# Patient Record
Sex: MALE | Race: BLACK OR AFRICAN AMERICAN | Employment: UNEMPLOYED | URBAN - METROPOLITAN AREA
[De-identification: names, ages, dates, MRNs, and addresses within clinical notes are randomized per-mention and may not be internally consistent; named-entity substitution may affect disease eponyms.]

---

## 2020-11-10 ENCOUNTER — HOSPITAL ENCOUNTER (EMERGENCY)
Age: 60
Discharge: HOME | End: 2020-11-10
Attending: EMERGENCY MEDICINE

## 2020-11-10 VITALS
WEIGHT: 175 LBS | HEIGHT: 71 IN | RESPIRATION RATE: 14 BRPM | TEMPERATURE: 98.4 F | OXYGEN SATURATION: 100 % | SYSTOLIC BLOOD PRESSURE: 139 MMHG | HEART RATE: 87 BPM | DIASTOLIC BLOOD PRESSURE: 106 MMHG | BODY MASS INDEX: 24.5 KG/M2

## 2020-11-10 LAB
A/G RATIO: 1.1 (ref 1.1–2.2)
ALBUMIN SERPL-MCNC: 3.9 G/DL (ref 3.4–5)
ALP BLD-CCNC: 137 U/L (ref 40–129)
ALT SERPL-CCNC: 74 U/L (ref 10–40)
ANION GAP SERPL CALCULATED.3IONS-SCNC: 12 MMOL/L (ref 3–16)
AST SERPL-CCNC: 139 U/L (ref 15–37)
BASOPHILS ABSOLUTE: 0 K/UL (ref 0–0.2)
BASOPHILS RELATIVE PERCENT: 0.5 %
BILIRUB SERPL-MCNC: 0.8 MG/DL (ref 0–1)
BILIRUBIN URINE: NEGATIVE
BLOOD, URINE: NEGATIVE
BUN BLDV-MCNC: 13 MG/DL (ref 7–20)
CALCIUM SERPL-MCNC: 8.9 MG/DL (ref 8.3–10.6)
CHLORIDE BLD-SCNC: 100 MMOL/L (ref 99–110)
CLARITY: CLEAR
CO2: 22 MMOL/L (ref 21–32)
COLOR: YELLOW
CREAT SERPL-MCNC: 0.9 MG/DL (ref 0.8–1.3)
EOSINOPHILS ABSOLUTE: 0.1 K/UL (ref 0–0.6)
EOSINOPHILS RELATIVE PERCENT: 0.8 %
GFR AFRICAN AMERICAN: >60
GFR NON-AFRICAN AMERICAN: >60
GLOBULIN: 3.4 G/DL
GLUCOSE BLD-MCNC: 151 MG/DL (ref 70–99)
GLUCOSE URINE: NEGATIVE MG/DL
HCT VFR BLD CALC: 48.6 % (ref 40.5–52.5)
HEMOGLOBIN: 16.3 G/DL (ref 13.5–17.5)
KETONES, URINE: NEGATIVE MG/DL
LEUKOCYTE ESTERASE, URINE: ABNORMAL
LYMPHOCYTES ABSOLUTE: 3 K/UL (ref 1–5.1)
LYMPHOCYTES RELATIVE PERCENT: 46.6 %
MCH RBC QN AUTO: 34.8 PG (ref 26–34)
MCHC RBC AUTO-ENTMCNC: 33.5 G/DL (ref 31–36)
MCV RBC AUTO: 103.8 FL (ref 80–100)
MICROSCOPIC EXAMINATION: YES
MONOCYTES ABSOLUTE: 0.8 K/UL (ref 0–1.3)
MONOCYTES RELATIVE PERCENT: 11.6 %
MUCUS: ABNORMAL /LPF
NEUTROPHILS ABSOLUTE: 2.6 K/UL (ref 1.7–7.7)
NEUTROPHILS RELATIVE PERCENT: 40.5 %
NITRITE, URINE: NEGATIVE
PDW BLD-RTO: 13.1 % (ref 12.4–15.4)
PH UA: 6 (ref 5–8)
PLATELET # BLD: 72 K/UL (ref 135–450)
PMV BLD AUTO: 9 FL (ref 5–10.5)
POTASSIUM REFLEX MAGNESIUM: 4.7 MMOL/L (ref 3.5–5.1)
PROTEIN UA: NEGATIVE MG/DL
RBC # BLD: 4.68 M/UL (ref 4.2–5.9)
RBC UA: ABNORMAL /HPF (ref 0–4)
RENAL EPITHELIAL, UA: ABNORMAL /HPF (ref 0–1)
SODIUM BLD-SCNC: 134 MMOL/L (ref 136–145)
SPECIFIC GRAVITY UA: 1.02 (ref 1–1.03)
TOTAL PROTEIN: 7.3 G/DL (ref 6.4–8.2)
URINE TYPE: ABNORMAL
UROBILINOGEN, URINE: 1 E.U./DL
WBC # BLD: 6.5 K/UL (ref 4–11)
WBC UA: ABNORMAL /HPF (ref 0–5)

## 2020-11-10 PROCEDURE — 81001 URINALYSIS AUTO W/SCOPE: CPT

## 2020-11-10 PROCEDURE — 96360 HYDRATION IV INFUSION INIT: CPT

## 2020-11-10 PROCEDURE — 85025 COMPLETE CBC W/AUTO DIFF WBC: CPT

## 2020-11-10 PROCEDURE — 96361 HYDRATE IV INFUSION ADD-ON: CPT

## 2020-11-10 PROCEDURE — 2580000003 HC RX 258: Performed by: EMERGENCY MEDICINE

## 2020-11-10 PROCEDURE — 80053 COMPREHEN METABOLIC PANEL: CPT

## 2020-11-10 PROCEDURE — 99284 EMERGENCY DEPT VISIT MOD MDM: CPT

## 2020-11-10 PROCEDURE — 84443 ASSAY THYROID STIM HORMONE: CPT

## 2020-11-10 RX ORDER — SODIUM CHLORIDE, SODIUM LACTATE, POTASSIUM CHLORIDE, CALCIUM CHLORIDE 600; 310; 30; 20 MG/100ML; MG/100ML; MG/100ML; MG/100ML
1000 INJECTION, SOLUTION INTRAVENOUS ONCE
Status: COMPLETED | OUTPATIENT
Start: 2020-11-10 | End: 2020-11-10

## 2020-11-10 RX ADMIN — SODIUM CHLORIDE, POTASSIUM CHLORIDE, SODIUM LACTATE AND CALCIUM CHLORIDE 1000 ML: 600; 310; 30; 20 INJECTION, SOLUTION INTRAVENOUS at 01:37

## 2020-11-10 ASSESSMENT — ENCOUNTER SYMPTOMS
NAUSEA: 0
DIARRHEA: 1
BACK PAIN: 0
COUGH: 0
ABDOMINAL PAIN: 0
VOMITING: 0
SHORTNESS OF BREATH: 0

## 2020-11-10 NOTE — ED PROVIDER NOTES
[address] [facility]          ATTENDING PHYSICIAN NOTE       Date of evaluation: 11/10/2020    Chief Complaint     Fatigue      History of Present Illness     Rere Soriano is a 60 y.o. male who presents with a complaint of fatigue and generalized weakness. The patient states that he has been feeling weak for the last week or so with intermittent loose stools. He states he feels like \"the energy has been sucked out of him\". He did get tested for coronavirus this Friday but his results are not back yet. He denies respiratory symptoms, chest pain, or exertional dyspnea. He denies abdominal pain. He denies nausea or vomiting. [facility] because his energy had not returned. He just moved here from [place] and does not yet have primary care in the area but is unsure establishing primary care. Medical history significant only for hepatitis C for which he is currently not being treated. Review of Systems     Review of Systems   Constitutional: Positive for fatigue. Negative for chills and fever. Respiratory: Negative for cough and shortness of breath. Cardiovascular: Negative for chest pain. Gastrointestinal: Positive for diarrhea. Negative for abdominal pain, nausea and vomiting. Endocrine: Negative for cold intolerance and heat intolerance. Genitourinary: Negative for decreased urine volume, dysuria and urgency. Musculoskeletal: Negative for back pain and neck pain. Neurological: Positive for weakness. Negative for dizziness. All other systems reviewed and are negative. Past Medical, Surgical, Family, and Social History     He has a past medical history of Hepatitis C. He has no past surgical history on file. His family history is not on file. He reports that he has been smoking cigarettes. He has been smoking about 0.50 packs per day.  He has never used smokeless tobacco. He reports current alcohol use. He reports previous drug use. Medications     There are no discharge medications for this patient. Allergies     He has No Known Allergies. Physical Exam     INITIAL VITALS: BP: (!) 163/112, Temp: 98.4 °F (36.9 °C), Pulse: 90, Resp: 16, SpO2: 100 %   Physical Exam  Constitutional: Well nourished middle aged -American male who appears in no acute distress. HEENT: NC/AT. PERRL 4-2 bilaterally. EOMI. CV:Heart is regular rate and rhythm without murmurs, rubs or gallops. Resp: Respirations unlabored. Lungs clear to auscultation w/o wheezing. Abd: Soft, nontender, nondistended. MSK: Full ROM, no edema or tenderness to palpation. Skin: Extremities are warm and well perfused. 2+ radial pulses . Cap Refill <3 seconds. Neuro: A&Ox3. Cranial nerves grossly intact   Strength and sensation intact x4 extremities. Psych:  Thought content, behavior & judgement normal.    Diagnostic Results         RADIOLOGY:  No orders to display       LABS:   Results for orders placed or performed during the hospital encounter of 11/10/20   CBC Auto Differential   Result Value Ref Range    WBC 6.5 4.0 - 11.0 K/uL    RBC 4.68 4.20 - 5.90 M/uL    Hemoglobin 16.3 13.5 - 17.5 g/dL    Hematocrit 48.6 40.5 - 52.5 %    .8 (H) 80.0 - 100.0 fL    MCH 34.8 (H) 26.0 - 34.0 pg    MCHC 33.5 31.0 - 36.0 g/dL    RDW 13.1 12.4 - 15.4 %    Platelets 72 (L) 093 - 450 K/uL    MPV 9.0 5.0 - 10.5 fL    Neutrophils % 40.5 %    Lymphocytes % 46.6 %    Monocytes % 11.6 %    Eosinophils % 0.8 %    Basophils % 0.5 %    Neutrophils Absolute 2.6 1.7 - 7.7 K/uL    Lymphocytes Absolute 3.0 1.0 - 5.1 K/uL    Monocytes Absolute 0.8 0.0 - 1.3 K/uL    Eosinophils Absolute 0.1 0.0 - 0.6 K/uL    Basophils Absolute 0.0 0.0 - 0.2 K/uL   Comprehensive Metabolic Panel w/ Reflex to MG   Result Value Ref Range    Sodium 134 (L) 136 - 145 mmol/L    Potassium reflex Magnesium 4.7 3.5 - 5.1 mmol/L    Chloride repeated this time. He endorses some diarrhea, but has no recent antibiotic use or other findings concerning for C. difficile colitis. It is possible that his diarrhea and fatigue are due to novel coronavirus infection or another viral gastroenteritis. His urinalysis was unremarkable with trace leuks but he has no symptoms of a UTI. He has no sign of dehydration and urinalysis. CBC showed thrombocytopenia, which is uncertain if this is new or old for the patient. I did discuss with him the need for outpatient work-up of this. However he has no active signs of bleeding currently. The remainder of his cell lines appear to be within normal range. Medardo Meade His comprehensive metabolic panel showed a mild transaminitis consistent with chronic hepatitis from hepatitis C. He was given a liter of fluids for dehydration. He was instructed to follow-up with a primary care physician. I did offer him linkage to a primary care physician in our system but he prefers to \"shop for himself \". As such he will be discharged with return precautions for bleeding, and instructed to avoid NSAIDs due to the risk of bleeding with thrombocytopenia. Clinical Impression     1. Fatigue, unspecified type    2. Thrombocytopenia [facility])        Disposition     PATIENT REFERRED TO:  [facility] Emergency Department  [address] [place] [address]  [phone]    If symptoms worsen    A PCP  For further monitoring and testing of your platelet count, hepatitis, and other chronic medical issues          DISCHARGE MEDICATIONS:  There are no discharge medications for this patient.       DISPOSITION Decision To Discharge 11/10/2020 04:24:16 AM          Clarisa Vernon MD  11/10/20 [id number]

## 2020-11-10 NOTE — ED NOTES
Patient prepared for and ready to be discharged. Dressed in clothes and given belongings. IV removed, pt tolerated well, no complications. Patient discharged at this time in no acute distress after he verbalized understanding of discharge instructions. Reviewed medications, and when to return to the ED with patient.  Encouraged follow up with new PCP/[facility] clinic  Patient walked to miguel Haque RN  11/10/20 4204

## 2020-11-13 LAB — TSH REFLEX: 2.72 UIU/ML (ref 0.27–4.2)

## 2021-04-16 ENCOUNTER — HOSPITAL ENCOUNTER (INPATIENT)
Age: 61
LOS: 1 days | Discharge: HOME | DRG: 204 | End: 2021-04-18
Attending: EMERGENCY MEDICINE | Admitting: INTERNAL MEDICINE
Payer: MEDICAID

## 2021-04-16 ENCOUNTER — APPOINTMENT (OUTPATIENT)
Dept: GENERAL RADIOLOGY | Age: 61
DRG: 204 | End: 2021-04-16
Payer: MEDICAID

## 2021-04-16 ENCOUNTER — APPOINTMENT (OUTPATIENT)
Dept: CT IMAGING | Age: 61
DRG: 204 | End: 2021-04-16
Payer: MEDICAID

## 2021-04-16 DIAGNOSIS — R55 SYNCOPE AND COLLAPSE: Primary | ICD-10-CM

## 2021-04-16 LAB
ANION GAP SERPL CALCULATED.3IONS-SCNC: 18 MMOL/L (ref 3–16)
ANION GAP SERPL CALCULATED.3IONS-SCNC: NORMAL MMOL/L (ref 3–16)
BASOPHILS ABSOLUTE: 0 K/UL (ref 0–0.2)
BASOPHILS RELATIVE PERCENT: 0.1 %
BUN BLDV-MCNC: 9 MG/DL (ref 7–20)
BUN BLDV-MCNC: NORMAL MG/DL (ref 7–20)
CALCIUM SERPL-MCNC: 9.2 MG/DL (ref 8.3–10.6)
CALCIUM SERPL-MCNC: NORMAL MG/DL (ref 8.3–10.6)
CHLORIDE BLD-SCNC: 97 MMOL/L (ref 99–110)
CHLORIDE BLD-SCNC: NORMAL MMOL/L (ref 99–110)
CO2: 20 MMOL/L (ref 21–32)
CO2: NORMAL MMOL/L (ref 21–32)
CREAT SERPL-MCNC: 0.9 MG/DL (ref 0.8–1.3)
CREAT SERPL-MCNC: NORMAL MG/DL (ref 0.8–1.3)
EKG ATRIAL RATE: 73 BPM
EKG DIAGNOSIS: NORMAL
EKG P AXIS: 55 DEGREES
EKG P-R INTERVAL: 140 MS
EKG Q-T INTERVAL: 418 MS
EKG QRS DURATION: 78 MS
EKG QTC CALCULATION (BAZETT): 460 MS
EKG R AXIS: -23 DEGREES
EKG T AXIS: 37 DEGREES
EKG VENTRICULAR RATE: 73 BPM
EOSINOPHILS ABSOLUTE: 0 K/UL (ref 0–0.6)
EOSINOPHILS RELATIVE PERCENT: 0 %
GFR AFRICAN AMERICAN: >60
GFR AFRICAN AMERICAN: NORMAL
GFR NON-AFRICAN AMERICAN: >60
GFR NON-AFRICAN AMERICAN: NORMAL
GLUCOSE BLD-MCNC: 110 MG/DL (ref 70–99)
GLUCOSE BLD-MCNC: NORMAL MG/DL (ref 70–99)
HCT VFR BLD CALC: 43.4 % (ref 40.5–52.5)
HEMOGLOBIN: 14.9 G/DL (ref 13.5–17.5)
LYMPHOCYTES ABSOLUTE: 1.2 K/UL (ref 1–5.1)
LYMPHOCYTES RELATIVE PERCENT: 21.7 %
MACROCYTES: ABNORMAL
MCH RBC QN AUTO: 34.9 PG (ref 26–34)
MCHC RBC AUTO-ENTMCNC: 34.3 G/DL (ref 31–36)
MCV RBC AUTO: 101.7 FL (ref 80–100)
MONOCYTES ABSOLUTE: 0.6 K/UL (ref 0–1.3)
MONOCYTES RELATIVE PERCENT: 9.8 %
NEUTROPHILS ABSOLUTE: 3.9 K/UL (ref 1.7–7.7)
NEUTROPHILS RELATIVE PERCENT: 68.4 %
PDW BLD-RTO: 12.8 % (ref 12.4–15.4)
PLATELET # BLD: 62 K/UL (ref 135–450)
PMV BLD AUTO: 9.6 FL (ref 5–10.5)
POTASSIUM REFLEX MAGNESIUM: 5 MMOL/L (ref 3.5–5.1)
PRO-BNP: NORMAL PG/ML (ref 0–124)
RBC # BLD: 4.26 M/UL (ref 4.2–5.9)
SODIUM BLD-SCNC: 135 MMOL/L (ref 136–145)
SODIUM BLD-SCNC: NORMAL MMOL/L (ref 136–145)
TROPONIN: <0.01 NG/ML
TROPONIN: NORMAL NG/ML
WBC # BLD: 5.7 K/UL (ref 4–11)

## 2021-04-16 PROCEDURE — 96360 HYDRATION IV INFUSION INIT: CPT

## 2021-04-16 PROCEDURE — 72220 X-RAY EXAM SACRUM TAILBONE: CPT

## 2021-04-16 PROCEDURE — 70450 CT HEAD/BRAIN W/O DYE: CPT

## 2021-04-16 PROCEDURE — 99283 EMERGENCY DEPT VISIT LOW MDM: CPT

## 2021-04-16 PROCEDURE — 71045 X-RAY EXAM CHEST 1 VIEW: CPT

## 2021-04-16 PROCEDURE — 85025 COMPLETE CBC W/AUTO DIFF WBC: CPT

## 2021-04-16 PROCEDURE — 6370000000 HC RX 637 (ALT 250 FOR IP): Performed by: PHYSICIAN ASSISTANT

## 2021-04-16 PROCEDURE — 80048 BASIC METABOLIC PNL TOTAL CA: CPT

## 2021-04-16 PROCEDURE — 72100 X-RAY EXAM L-S SPINE 2/3 VWS: CPT

## 2021-04-16 PROCEDURE — 83880 ASSAY OF NATRIURETIC PEPTIDE: CPT

## 2021-04-16 PROCEDURE — G0378 HOSPITAL OBSERVATION PER HR: HCPCS

## 2021-04-16 PROCEDURE — 84484 ASSAY OF TROPONIN QUANT: CPT

## 2021-04-16 PROCEDURE — 2580000003 HC RX 258: Performed by: PHYSICIAN ASSISTANT

## 2021-04-16 PROCEDURE — 93005 ELECTROCARDIOGRAM TRACING: CPT | Performed by: EMERGENCY MEDICINE

## 2021-04-16 RX ORDER — SODIUM CHLORIDE 0.9 % (FLUSH) 0.9 %
5-40 SYRINGE (ML) INJECTION EVERY 12 HOURS SCHEDULED
Status: DISCONTINUED | OUTPATIENT
Start: 2021-04-17 | End: 2021-04-18 | Stop reason: HOSPADM

## 2021-04-16 RX ORDER — ACETAMINOPHEN 650 MG/1
650 SUPPOSITORY RECTAL EVERY 6 HOURS PRN
Status: DISCONTINUED | OUTPATIENT
Start: 2021-04-16 | End: 2021-04-18 | Stop reason: HOSPADM

## 2021-04-16 RX ORDER — ACETAMINOPHEN 325 MG/1
650 TABLET ORAL EVERY 6 HOURS PRN
Status: DISCONTINUED | OUTPATIENT
Start: 2021-04-16 | End: 2021-04-18 | Stop reason: HOSPADM

## 2021-04-16 RX ORDER — SODIUM CHLORIDE 0.9 % (FLUSH) 0.9 %
5-40 SYRINGE (ML) INJECTION PRN
Status: DISCONTINUED | OUTPATIENT
Start: 2021-04-16 | End: 2021-04-18 | Stop reason: HOSPADM

## 2021-04-16 RX ORDER — SODIUM CHLORIDE 9 MG/ML
INJECTION, SOLUTION INTRAVENOUS CONTINUOUS
Status: ACTIVE | OUTPATIENT
Start: 2021-04-17 | End: 2021-04-17

## 2021-04-16 RX ORDER — SODIUM CHLORIDE 9 MG/ML
25 INJECTION, SOLUTION INTRAVENOUS PRN
Status: DISCONTINUED | OUTPATIENT
Start: 2021-04-16 | End: 2021-04-18 | Stop reason: HOSPADM

## 2021-04-16 RX ORDER — MAGNESIUM SULFATE IN WATER 40 MG/ML
2000 INJECTION, SOLUTION INTRAVENOUS PRN
Status: DISCONTINUED | OUTPATIENT
Start: 2021-04-16 | End: 2021-04-18 | Stop reason: HOSPADM

## 2021-04-16 RX ORDER — ONDANSETRON 2 MG/ML
4 INJECTION INTRAMUSCULAR; INTRAVENOUS EVERY 6 HOURS PRN
Status: DISCONTINUED | OUTPATIENT
Start: 2021-04-16 | End: 2021-04-18 | Stop reason: HOSPADM

## 2021-04-16 RX ORDER — OXYCODONE HYDROCHLORIDE AND ACETAMINOPHEN 5; 325 MG/1; MG/1
1 TABLET ORAL ONCE
Status: COMPLETED | OUTPATIENT
Start: 2021-04-16 | End: 2021-04-16

## 2021-04-16 RX ORDER — POLYETHYLENE GLYCOL 3350 17 G/17G
17 POWDER, FOR SOLUTION ORAL DAILY PRN
Status: DISCONTINUED | OUTPATIENT
Start: 2021-04-16 | End: 2021-04-18 | Stop reason: HOSPADM

## 2021-04-16 RX ORDER — POTASSIUM CHLORIDE 20 MEQ/1
40 TABLET, EXTENDED RELEASE ORAL PRN
Status: DISCONTINUED | OUTPATIENT
Start: 2021-04-16 | End: 2021-04-18 | Stop reason: HOSPADM

## 2021-04-16 RX ORDER — PROMETHAZINE HYDROCHLORIDE 25 MG/1
12.5 TABLET ORAL EVERY 6 HOURS PRN
Status: DISCONTINUED | OUTPATIENT
Start: 2021-04-16 | End: 2021-04-18 | Stop reason: HOSPADM

## 2021-04-16 RX ORDER — 0.9 % SODIUM CHLORIDE 0.9 %
1000 INTRAVENOUS SOLUTION INTRAVENOUS ONCE
Status: COMPLETED | OUTPATIENT
Start: 2021-04-16 | End: 2021-04-17

## 2021-04-16 RX ORDER — POTASSIUM CHLORIDE 7.45 MG/ML
10 INJECTION INTRAVENOUS PRN
Status: DISCONTINUED | OUTPATIENT
Start: 2021-04-16 | End: 2021-04-18 | Stop reason: HOSPADM

## 2021-04-16 RX ORDER — IBUPROFEN 400 MG/1
800 TABLET ORAL ONCE
Status: COMPLETED | OUTPATIENT
Start: 2021-04-16 | End: 2021-04-16

## 2021-04-16 RX ADMIN — IBUPROFEN 800 MG: 400 TABLET, FILM COATED ORAL at 23:01

## 2021-04-16 RX ADMIN — OXYCODONE AND ACETAMINOPHEN 1 TABLET: 5; 325 TABLET ORAL at 23:01

## 2021-04-16 RX ADMIN — SODIUM CHLORIDE 1000 ML: 9 INJECTION, SOLUTION INTRAVENOUS at 23:09

## 2021-04-16 ASSESSMENT — PAIN DESCRIPTION - DESCRIPTORS: DESCRIPTORS: ACHING

## 2021-04-16 ASSESSMENT — PAIN DESCRIPTION - ONSET: ONSET: ON-GOING

## 2021-04-16 ASSESSMENT — PAIN DESCRIPTION - PAIN TYPE: TYPE: ACUTE PAIN

## 2021-04-16 ASSESSMENT — PAIN SCALES - GENERAL: PAINLEVEL_OUTOF10: 7

## 2021-04-16 ASSESSMENT — PAIN DESCRIPTION - LOCATION: LOCATION: SACRUM

## 2021-04-16 ASSESSMENT — PAIN - FUNCTIONAL ASSESSMENT: PAIN_FUNCTIONAL_ASSESSMENT: ACTIVITIES ARE NOT PREVENTED

## 2021-04-16 NOTE — ED PROVIDER NOTES
[facility]          PHYSICIAN ASSISTANT NOTE     Date of evaluation: 4/16/2021    ADDENDUM:      Care of this patient was assumed from Steffanie Terrazas MD.  The patient was seen for Loss of Consciousness (c/o light headed prior to syncope, + LOC while at work, ? length of time couple minutes ) and Head Injury (hit left side of head)  . The patient's initial evaluation and plan have been discussed with the prior provider who initially evaluated the patient. Nursing Notes, Past Medical Hx, Past Surgical Hx, Social Hx, Allergies, and Family Hx were all reviewed. Diagnostic Results     EKG   EKG NSR, left axis devation, poor R wave progression, no acute ischemic changes. RADIOLOGY:  XR SACRUM COCCYX (MIN 2 VIEWS)   Final Result   Impression:    No acute osseous injury. XR LUMBAR SPINE (2-3 VIEWS)   Final Result   Impression:    No acute osseous injury. CT Head WO Contrast   Final Result      1. No acute intracranial process. 2.  Mild chronic small vessel ischemic disease. XR CHEST PORTABLE   Final Result      No acute pulmonary disease.              LABS:   Results for orders placed or performed during the hospital encounter of 04/16/21   CBC Auto Differential   Result Value Ref Range    WBC 5.7 4.0 - 11.0 K/uL    RBC 4.26 4.20 - 5.90 M/uL    Hemoglobin 14.9 13.5 - 17.5 g/dL    Hematocrit 43.4 40.5 - 52.5 %    .7 (H) 80.0 - 100.0 fL    MCH 34.9 (H) 26.0 - 34.0 pg    MCHC 34.3 31.0 - 36.0 g/dL    RDW 12.8 12.4 - 15.4 %    Platelets 62 (L) 257 - 450 K/uL    MPV 9.6 5.0 - 10.5 fL    Neutrophils % 68.4 %    Lymphocytes % 21.7 %    Monocytes % 9.8 %    Eosinophils % 0.0 %    Basophils % 0.1 %    Neutrophils Absolute 3.9 1.7 - 7.7 K/uL    Lymphocytes Absolute 1.2 1.0 - 5.1 K/uL    Monocytes Absolute 0.6 0.0 - 1.3 K/uL    Eosinophils Absolute 0.0 0.0 - 0.6 K/uL    Basophils Absolute 0.0 0.0 - 0.2 K/uL    Macrocytes 1+ (A)    Basic Metabolic Panel w/ no acute fractures. Labs obtained revealed WBC 5.7, hemoglobin 14.9, creatinine 0.9, glucose 110, anion gap 18, sodium 135, negative troponin, . Given syncopal episode and hx of near syncope episodes with no good plan for follow up, will plan to admit patient to medicine at this time. This patient was also evaluated by the attending physician. All care plans were discussed and agreed upon. Clinical Impression     1.  Syncope and collapse        Disposition     DISPOSITION  Admit         Kenji Flores PA-C  04/16/21 7540

## 2021-04-16 NOTE — ED PROVIDER NOTES
ED Attending Attestation Note     Date of evaluation: 4/16/2021    This patient was seen by the resident. I have seen and examined the patient, agree with the workup, evaluation, management and diagnosis. The care plan has been discussed. I have reviewed the ECG and concur with the resident's interpretation. My assessment reveals patient here for a syncopal episode. Patient states over the past year he has had episodes of lightheadedness and near syncope but today had a true syncopal episode. He passed out while at work. No exposure to any chemicals or fumes. He fell on his buttocks and then fell to the side hitting his forehead. Also hit his left elbow. No reports of any seizure activity. He denies any recent fever chills sweats. He at 1 point lived in [place] but has moved back here to [address] [place] [address]. He denies any heart lung issues. Denies any history of diabetes. On exam patient in no acute distress and vitals as noted nurse's notes. Thin black male no acute distress. Patient does have a small abrasion in his left upper forehead with a small hematoma. Some slight bruising over his left elbow peripheral range of motion no tenderness. Good strength in his lower extremities. Neurologic revealed speech clear no focal deficits. Jose Alarcon MD  04/16/21 5134

## 2021-04-16 NOTE — LETTER
[address] [facility]  [address] [address]  Phone: [phone]             April 18, 2021    Patient: Marietta Lindo   YOB: 1960   Date of Visit: 4/16/2021       To Whom It May Concern:    Marietta Lindo was seen and treated in our facility  beginning 4/16/2021 until 4/18/2021.       Sincerely,       Cyndie Cooks, MD         Signature:__________________________________

## 2021-04-17 PROBLEM — I95.1 ORTHOSTATIC SYNCOPE: Status: ACTIVE | Noted: 2021-04-17

## 2021-04-17 LAB
ALBUMIN SERPL-MCNC: 2.6 G/DL (ref 3.4–5)
ALP BLD-CCNC: 93 U/L (ref 40–129)
ALT SERPL-CCNC: 69 U/L (ref 10–40)
ANION GAP SERPL CALCULATED.3IONS-SCNC: 9 MMOL/L (ref 3–16)
AST SERPL-CCNC: 107 U/L (ref 15–37)
BASOPHILS ABSOLUTE: 0 K/UL (ref 0–0.2)
BASOPHILS RELATIVE PERCENT: 0.2 %
BILIRUB SERPL-MCNC: 0.9 MG/DL (ref 0–1)
BILIRUBIN DIRECT: 0.3 MG/DL (ref 0–0.3)
BILIRUBIN, INDIRECT: 0.6 MG/DL (ref 0–1)
BUN BLDV-MCNC: 10 MG/DL (ref 7–20)
CALCIUM SERPL-MCNC: 7.7 MG/DL (ref 8.3–10.6)
CHLORIDE BLD-SCNC: 101 MMOL/L (ref 99–110)
CO2: 23 MMOL/L (ref 21–32)
CREAT SERPL-MCNC: 0.8 MG/DL (ref 0.8–1.3)
EOSINOPHILS ABSOLUTE: 0 K/UL (ref 0–0.6)
EOSINOPHILS RELATIVE PERCENT: 0.6 %
FOLATE: 10.61 NG/ML (ref 4.78–24.2)
GFR AFRICAN AMERICAN: >60
GFR NON-AFRICAN AMERICAN: >60
GLUCOSE BLD-MCNC: 171 MG/DL (ref 70–99)
HCT VFR BLD CALC: 34.1 % (ref 40.5–52.5)
HEMOGLOBIN: 11.8 G/DL (ref 13.5–17.5)
INR BLD: 1.26 (ref 0.86–1.14)
LYMPHOCYTES ABSOLUTE: 2.1 K/UL (ref 1–5.1)
LYMPHOCYTES RELATIVE PERCENT: 43.9 %
MCH RBC QN AUTO: 35 PG (ref 26–34)
MCHC RBC AUTO-ENTMCNC: 34.6 G/DL (ref 31–36)
MCV RBC AUTO: 101.1 FL (ref 80–100)
MONOCYTES ABSOLUTE: 0.7 K/UL (ref 0–1.3)
MONOCYTES RELATIVE PERCENT: 13.6 %
NEUTROPHILS ABSOLUTE: 2 K/UL (ref 1.7–7.7)
NEUTROPHILS RELATIVE PERCENT: 41.7 %
PDW BLD-RTO: 12.6 % (ref 12.4–15.4)
PLATELET # BLD: 41 K/UL (ref 135–450)
PMV BLD AUTO: 9.4 FL (ref 5–10.5)
POTASSIUM REFLEX MAGNESIUM: 4.1 MMOL/L (ref 3.5–5.1)
PROTHROMBIN TIME: 14.6 SEC (ref 10–13.2)
RBC # BLD: 3.38 M/UL (ref 4.2–5.9)
SODIUM BLD-SCNC: 133 MMOL/L (ref 136–145)
TOTAL PROTEIN: 5.3 G/DL (ref 6.4–8.2)
TSH REFLEX: 1.37 UIU/ML (ref 0.27–4.2)
VITAMIN B-12: 1214 PG/ML (ref 211–911)
WBC # BLD: 4.9 K/UL (ref 4–11)

## 2021-04-17 PROCEDURE — 85610 PROTHROMBIN TIME: CPT

## 2021-04-17 PROCEDURE — 85025 COMPLETE CBC W/AUTO DIFF WBC: CPT

## 2021-04-17 PROCEDURE — 1200000000 HC SEMI PRIVATE

## 2021-04-17 PROCEDURE — 6370000000 HC RX 637 (ALT 250 FOR IP): Performed by: INTERNAL MEDICINE

## 2021-04-17 PROCEDURE — 36415 COLL VENOUS BLD VENIPUNCTURE: CPT

## 2021-04-17 PROCEDURE — 80076 HEPATIC FUNCTION PANEL: CPT

## 2021-04-17 PROCEDURE — 84443 ASSAY THYROID STIM HORMONE: CPT

## 2021-04-17 PROCEDURE — 82746 ASSAY OF FOLIC ACID SERUM: CPT

## 2021-04-17 PROCEDURE — 80048 BASIC METABOLIC PNL TOTAL CA: CPT

## 2021-04-17 PROCEDURE — G0378 HOSPITAL OBSERVATION PER HR: HCPCS

## 2021-04-17 PROCEDURE — 82607 VITAMIN B-12: CPT

## 2021-04-17 PROCEDURE — 93005 ELECTROCARDIOGRAM TRACING: CPT | Performed by: INTERNAL MEDICINE

## 2021-04-17 PROCEDURE — 2580000003 HC RX 258: Performed by: INTERNAL MEDICINE

## 2021-04-17 PROCEDURE — 99255 IP/OBS CONSLTJ NEW/EST HI 80: CPT | Performed by: INTERNAL MEDICINE

## 2021-04-17 RX ORDER — LISINOPRIL 10 MG/1
10 TABLET ORAL DAILY
Status: DISCONTINUED | OUTPATIENT
Start: 2021-04-18 | End: 2021-04-18 | Stop reason: HOSPADM

## 2021-04-17 RX ORDER — LISINOPRIL 5 MG/1
5 TABLET ORAL ONCE
Status: COMPLETED | OUTPATIENT
Start: 2021-04-17 | End: 2021-04-17

## 2021-04-17 RX ORDER — LABETALOL HYDROCHLORIDE 5 MG/ML
5 INJECTION, SOLUTION INTRAVENOUS EVERY 4 HOURS PRN
Status: DISCONTINUED | OUTPATIENT
Start: 2021-04-17 | End: 2021-04-18 | Stop reason: HOSPADM

## 2021-04-17 RX ORDER — LISINOPRIL 5 MG/1
5 TABLET ORAL DAILY
Status: DISCONTINUED | OUTPATIENT
Start: 2021-04-17 | End: 2021-04-17

## 2021-04-17 RX ORDER — NICOTINE 21 MG/24HR
1 PATCH, TRANSDERMAL 24 HOURS TRANSDERMAL DAILY
Status: DISCONTINUED | OUTPATIENT
Start: 2021-04-17 | End: 2021-04-18 | Stop reason: HOSPADM

## 2021-04-17 RX ADMIN — SODIUM CHLORIDE 25 ML: 9 INJECTION, SOLUTION INTRAVENOUS at 19:58

## 2021-04-17 RX ADMIN — LISINOPRIL 5 MG: 5 TABLET ORAL at 16:38

## 2021-04-17 RX ADMIN — Medication 5 ML: at 21:47

## 2021-04-17 RX ADMIN — SODIUM CHLORIDE: 9 INJECTION, SOLUTION INTRAVENOUS at 00:04

## 2021-04-17 RX ADMIN — ACETAMINOPHEN 650 MG: 325 TABLET ORAL at 00:56

## 2021-04-17 RX ADMIN — LISINOPRIL 5 MG: 5 TABLET ORAL at 14:37

## 2021-04-17 RX ADMIN — ACETAMINOPHEN 650 MG: 325 TABLET ORAL at 16:41

## 2021-04-17 ASSESSMENT — PAIN DESCRIPTION - ORIENTATION: ORIENTATION: MID

## 2021-04-17 ASSESSMENT — PAIN SCALES - GENERAL: PAINLEVEL_OUTOF10: 6

## 2021-04-17 ASSESSMENT — PAIN - FUNCTIONAL ASSESSMENT: PAIN_FUNCTIONAL_ASSESSMENT: ACTIVITIES ARE NOT PREVENTED

## 2021-04-17 ASSESSMENT — PAIN DESCRIPTION - DESCRIPTORS: DESCRIPTORS: ACHING

## 2021-04-17 ASSESSMENT — PAIN DESCRIPTION - LOCATION: LOCATION: SACRUM

## 2021-04-17 ASSESSMENT — PAIN DESCRIPTION - PAIN TYPE: TYPE: ACUTE PAIN

## 2021-04-17 NOTE — ED NOTES
Bed: B20-20  Expected date:   Expected time:   Means of arrival:   Comments:  [facility]  04/16/21 9723
Report to [address], transport to floor      [place], [address] [place] [address]  04/16/21 0059
3

## 2021-04-17 NOTE — CONSULTS
[facility]  Cardiology Inpatient Consult Service                                                                                          Pt Name: Bri Whitehead  Age: 63 y.o. Sex: male  : 1960  Location: [facility] [place] [facility]/5316-01    Referring Physician: Jadyn Pillai MD      Reason for Consult:       Reason for Consultation/Chief Complaint:   Syncope      HPI:      Bri Whitehead is a 63 y.o. male with no significant PMH who syncopal episode. He states he has 3-4 episodes over the past year. 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 He was not standing for too long. States having a feeling of flushing/sweating/diaphoresis followed by lightheadedness and then passing out. No palpitations. Histories     Past Medical History:   has a past medical history of Hepatitis C. Surgical History:   has no past surgical history on file. Social History:   reports that he has been smoking cigars. He has been smoking about 0.00 packs per day. He has never used smokeless tobacco. He reports current alcohol use. He reports that he does not use drugs. Family History:  No evidence for sudden cardiac death or premature CAD      Medications:       Home Medications  Were reviewed and are listed in nursing record. and/or listed below  Prior to Admission medications    Not on File          Inpatient Medications:   sodium chloride flush  5-40 mL Intravenous 2 times per day       IV drips:   sodium chloride      sodium chloride 100 mL/hr at 21 0004       PRN:  sodium chloride flush, sodium chloride, promethazine **OR** ondansetron, polyethylene glycol, acetaminophen **OR** acetaminophen, potassium chloride **OR** potassium alternative oral replacement **OR** potassium chloride, magnesium sulfate    Allergy:     Patient has no known allergies. Review of Systems:     All 12 point review of symptoms completed.  Pertinent positives identified in the HPI, all other review of symptoms negative as below. CONSTITUTIONAL: syncope    SKIN: No rash or pruritis. EYES: No visual changes or diplopia. No scleral icterus. ENT: No Headaches, hearing loss or vertigo. No mouth sores or sore throat. CARDIOVASCULAR: No chest pain/chest pressure/chest discomfort. No palpitations. RESPIRATORY: No cough or wheezing, no sputum production. No hematemesis. GASTROINTESTINAL: No N/V/D. No abdominal pain, appetite loss, blood in stools. GENITOURINARY: No dysuria, trouble voiding, or hematuria. MUSCULOSKELETAL:  No gait disturbance, weakness or joint complaints. NEUROLOGICAL: syncope  PSHYCH: No anxiety, loss of interest, change in sexual behavior, feelings of self-harm, or confusion. ENDOCRINE: No malaise, fatigue or temperature intolerance. No excessive thirst, fluid intake, or urination. No tremor. HEMATOLOGIC: No abnormal bruising or bleeding. ALLERGY: No nasal congestion or hives.       Physical Examination:     Vitals:    04/16/21 2348 04/17/21 0347 04/17/21 0901 04/17/21 0903   BP: (!) 159/92 (!) 152/94 (!) 167/97 (!) 160/96   Pulse: 86 91 91    Resp: 18 18 16    Temp: 98.6 °F (37 °C) 98.3 °F (36.8 °C) 98.4 °F (36.9 °C)    TempSrc: Oral Oral Oral    SpO2: 95% 98% 94%    Weight:       Height:           Wt Readings from Last 3 Encounters:   04/16/21 175 lb (79.4 kg)   11/10/20 175 lb (79.4 kg)       Objective      General Appearance:  Alert, cooperative, no distress, appears stated age Appropriate weight   Head:  Normocephalic, without obvious abnormality, atraumatic   Eyes:  PERRL, conjunctiva/corneas clear EOM intact  Ears normal   Throat no lesions       Nose: Nares normal, no drainage or sinus tenderness   Throat: Lips, mucosa, and tongue normal   Neck: Supple, symmetrical, trachea midline, no adenopathy, thyroid: not enlarged, symmetric, no tenderness/mass/nodules, no carotid bruit or JVD       Lungs:   Clear to auscultation bilaterally, respirations unlabored Chest Wall:  No tenderness or deformity   Heart:  Regular rate and rhythm, S1, S2 normal, no murmur, rub or gallop PMI intact   Abdomen:   Soft, non-tender, bowel sounds active all four quadrants,  no masses, no organomegaly       Extremities: Extremities normal, atraumatic, no cyanosis or edema   Pulses: 2+ and symmetric   Skin: Skin color, texture, turgor normal, no rashes or lesions   Pysch: Normal mood and affect   Neurologic: Normal gross motor and sensory exam.  Cranial nerves intact        Labs:     Recent Labs     04/16/21 1743 04/16/21 1851 04/17/21  0628   NA see below 135* 133*   K  --  5.0 4.1   BUN see below 9 10   CREATININE see below 0.9 0.8   CL see below 97* 101   CO2 see below 20* 23   GLUCOSE see below 110* 171*   CALCIUM see below 9.2 7.7*     Recent Labs     04/16/21 1743 04/17/21  0628   WBC 5.7 4.9   HGB 14.9 11.8*   HCT 43.4 34.1*   PLT 62* 41*   .7* 101.1*     No results for input(s): CHOLTOT, TRIG, HDL in the last 72 hours. Invalid input(s): LIPIDCOMM, CHOLHDL, VLDCHOL, LDL  Recent Labs     04/17/21  1026   INR 1.26*     Recent Labs     04/16/21 1743 04/16/21 1851   TROPONINI see below <0.01     No results for input(s): BNP in the last 72 hours. No results for input(s): TSH in the last 72 hours. No results for input(s): CHOL, HDL, LDLCALC, TRIG in the last 72 hours.]    Lab Results   Component Value Date    TROPONINI <0.01 04/16/2021         Imaging:     I personally reviewed imaging studies including CXR, Stress test, TTE/SACHA. Last ECG (if available) - EKG:  I have reviewed EKG with the following interpretation  NSR    Telemetry:  NSR    Last Stress (if available):    Last TTE/SACHA(if available):    Last Cath (if available):    Last CMR  (if available):      Assessment / Plan:     Syncope  -Seems vasovagal in nature.  -Echo pending. -EKG unremarkable. -Orthostatics    Tobacco use was discussed with the patient and educated on the negative effects.  I have asked the

## 2021-04-17 NOTE — H&P
Hospital Medicine History & Physical      PCP: No primary care provider on file. Date of Admission: 4/16/2021    Date of Service: Pt seen/examined on 4/16/2021. Placed in Observation. Chief Complaint: Syncopal episode at work      History Of Present Illness:     63 y.o. male who presents after a syncopal episode at work. 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 No witnessed seizure activity. According to bystanders he has been unconscious for about 2 minutes. Patient states that he did strike his head on the left. Patient states that it was his first day at a new job as a  and he passed out even before he started his job while folding some towels. Patient has  Patient has recently moved to [place] from [place] and the day he was leaving he was having lunch with his friend and when he got up to leave he felt extreme brightness, lightheadedness and passed out. He has been having episodes of near syncope at least 4-5 times over the past 1 and half years. Patient denies chest pain, shortness of breath, nausea, vomiting, palpitations, urinary symptoms, abdominal pain. Past Medical History:          Diagnosis Date    Hepatitis C        Past Surgical History:      History reviewed. No pertinent surgical history. Medications Prior to Admission:      Prior to Admission medications    Not on File       Allergies:  Patient has no known allergies. Social History:      TOBACCO:   reports that he has been smoking cigars. He has been smoking about 0.00 packs per day. He has never used smokeless tobacco.  ETOH:   reports current alcohol use. E-Cigarettes/Vaping Use     Questions Responses    E-Cigarette/Vaping Use Never User    Start Date     Passive Exposure     Quit Date     Counseling Given     Comments             Family History:    Reviewed in detail and negative for DM, CAD, Cancer, CVA.  Positive as follows:    History reviewed. No pertinent family history. REVIEW OF SYSTEMS:   Pertinent positives as noted in the HPI. All other systems reviewed and negative. PHYSICAL EXAM PERFORMED:    /86   Pulse 87   Temp 97.6 °F (36.4 °C) (Oral)   Resp 18   Ht 5' 11\" (1.803 m)   Wt 175 lb (79.4 kg)   SpO2 100%   BMI 24.41 kg/m²     General appearance:  No apparent distress, appears stated age and cooperative. Thin built -American male  HEENT:  Normal cephalic, atraumatic without obvious deformity. Pupils equal, round, and reactive to light. Extra ocular muscles intact. Conjunctivae/corneas clear. Neck: Supple, with full range of motion. No jugular venous distention. Trachea midline. Respiratory:  Normal respiratory effort. Clear to auscultation, bilaterally without Rales/Wheezes/Rhonchi. Cardiovascular:  Regular rate and rhythm with normal S1/S2 without murmurs, rubs or gallops. Abdomen: Soft, non-tender, non-distended with normal bowel sounds. Musculoskeletal:  No clubbing, cyanosis or edema bilaterally. Full range of motion without deformity. Skin: Skin color, texture, turgor normal.  Hypopigmented spots/vitiligo all over the body  Neurologic:  Neurovascularly intact without any focal sensory/motor deficits. Cranial nerves: II-XII intact, grossly non-focal.  Psychiatric:  Alert and oriented, thought content appropriate, normal insight  Capillary Refill: Brisk,< 3 seconds   Peripheral Pulses: +2 palpable, equal bilaterally       Labs:     Recent Labs     04/16/21 1743   WBC 5.7   HGB 14.9   HCT 43.4   PLT 62*     Recent Labs     04/16/21 1743 04/16/21 1851   NA see below 135*   K  --  5.0   CL see below 97*   CO2 see below 20*   BUN see below 9   CREATININE see below 0.9   CALCIUM see below 9.2     No results for input(s): AST, ALT, BILIDIR, BILITOT, ALKPHOS in the last 72 hours. No results for input(s): INR in the last 72 hours.   Recent Labs     04/16/21 1743 04/16/21 1851   TROPONINI see below <0.01

## 2021-04-17 NOTE — PLAN OF CARE
Patient is free from falls, patient in bed in lowest position, bed wheels locked, bed alarm on, call light and bed table within reach, bathroom checked q2hrs and PRN. Chair alarm on when used, wheels locked. Due to history of syncope, patient is educated to call for assistance before getting oob.  Will continue to monitor

## 2021-04-18 VITALS
OXYGEN SATURATION: 100 % | TEMPERATURE: 98.1 F | SYSTOLIC BLOOD PRESSURE: 133 MMHG | RESPIRATION RATE: 16 BRPM | WEIGHT: 175 LBS | BODY MASS INDEX: 24.5 KG/M2 | HEIGHT: 71 IN | HEART RATE: 91 BPM | DIASTOLIC BLOOD PRESSURE: 89 MMHG

## 2021-04-18 PROBLEM — I10 HTN (HYPERTENSION): Status: ACTIVE | Noted: 2021-04-18

## 2021-04-18 PROBLEM — D69.6 THROMBOCYTOPENIA (HCC): Status: ACTIVE | Noted: 2021-04-18

## 2021-04-18 PROBLEM — R74.8 ABNORMAL LIVER ENZYMES: Status: ACTIVE | Noted: 2021-04-18

## 2021-04-18 LAB
ANION GAP SERPL CALCULATED.3IONS-SCNC: 7 MMOL/L (ref 3–16)
BASOPHILS ABSOLUTE: 0 K/UL (ref 0–0.2)
BASOPHILS RELATIVE PERCENT: 0.8 %
BUN BLDV-MCNC: 7 MG/DL (ref 7–20)
CALCIUM SERPL-MCNC: 8.2 MG/DL (ref 8.3–10.6)
CHLORIDE BLD-SCNC: 102 MMOL/L (ref 99–110)
CO2: 25 MMOL/L (ref 21–32)
CREAT SERPL-MCNC: 0.7 MG/DL (ref 0.8–1.3)
EOSINOPHILS ABSOLUTE: 0 K/UL (ref 0–0.6)
EOSINOPHILS RELATIVE PERCENT: 1 %
GFR AFRICAN AMERICAN: >60
GFR NON-AFRICAN AMERICAN: >60
GLUCOSE BLD-MCNC: 192 MG/DL (ref 70–99)
HCT VFR BLD CALC: 34.8 % (ref 40.5–52.5)
HEMOGLOBIN: 12 G/DL (ref 13.5–17.5)
LYMPHOCYTES ABSOLUTE: 1.4 K/UL (ref 1–5.1)
LYMPHOCYTES RELATIVE PERCENT: 37.8 %
MCH RBC QN AUTO: 34.8 PG (ref 26–34)
MCHC RBC AUTO-ENTMCNC: 34.6 G/DL (ref 31–36)
MCV RBC AUTO: 100.8 FL (ref 80–100)
MONOCYTES ABSOLUTE: 0.5 K/UL (ref 0–1.3)
MONOCYTES RELATIVE PERCENT: 13.4 %
NEUTROPHILS ABSOLUTE: 1.8 K/UL (ref 1.7–7.7)
NEUTROPHILS RELATIVE PERCENT: 47 %
PDW BLD-RTO: 12.7 % (ref 12.4–15.4)
PLATELET # BLD: 39 K/UL (ref 135–450)
PMV BLD AUTO: 9.3 FL (ref 5–10.5)
POTASSIUM REFLEX MAGNESIUM: 4 MMOL/L (ref 3.5–5.1)
RBC # BLD: 3.45 M/UL (ref 4.2–5.9)
SODIUM BLD-SCNC: 134 MMOL/L (ref 136–145)
WBC # BLD: 3.8 K/UL (ref 4–11)

## 2021-04-18 PROCEDURE — 80048 BASIC METABOLIC PNL TOTAL CA: CPT

## 2021-04-18 PROCEDURE — 85025 COMPLETE CBC W/AUTO DIFF WBC: CPT

## 2021-04-18 PROCEDURE — 6370000000 HC RX 637 (ALT 250 FOR IP): Performed by: INTERNAL MEDICINE

## 2021-04-18 PROCEDURE — 2580000003 HC RX 258: Performed by: INTERNAL MEDICINE

## 2021-04-18 PROCEDURE — 36415 COLL VENOUS BLD VENIPUNCTURE: CPT

## 2021-04-18 RX ORDER — LISINOPRIL 10 MG/1
10 TABLET ORAL DAILY
Qty: 30 TABLET | Refills: 3 | Status: SHIPPED | OUTPATIENT
Start: 2021-04-18 | End: 2021-04-18 | Stop reason: HOSPADM

## 2021-04-18 RX ORDER — LISINOPRIL 5 MG/1
5 TABLET ORAL DAILY
Qty: 30 TABLET | Refills: 0 | Status: SHIPPED | OUTPATIENT
Start: 2021-04-18 | End: 2021-06-17

## 2021-04-18 RX ADMIN — LISINOPRIL 10 MG: 10 TABLET ORAL at 09:32

## 2021-04-18 RX ADMIN — Medication 5 ML: at 09:32

## 2021-04-18 NOTE — DISCHARGE SUMMARY
Hospital Medicine Discharge Summary      Patient ID: Ricardo Brian      Patient's PCP: No primary care provider on file. Admit Date: 4/16/2021     Discharge Date: 4/18/2021      Admitting Physician: Devendra Hollingsworth MD    Discharge Physician: Leonie Reinoso MD     Discharge Diagnoses: Active Hospital Problems    Diagnosis Date Noted    Abnormal liver enzymes [R74.8] 04/18/2021    Thrombocytopenia (HCC) [D69.6] 04/18/2021    HTN (hypertension) [I10] 04/18/2021    Orthostatic syncope [I95.1] 04/17/2021    Syncope and collapse [R55] 04/16/2021         The patient was seen and examined on day of discharge and this discharge summary is in conjunction with any daily progress note from day of discharge. Hospital Course:     Patient is a 64 y/o male who recently relocated to [place] and presented with syncope at his work. Patient had several episodes in the past but did not have medical work up, has not seen a doctor in many years. He was told he has hepatitis C, he was not treated with antiviral medications. Patient works as a  in a relatively hot environment. Hospital course:  Cardiology was consulted, patient admitted to telemetry. Orthostatic vitals were positive. Given IVF with improvement. Overall SBP was elevated to 170's. Started on lisinopril. Cardiology cleared patient for discharge and outpatient work up. Labs were concerning for possible liver cirrhosis. Patient was asked to stop drinking all alcoholic drinks. He was referred to cardiology, PCP and GI on discharge. Etiology of his syncope likely vasovagal with orthostatic component (multifactorial).      Consults:     IP CONSULT TO HOSPITALIST  IP CONSULT TO CARDIOLOGY    Disposition: Home    Discharged Condition: Stable    Code Status: Prior    Activity: activity as tolerated    Diet: regular diet    Follow Up: Primary Care Physician in one week    Exam:     General appearance: No apparent distress, appears stated age and cooperative. Lungs: Clear to ascultation, bilaterally without Rales/Wheezes/Rhonchi with good respiratory effort. Heart: Regular rate and rhythm with Normal S1/S2 without  murmurs, rubs or gallops, point of maximum impulse non-displaced  Abdomen: Soft, non-tender or non-distended without rigidity or guarding and positive bowel sounds all four quadrants. Extremities: No clubbing, cyanosis, or edema bilaterally. Skin: Skin color, texture, turgor normal.    Neurologic: Alert and oriented X 3,  grossly non-focal.  Mental status: Alert, oriented, thought content appropriate      Labs: For convenience and continuity at follow-up the following most recent labs are provided:    CBC:   Lab Results   Component Value Date    WBC 3.8 04/18/2021    HGB 12.0 04/18/2021    HCT 34.8 04/18/2021    PLT 39 04/18/2021       RENAL:   Lab Results   Component Value Date     04/18/2021    K 4.0 04/18/2021     04/18/2021    CO2 25 04/18/2021    BUN 7 04/18/2021    CREATININE 0.7 04/18/2021           Discharge Medications:   Discharge Medication List as of 4/18/2021 12:11 PM           Details   lisinopril (PRINIVIL;ZESTRIL) 5 MG tablet Take 1 tablet by mouth daily, Disp-30 tablet, R-0Print                Time Spent on discharge is more than 30 minutes in the examination, evaluation, counseling and review of medications and discharge plan. Signed:  Candance Abu, MD   4/18/2021      Thank you No primary care provider on file. for the opportunity to be involved in this patient's care. If you have any questions or concerns please feel free to contact me at [phone].

## 2021-04-18 NOTE — PROGRESS NOTES
4 Eyes Admission Assessment     I agree as the admission nurse that 2 RN's have performed a thorough Head to Toe Skin Assessment on the patient. ALL assessment sites listed below have been assessed on admission.        Areas assessed by both nurses:   [x]   Head, Face, and Ears   [x]   Shoulders, Back, and Chest  [x]   Arms, Elbows, and Hands   [x]   Coccyx, Sacrum, and Ischium  [x]   Legs, Feet, and Heels        Does the Patient have Skin Breakdown?  no        César Prevention initiated:  no   Wound Care Orders initiated:  no      [facility] nurse consulted for Pressure Injury (Stage 3,4, Unstageable, DTI, NWPT, and Complex wounds) or César score 18 or lower:  no    Nurse 1 eSignature:  Electronically signed by Yanni Solomon RN on 4/17/2021 at 12:35 AM      Nurse 2 eSignature: Electronically signed by Santos Velarde RN on 4/17/21 at 6:12 AM EDT
Discharge instructions given reguarding medications, follow up appts and follow up testing. Patient verbalized understanding.  Patient discharged to home per [employer] ordered by social work
No significant events overnight. Pt remained AAOx4, denies any episodes of weakness, dizzyness, sweating, subjective palpitations or other symptoms either in bed or while ambulating to bathroom. Maintained NSR on telemetry, calm and co-opertive with care.
Pt ao4, vss. C/o pain on the sacrum and buttocks which was relieved w prn tylenol. This RN verified w dr Jennifer Honeycutt about giving tylenol considering pt has hx hep C, MD approved to give. Pt denied any sob, n/v, chestpain, but some light dizziness. Pt was educated to call for assistance before oob for his safety. Bed alarm remains active.  Will continue to monitor
four quadrants. Extremities: No clubbing, cyanosis, or edema bilaterally. Skin: Skin color, texture, turgor normal.   Neurologic: Alert and oriented X 3,   grossly non-focal.  Mental status: Alert, oriented, thought content appropriate. Data    Recent Labs     04/16/21 1743 04/17/21  0628   WBC 5.7 4.9   HGB 14.9 11.8*   HCT 43.4 34.1*   PLT 62* 41*      Recent Labs     04/16/21  1743 04/16/21  1851 04/17/21  0628   NA see below 135* 133*   K  --  5.0 4.1   CL see below 97* 101   CO2 see below 20* 23   BUN see below 9 10   CREATININE see below 0.9 0.8     No results for input(s): AST, ALT, ALB, BILIDIR, BILITOT, ALKPHOS in the last 72 hours. Recent Labs     04/17/21  1026   INR 1.26*     Recent Labs     04/16/21 1743 04/16/21  1851   TROPONINI see below <0.01       Consults:     IP CONSULT TO HOSPITALIST  [facility]    Diagnosis Date Noted    Orthostatic syncope [I95.1] 04/17/2021    Syncope and collapse [R55] 04/16/2021         ASSESSMENT AND PLAN      Recurrent syncope:  Has not had any work up prior. Keep on tele. Consult cardiology. Obtain ECHO and carotid duplex. Repeat EKG today. Orthostatic vitals are positive- given IVF. Will repeat tomorrow. HTN:  Start low dose lisinopril    H/o hep C:  Labs are concerning for cirrhosis. Will need referral to GI. Thrombocytopenia:  With elevated MCV. Obtain labs.          DVT Prophylaxis: SCD  Diet: DIET GENERAL;  Code Status: Full Code    PT/OT Eval Status:ordered    Dispo - inpt    Lucas Agustin MD

## 2021-04-19 LAB
EKG ATRIAL RATE: 84 BPM
EKG DIAGNOSIS: NORMAL
EKG P AXIS: 76 DEGREES
EKG P-R INTERVAL: 140 MS
EKG Q-T INTERVAL: 384 MS
EKG QRS DURATION: 76 MS
EKG QTC CALCULATION (BAZETT): 453 MS
EKG R AXIS: 7 DEGREES
EKG T AXIS: 62 DEGREES
EKG VENTRICULAR RATE: 84 BPM

## 2021-04-19 PROCEDURE — 93010 ELECTROCARDIOGRAM REPORT: CPT | Performed by: INTERNAL MEDICINE

## 2021-04-23 ASSESSMENT — ENCOUNTER SYMPTOMS
SORE THROAT: 0
BACK PAIN: 0
ABDOMINAL PAIN: 0
EYE REDNESS: 0
TROUBLE SWALLOWING: 0
DIARRHEA: 0
BLOOD IN STOOL: 0
EYE PAIN: 0
SHORTNESS OF BREATH: 0
VOMITING: 0
COUGH: 0
NAUSEA: 0
RHINORRHEA: 0

## 2021-04-23 NOTE — ED PROVIDER NOTES
[address] [facility]          EM RESIDENT NOTE       Date of evaluation: 4/16/2021    Chief Complaint     Loss of Consciousness (c/o light headed prior to syncope, + LOC while at work, ? length of time couple minutes ) and Head Injury (hit left side of head)      History of Present Illness     Ariela Mcwilliams is a 63 y.o. male who presents to the emergency department for syncope and collapse. 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 Denies preceding symptoms. Loss of consciousness length a few minutes. He reports that he hit his head. Has had 1 similar episode in the past where he felt like he was going to pass out but did not actually pass out. Patient has a history of hepatitis C but no history of cardiac abnormalities. Review of Systems     Review of Systems   Constitutional: Negative for chills, fatigue, fever and unexpected weight change. HENT: Negative for congestion, ear pain, rhinorrhea, sore throat and trouble swallowing. Bruise on head    Eyes: Negative for pain and redness. Respiratory: Negative for cough and shortness of breath. No hemoptysis   Cardiovascular: Negative for chest pain, palpitations and leg swelling. Gastrointestinal: Negative for abdominal pain, blood in stool, diarrhea, nausea and vomiting. Genitourinary: Negative for dysuria and hematuria. Musculoskeletal: Negative for back pain and myalgias. Skin: Negative for rash and wound. Neurological: Negative for dizziness, weakness and headaches. No sensation changes  Loss of consciousness    Psychiatric/Behavioral: Negative for dysphoric mood and suicidal ideas. All other systems reviewed and are negative. Past Medical, Surgical, Family, and Social History     He has a past medical history of Hepatitis C. He has no past surgical history on file. His family history is not on file.   He reports that he has been smoking cigars. He has been smoking about 0.00 packs per day. He has never used smokeless tobacco. He reports current alcohol use. He reports that he does not use drugs. Medications     Discharge Medication List as of 4/18/2021 12:11 PM          Allergies     He has No Known Allergies. Physical Exam     INITIAL VITALS: BP: 116/86, Temp: 97.6 °F (36.4 °C), Pulse: 76, Resp: 12, SpO2: 99 %   Physical Exam  Vitals signs and nursing note reviewed. Exam conducted with a chaperone present. Constitutional:       General: He is not in acute distress. Appearance: Normal appearance. He is not ill-appearing. HENT:      Head: Normocephalic. Comments: Small bruise on the head      Nose: Nose normal.      Mouth/Throat:      Mouth: Mucous membranes are moist.      Pharynx: Oropharynx is clear. Eyes:      Extraocular Movements: Extraocular movements intact. Conjunctiva/sclera: Conjunctivae normal.      Pupils: Pupils are equal, round, and reactive to light. Neck:      Musculoskeletal: Normal range of motion and neck supple. No muscular tenderness. Cardiovascular:      Rate and Rhythm: Normal rate and regular rhythm. Pulses: Normal pulses. Heart sounds: Normal heart sounds. No murmur. No friction rub. No gallop. Pulmonary:      Effort: Pulmonary effort is normal. No respiratory distress. Breath sounds: Normal breath sounds. No wheezing, rhonchi or rales. Abdominal:      General: Abdomen is flat. Bowel sounds are normal.      Palpations: Abdomen is soft. Tenderness: There is no abdominal tenderness. There is no guarding or rebound. Musculoskeletal: Normal range of motion. General: No swelling, tenderness, deformity or signs of injury. Right lower leg: No edema. Left lower leg: No edema. Skin:     General: Skin is warm and dry. Capillary Refill: Capillary refill takes less than 2 seconds. Findings: No rash.    Neurological:      General: No focal Eosinophils % 0.6 %    Basophils % 0.2 %    Neutrophils Absolute 2.0 1.7 - 7.7 K/uL    Lymphocytes Absolute 2.1 1.0 - 5.1 K/uL    Monocytes Absolute 0.7 0.0 - 1.3 K/uL    Eosinophils Absolute 0.0 0.0 - 0.6 K/uL    Basophils Absolute 0.0 0.0 - 0.2 K/uL   Hepatic function panel   Result Value Ref Range    Total Protein 5.3 (L) 6.4 - 8.2 g/dL    Albumin 2.6 (L) 3.4 - 5.0 g/dL    Alkaline Phosphatase 93 40 - 129 U/L    ALT 69 (H) 10 - 40 U/L     (H) 15 - 37 U/L    Total Bilirubin 0.9 0.0 - 1.0 mg/dL    Bilirubin, Direct 0.3 0.0 - 0.3 mg/dL    Bilirubin, Indirect 0.6 0.0 - 1.0 mg/dL   Protime-INR   Result Value Ref Range    Protime 14.6 (H) 10.0 - 13.2 sec    INR 1.26 (H) 0.86 - 1.14   Folate   Result Value Ref Range    Folate 10.61 4.78 - 24.20 ng/mL   TSH with Reflex   Result Value Ref Range    TSH 1.37 0.27 - 4.20 uIU/mL   Vitamin B12   Result Value Ref Range    Vitamin B-12 1214 (H) 211 - 911 pg/mL   Basic Metabolic Panel w/ Reflex to MG   Result Value Ref Range    Sodium 134 (L) 136 - 145 mmol/L    Potassium reflex Magnesium 4.0 3.5 - 5.1 mmol/L    Chloride 102 99 - 110 mmol/L    CO2 25 21 - 32 mmol/L    Anion Gap 7 3 - 16    Glucose 192 (H) 70 - 99 mg/dL    BUN 7 7 - 20 mg/dL    CREATININE 0.7 (L) 0.8 - 1.3 mg/dL    GFR Non-African American >60 >60    GFR African American >60 >60    Calcium 8.2 (L) 8.3 - 10.6 mg/dL   CBC auto differential   Result Value Ref Range    WBC 3.8 (L) 4.0 - 11.0 K/uL    RBC 3.45 (L) 4.20 - 5.90 M/uL    Hemoglobin 12.0 (L) 13.5 - 17.5 g/dL    Hematocrit 34.8 (L) 40.5 - 52.5 %    .8 (H) 80.0 - 100.0 fL    MCH 34.8 (H) 26.0 - 34.0 pg    MCHC 34.6 31.0 - 36.0 g/dL    RDW 12.7 12.4 - 15.4 %    Platelets 39 (L) 990 - 450 K/uL    MPV 9.3 5.0 - 10.5 fL    Neutrophils % 47.0 %    Lymphocytes % 37.8 %    Monocytes % 13.4 %    Eosinophils % 1.0 %    Basophils % 0.8 %    Neutrophils Absolute 1.8 1.7 - 7.7 K/uL    Lymphocytes Absolute 1.4 1.0 - 5.1 K/uL    Monocytes Absolute 0.5 0.0 - 1.3 K/uL    Eosinophils Absolute 0.0 0.0 - 0.6 K/uL    Basophils Absolute 0.0 0.0 - 0.2 K/uL   EKG 12 Lead   Result Value Ref Range    Ventricular Rate 73 BPM    Atrial Rate 73 BPM    P-R Interval 140 ms    QRS Duration 78 ms    Q-T Interval 418 ms    QTc Calculation (Bazett) 460 ms    P Axis 55 degrees    R Axis -23 degrees    T Axis 37 degrees    Diagnosis       EKG performed in ER and to be interpreted by ER physician. Confirmed by MD, ER ([id number]),  LENCHO GARCIA ([id number]) on 4/16/2021 4:33:31 PM   EKG 12 Lead   Result Value Ref Range    Ventricular Rate 84 BPM    Atrial Rate 84 BPM    P-R Interval 140 ms    QRS Duration 76 ms    Q-T Interval 384 ms    QTc Calculation (Bazett) 453 ms    P Axis 76 degrees    R Axis 7 degrees    T Axis 62 degrees    Diagnosis       Normal sinus rhythmCannot rule out Anterior infarct , age undeterminedAbnormal ECGConfirmed by Bunny Isidro ([id number]) on 4/19/2021 8:57:04 AM         RECENT VITALS:  BP: 133/89, Temp: 98.1 °F (36.7 °C), Pulse: 91,Resp: 16, SpO2: 100 %     Procedures     None     ED Course     Nursing Notes, Past Medical Hx, Past Surgical Hx, Social Hx, Allergies, and Family Hx were reviewed.     The patient was given the followingmedications:  Orders Placed This Encounter   Medications    DISCONTD: sodium chloride flush 0.9 % injection 5-40 mL    DISCONTD: sodium chloride flush 0.9 % injection 5-40 mL    DISCONTD: 0.9 % sodium chloride infusion    DISCONTD: promethazine (PHENERGAN) tablet 12.5 mg    DISCONTD: ondansetron (ZOFRAN) injection 4 mg    DISCONTD: polyethylene glycol (GLYCOLAX) packet 17 g    DISCONTD: acetaminophen (TYLENOL) tablet 650 mg    DISCONTD: acetaminophen (TYLENOL) suppository 650 mg    DISCONTD: potassium chloride (KLOR-CON M) extended release tablet 40 mEq    DISCONTD: potassium bicarb-citric acid (EFFER-K) effervescent tablet 40 mEq    DISCONTD: potassium chloride 10 mEq/100 mL IVPB (Peripheral Line)    DISCONTD: magnesium sulfate 2000 mg in 50 mL IVPB premix    0.9 % sodium chloride infusion    0.9 % sodium chloride bolus    oxyCODONE-acetaminophen (PERCOCET) 5-325 MG per tablet 1 tablet    ibuprofen (ADVIL;MOTRIN) tablet 800 mg    DISCONTD: nicotine (NICODERM CQ) 14 MG/24HR 1 patch    DISCONTD: lisinopril (PRINIVIL;ZESTRIL) tablet 5 mg    lisinopril (PRINIVIL;ZESTRIL) tablet 5 mg    DISCONTD: lisinopril (PRINIVIL;ZESTRIL) tablet 10 mg    DISCONTD: labetalol (NORMODYNE;TRANDATE) injection 5 mg    DISCONTD: lisinopril (PRINIVIL;ZESTRIL) 10 MG tablet     Sig: Take 1 tablet by mouth daily     Dispense:  30 tablet     Refill:  3    lisinopril (PRINIVIL;ZESTRIL) 5 MG tablet     Sig: Take 1 tablet by mouth daily     Dispense:  30 tablet     Refill:  0       CONSULTS:  IP CONSULT TO HOSPITALIST  IP CONSULT TO [address] [place] [address] / KENYA / Rosa Adrien is a 63 y.o. male with history listed above presenting to the emergency department for syncope and collapse. Patient has a reassuring physical exam but given the description of his symptoms, concerning for cardiac syncope. Vital signs within normal limits and no obvious ST segment changes on his EKG. Lab work within normal limits. CT head Noncon with no intracranial hemorrhage. Patient not on blood thinners at baseline. Other lab work overall reassuring with reassuring imaging. At this time I am going off service and assigned out the care of this patient to the oncoming provider. My colleagues responsibilities will include: Follow-up on imaging, reassessment, disposition. This patient was also evaluated by the attending physician. All care plans werediscussed and agreed upon. Clinical Impression     1.  Syncope and collapse        Disposition     PATIENT REFERRED TO:  [place] [id number] [phone]    Schedule an appointment as soon as possible for a visit in 1 week  to establish PCP    Romayne Lips, MD  [address] [place] [address]  [phone]    Schedule an appointment as soon as possible for a visit in 1 week      Annemarie Lauren [address] [address]  [phone]    Schedule an appointment as soon as possible for a visit in 1 week        DISCHARGE MEDICATIONS:  Discharge Medication List as of 4/18/2021 12:11 PM          DISPOSITION Admitted 04/16/2021 10:30:56 PM       Yancy Callaway MD  04/23/21 1944

## 2021-06-02 ENCOUNTER — OFFICE VISIT (OUTPATIENT)
Dept: CARDIOLOGY CLINIC | Age: 61
End: 2021-06-02
Payer: MEDICAID

## 2021-06-02 VITALS
DIASTOLIC BLOOD PRESSURE: 80 MMHG | HEIGHT: 71 IN | WEIGHT: 159.8 LBS | OXYGEN SATURATION: 96 % | HEART RATE: 101 BPM | BODY MASS INDEX: 22.37 KG/M2 | SYSTOLIC BLOOD PRESSURE: 120 MMHG

## 2021-06-02 DIAGNOSIS — R55 SYNCOPE AND COLLAPSE: Primary | ICD-10-CM

## 2021-06-02 DIAGNOSIS — I10 ESSENTIAL HYPERTENSION: ICD-10-CM

## 2021-06-02 DIAGNOSIS — I95.1 ORTHOSTATIC SYNCOPE: ICD-10-CM

## 2021-06-02 PROCEDURE — 99204 OFFICE O/P NEW MOD 45 MIN: CPT | Performed by: INTERNAL MEDICINE

## 2021-06-02 ASSESSMENT — ENCOUNTER SYMPTOMS
ABDOMINAL PAIN: 0
COLOR CHANGE: 0
WHEEZING: 0
BACK PAIN: 0
FACIAL SWELLING: 0
ABDOMINAL DISTENTION: 0
EYE DISCHARGE: 0
COUGH: 0
BLOOD IN STOOL: 0
SHORTNESS OF BREATH: 0
CHEST TIGHTNESS: 0
VOMITING: 0

## 2021-06-02 NOTE — PROGRESS NOTES
[facility]     Outpatient Cardiology         Chief Complaint   Patient presents with    Follow-Up from Hospital     due to syncopal episodes     Fatigue    Dizziness       HPI     Jose Myles a 63 y.o. male here for hospital follow up for syncope. No cardiac history. States 3 episodes of syncope, all of them while changing positions or standing, some premonitory symptoms including fogginess, little bit of sweatiness followed by passing out. The symptoms are moderate in nature. Today he is orthostatic in the office. He works as a  and is on his feet all day  Denies any symptoms concerning for CAD. PMH  Past Medical History:   Diagnosis Date    Hepatitis C     Hypertension        PSH  History reviewed. No pertinent surgical history. Social HIstory  Social History     Tobacco Use    Smoking status: Current Every Day Smoker     Packs/day: 0.00     Types: Cigars    Smokeless tobacco: Never Used    Tobacco comment: 2 cigars daily    Vaping Use    Vaping Use: Never used   Substance Use Topics    Alcohol use: Yes     Comment: couple beers every couple days     Drug use: Never       Family History  History reviewed. No pertinent family history. Allergies   No Known Allergies    Medications:     Home Medications:  Were reviewed and are listed in nursing record. and/or listed below    Prior to Admission medications    Medication Sig Start Date End Date Taking? Authorizing Provider   lisinopril (PRINIVIL;ZESTRIL) 5 MG tablet Take 1 tablet by mouth daily  Patient not taking: Reported on 6/2/2021 4/18/21   Nelly Benito MD        Review of Systems   Constitutional: Negative for activity change, appetite change, diaphoresis, fatigue, fever and unexpected weight change. HENT: Negative for congestion, facial swelling, mouth sores and nosebleeds. Eyes: Negative for discharge and visual disturbance.    Respiratory: Negative for cough, chest tightness, shortness of breath and wheezing. Cardiovascular: Negative for chest pain, palpitations and leg swelling. Gastrointestinal: Negative for abdominal distention, abdominal pain, blood in stool and vomiting. Endocrine: Negative for cold intolerance, heat intolerance and polyuria. Genitourinary: Negative for difficulty urinating, dysuria, frequency and hematuria. Musculoskeletal: Negative for back pain, joint swelling, myalgias and neck pain. Skin: Negative for color change, pallor and rash. Allergic/Immunologic: Negative for immunocompromised state. Neurological: Positive for syncope. Negative for dizziness, weakness, light-headedness, numbness and headaches. Hematological: Negative for adenopathy. Does not bruise/bleed easily. Psychiatric/Behavioral: Negative for behavioral problems, confusion, decreased concentration and suicidal ideas. The patient is not nervous/anxious. Vitals:    06/02/21 1028   BP: 120/80   Pulse: 101   SpO2:     Weight: 159 lb 12.8 oz (72.5 kg)       Vitals:    06/02/21 1017 06/02/21 1024 06/02/21 1028   BP: (!) 140/100 (!) 130/100 120/80   Site: Left Upper Arm Left Upper Arm Left Upper Arm   Position: Supine Sitting Standing   Cuff Size: Medium Adult Medium Adult Medium Adult   Pulse: 96 96 101   SpO2: 96%     Weight: 159 lb 12.8 oz (72.5 kg)     Height: 5' 11\" (1.803 m)         BP Readings from Last 3 Encounters:   06/02/21 120/80   04/18/21 133/89   11/10/20 (!) 139/106       Wt Readings from Last 3 Encounters:   06/02/21 159 lb 12.8 oz (72.5 kg)   04/16/21 175 lb (79.4 kg)   11/10/20 175 lb (79.4 kg)       Physical Exam  Constitutional:       General: He is not in acute distress. Appearance: He is well-developed. He is not diaphoretic. HENT:      Head: Normocephalic and atraumatic. Eyes:      Pupils: Pupils are equal, round, and reactive to light. Neck:      Thyroid: No thyromegaly. Vascular: No JVD.    Cardiovascular:      Rate and Rhythm: Normal rate and regular rhythm. Chest Wall: PMI is not displaced. Heart sounds: Normal heart sounds, S1 normal and S2 normal. No murmur heard. No friction rub. No gallop. Pulmonary:      Effort: Pulmonary effort is normal. No respiratory distress. Breath sounds: Normal breath sounds. No stridor. No wheezing or rales. Chest:      Chest wall: No tenderness. Abdominal:      General: Bowel sounds are normal. There is no distension. Palpations: Abdomen is soft. Tenderness: There is no abdominal tenderness. There is no guarding or rebound. Musculoskeletal:         General: No tenderness. Normal range of motion. Cervical back: Normal range of motion. Lymphadenopathy:      Cervical: No cervical adenopathy. Skin:     General: Skin is warm and dry. Findings: No erythema or rash. Neurological:      Mental Status: He is alert and oriented to person, place, and time. Coordination: Coordination normal.   Psychiatric:         Behavior: Behavior normal.         Thought Content:  Thought content normal.         Judgment: Judgment normal.         Labs:       Lab Results   Component Value Date    WBC 3.8 (L) 04/18/2021    HGB 12.0 (L) 04/18/2021    HCT 34.8 (L) 04/18/2021    .8 (H) 04/18/2021    PLT 39 (L) 04/18/2021     Lab Results   Component Value Date     (L) 04/18/2021    K 4.0 04/18/2021     04/18/2021    CO2 25 04/18/2021    BUN 7 04/18/2021    CREATININE 0.7 (L) 04/18/2021    GLUCOSE 192 (H) 04/18/2021    CALCIUM 8.2 (L) 04/18/2021    PROT 5.3 (L) 04/17/2021    LABALBU 2.6 (L) 04/17/2021    BILITOT 0.9 04/17/2021    ALKPHOS 93 04/17/2021     (H) 04/17/2021    ALT 69 (H) 04/17/2021    LABGLOM >60 04/18/2021    GFRAA >60 04/18/2021    AGRATIO 1.1 11/10/2020    GLOB 3.4 11/10/2020         No results found for: CHOL  No results found for: TRIG  No results found for: HDL  No results found for: LDLCHOLESTEROL, LDLCALC  No results found for: LABVLDL, VLDL  No results found

## 2021-06-02 NOTE — ASSESSMENT & PLAN NOTE
Clearly orthostatic today. We will get an echocardiogram.  Discussed hydration and compression stockings. No need for other medications currently.
No

## 2021-06-14 ENCOUNTER — HOSPITAL ENCOUNTER (OUTPATIENT)
Dept: NON INVASIVE DIAGNOSTICS | Age: 61
Discharge: HOME | End: 2021-06-14
Payer: MEDICAID

## 2021-06-14 DIAGNOSIS — R55 SYNCOPE AND COLLAPSE: ICD-10-CM

## 2021-06-14 LAB
LV EF: 58 %
LVEF MODALITY: NORMAL

## 2021-06-14 PROCEDURE — 93306 TTE W/DOPPLER COMPLETE: CPT

## 2021-06-17 ENCOUNTER — OFFICE VISIT (OUTPATIENT)
Dept: CARDIOLOGY CLINIC | Age: 61
End: 2021-06-17
Payer: MEDICAID

## 2021-06-17 VITALS
HEART RATE: 84 BPM | WEIGHT: 158.6 LBS | BODY MASS INDEX: 22.12 KG/M2 | DIASTOLIC BLOOD PRESSURE: 80 MMHG | SYSTOLIC BLOOD PRESSURE: 130 MMHG

## 2021-06-17 DIAGNOSIS — R55 SYNCOPE AND COLLAPSE: ICD-10-CM

## 2021-06-17 DIAGNOSIS — I10 ESSENTIAL HYPERTENSION: ICD-10-CM

## 2021-06-17 DIAGNOSIS — Z79.01 LONG TERM (CURRENT) USE OF ANTICOAGULANTS: ICD-10-CM

## 2021-06-17 DIAGNOSIS — I95.1 ORTHOSTATIC SYNCOPE: Primary | ICD-10-CM

## 2021-06-17 DIAGNOSIS — Z01.818 PRE-OP EVALUATION: Primary | ICD-10-CM

## 2021-06-17 DIAGNOSIS — R42 DIZZINESS: ICD-10-CM

## 2021-06-17 PROCEDURE — 4004F PT TOBACCO SCREEN RCVD TLK: CPT | Performed by: INTERNAL MEDICINE

## 2021-06-17 PROCEDURE — 93000 ELECTROCARDIOGRAM COMPLETE: CPT | Performed by: INTERNAL MEDICINE

## 2021-06-17 PROCEDURE — G8427 DOCREV CUR MEDS BY ELIG CLIN: HCPCS | Performed by: INTERNAL MEDICINE

## 2021-06-17 PROCEDURE — 3017F COLORECTAL CA SCREEN DOC REV: CPT | Performed by: INTERNAL MEDICINE

## 2021-06-17 PROCEDURE — 99204 OFFICE O/P NEW MOD 45 MIN: CPT | Performed by: INTERNAL MEDICINE

## 2021-06-17 PROCEDURE — G8420 CALC BMI NORM PARAMETERS: HCPCS | Performed by: INTERNAL MEDICINE

## 2021-06-17 NOTE — PROGRESS NOTES
[facility]   Cardiac Consultation    Referring Provider:  No primary care provider on file. Chief Complaint   Patient presents with    New Patient     syncope     HPI:  Jesus Calle is a 63 y.o. male referred by Dr. Ameena Walsh for syncope. PMH significant for HTN. He presented to the ED on 4/16/21 following a syncopal event. He reports having 3-4 episodes over the past year. Normal echo (6/14/21) with EF 55-60%. [facility] (while moving to [place]). In 2/2021, he states he became extremely weak, out of work for about 2 weeks which cost him his job. He took a couple months off work. In 4/2021, he got a new job as a . 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 This same scenario occurred 2 more times and he hasn't been able to work since. He is very frustrated because he wants to work. Past Medical History:   has a past medical history of Hepatitis C and Hypertension. Surgical History:   has no past surgical history on file. Social History:   reports that he has been smoking cigars. He has been smoking about 0.00 packs per day. He has never used smokeless tobacco. He reports current alcohol use. He reports that he does not use drugs. Family History:  family history is not on file. Home Medications:  Outpatient Encounter Medications as of 6/17/2021   Medication Sig Dispense Refill    [DISCONTINUED] lisinopril (PRINIVIL;ZESTRIL) 5 MG tablet Take 1 tablet by mouth daily (Patient not taking: Reported on 6/2/2021) 30 tablet 0     No facility-administered encounter medications on file as of 6/17/2021. Allergies:  Patient has no known allergies. Review of Systems   Constitutional: Negative. HENT: Negative. Eyes: Negative. Respiratory: Negative. Cardiovascular: Negative. Gastrointestinal: Negative. Genitourinary: Negative. Musculoskeletal: Negative. Skin: Negative. Neurological: Negative. Hematological: Negative. Psychiatric/Behavioral: Negative. /80   Pulse 84   Wt 158 lb 9.6 oz (71.9 kg)   BMI 22.12 kg/m²     ECG 6/17/21, personally reviewed    Echo 6/14/21  Summary   Normal left ventricular size. Preserved ejection fraction estimated at 55%-60%. Normal diastolic function. Thickened aortic valve leaflets. Trace-mild tricuspid regurgitation. The pulmonic valve is not well visualized. Estimated pulmonary artery systolic pressure is at 22 mmHg assuming a right   atrial pressure of 3 mmHg. Objective:  Physical Exam   Constitutional: He is oriented to person, place, and time. He appears well-developed and well-nourished. HENT:   Head: Normocephalic and atraumatic. Eyes: Pupils are equal, round, and reactive to light. Neck: Normal range of motion. Cardiovascular: Normal rate, regular rhythm and normal heart sounds. Pulmonary/Chest: Effort normal and breath sounds normal.   Abdominal: Soft. No tenderness. Musculoskeletal: Normal range of motion. He exhibits no edema. Neurological: He is alert and oriented to person, place, and time. Skin: Skin is warm and dry. Psychiatric: He has a normal mood and affect. Assessment:  1. Syncope - He has had 5 syncopal episodes since 1/2021. The first 2 were after postural changes. The last 3 episodes occurred after standing for 15 min. Plan:  1. Plan for tilt table study  2. Follow up     Eliud NAVARRETE RN, am scribing for and in the presence of Dr. Kim Chambers. 06/17/21 1:01 PM  Eliud Baeza RN    I, Dr. Kim Chambers, personally performed the services described in this documentation as scribed by Eliud Baeza RN in my presence, and it is both accurate and complete.       Kim Chambers M.D.

## 2021-06-17 NOTE — PATIENT INSTRUCTIONS
Tilt Table Test    Date of Procedure:    Time of Arrival:    Cardiologist performing procedure:     · Arrive at [facility] through the main entrance. Check in at the Outpatient Diagnostic desk on the 1st floor. · Do not eat or drink anything 4 hours prior to the test.      · No caffeine after midnight the night before the test.    · You may brush your teeth and rinse the morning of the procedure. · Take ALL of your routine medications the morning of the procedure. However, if you are taking diabetic medications, please HOLD on the day of the procedure (including insulin). If you take Lantus insulin, take HALF of your usual dose the night before. · Do NOT stop taking aspirin, Plavix, coumadin, Eliquis, Xarelto, or Pradaxa (any blood thinners)    · Do not apply any lotion, deodorant, or powder the morning of the procedure. · Please bring a list of your medications to the hospital with you. · You will not receive sedation, so you may drive yourself home. · If you are unable to make this appointment, please call [facility] Cardiology at [phone].

## 2021-06-18 ENCOUNTER — NURSE ONLY (OUTPATIENT)
Dept: PRIMARY CARE CLINIC | Age: 61
End: 2021-06-18
Payer: MEDICAID

## 2021-06-18 DIAGNOSIS — Z01.818 PRE-OP EXAMINATION: Primary | ICD-10-CM

## 2021-06-18 PROCEDURE — 99421 OL DIG E/M SVC 5-10 MIN: CPT | Performed by: NURSE PRACTITIONER

## 2021-06-19 LAB — SARS-COV-2: NOT DETECTED

## 2021-06-21 NOTE — PRE-PROCEDURE INSTRUCTIONS
Called patient about procedure. Told to be here at 1430 for procedure at 1500. To bring a current list of medications. No other questions or concerns.

## 2021-06-22 ENCOUNTER — HOSPITAL ENCOUNTER (OUTPATIENT)
Dept: CARDIAC CATH/INVASIVE PROCEDURES | Age: 61
Discharge: HOME | End: 2021-06-24
Payer: MEDICAID

## 2021-06-22 VITALS — TEMPERATURE: 97.8 F | BODY MASS INDEX: 22.4 KG/M2 | HEIGHT: 71 IN | WEIGHT: 160 LBS

## 2021-06-22 DIAGNOSIS — Z79.01 LONG TERM (CURRENT) USE OF ANTICOAGULANTS: ICD-10-CM

## 2021-06-22 DIAGNOSIS — R42 DIZZINESS: ICD-10-CM

## 2021-06-22 DIAGNOSIS — I10 ESSENTIAL HYPERTENSION: ICD-10-CM

## 2021-06-22 DIAGNOSIS — R55 SYNCOPE AND COLLAPSE: ICD-10-CM

## 2021-06-22 DIAGNOSIS — Z01.818 PRE-OP EVALUATION: ICD-10-CM

## 2021-06-22 PROCEDURE — 93005 ELECTROCARDIOGRAM TRACING: CPT

## 2021-06-22 PROCEDURE — 93660 TILT TABLE EVALUATION: CPT | Performed by: INTERNAL MEDICINE

## 2021-06-22 PROCEDURE — 93660 TILT TABLE EVALUATION: CPT

## 2021-06-22 NOTE — PROCEDURES
Tilt table procedure    This patient currently age 63 with several falls and syncope or near syncope over the past several months. Referred by Dr. Kiran Lamar and Dr. Sabine Johnson for tilt table procedure. This patient underwent the standard protocol for this procedure with a head up tilt table. Without issues issues. At about 20 minutes into the procedure he started having lightheadedness and felt weak and tired similar to what he has been experiencing. He did go out where he was on responsible for a bit but did not have appreciable change in his heart rate. There was a drop in his blood pressure systolic down to the 61X. At this time we had to terminate the procedure and that reclined him and he improved fairly quickly. At this time I declare that this is a positive head up tilt table with significant drop in blood pressure during the procedure but no appreciable change in the heart rate nor rhythm. We will send the reports to Dr. Kiran Lamar and anticipate that this gentleman will probably need to be on some treatment.   Katty Da Silva MD, [facility]    Please cc this note to Dr. Yannick Enriquez

## 2021-06-24 LAB
EKG ATRIAL RATE: 79 BPM
EKG DIAGNOSIS: NORMAL
EKG P AXIS: 77 DEGREES
EKG P-R INTERVAL: 144 MS
EKG Q-T INTERVAL: 398 MS
EKG QRS DURATION: 76 MS
EKG QTC CALCULATION (BAZETT): 456 MS
EKG R AXIS: -15 DEGREES
EKG T AXIS: 58 DEGREES
EKG VENTRICULAR RATE: 79 BPM

## 2021-07-01 ENCOUNTER — OFFICE VISIT (OUTPATIENT)
Dept: CARDIOLOGY CLINIC | Age: 61
End: 2021-07-01
Payer: MEDICAID

## 2021-07-01 VITALS
HEART RATE: 100 BPM | OXYGEN SATURATION: 96 % | WEIGHT: 161 LBS | SYSTOLIC BLOOD PRESSURE: 120 MMHG | BODY MASS INDEX: 22.45 KG/M2 | DIASTOLIC BLOOD PRESSURE: 80 MMHG

## 2021-07-01 DIAGNOSIS — R55 SYNCOPE AND COLLAPSE: ICD-10-CM

## 2021-07-01 DIAGNOSIS — I95.1 ORTHOSTATIC SYNCOPE: ICD-10-CM

## 2021-07-01 PROCEDURE — G8427 DOCREV CUR MEDS BY ELIG CLIN: HCPCS | Performed by: INTERNAL MEDICINE

## 2021-07-01 PROCEDURE — G8420 CALC BMI NORM PARAMETERS: HCPCS | Performed by: INTERNAL MEDICINE

## 2021-07-01 PROCEDURE — 4004F PT TOBACCO SCREEN RCVD TLK: CPT | Performed by: INTERNAL MEDICINE

## 2021-07-01 PROCEDURE — 99214 OFFICE O/P EST MOD 30 MIN: CPT | Performed by: INTERNAL MEDICINE

## 2021-07-01 PROCEDURE — 3017F COLORECTAL CA SCREEN DOC REV: CPT | Performed by: INTERNAL MEDICINE

## 2021-07-01 ASSESSMENT — ENCOUNTER SYMPTOMS
CHEST TIGHTNESS: 0
WHEEZING: 0
VOMITING: 0
FACIAL SWELLING: 0
COUGH: 0
BLOOD IN STOOL: 0
COLOR CHANGE: 0
SHORTNESS OF BREATH: 0
ABDOMINAL DISTENTION: 0
BACK PAIN: 0
EYE DISCHARGE: 0
ABDOMINAL PAIN: 0

## 2021-07-01 NOTE — PROGRESS NOTES
[facility]     Outpatient Cardiology         Chief Complaint   Patient presents with    Follow-up    Fatigue     patient is tired at all times       HPI     Cristina Hassan a 63 y.o. male here for follow up for syncope. Post tilt table test. Hx of HTN. Had a tilt table as part of work-up for his recurrent episodes of syncope. Tilt table was positive. No recurrence since then although still having a few episodes of lightheadedness. Not using compression stockings yet. PMH  Past Medical History:   Diagnosis Date    Hepatitis C     Hypertension        PSH  History reviewed. No pertinent surgical history. Social HIstory  Social History     Tobacco Use    Smoking status: Current Every Day Smoker     Packs/day: 0.00     Types: Cigars    Smokeless tobacco: Never Used    Tobacco comment: 2 cigars daily    Vaping Use    Vaping Use: Never used   Substance Use Topics    Alcohol use: Yes     Comment: couple beers every couple days     Drug use: Never       Family History  History reviewed. No pertinent family history. Allergies   No Known Allergies    Medications:     Home Medications:  Were reviewed and are listed in nursing record. and/or listed below    Prior to Admission medications    Not on File        Review of Systems   Constitutional: Negative for activity change, appetite change, diaphoresis, fatigue, fever and unexpected weight change. HENT: Negative for congestion, facial swelling, mouth sores and nosebleeds. Eyes: Negative for discharge and visual disturbance. Respiratory: Negative for cough, chest tightness, shortness of breath and wheezing. Cardiovascular: Negative for chest pain, palpitations and leg swelling. Gastrointestinal: Negative for abdominal distention, abdominal pain, blood in stool and vomiting. Endocrine: Negative for cold intolerance, heat intolerance and polyuria.    Genitourinary: Negative for difficulty urinating, dysuria, frequency and hematuria. Musculoskeletal: Negative for back pain, joint swelling, myalgias and neck pain. Skin: Negative for color change, pallor and rash. Allergic/Immunologic: Negative for immunocompromised state. Neurological: Positive for syncope. Negative for dizziness, weakness, light-headedness, numbness and headaches. Hematological: Negative for adenopathy. Does not bruise/bleed easily. Psychiatric/Behavioral: Negative for behavioral problems, confusion, decreased concentration and suicidal ideas. The patient is not nervous/anxious. Vitals:    07/01/21 1547   BP: 120/80   Pulse: 100   SpO2: 96%    Weight: 161 lb (73 kg)       Vitals:    07/01/21 1547   BP: 120/80   Site: Left Upper Arm   Position: Sitting   Cuff Size: Medium Adult   Pulse: 100   SpO2: 96%   Weight: 161 lb (73 kg)       BP Readings from Last 3 Encounters:   07/01/21 120/80   06/17/21 130/80   06/02/21 120/80       Wt Readings from Last 3 Encounters:   07/01/21 161 lb (73 kg)   06/22/21 160 lb (72.6 kg)   06/17/21 158 lb 9.6 oz (71.9 kg)       Physical Exam  Constitutional:       General: He is not in acute distress. Appearance: He is well-developed. He is not diaphoretic. HENT:      Head: Normocephalic and atraumatic. Eyes:      Pupils: Pupils are equal, round, and reactive to light. Neck:      Thyroid: No thyromegaly. Vascular: No JVD. Cardiovascular:      Rate and Rhythm: Normal rate and regular rhythm. Chest Wall: PMI is not displaced. Heart sounds: Normal heart sounds, S1 normal and S2 normal. No murmur heard. No friction rub. No gallop. Pulmonary:      Effort: Pulmonary effort is normal. No respiratory distress. Breath sounds: Normal breath sounds. No stridor. No wheezing or rales. Chest:      Chest wall: No tenderness. Abdominal:      General: Bowel sounds are normal. There is no distension. Palpations: Abdomen is soft. Tenderness: There is no abdominal tenderness.  There is no guarding or rebound. Musculoskeletal:         General: No tenderness. Normal range of motion. Cervical back: Normal range of motion. Lymphadenopathy:      Cervical: No cervical adenopathy. Skin:     General: Skin is warm and dry. Findings: No erythema or rash. Neurological:      Mental Status: He is alert and oriented to person, place, and time. Coordination: Coordination normal.   Psychiatric:         Behavior: Behavior normal.         Thought Content: Thought content normal.         Judgment: Judgment normal.         Labs:       Lab Results   Component Value Date    WBC 3.8 (L) 04/18/2021    HGB 12.0 (L) 04/18/2021    HCT 34.8 (L) 04/18/2021    .8 (H) 04/18/2021    PLT 39 (L) 04/18/2021     Lab Results   Component Value Date     (L) 04/18/2021    K 4.0 04/18/2021     04/18/2021    CO2 25 04/18/2021    BUN 7 04/18/2021    CREATININE 0.7 (L) 04/18/2021    GLUCOSE 192 (H) 04/18/2021    CALCIUM 8.2 (L) 04/18/2021    PROT 5.3 (L) 04/17/2021    LABALBU 2.6 (L) 04/17/2021    BILITOT 0.9 04/17/2021    ALKPHOS 93 04/17/2021     (H) 04/17/2021    ALT 69 (H) 04/17/2021    LABGLOM >60 04/18/2021    GFRAA >60 04/18/2021    AGRATIO 1.1 11/10/2020    GLOB 3.4 11/10/2020         No results found for: CHOL  No results found for: TRIG  No results found for: HDL  No results found for: LDLCHOLESTEROL, LDLCALC  No results found for: LABVLDL, VLDL  No results found for: [facility]    Lab Results   Component Value Date    INR 1.26 (H) 04/17/2021    PROTIME 14.6 (H) 04/17/2021       The ASCVD Risk score (José Luis Rojas, et al., 2013) failed to calculate for the following reasons:    Cannot find a previous HDL lab    Cannot find a previous total cholesterol lab      Imaging:       Last ECG (if available):  Normal sinus rhythm  Last Monitor/Holter    Last Stress (if available):    Last Cath (if available):    Last TTE/SACHA(if available): 6/16/21  Summary   Normal left ventricular size. Preserved ejection fraction estimated at 55%-60%. Normal diastolic function. Thickened aortic valve leaflets. Trace-mild tricuspid regurgitation. The pulmonic valve is not well visualized. Estimated pulmonary artery systolic pressure is at 22 mmHg assuming a right   atrial pressure of 3 mmHg. TILT Table  This patient underwent the standard protocol for this procedure with a head up tilt table. Without issues issues. At about 20 minutes into the procedure he started having lightheadedness and felt weak and tired similar to what he has been experiencing. He did go out where he was on responsible for a bit but did not have appreciable change in his heart rate. There was a drop in his blood pressure systolic down to the 96U. At this time we had to terminate the procedure and that reclined him and he improved fairly quickly. At this time I declare that this is a positive head up tilt table with significant drop in blood pressure during the procedure but no appreciable change in the heart rate nor rhythm. Last CMR  (if available):      Assessment / Plan:     Orthostatic syncope  Will follow with EP. Continue learning techniques to avoid syncope. Stay hydrated. Compression stockings. Syncope and collapse  No recurrence although had positive tilt table test.  Total of about 5 events. I had the opportunity to review the clinical symptoms and presentation of Jake Richardson. Patient's allergies and medications were reviewed and updated. Patient's past medical, surgical, social and family history were reviewed and updated. Patient's testing including laboratory, ECGs, monitor, imaging (TTE,SACHA,CMR,cath) were reviewed. Tobacco use was discussed with the patient and educated on the negative effects. I have asked the patient to not utilize these agents. All questions and concerns were addressed to the patient/family. Alternatives to my treatment were discussed.  The note was completed using EMR. Every effort wasmade to ensure accuracy; however, inadvertent computerized transcription errors may be present. Thank you for allowing [facility] to [facility] or [address] [place] [address].  Mortimer Diamond, MD, [facility]

## 2021-07-06 NOTE — ASSESSMENT & PLAN NOTE
Will follow with EP. Continue learning techniques to avoid syncope. Stay hydrated. Compression stockings.

## 2021-07-09 ENCOUNTER — OFFICE VISIT (OUTPATIENT)
Dept: PRIMARY CARE CLINIC | Age: 61
End: 2021-07-09
Payer: MEDICAID

## 2021-07-09 VITALS
SYSTOLIC BLOOD PRESSURE: 131 MMHG | HEART RATE: 92 BPM | DIASTOLIC BLOOD PRESSURE: 87 MMHG | WEIGHT: 159 LBS | OXYGEN SATURATION: 99 % | HEIGHT: 70 IN | TEMPERATURE: 97 F | BODY MASS INDEX: 22.76 KG/M2

## 2021-07-09 DIAGNOSIS — D61.818 PANCYTOPENIA (HCC): ICD-10-CM

## 2021-07-09 DIAGNOSIS — R53.82 CHRONIC FATIGUE: ICD-10-CM

## 2021-07-09 DIAGNOSIS — B18.2 CHRONIC HEPATITIS C WITHOUT HEPATIC COMA (HCC): ICD-10-CM

## 2021-07-09 DIAGNOSIS — E11.9 TYPE 2 DIABETES MELLITUS WITHOUT COMPLICATION, WITHOUT LONG-TERM CURRENT USE OF INSULIN (HCC): ICD-10-CM

## 2021-07-09 DIAGNOSIS — R94.09 ABNORMAL TILT TABLE TEST: ICD-10-CM

## 2021-07-09 DIAGNOSIS — R79.89 ELEVATED LIVER FUNCTION TESTS: ICD-10-CM

## 2021-07-09 DIAGNOSIS — E87.1 HYPONATREMIA: ICD-10-CM

## 2021-07-09 DIAGNOSIS — R73.9 ELEVATED SERUM GLUCOSE: ICD-10-CM

## 2021-07-09 DIAGNOSIS — Z13.31 POSITIVE DEPRESSION SCREENING: ICD-10-CM

## 2021-07-09 DIAGNOSIS — I95.1 ORTHOSTATIC SYNCOPE: Primary | ICD-10-CM

## 2021-07-09 DIAGNOSIS — L80 VITILIGO: ICD-10-CM

## 2021-07-09 DIAGNOSIS — F17.200 SMOKER: ICD-10-CM

## 2021-07-09 LAB — HBA1C MFR BLD: 7.3 %

## 2021-07-09 PROCEDURE — 4004F PT TOBACCO SCREEN RCVD TLK: CPT | Performed by: FAMILY MEDICINE

## 2021-07-09 PROCEDURE — 3017F COLORECTAL CA SCREEN DOC REV: CPT | Performed by: FAMILY MEDICINE

## 2021-07-09 PROCEDURE — G8420 CALC BMI NORM PARAMETERS: HCPCS | Performed by: FAMILY MEDICINE

## 2021-07-09 PROCEDURE — 3051F HG A1C>EQUAL 7.0%<8.0%: CPT | Performed by: FAMILY MEDICINE

## 2021-07-09 PROCEDURE — 83036 HEMOGLOBIN GLYCOSYLATED A1C: CPT | Performed by: FAMILY MEDICINE

## 2021-07-09 PROCEDURE — 2022F DILAT RTA XM EVC RTNOPTHY: CPT | Performed by: FAMILY MEDICINE

## 2021-07-09 PROCEDURE — 99204 OFFICE O/P NEW MOD 45 MIN: CPT | Performed by: FAMILY MEDICINE

## 2021-07-09 PROCEDURE — G8427 DOCREV CUR MEDS BY ELIG CLIN: HCPCS | Performed by: FAMILY MEDICINE

## 2021-07-09 ASSESSMENT — COLUMBIA-SUICIDE SEVERITY RATING SCALE - C-SSRS
5. HAVE YOU STARTED TO WORK OUT OR WORKED OUT THE DETAILS OF HOW TO KILL YOURSELF? DO YOU INTEND TO CARRY OUT THIS PLAN?: YES
2. HAVE YOU ACTUALLY HAD ANY THOUGHTS OF KILLING YOURSELF?: YES
1. WITHIN THE PAST MONTH, HAVE YOU WISHED YOU WERE DEAD OR WISHED YOU COULD GO TO SLEEP AND NOT WAKE UP?: YES
6. HAVE YOU EVER DONE ANYTHING, STARTED TO DO ANYTHING, OR PREPARED TO DO ANYTHING TO END YOUR LIFE?: NO
4. HAVE YOU HAD THESE THOUGHTS AND HAD SOME INTENTION OF ACTING ON THEM?: NO
3. HAVE YOU BEEN THINKING ABOUT HOW YOU MIGHT KILL YOURSELF?: NO

## 2021-07-09 ASSESSMENT — ENCOUNTER SYMPTOMS
SHORTNESS OF BREATH: 0
NAUSEA: 0
COUGH: 0
SORE THROAT: 0
ABDOMINAL PAIN: 0

## 2021-07-09 ASSESSMENT — PATIENT HEALTH QUESTIONNAIRE - PHQ9
6. FEELING BAD ABOUT YOURSELF - OR THAT YOU ARE A FAILURE OR HAVE LET YOURSELF OR YOUR FAMILY DOWN: 0
10. IF YOU CHECKED OFF ANY PROBLEMS, HOW DIFFICULT HAVE THESE PROBLEMS MADE IT FOR YOU TO DO YOUR WORK, TAKE CARE OF THINGS AT HOME, OR GET ALONG WITH OTHER PEOPLE: 3
7. TROUBLE CONCENTRATING ON THINGS, SUCH AS READING THE NEWSPAPER OR WATCHING TELEVISION: 0
4. FEELING TIRED OR HAVING LITTLE ENERGY: 3
SUM OF ALL RESPONSES TO PHQ QUESTIONS 1-9: 12
3. TROUBLE FALLING OR STAYING ASLEEP: 3
5. POOR APPETITE OR OVEREATING: 0
8. MOVING OR SPEAKING SO SLOWLY THAT OTHER PEOPLE COULD HAVE NOTICED. OR THE OPPOSITE, BEING SO FIGETY OR RESTLESS THAT YOU HAVE BEEN MOVING AROUND A LOT MORE THAN USUAL: 3
SUM OF ALL RESPONSES TO PHQ9 QUESTIONS 1 & 2: 3
1. LITTLE INTEREST OR PLEASURE IN DOING THINGS: 1
SUM OF ALL RESPONSES TO PHQ QUESTIONS 1-9: 12
SUM OF ALL RESPONSES TO PHQ QUESTIONS 1-9: 12
2. FEELING DOWN, DEPRESSED OR HOPELESS: 2
9. THOUGHTS THAT YOU WOULD BE BETTER OFF DEAD, OR OF HURTING YOURSELF: 0

## 2021-07-09 NOTE — PROGRESS NOTES
Never used   Substance Use Topics    Alcohol use: Yes     Comment: 48 oz beer/day    Drug use: Not Currently        History reviewed. No pertinent surgical history. No Known Allergies     Family History   Problem Relation Age of Onset    Other Mother         COPD    No Known Problems Sister     No Known Problems Brother     No Known Problems Brother     No Known Problems Brother         Patient's past medical history, surgical history, family history, medications, and allergies  were all reviewed and updated as appropriate today. Review of Systems   Constitutional: Positive for fatigue. Negative for fever and unexpected weight change. HENT: Negative for congestion, ear pain and sore throat. Eyes: Negative for pain, itching and visual disturbance. Respiratory: Negative for cough, shortness of breath and wheezing. Cardiovascular: Negative for chest pain, palpitations and leg swelling. Gastrointestinal: Negative for abdominal pain, constipation, diarrhea, nausea and vomiting. Endocrine: Negative for cold intolerance, heat intolerance, polydipsia and polyuria. Genitourinary: Negative for dysuria, frequency and hematuria. Musculoskeletal: Negative for arthralgias and joint swelling. Skin: Negative for rash. Neurological: Positive for dizziness and syncope. Negative for headaches. Hematological: Negative for adenopathy. Psychiatric/Behavioral: Positive for dysphoric mood. Negative for self-injury and suicidal ideas. /87   Pulse 92   Temp 97 °F (36.1 °C)   Ht 5' 10\" (1.778 m)   Wt 159 lb (72.1 kg)   SpO2 99%   BMI 22.81 kg/m²      Physical Exam  Vitals reviewed. Constitutional:       General: He is not in acute distress. Appearance: Normal appearance. He is well-developed and normal weight. HENT:      Head: Normocephalic and atraumatic. Right Ear: Tympanic membrane and ear canal normal. No drainage. No middle ear effusion.  Tympanic membrane is not erythematous. Left Ear: Tympanic membrane and ear canal normal. No drainage. No middle ear effusion. Tympanic membrane is not erythematous. Nose: Nose normal. No rhinorrhea. Mouth/Throat:      Mouth: Mucous membranes are moist.      Pharynx: No oropharyngeal exudate or posterior oropharyngeal erythema. Eyes:      Extraocular Movements: Extraocular movements intact. Pupils: Pupils are equal, round, and reactive to light. Neck:      Thyroid: No thyromegaly. Vascular: No carotid bruit, hepatojugular reflux or JVD. Cardiovascular:      Rate and Rhythm: Normal rate and regular rhythm. Heart sounds: No murmur heard. Pulmonary:      Effort: Pulmonary effort is normal.      Breath sounds: Normal breath sounds. No wheezing. Abdominal:      General: Bowel sounds are normal. There is no distension. Palpations: Abdomen is soft. There is no hepatomegaly, splenomegaly or mass. Tenderness: There is no abdominal tenderness. Musculoskeletal:         General: No swelling or deformity. Normal range of motion. Cervical back: Neck supple. Lymphadenopathy:      Cervical: No cervical adenopathy. Skin:     General: Skin is warm and dry. Findings: No rash. Neurological:      General: No focal deficit present. Mental Status: He is alert and oriented to person, place, and time. Cranial Nerves: No cranial nerve deficit. Psychiatric:         Mood and Affect: Mood normal.         Behavior: Behavior normal. Behavior is cooperative. Thought Content:  Thought content normal.         Judgment: Judgment normal.       Lab Results   Component Value Date    WBC 3.8 (L) 04/18/2021    HGB 12.0 (L) 04/18/2021    HCT 34.8 (L) 04/18/2021    .8 (H) 04/18/2021    PLT 39 (L) 04/18/2021     Lab Results   Component Value Date     (L) 04/18/2021    K 4.0 04/18/2021     04/18/2021    CO2 25 04/18/2021    BUN 7 04/18/2021    CREATININE 0.7 (L) 04/18/2021 GLUCOSE 192 (H) 04/18/2021    CALCIUM 8.2 (L) 04/18/2021    PROT 5.3 (L) 04/17/2021    LABALBU 2.6 (L) 04/17/2021    BILITOT 0.9 04/17/2021    ALKPHOS 93 04/17/2021     (H) 04/17/2021    ALT 69 (H) 04/17/2021    LABGLOM >60 04/18/2021    GFRAA >60 04/18/2021    AGRATIO 1.1 11/10/2020    GLOB 3.4 11/10/2020     Lab Results   Component Value Date    LABA1C 7.3 07/09/2021     Reviewed previous labs from ER/hospital and checked A1C in office today. Assessment:  Encounter Diagnoses   Name Primary?  Orthostatic syncope Yes    Abnormal tilt table test     Pancytopenia (HCC)     Elevated liver function tests     Hyponatremia     Vitiligo     Elevated serum glucose     Chronic fatigue     Smoker     Chronic hepatitis C without hepatic coma (HCC) - Diagnosed in 35s     Type 2 diabetes mellitus without complication, without long-term current use of insulin (HCC)     Positive depression screening        Plan:  1. Orthostatic syncope  NP to practice with intermittent episodes of dizziness and full syncope. Extensive chart review done today - appears to have true cardiac etiology with this. Will be seeing EP soon - encouraged to keep that visit. There are plenty of other factors that I believe could be playing a roll, so recommend continued eval. I have ordered a CT scan and additional labs. - CT ABDOMEN PELVIS W IV CONTRAST Additional Contrast? Oral; Future    2. Abnormal tilt table test    3. Pancytopenia ([address]  Noted on exams - possibly related to Hep C? Check labs now. - CT ABDOMEN PELVIS W IV CONTRAST Additional Contrast? Oral; Future  - Path Review, Smear; Future  - Amylase; Future  - Lipase; Future    4. Elevated liver function tests  Likely 2/2 Hep C that is untreated. Check labs now. Needs GI specialists eval and discussion for treatment.  - CT ABDOMEN PELVIS W IV CONTRAST Additional Contrast? Oral; Future  - Amylase; Future  - Lipase; Future  - Comprehensive Metabolic Panel; Future    5. Hyponatremia  Noted on labs - repeat as above. 6. Vitiligo    7. Elevated serum glucose  Check A1C - newly diagnosed DM.  - POCT glycosylated hemoglobin (Hb A1C)    8. Chronic fatigue    9. Smoker    10. Chronic hepatitis C without hepatic coma (HCC) - Diagnosed in 35s  - CT ABDOMEN PELVIS W IV CONTRAST Additional Contrast? Oral; Future  - Hepatitis C RNA QNT W Genotype RFLX; Future    11. Type 2 diabetes mellitus without complication, without long-term current use of insulin (HCC)  Newly diagnosed - discussed with pt what this means long term and what the goals are we need to set to achieve. No meds now - wants to work on dietary changes. Education provided. 12. Positive depression screening  Discussed briefly with pt - reports feeling like he has control over this. No red flags through discussion. F/U prn. Return if symptoms worsen or fail to improve. Hernán Ga, DO     Please note that this chart was generated using dragon dictation software. Although every effort was made to ensure the accuracy of this automated transcription, some errors in transcription may have occurred.

## 2021-07-13 ASSESSMENT — ENCOUNTER SYMPTOMS
EYE PAIN: 0
EYE ITCHING: 0
WHEEZING: 0
CONSTIPATION: 0
DIARRHEA: 0
VOMITING: 0

## 2021-07-14 DIAGNOSIS — R42 DIZZINESS: ICD-10-CM

## 2021-07-14 DIAGNOSIS — I10 ESSENTIAL HYPERTENSION: ICD-10-CM

## 2021-07-14 DIAGNOSIS — Z79.01 LONG TERM (CURRENT) USE OF ANTICOAGULANTS: ICD-10-CM

## 2021-07-14 DIAGNOSIS — R55 SYNCOPE AND COLLAPSE: ICD-10-CM

## 2021-07-14 DIAGNOSIS — D61.818 PANCYTOPENIA (HCC): ICD-10-CM

## 2021-07-14 DIAGNOSIS — R79.89 ELEVATED LIVER FUNCTION TESTS: ICD-10-CM

## 2021-07-14 DIAGNOSIS — B18.2 CHRONIC HEPATITIS C WITHOUT HEPATIC COMA (HCC): ICD-10-CM

## 2021-07-14 DIAGNOSIS — Z01.818 PRE-OP EVALUATION: ICD-10-CM

## 2021-07-14 LAB
A/G RATIO: 1 (ref 1.1–2.2)
ALBUMIN SERPL-MCNC: 3.6 G/DL (ref 3.4–5)
ALP BLD-CCNC: 171 U/L (ref 40–129)
ALT SERPL-CCNC: 103 U/L (ref 10–40)
AMYLASE: 135 U/L (ref 25–115)
ANION GAP SERPL CALCULATED.3IONS-SCNC: 17 MMOL/L (ref 3–16)
AST SERPL-CCNC: 200 U/L (ref 15–37)
BASOPHILS ABSOLUTE: 0 K/UL (ref 0–0.2)
BASOPHILS RELATIVE PERCENT: 0.4 %
BILIRUB SERPL-MCNC: 0.7 MG/DL (ref 0–1)
BUN BLDV-MCNC: 10 MG/DL (ref 7–20)
CALCIUM SERPL-MCNC: 9.4 MG/DL (ref 8.3–10.6)
CHLORIDE BLD-SCNC: 99 MMOL/L (ref 99–110)
CO2: 19 MMOL/L (ref 21–32)
CREAT SERPL-MCNC: 0.8 MG/DL (ref 0.8–1.3)
EOSINOPHILS ABSOLUTE: 0 K/UL (ref 0–0.6)
EOSINOPHILS RELATIVE PERCENT: 0.9 %
GFR AFRICAN AMERICAN: >60
GFR NON-AFRICAN AMERICAN: >60
GLOBULIN: 3.5 G/DL
GLUCOSE BLD-MCNC: 142 MG/DL (ref 70–99)
HCT VFR BLD CALC: 44.5 % (ref 40.5–52.5)
HEMOGLOBIN: 15.2 G/DL (ref 13.5–17.5)
INR BLD: 1.14 (ref 0.88–1.12)
LIPASE: 50 U/L (ref 13–60)
LYMPHOCYTES ABSOLUTE: 2.9 K/UL (ref 1–5.1)
LYMPHOCYTES RELATIVE PERCENT: 50.6 %
MCH RBC QN AUTO: 34.8 PG (ref 26–34)
MCHC RBC AUTO-ENTMCNC: 34.1 G/DL (ref 31–36)
MCV RBC AUTO: 102 FL (ref 80–100)
MONOCYTES ABSOLUTE: 0.7 K/UL (ref 0–1.3)
MONOCYTES RELATIVE PERCENT: 11.7 %
NEUTROPHILS ABSOLUTE: 2 K/UL (ref 1.7–7.7)
NEUTROPHILS RELATIVE PERCENT: 36.4 %
PDW BLD-RTO: 14 % (ref 12.4–15.4)
PLATELET # BLD: 60 K/UL (ref 135–450)
PMV BLD AUTO: 10.1 FL (ref 5–10.5)
POTASSIUM SERPL-SCNC: 4.3 MMOL/L (ref 3.5–5.1)
PROTHROMBIN TIME: 12.9 SEC (ref 9.9–12.7)
RBC # BLD: 4.36 M/UL (ref 4.2–5.9)
SODIUM BLD-SCNC: 135 MMOL/L (ref 136–145)
TOTAL PROTEIN: 7.1 G/DL (ref 6.4–8.2)
WBC # BLD: 5.6 K/UL (ref 4–11)

## 2021-07-16 LAB
HCV QNT BY NAAT IU/ML: ABNORMAL
HCV QNT BY NAAT LOG IU/ML: 6.9 LOG IU/ML
INTERPRETATION: DETECTED

## 2021-07-20 LAB — HEPATITIS C GENOTYPE: NORMAL

## 2021-07-21 ENCOUNTER — TELEPHONE (OUTPATIENT)
Dept: PRIMARY CARE CLINIC | Age: 61
End: 2021-07-21

## 2021-07-21 NOTE — TELEPHONE ENCOUNTER
Spoke to pt  Read him note from 7/15/21    He wants to know if there is anything else from the blood work that he should know? Also he says he will get his CT on Friday. He will await to here from [facility] about scan results before scheduling another appt. Ronda Guzmán

## 2021-07-21 NOTE — TELEPHONE ENCOUNTER
PT CALLING LOOKING FOR RESULTS OF LABS DONE 7-14    PT IS AWARE DR HAS FINISHED FOR TODAY AND TOMORROW IS FNE

## 2021-07-22 NOTE — TELEPHONE ENCOUNTER
Pt has not     \"He has the CT tomorrow, so we will see what happens with that\" and then will schedule with Hep C after that.

## 2021-07-23 ENCOUNTER — HOSPITAL ENCOUNTER (OUTPATIENT)
Dept: CT IMAGING | Age: 61
Discharge: HOME | End: 2021-07-23
Payer: MEDICAID

## 2021-07-23 DIAGNOSIS — D61.818 PANCYTOPENIA (HCC): ICD-10-CM

## 2021-07-23 DIAGNOSIS — I95.1 ORTHOSTATIC SYNCOPE: ICD-10-CM

## 2021-07-23 DIAGNOSIS — B18.2 CHRONIC HEPATITIS C WITHOUT HEPATIC COMA (HCC): ICD-10-CM

## 2021-07-23 DIAGNOSIS — R79.89 ELEVATED LIVER FUNCTION TESTS: ICD-10-CM

## 2021-07-23 PROCEDURE — 74177 CT ABD & PELVIS W/CONTRAST: CPT

## 2021-07-23 PROCEDURE — 6360000004 HC RX CONTRAST MEDICATION: Performed by: FAMILY MEDICINE

## 2021-07-23 RX ADMIN — IOHEXOL 50 ML: 240 INJECTION, SOLUTION INTRATHECAL; INTRAVASCULAR; INTRAVENOUS; ORAL at 13:04

## 2021-07-23 RX ADMIN — IOPAMIDOL 80 ML: 755 INJECTION, SOLUTION INTRAVENOUS at 12:55

## 2021-07-25 PROBLEM — K76.0 FATTY LIVER DISEASE, NONALCOHOLIC: Status: ACTIVE | Noted: 2021-07-25

## 2021-07-26 DIAGNOSIS — B18.2 CHRONIC HEPATITIS C WITHOUT HEPATIC COMA (HCC): Primary | ICD-10-CM

## 2021-07-26 NOTE — RESULT ENCOUNTER NOTE
Please let pt know I have reviewed ct scan. Things are normal except liver shows appearance of fatty liver. This, plus the hepatitis c, puts him at significant risk in future of severe liver issues. I suggest her allow referral to specialist to discuss treatment ptions of Hep C. Let me know what he would like to do.

## 2021-07-27 ENCOUNTER — TELEPHONE (OUTPATIENT)
Dept: PRIMARY CARE CLINIC | Age: 61
End: 2021-07-27

## 2021-07-27 DIAGNOSIS — B18.2 CHRONIC HEPATITIS C WITHOUT HEPATIC COMA (HCC): Primary | ICD-10-CM

## 2021-07-27 NOTE — TELEPHONE ENCOUNTER
----- Message from Matteo Menchaca sent at 7/27/2021  3:06 PM EDT -----  Subject: Appointment Request    Reason for Call:     QUESTIONS  Type of Appointment? Established Patient  Reason for appointment request? Requested Provider unavailable - Allyson Howard  Additional Information for Provider? PT was referred to Dr. Allyson Howard @   [facility] @[address] [place] [address] [id number], [phone]. Dr. Valorie Murphy is not at this office. Can we please refer PT to a different   provider? PT best contact number is [phone]. Thank you.  ---------------------------------------------------------------------------  --------------  CALL BACK INFO  What is the best way for the office to contact you? OK to leave message on   voicemail  Preferred Call Back Phone Number?  [phone]  ---------------------------------------------------------------------------  --------------  SCRIPT ANSWERS

## 2021-08-04 ENCOUNTER — TELEPHONE (OUTPATIENT)
Dept: PRIMARY CARE CLINIC | Age: 61
End: 2021-08-04

## 2021-08-04 DIAGNOSIS — B18.2 CHRONIC HEPATITIS C WITHOUT HEPATIC COMA (HCC): Primary | ICD-10-CM

## 2021-08-04 NOTE — TELEPHONE ENCOUNTER
MATHEUS FROM [facility] CALLING TO SAY THEY CANNOT SEE PT FOR HIS HEP C AND THAT HE WOULD NEED TO BE REFERRED TO A [facility]

## 2021-08-05 NOTE — TELEPHONE ENCOUNTER
Please place referral to [address] [place] [address] and give pt information. Hopelessness/Suicidality Suicidality Unremarkable Suicidality Suicidality Suicidality Hopelessness/Suicidality Hopelessness/Suicidality Unremarkable/Hopelessness/Suicidality Unremarkable/Hopelessness/Suicidality

## 2021-08-19 ENCOUNTER — TELEPHONE (OUTPATIENT)
Dept: PRIMARY CARE CLINIC | Age: 61
End: 2021-08-19

## 2021-08-19 NOTE — TELEPHONE ENCOUNTER
Had one scheduled with Dr Yessenia Arireta for 8/26 - it shows as cancelled by provider?  That is who he needs to see

## 2021-08-19 NOTE — TELEPHONE ENCOUNTER
No. He was supposed to see the electrophysiologist heart specialist - I dont see that this happened or that it is scheduled. He needs to f/u on that.

## 2021-08-19 NOTE — TELEPHONE ENCOUNTER
Pt calling to see if dr has an \"overall assessment\" of his condition to see why he keeps passing out and falling down. He declined appt to come in to discuss. He says he did not hear back about his ultrasound of his ab/pel.   Gave him the date that [address] in our office talked to him and that he had said \"yes\" to the G.I. referral.    Asked him if he made an appt with the G.I. and he said yes and it is coming up sept 7 but does not know the name of the dr he will see

## 2021-08-19 NOTE — TELEPHONE ENCOUNTER
Let patient know. He states he was unaware of the need to see an EP. I looked at the result note from Dr Sonia Brown, his nurse states she told the patient and made an appointment with an EP. Patient states that he was not told to see a specialist.  Would you now put a referral in for him?

## 2021-08-20 ENCOUNTER — NURSE TRIAGE (OUTPATIENT)
Dept: OTHER | Facility: CLINIC | Age: 61
End: 2021-08-20

## 2021-08-20 NOTE — TELEPHONE ENCOUNTER
Pt calling thru central scheduling & nurse triage    Asked pt if he had called the phone number to Dr Audrey Gross that he was given about a half an hour ago. He said no. Told him dr Jean Paul Hoyos wanted him to see this dr to see what is going on with him.   Pt says he will call that number now

## 2021-08-20 NOTE — TELEPHONE ENCOUNTER
Reason for Disposition   MILD weakness (i.e., does not interfere with ability to work, go to school, normal activities) and persists > 1 week    Answer Assessment - Initial Assessment Questions  1. DESCRIPTION: \"Describe how you are feeling. \"      Weak     2. SEVERITY: \"How bad is it? \"  \"Can you stand and walk? \"    - MILD - Feels weak or tired, but does not interfere with work, school or normal activities    - [facility] to stand and walk; weakness interferes with work, school, or normal activities    - SEVERE - Unable to stand or walk      No energy, passed out on job site 3 times about 1 month ago    3. ONSET:  \"When did the weakness begin? \"      4-5 months ago    4. CAUSE: \"What do you think is causing the weakness? \"      States he has had multiple tests and he has no answers    5. MEDICINES: [employer] Gather you recently started a new medicine or had a change in the amount of a medicine? \"      No medications    6. OTHER SYMPTOMS: \"Do you have any other symptoms? \" (e.g., chest pain, fever, cough, SOB, vomiting, diarrhea, bleeding, other areas of pain)      Dizzy at times, fainting episodes    7. PREGNANCY: \"Is there any chance you are pregnant? \" \"When was your last menstrual period? \"      n/a    Protocols used: WEAKNESS (GENERALIZED) AND FATIGUE-ADULT-OH    Received call from Suzi at [facility] with Red Flag Complaint. Brief description of triage: generalized weakness x 5 months. Triage indicates for patient to be seen in office within 3 days- wants to be seen Monday afternoon    Care advice provided, patient verbalizes understanding; denies any other questions or concerns; instructed to call back for any new or worsening symptoms. Writer provided warm transfer to Oksana Pope at [facility] for appointment scheduling. Attention Provider: Thank you for allowing me to participate in the care of your patient.   The patient was connected to triage in response to information provided to the ECC.  Please do not respond through this encounter as the response is not directed to a shared pool.

## 2021-11-15 LAB
ALBUMIN SERPL-MCNC: 3.8 G/DL (ref 3.4–5)
ALP BLD-CCNC: 241 U/L (ref 40–129)
ALT SERPL-CCNC: 125 U/L (ref 10–40)
AST SERPL-CCNC: 188 U/L (ref 15–37)
BILIRUB SERPL-MCNC: 0.5 MG/DL (ref 0–1)
BILIRUBIN DIRECT: 0.3 MG/DL (ref 0–0.3)
BILIRUBIN, INDIRECT: 0.2 MG/DL (ref 0–1)
HBV SURFACE AB TITR SER: <3.5 MIU/ML
HEPATITIS B SURFACE ANTIGEN INTERPRETATION: NORMAL
INR BLD: 1.08 (ref 0.88–1.12)
PROTHROMBIN TIME: 12.3 SEC (ref 9.9–12.7)
TOTAL PROTEIN: 7.1 G/DL (ref 6.4–8.2)

## 2021-11-16 LAB
HIV AG/AB: NORMAL
HIV ANTIGEN: NORMAL
HIV-1 ANTIBODY: NORMAL
HIV-2 AB: NORMAL

## 2021-11-17 LAB — HAV AB SERPL IA-ACNC: POSITIVE

## 2021-11-19 LAB
Lab: NORMAL
Lab: NORMAL
REPORT: NORMAL
REPORT: NORMAL
THIS TEST SENT TO: NORMAL
THIS TEST SENT TO: NORMAL

## 2021-12-07 ENCOUNTER — TELEPHONE (OUTPATIENT)
Dept: ORTHOPEDIC SURGERY | Age: 61
End: 2021-12-07

## 2021-12-16 ENCOUNTER — TELEPHONE (OUTPATIENT)
Dept: ORTHOPEDIC SURGERY | Age: 61
End: 2021-12-16

## 2021-12-16 NOTE — TELEPHONE ENCOUNTER
LVM for patient regarding the [address] [place] [address] Orthopedic joint pain program. Patient can call [phone] for more information or to schedule an appointment with a joint pain specialist.

## 2021-12-29 ENCOUNTER — OFFICE VISIT (OUTPATIENT)
Dept: PRIMARY CARE CLINIC | Age: 61
End: 2021-12-29
Payer: MEDICAID

## 2021-12-29 VITALS
HEIGHT: 70 IN | SYSTOLIC BLOOD PRESSURE: 145 MMHG | OXYGEN SATURATION: 98 % | DIASTOLIC BLOOD PRESSURE: 98 MMHG | TEMPERATURE: 98.1 F | HEART RATE: 110 BPM | BODY MASS INDEX: 23.34 KG/M2 | WEIGHT: 163 LBS

## 2021-12-29 DIAGNOSIS — B18.2 CHRONIC HEPATITIS C WITHOUT HEPATIC COMA (HCC): Primary | ICD-10-CM

## 2021-12-29 DIAGNOSIS — R94.09 ABNORMAL TILT TABLE TEST: ICD-10-CM

## 2021-12-29 DIAGNOSIS — L80 VITILIGO: ICD-10-CM

## 2021-12-29 DIAGNOSIS — I95.1 ORTHOSTATIC SYNCOPE: ICD-10-CM

## 2021-12-29 DIAGNOSIS — R79.89 ELEVATED LIVER FUNCTION TESTS: ICD-10-CM

## 2021-12-29 DIAGNOSIS — E11.9 TYPE 2 DIABETES MELLITUS WITHOUT COMPLICATION, WITHOUT LONG-TERM CURRENT USE OF INSULIN (HCC): ICD-10-CM

## 2021-12-29 PROCEDURE — 2022F DILAT RTA XM EVC RTNOPTHY: CPT | Performed by: FAMILY MEDICINE

## 2021-12-29 PROCEDURE — G8420 CALC BMI NORM PARAMETERS: HCPCS | Performed by: FAMILY MEDICINE

## 2021-12-29 PROCEDURE — 99214 OFFICE O/P EST MOD 30 MIN: CPT | Performed by: FAMILY MEDICINE

## 2021-12-29 PROCEDURE — 4004F PT TOBACCO SCREEN RCVD TLK: CPT | Performed by: FAMILY MEDICINE

## 2021-12-29 PROCEDURE — G8484 FLU IMMUNIZE NO ADMIN: HCPCS | Performed by: FAMILY MEDICINE

## 2021-12-29 PROCEDURE — G8427 DOCREV CUR MEDS BY ELIG CLIN: HCPCS | Performed by: FAMILY MEDICINE

## 2021-12-29 PROCEDURE — 3051F HG A1C>EQUAL 7.0%<8.0%: CPT | Performed by: FAMILY MEDICINE

## 2021-12-29 PROCEDURE — 3017F COLORECTAL CA SCREEN DOC REV: CPT | Performed by: FAMILY MEDICINE

## 2021-12-29 RX ORDER — VELPATASVIR AND SOFOSBUVIR 100; 400 MG/1; MG/1
1 TABLET, FILM COATED ORAL DAILY
COMMUNITY
Start: 2021-12-03 | End: 2022-06-09

## 2021-12-29 NOTE — PROGRESS NOTES
No Known Allergies     Family History   Problem Relation Age of Onset    Other Mother         COPD    No Known Problems Sister     No Known Problems Brother     No Known Problems Brother     No Known Problems Brother         Patient's past medical history, surgical history, family history, medications, and allergies  were all reviewed and updated as appropriate today. Review of Systems   Constitutional: Positive for fatigue. Negative for fever and unexpected weight change. HENT: Negative for congestion, ear pain and sore throat. Eyes: Negative for pain, itching and visual disturbance. Respiratory: Negative for cough, shortness of breath and wheezing. Cardiovascular: Negative for chest pain, palpitations and leg swelling. Gastrointestinal: Negative for abdominal pain, constipation, diarrhea, nausea and vomiting. Endocrine: Negative for cold intolerance, heat intolerance, polydipsia and polyuria. Genitourinary: Negative for dysuria, frequency and hematuria. Musculoskeletal: Negative for arthralgias and joint swelling. Skin: Negative for rash. Neurological: Positive for dizziness and syncope. Negative for headaches. Hematological: Negative for adenopathy. Psychiatric/Behavioral: Negative for dysphoric mood, self-injury and suicidal ideas. BP (!) 145/98   Pulse 110   Temp 98.1 °F (36.7 °C)   Ht 5' 10\" (1.778 m)   Wt 163 lb (73.9 kg)   SpO2 98%   BMI 23.39 kg/m²      Physical Exam  Vitals reviewed. Constitutional:       General: He is not in acute distress. Appearance: Normal appearance. He is well-developed and normal weight. HENT:      Head: Normocephalic and atraumatic. Right Ear: Tympanic membrane and ear canal normal. No drainage. No middle ear effusion. Tympanic membrane is not erythematous. Left Ear: Tympanic membrane and ear canal normal. No drainage. No middle ear effusion. Tympanic membrane is not erythematous.       Nose: Nose normal. No rhinorrhea. Mouth/Throat:      Mouth: Mucous membranes are moist.      Pharynx: No oropharyngeal exudate or posterior oropharyngeal erythema. Eyes:      Extraocular Movements: Extraocular movements intact. Pupils: Pupils are equal, round, and reactive to light. Neck:      Thyroid: No thyromegaly. Vascular: No carotid bruit, hepatojugular reflux or JVD. Cardiovascular:      Rate and Rhythm: Normal rate and regular rhythm. Heart sounds: No murmur heard. Pulmonary:      Effort: Pulmonary effort is normal.      Breath sounds: Normal breath sounds. No wheezing. Abdominal:      General: Bowel sounds are normal. There is no distension. Palpations: Abdomen is soft. There is no hepatomegaly, splenomegaly or mass. Tenderness: There is no abdominal tenderness. Musculoskeletal:         General: No swelling or deformity. Normal range of motion. Cervical back: Neck supple. Lymphadenopathy:      Cervical: No cervical adenopathy. Skin:     General: Skin is warm and dry. Findings: No rash. Neurological:      General: No focal deficit present. Mental Status: He is alert and oriented to person, place, and time. Cranial Nerves: No cranial nerve deficit. Psychiatric:         Mood and Affect: Mood normal.         Behavior: Behavior normal. Behavior is cooperative. Thought Content: Thought content normal.         Judgment: Judgment normal.         Assessment:  Encounter Diagnoses   Name Primary?  Chronic hepatitis C without hepatic coma ([address] - Diagnosed in 35s Yes    Type 2 diabetes mellitus without complication, without long-term current use of insulin (HCC)     Elevated liver function tests     Abnormal tilt table test     Orthostatic syncope     Vitiligo        Plan:  1. Chronic hepatitis C without hepatic coma ([address] - Diagnosed in 30s  Continue per gastroenterology.     2. Type 2 diabetes mellitus without complication, without long-term current use of insulin ([facility]  Due for follow-up labs. - Hemoglobin A1C; Future  - Microalbumin / Creatinine Urine Ratio; Future  - Lipid, Fasting; Future    3. Elevated liver function tests  - Lipid, Fasting; Future    4. Abnormal tilt table test  Patient continues to have concerns regarding these unexplained syncopal events. He never followed up with cardiology appropriately by their recommendations. At this time I have discussed this once again with patient and have explained to him that by their recommendations he was still needing to follow-up with electrophysiology. I am going to go ahead and give him time to reach out to them, but also send a message to cardiology. 5. Orthostatic syncope  - TSH with Reflex; Future    6. Vitiligo    Total time spent: Over 30 minutes. This includes time spent with the patient during the visit as well as time spent before and after the visit reviewing the chart, reviewing past/present studies and documenting the encounter. Return if symptoms worsen or fail to improve. Kavin Luz, DO     Please note that this chart was generated using dragon dictation software. Although every effort was made to ensure the accuracy of this automated transcription, some errors in transcription may have occurred.

## 2021-12-29 NOTE — Clinical Note
Sherry Khalil, this is a ermias you had seen a couple of times in the spring/summer for syncopal events. I just saw him for the second time, first being in July. He never followed through with y'alls cardiac recs I believe - can you look at his case again and see if there was f/u he did not complete. Maybe was needing to see EP? He is a terrible historian and basically came to see me and said nothing, but said he needs to figure out why he is still passing out. Let me know. Thanks.   Mundo Mckay

## 2022-01-03 ASSESSMENT — ENCOUNTER SYMPTOMS
DIARRHEA: 0
WHEEZING: 0
VOMITING: 0
SHORTNESS OF BREATH: 0
EYE ITCHING: 0
NAUSEA: 0
COUGH: 0
SORE THROAT: 0
EYE PAIN: 0
CONSTIPATION: 0
ABDOMINAL PAIN: 0

## 2022-02-07 ENCOUNTER — OFFICE VISIT (OUTPATIENT)
Dept: PRIMARY CARE CLINIC | Age: 62
End: 2022-02-07
Payer: MEDICAID

## 2022-02-07 VITALS
OXYGEN SATURATION: 98 % | WEIGHT: 167 LBS | TEMPERATURE: 98.6 F | BODY MASS INDEX: 23.91 KG/M2 | SYSTOLIC BLOOD PRESSURE: 133 MMHG | DIASTOLIC BLOOD PRESSURE: 81 MMHG | HEART RATE: 114 BPM | HEIGHT: 70 IN

## 2022-02-07 DIAGNOSIS — E11.9 TYPE 2 DIABETES MELLITUS WITHOUT COMPLICATION, WITHOUT LONG-TERM CURRENT USE OF INSULIN (HCC): ICD-10-CM

## 2022-02-07 DIAGNOSIS — R94.09 ABNORMAL TILT TABLE TEST: Primary | ICD-10-CM

## 2022-02-07 DIAGNOSIS — B18.2 CHRONIC HEPATITIS C WITHOUT HEPATIC COMA (HCC): ICD-10-CM

## 2022-02-07 DIAGNOSIS — E11.42 DIABETIC POLYNEUROPATHY ASSOCIATED WITH TYPE 2 DIABETES MELLITUS (HCC): ICD-10-CM

## 2022-02-07 PROBLEM — K76.0 FATTY LIVER DISEASE, NONALCOHOLIC: Status: RESOLVED | Noted: 2021-07-25 | Resolved: 2022-02-07

## 2022-02-07 PROCEDURE — 3017F COLORECTAL CA SCREEN DOC REV: CPT | Performed by: STUDENT IN AN ORGANIZED HEALTH CARE EDUCATION/TRAINING PROGRAM

## 2022-02-07 PROCEDURE — 2022F DILAT RTA XM EVC RTNOPTHY: CPT | Performed by: STUDENT IN AN ORGANIZED HEALTH CARE EDUCATION/TRAINING PROGRAM

## 2022-02-07 PROCEDURE — 99214 OFFICE O/P EST MOD 30 MIN: CPT | Performed by: STUDENT IN AN ORGANIZED HEALTH CARE EDUCATION/TRAINING PROGRAM

## 2022-02-07 PROCEDURE — G8484 FLU IMMUNIZE NO ADMIN: HCPCS | Performed by: STUDENT IN AN ORGANIZED HEALTH CARE EDUCATION/TRAINING PROGRAM

## 2022-02-07 PROCEDURE — G8420 CALC BMI NORM PARAMETERS: HCPCS | Performed by: STUDENT IN AN ORGANIZED HEALTH CARE EDUCATION/TRAINING PROGRAM

## 2022-02-07 PROCEDURE — G8427 DOCREV CUR MEDS BY ELIG CLIN: HCPCS | Performed by: STUDENT IN AN ORGANIZED HEALTH CARE EDUCATION/TRAINING PROGRAM

## 2022-02-07 PROCEDURE — 4004F PT TOBACCO SCREEN RCVD TLK: CPT | Performed by: STUDENT IN AN ORGANIZED HEALTH CARE EDUCATION/TRAINING PROGRAM

## 2022-02-07 PROCEDURE — 3046F HEMOGLOBIN A1C LEVEL >9.0%: CPT | Performed by: STUDENT IN AN ORGANIZED HEALTH CARE EDUCATION/TRAINING PROGRAM

## 2022-02-07 RX ORDER — GABAPENTIN 300 MG/1
300 CAPSULE ORAL 3 TIMES DAILY
Qty: 270 CAPSULE | Refills: 1 | Status: SHIPPED | OUTPATIENT
Start: 2022-02-07 | End: 2022-04-07

## 2022-02-07 RX ORDER — METFORMIN HYDROCHLORIDE 500 MG/1
500 TABLET, EXTENDED RELEASE ORAL
Qty: 90 TABLET | Refills: 1 | Status: SHIPPED | OUTPATIENT
Start: 2022-02-07 | End: 2022-03-22

## 2022-02-07 SDOH — ECONOMIC STABILITY: FOOD INSECURITY: WITHIN THE PAST 12 MONTHS, THE FOOD YOU BOUGHT JUST DIDN'T LAST AND YOU DIDN'T HAVE MONEY TO GET MORE.: NEVER TRUE

## 2022-02-07 SDOH — ECONOMIC STABILITY: FOOD INSECURITY: WITHIN THE PAST 12 MONTHS, YOU WORRIED THAT YOUR FOOD WOULD RUN OUT BEFORE YOU GOT MONEY TO BUY MORE.: NEVER TRUE

## 2022-02-07 ASSESSMENT — ENCOUNTER SYMPTOMS
NAUSEA: 0
ABDOMINAL PAIN: 0
CHEST TIGHTNESS: 0
ABDOMINAL DISTENTION: 0
SHORTNESS OF BREATH: 0
COUGH: 0
DIARRHEA: 0

## 2022-02-07 ASSESSMENT — SOCIAL DETERMINANTS OF HEALTH (SDOH): HOW HARD IS IT FOR YOU TO PAY FOR THE VERY BASICS LIKE FOOD, HOUSING, MEDICAL CARE, AND HEATING?: NOT HARD AT ALL

## 2022-02-07 NOTE — PROGRESS NOTES
2022     Herson Larios (:  1960) is a 57 y.o. male, here for evaluation of the following medical concerns:    HPI  Diabetes Mellitus Type 2: Current symptoms/problems include neuropathy. Medication compliance:  Diet controlled  Diabetic diet compliance:  compliant most of the time,  Weight trend: stable  Current exercise: no regular exercise  Barriers: none    Home blood sugar records: patient does not test  Any episodes of hypoglycemia? no  Eye exam current (within one year): no   reports that he has been smoking cigars. He has been smoking about 0.00 packs per day for the past 40.00 years. He has never used smokeless tobacco.   Daily Aspirin? No    Lab Results   Component Value Date    LABA1C 7.3 2021     Lab Results   Component Value Date    LABMICR YES 11/10/2020    CREATININE 0.8 2021     Lab Results   Component Value Date     (H) 11/15/2021     (H) 11/15/2021     No results found for: CHOL, TRIG, HDL, LDLCALC, LDLDIRECT       Review of Systems   Constitutional: Negative for activity change, appetite change, fatigue and unexpected weight change. Eyes: Negative for visual disturbance. Respiratory: Negative for cough, chest tightness and shortness of breath. Cardiovascular: Negative for chest pain, palpitations and leg swelling. Gastrointestinal: Negative for abdominal distention, abdominal pain, diarrhea and nausea. Endocrine: Negative for polydipsia, polyphagia and polyuria. Genitourinary: Negative for decreased urine volume, dysuria and frequency. Musculoskeletal: Negative for gait problem and myalgias. Skin: Negative for rash and wound. Neurological: Positive for syncope. Negative for dizziness, weakness, light-headedness and numbness. Hematological: Does not bruise/bleed easily. Prior to Visit Medications    Medication Sig Taking?  Authorizing Provider   metFORMIN (GLUCOPHAGE-XR) 500 MG extended release tablet Take 1 tablet by mouth daily (with breakfast) Yes Melodee Meckel, DO   gabapentin (NEURONTIN) 300 MG capsule Take 1 capsule by mouth 3 times daily for 30 days. Yes Melodee Meckel, DO   Sofosbuvir-Velpatasvir 400-100 MG TABS Take 1 tablet by mouth daily Yes Historical Provider, MD        Social History     Tobacco Use    Smoking status: Current Every Day Smoker     Packs/day: 0.00     Years: 40.00     Pack years: 0.00     Types: Cigars    Smokeless tobacco: Never Used    Tobacco comment: 2 cigars daily    Substance Use Topics    Alcohol use: Yes     Comment: 48 oz beer/day        Vitals:    02/07/22 1418   BP: 133/81   Pulse: 114   Temp: 98.6 °F (37 °C)   TempSrc: Temporal   SpO2: 98%   Weight: 167 lb (75.8 kg)   Height: 5' 10\" (1.778 m)     Estimated body mass index is 23.96 kg/m² as calculated from the following:    Height as of this encounter: 5' 10\" (1.778 m). Weight as of this encounter: 167 lb (75.8 kg). Physical Exam  Vitals reviewed. Constitutional:       Appearance: Normal appearance. He is normal weight. HENT:      Head: Normocephalic and atraumatic. Nose: Nose normal.      Mouth/Throat:      Mouth: Mucous membranes are moist.      Pharynx: Oropharynx is clear. Eyes:      Extraocular Movements: Extraocular movements intact. Conjunctiva/sclera: Conjunctivae normal.   Cardiovascular:      Rate and Rhythm: Normal rate and regular rhythm. Pulses: Normal pulses. Heart sounds: Normal heart sounds. Pulmonary:      Effort: Pulmonary effort is normal.      Breath sounds: Normal breath sounds. Abdominal:      General: Abdomen is flat. Bowel sounds are normal.      Palpations: Abdomen is soft. Musculoskeletal:         General: Normal range of motion. Cervical back: Normal range of motion and neck supple. Skin:     General: Skin is warm and dry. Capillary Refill: Capillary refill takes less than 2 seconds. Neurological:      General: No focal deficit present.       Mental Status: He is alert and oriented to person, place, and time. Mental status is at baseline. Psychiatric:         Mood and Affect: Mood normal.         ASSESSMENT/PLAN:  1. Abnormal tilt table test: Patient is new patient to me today. Has been following with Dr. Pippa Villagomez (cardiology) and Dr. Xiang Amezcua. Looks like work-up has been negative until recent normal tilt table test.  Information was never relayed to patient to schedule with EP per his report. This is why seen in the office today, because he has questions. Long conversation with patient about what a positive tilt table test means and why he needs to see electrophysiology. Referring him today. - Forrest Peres MD, Cardiac Electrophysiology, [facility]    2. Type 2 diabetes mellitus without complication, without long-term current use of insulin ([facility]): A1c 7.3. Patient is having neuropathy, which makes me question if his A1c may actually be worse than it was 6 months ago. Updating that lab today. Will start patient on Metformin.  - metFORMIN (GLUCOPHAGE-XR) 500 MG extended release tablet; Take 1 tablet by mouth daily (with breakfast)  Dispense: 90 tablet; Refill: 1    3. Diabetic polyneuropathy associated with type 2 diabetes mellitus ([address]): Foot exam performed and normal.  Does have some diminished sensation, which is symmetric in nature. Starting gabapentin as below. Reevaluate for improvement in 6 weeks. - gabapentin (NEURONTIN) 300 MG capsule; Take 1 capsule by mouth 3 times daily for 30 days. Dispense: 270 capsule; Refill: 1    4. Chronic hepatitis C without hepatic coma ([facility]): Following with gastroenterology for treatment. Return in about 6 weeks (around 3/21/2022) for Diabetes. An electronic signature was used to authenticate this note.     --Herminia Alamo, DO on 2/7/2022 at 3:40 PM

## 2022-02-07 NOTE — PATIENT INSTRUCTIONS
Learning About Diabetes and Exercise  Can you exercise if you have diabetes? When you have diabetes, it's important to get regular exercise. This helps control your blood sugar level. You can still play sports, run, ride a bike, go swimming, and do other activities when you have diabetes. How can exercise help you manage diabetes? Your body turns the food you eat into glucose, a type of sugar. You need this sugar for energy. When you have diabetes, the sugar builds up in your blood. But when you exercise, your body uses sugar. This helps keep it from building up in your blood and results in lower blood sugar and better control of diabetes. Exercise may help you in other ways too. It can help you reach and stay at a healthy weight. It also helps improve blood pressure and cholesterol, which can reduce the risk of heart disease. Exercise can make you feel stronger and happier. It can help you relax and sleep better, and give you confidence in other things you do. How can you exercise safely? Before you start a new exercise program, talk to your doctor about how and when to exercise. You may need to have a medical exam and tests before you begin. Some types of exercise can be harmful if your diabetes is causing other problems, such as problems with your feet. Your doctor can tell you what types of exercise are good choices for you. These tips can help you exercise safely when you have diabetes. If your diabetes is controlled by diet or medicine that doesn't lower your blood sugar, you don't need to eat a snack before you exercise. · Check your blood sugar before you exercise. And be careful about what you eat. ? If your blood sugar is less than 100, eat a carbohydrate snack before you exercise. ? Be careful when you exercise if your blood sugar is over 300. High blood sugar can make you dehydrated. And that makes your blood sugar levels go even higher.  If you have ketones in your blood or urine and your blood sugar is over 300, do not exercise. · Don't try to do too much at first. Build up your exercise program bit by bit. Try to get at least 30 minutes of exercise on most days of the week. Walking is a good choice. You also may want to do other activities, such as riding a bike or swimming. You might try running or gardening. Try to include muscle-strengthening exercises at least 2 times a week. These exercises include push-ups and weight training. You can also use rubber tubing or stretch bands. You stretch or pull the tubing or band to build muscle strength. If you want to exercise more, slowly increase how hard or long you exercise. · You may get symptoms of low blood sugar during exercise or up to 24 hours later. Some symptoms of low blood sugar, such as sweating, a fast heartbeat, or feeling tired, can be confused with what can happen anytime you exercise. Other symptoms may include feeling anxious, dizzy, weak, or shaky. So it's a good idea to check your blood sugar again. · You can treat low blood sugar by eating or drinking something that has 15 grams of carbohydrate. These should be quick-sugar foods. Quick-sugar foods such as glucose tablets, table sugar, honey, fruit juice, regular (not diet) soda pop, or hard candy can help raise blood sugar. Check your blood sugar level again 15 minutes after having a quick-sugar food to make sure your level is getting back to your target range. · Drink plenty of water before, during, and after you exercise. · Wear medical alert jewelry that says you have diabetes. You can buy this at most drugstores. · Pay attention to your body. If you are used to exercise and notice that you can't do as much as usual, talk to your doctor. Follow-up care is a key part of your treatment and safety. Be sure to make and go to all appointments, and call your doctor if you are having problems.  It's also a good idea to know your test results and keep a list of the medicines you take.  Where can you learn more? Go to [email or web address] and sign in to your [employer] account. Enter H829 in the [employer] box to learn more about \"Learning About Diabetes and Exercise. \"     If you do not have an account, please click on the \"Sign Up Now\" link. Current as of: December 20, 2019               Content Version: 12.5  © 7474-6826 [employer]wise, Incorporated. Care instructions adapted under license by [facility] ([facility]). If you have questions about a medical condition or this instruction, always ask your healthcare professional. [address] any warranty or liability for your use of this information.

## 2022-02-22 NOTE — PROGRESS NOTES
[address]   Cardiac Electrophysiology Consultation   Date: 2/24/2022  Reason for Consultation:   Consult Requesting Physician: No att. providers found     Chief Complaint:   Chief Complaint   Patient presents with    Follow-up     Syncope        HPI: Gonzalez Garcia is a 57 y.o. male, who has seen Dr Sandra Bustos in the past (7/2021), most recently referred to me by Dr Sana Laguerre for syncope. PMH significant for HTN, Hep C (fatty liver), DM Type 2. He presented to the ED on 4/16/21 following a syncopal event. He reports having 3-4 episodes over the past year. Normal echo (6/14/21) with EF 55-60%.       His first episode of syncope was in the [facility] [place] in 1/2021 (while moving back to [place]). In 2/2021, he states he became extremely weak, out of work for about 2 weeks; he then took a couple months off work & in 4/2021 he started work as a . 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 In June/2021, tilt table, Dr Arely Estrada, positive. Interval History: Today, he is here to review the tilt table results. Last episode of syncope/passing out ~ 3 mos ago. Does not 'know or feel' episodes coming on. During one episode in the past, in [facility] felt 'a head rush with knees buckling'. All episodes occur while standing, seem to be associated with lightheadedness. Assessment:   1. Neurocardiogenic syncope, multifactorial including autonomic dysfunction in the setting of diabetic neuropathy  2. Type 2 diabetes mellitus    Plan:   1. Keep yourself hydrated; adequate salt intake  2. Wear knee high compression stockings  3. Start Midodrine 5 mg twice daily  4. Follow up in 3 months with EP NP  5. Check your Blood Pressure daily at home   6. Discussed about lifestyle modifications including isometric exercises.     Past Medical History:   Diagnosis Date    Chronic hepatitis C without hepatic coma ([facility] [address] - Diagnosed in [facility]     Type 2 diabetes mellitus without complication, without long-term current use of insulin ([facility] [address] 7/9/2021        No past surgical history on file. Allergies:  No Known Allergies    Medication:   Prior to Admission medications    Medication Sig Start Date End Date Taking? Authorizing Provider   metFORMIN (GLUCOPHAGE-XR) 500 MG extended release tablet Take 1 tablet by mouth daily (with breakfast) 2/7/22  Yes Stephanie Varghese,    gabapentin (NEURONTIN) 300 MG capsule Take 1 capsule by mouth 3 times daily for 30 days. 2/7/22 3/9/22 Yes Stephanie Varghese, DO   Sofosbuvir-Velpatasvir 400-100 MG TABS Take 1 tablet by mouth daily 12/3/21  Yes Historical Provider, MD       Social History:   reports that he has been smoking cigars. He has been smoking about 0.00 packs per day for the past 40.00 years. He has never used smokeless tobacco. He reports current alcohol use. He reports previous drug use. Family History:  family history includes No Known Problems in his brother, brother, brother, and sister; Other in his mother. Reviewed. Denies family history of sudden cardiac death, arrhythmia, premature CAD    Review of System:    · General ROS: negative for - chills, fever   · Psychological ROS: negative for - anxiety or depression  · Ophthalmic ROS: negative for - eye pain or loss of vision  · ENT ROS: negative for - epistaxis, headaches, nasal discharge, sore throat   · Allergy and Immunology ROS: negative for - hives, nasal congestion   · Hematological and Lymphatic ROS: negative for - bleeding problems, blood clots, bruising or jaundice  · Endocrine ROS: negative for - skin changes, temperature intolerance or unexpected weight changes  · Respiratory ROS: negative for - cough, hemoptysis, pleuritic pain, SOB, sputum changes or wheezing  · Cardiovascular ROS: Per HPI.    · Gastrointestinal ROS: negative for - abdominal pain, blood in stools, diarrhea, hematemesis, melena, nausea/vomiting or swallowing difficulty/pain  · Genito-Urinary ROS: negative for - dysuria or incontinence  · Musculoskeletal ROS: negative for - joint swelling or muscle pain  · Neurological ROS: negative for - confusion, dizziness, gait disturbance, headaches, numbness/tingling, seizures, speech problems, tremors, visual changes or weakness  · Dermatological ROS: negative for - rash    Physical Examination:  Vitals:    02/24/22 1316   BP: 118/70       · Constitutional: Oriented. No distress. · Head: Normocephalic and atraumatic. · Mouth/Throat: Oropharynx is clear and moist.   · Eyes: Conjunctivae normal. EOM are normal.   · Neck: Normal range of motion. Neck supple. No rigidity. No JVD present. · Cardiovascular: Normal rate, regular rhythm, S1&S2 and intact distal pulses. · Pulmonary/Chest: Bilateral respiratory sounds. No wheezes. No rhonchi. · Abdominal: Soft. Bowel sounds present. No distension, No tenderness. · Musculoskeletal: No tenderness. No edema    · Lymphadenopathy: Has no cervical adenopathy. · Neurological: Alert and oriented. Cranial nerve appears intact, No Gross deficit   · Skin: Skin is warm and dry. No rash noted. · Psychiatric: Has a normal mood, affect and behavior     Labs:  Reviewed. ECG: reviewed, Sinus  Rhythm, HR 94 with QRS duration 86 msec, QTch 419 msec    Studies:   1. Event monitor:   2. Echo: 6/14/2021  Summary  Normal left ventricular size. Preserved ejection fraction estimated at 55%-60%. Normal diastolic function. Thickened aortic valve leaflets. Trace-mild tricuspid regurgitation. The pulmonic valve is not well visualized. Estimated pulmonary artery systolic pressure is at 22 mmHg assuming a right atrial pressure of 3 mmHg. LA Dimension: 3 cm    3. Tilt Table :  6/22/2021 (Dr Oracio Praker)   Positive head up tilt table with significant drop in blood pressure during the procedure but no appreciable change in the heart rate nor rhythm. 4. Cath:      The MCOT, echocardiogram, stress test, and coronary angiography/PCI were reviewed by myself and used for my plan of care. - The patient is counseled to follow a low salt diet to assure blood pressure remains controlled for cardiovascular risk factor modification.   - The patient is counseled to avoid excess caffeine, and energy drinks as this may exacerbated ectopy and arrhythmia. - The patient is counseled to get regular exercise 3-5 times per week to control cardiovascular risk factors. - The patient is counseled to lose weigt to control cardiovascular risk factors. -The patient is counseled about the health hazards of smoking including cardiovascular side effects and its impact on morbidity and mortality. Thank you for allowing me to participate in the care of Evelyn Thomas. All questions and concerns were addressed to the patient/family. Alternatives to my treatment were discussed. Solomon Garcia RN, am scribing for and in the presence of Dr. Tyler Borja. 02/24/22 1:19 PM  Vickey Coleman, RN    I, Wood Bueno MD, personally performed the services prescribed in this documentation as scribed in my presence and it is both accurate and complete.      Wood Bueno MD  Cardiac Electrophysiology  [facility]

## 2022-02-24 ENCOUNTER — OFFICE VISIT (OUTPATIENT)
Dept: CARDIOLOGY CLINIC | Age: 62
End: 2022-02-24
Payer: MEDICAID

## 2022-02-24 VITALS
WEIGHT: 169.6 LBS | DIASTOLIC BLOOD PRESSURE: 70 MMHG | HEIGHT: 70 IN | HEART RATE: 94 BPM | BODY MASS INDEX: 24.28 KG/M2 | SYSTOLIC BLOOD PRESSURE: 118 MMHG

## 2022-02-24 DIAGNOSIS — E13.43 DIABETIC AUTONOMIC NEUROPATHY ASSOCIATED WITH OTHER SPECIFIED DIABETES MELLITUS (HCC): ICD-10-CM

## 2022-02-24 DIAGNOSIS — R55 SYNCOPE, UNSPECIFIED SYNCOPE TYPE: Primary | ICD-10-CM

## 2022-02-24 PROCEDURE — 2022F DILAT RTA XM EVC RTNOPTHY: CPT | Performed by: INTERNAL MEDICINE

## 2022-02-24 PROCEDURE — 3046F HEMOGLOBIN A1C LEVEL >9.0%: CPT | Performed by: INTERNAL MEDICINE

## 2022-02-24 PROCEDURE — 4004F PT TOBACCO SCREEN RCVD TLK: CPT | Performed by: INTERNAL MEDICINE

## 2022-02-24 PROCEDURE — 93000 ELECTROCARDIOGRAM COMPLETE: CPT | Performed by: INTERNAL MEDICINE

## 2022-02-24 PROCEDURE — G8427 DOCREV CUR MEDS BY ELIG CLIN: HCPCS | Performed by: INTERNAL MEDICINE

## 2022-02-24 PROCEDURE — G8484 FLU IMMUNIZE NO ADMIN: HCPCS | Performed by: INTERNAL MEDICINE

## 2022-02-24 PROCEDURE — 99214 OFFICE O/P EST MOD 30 MIN: CPT | Performed by: INTERNAL MEDICINE

## 2022-02-24 PROCEDURE — G8420 CALC BMI NORM PARAMETERS: HCPCS | Performed by: INTERNAL MEDICINE

## 2022-02-24 PROCEDURE — 3017F COLORECTAL CA SCREEN DOC REV: CPT | Performed by: INTERNAL MEDICINE

## 2022-02-24 RX ORDER — MIDODRINE HYDROCHLORIDE 5 MG/1
5 TABLET ORAL 2 TIMES DAILY
Qty: 60 TABLET | Refills: 3 | Status: ON HOLD | OUTPATIENT
Start: 2022-02-24 | End: 2022-08-16 | Stop reason: HOSPADM

## 2022-03-21 PROBLEM — F17.210 CIGARETTE NICOTINE DEPENDENCE WITHOUT COMPLICATION: Status: ACTIVE | Noted: 2022-03-21

## 2022-03-21 PROBLEM — R79.89 ELEVATED LIVER FUNCTION TESTS: Status: RESOLVED | Noted: 2021-07-09 | Resolved: 2022-03-21

## 2022-03-21 NOTE — PATIENT INSTRUCTIONS
Learning About Diabetes and Exercise  Can you exercise if you have diabetes? When you have diabetes, it's important to get regular exercise. This helps control your blood sugar level. You can still play sports, run, ride a bike, go swimming, and do other activities when you have diabetes. How can exercise help you manage diabetes? Your body turns the food you eat into glucose, a type of sugar. You need this sugar for energy. When you have diabetes, the sugar builds up in your blood. But when you exercise, your body uses sugar. This helps keep it from building up in your blood and results in lower blood sugar and better control of diabetes. Exercise may help you in other ways too. It can help you reach and stay at a healthy weight. It also helps improve blood pressure and cholesterol, which can reduce the risk of heart disease. Exercise can make you feel stronger and happier. It can help you relax and sleep better, and give you confidence in other things you do. How can you exercise safely? Before you start a new exercise program, talk to your doctor about how and when to exercise. You may need to have a medical exam and tests before you begin. Some types of exercise can be harmful if your diabetes is causing other problems, such as problems with your feet. Your doctor can tell you what types of exercise are good choices for you. These tips can help you exercise safely when you have diabetes. If your diabetes is controlled by diet or medicine that doesn't lower your blood sugar, you don't need to eat a snack before you exercise. · Check your blood sugar before you exercise. And be careful about what you eat. ? If your blood sugar is less than 100, eat a carbohydrate snack before you exercise. ? Be careful when you exercise if your blood sugar is over 300. High blood sugar can make you dehydrated. And that makes your blood sugar levels go even higher.  If you have ketones in your blood or urine and your blood sugar is over 300, do not exercise. · Don't try to do too much at first. Build up your exercise program bit by bit. Try to get at least 30 minutes of exercise on most days of the week. Walking is a good choice. You also may want to do other activities, such as riding a bike or swimming. You might try running or gardening. Try to include muscle-strengthening exercises at least 2 times a week. These exercises include push-ups and weight training. You can also use rubber tubing or stretch bands. You stretch or pull the tubing or band to build muscle strength. If you want to exercise more, slowly increase how hard or long you exercise. · You may get symptoms of low blood sugar during exercise or up to 24 hours later. Some symptoms of low blood sugar, such as sweating, a fast heartbeat, or feeling tired, can be confused with what can happen anytime you exercise. Other symptoms may include feeling anxious, dizzy, weak, or shaky. So it's a good idea to check your blood sugar again. · You can treat low blood sugar by eating or drinking something that has 15 grams of carbohydrate. These should be quick-sugar foods. Quick-sugar foods such as glucose tablets, table sugar, honey, fruit juice, regular (not diet) soda pop, or hard candy can help raise blood sugar. Check your blood sugar level again 15 minutes after having a quick-sugar food to make sure your level is getting back to your target range. · Drink plenty of water before, during, and after you exercise. · Wear medical alert jewelry that says you have diabetes. You can buy this at most drugstores. · Pay attention to your body. If you are used to exercise and notice that you can't do as much as usual, talk to your doctor. Follow-up care is a key part of your treatment and safety. Be sure to make and go to all appointments, and call your doctor if you are having problems.  It's also a good idea to know your test results and keep a list of the medicines you take.  Where can you learn more? Go to [email or web address] and sign in to your [email or web address] account. Enter G306 in the [employer] box to learn more about \"Learning About Diabetes and Exercise. \"     If you do not have an account, please click on the \"Sign Up Now\" link. Current as of: December 20, 2019               Content Version: 12.5  © 7233-7665 Healthwise, Incorporated. Care instructions adapted under license by [facility] ([facility]). If you have questions about a medical condition or this instruction, always ask your healthcare professional. [address] any warranty or liability for your use of this information.

## 2022-03-21 NOTE — PROGRESS NOTES
3/22/2022     Naman Varghese (:  1960) is a 57 y.o. male, here for evaluation of the following medical concerns:    HPI  Diabetes Mellitus Type 2: Current symptoms/problems include none and neuropathy. Medication compliance:  never started his metformin; decided after leaving to focus on diet  Diabetic diet compliance:  compliant most of the time,  Weight trend: stable  Current exercise: no regular exercise  Barriers: none    Home blood sugar records: patient does not test  Any episodes of hypoglycemia? no  Eye exam current (within one year): no   reports that he has been smoking cigars. He has been smoking about 0.00 packs per day for the past 40.00 years. He has never used smokeless tobacco.   Daily Aspirin? No    Lab Results   Component Value Date    LABA1C 7.3 2021     Lab Results   Component Value Date    LABMICR YES 11/10/2020    CREATININE 0.8 2021     Lab Results   Component Value Date     (H) 11/15/2021     (H) 11/15/2021     No results found for: CHOL, TRIG, HDL, LDLCALC, LDLDIRECT       Review of Systems   Constitutional: Negative for fatigue. Eyes: Negative for visual disturbance. Respiratory: Negative for cough, chest tightness and shortness of breath. Cardiovascular: Negative for chest pain, palpitations and leg swelling. Endocrine: Negative for polydipsia, polyphagia and polyuria. Genitourinary: Negative for frequency. Skin: Negative for rash. Neurological: Negative for dizziness, syncope (No episodes since Cardio visit), weakness and light-headedness. Prior to Visit Medications    Medication Sig Taking? Authorizing Provider   midodrine (PROAMATINE) 5 MG tablet Take 1 tablet by mouth 2 times daily Yes Carlie Baugh MD   Sofosbuvir-Velpatasvir 400-100 MG TABS Take 1 tablet by mouth daily Yes Historical Provider, MD   gabapentin (NEURONTIN) 300 MG capsule Take 1 capsule by mouth 3 times daily for 30 days.   Aaron Mcfadden DO modifications after leaving last visit. Has done well and A1c is down from 7.3-6.3. Okay with holding Metformin. Updating diabetic labs as below. Refer for diabetic eye exam.  - POCT glycosylated hemoglobin (Hb A1C)  - Microalbumin / Creatinine Urine Ratio; Future  - AFL - Whitney Sánchez, OD, Optometry, [place]    2. Macrocytosis: Noticed on chart review. He is having some lower energy. Checking T22 folic acid and smear as below. - Vitamin B12 & Folate; Future  - Path Review, Smear; Future    3. Screening for hyperlipidemia  - Lipid, Fasting; Future    4. Screen for colon cancer  - Fecal DNA Colorectal cancer screening (Cologuard)      Return in about 3 months (around 6/22/2022) for Diabetes. An electronic signature was used to authenticate this note.     --Aaron Mcfadden, DO on 3/22/2022 at 2:49 PM

## 2022-03-22 ENCOUNTER — OFFICE VISIT (OUTPATIENT)
Dept: PRIMARY CARE CLINIC | Age: 62
End: 2022-03-22
Payer: MEDICAID

## 2022-03-22 VITALS
WEIGHT: 167 LBS | SYSTOLIC BLOOD PRESSURE: 114 MMHG | DIASTOLIC BLOOD PRESSURE: 83 MMHG | HEART RATE: 97 BPM | BODY MASS INDEX: 23.91 KG/M2 | HEIGHT: 70 IN | TEMPERATURE: 97.3 F

## 2022-03-22 DIAGNOSIS — Z23 NEED FOR PROPHYLACTIC VACCINATION AGAINST STREPTOCOCCUS PNEUMONIAE (PNEUMOCOCCUS): ICD-10-CM

## 2022-03-22 DIAGNOSIS — E11.42 DIABETIC POLYNEUROPATHY ASSOCIATED WITH TYPE 2 DIABETES MELLITUS (HCC): ICD-10-CM

## 2022-03-22 DIAGNOSIS — D75.89 MACROCYTOSIS: ICD-10-CM

## 2022-03-22 DIAGNOSIS — Z12.11 SCREEN FOR COLON CANCER: ICD-10-CM

## 2022-03-22 DIAGNOSIS — Z13.220 SCREENING FOR HYPERLIPIDEMIA: ICD-10-CM

## 2022-03-22 DIAGNOSIS — E11.9 TYPE 2 DIABETES MELLITUS WITHOUT COMPLICATION, WITHOUT LONG-TERM CURRENT USE OF INSULIN (HCC): Primary | ICD-10-CM

## 2022-03-22 PROCEDURE — 99214 OFFICE O/P EST MOD 30 MIN: CPT | Performed by: STUDENT IN AN ORGANIZED HEALTH CARE EDUCATION/TRAINING PROGRAM

## 2022-03-22 PROCEDURE — 3046F HEMOGLOBIN A1C LEVEL >9.0%: CPT | Performed by: STUDENT IN AN ORGANIZED HEALTH CARE EDUCATION/TRAINING PROGRAM

## 2022-03-22 PROCEDURE — 4004F PT TOBACCO SCREEN RCVD TLK: CPT | Performed by: STUDENT IN AN ORGANIZED HEALTH CARE EDUCATION/TRAINING PROGRAM

## 2022-03-22 PROCEDURE — 83036 HEMOGLOBIN GLYCOSYLATED A1C: CPT | Performed by: STUDENT IN AN ORGANIZED HEALTH CARE EDUCATION/TRAINING PROGRAM

## 2022-03-22 PROCEDURE — G8484 FLU IMMUNIZE NO ADMIN: HCPCS | Performed by: STUDENT IN AN ORGANIZED HEALTH CARE EDUCATION/TRAINING PROGRAM

## 2022-03-22 PROCEDURE — G8427 DOCREV CUR MEDS BY ELIG CLIN: HCPCS | Performed by: STUDENT IN AN ORGANIZED HEALTH CARE EDUCATION/TRAINING PROGRAM

## 2022-03-22 PROCEDURE — G8420 CALC BMI NORM PARAMETERS: HCPCS | Performed by: STUDENT IN AN ORGANIZED HEALTH CARE EDUCATION/TRAINING PROGRAM

## 2022-03-22 PROCEDURE — 3017F COLORECTAL CA SCREEN DOC REV: CPT | Performed by: STUDENT IN AN ORGANIZED HEALTH CARE EDUCATION/TRAINING PROGRAM

## 2022-03-22 PROCEDURE — 2022F DILAT RTA XM EVC RTNOPTHY: CPT | Performed by: STUDENT IN AN ORGANIZED HEALTH CARE EDUCATION/TRAINING PROGRAM

## 2022-03-22 ASSESSMENT — ENCOUNTER SYMPTOMS
COUGH: 0
SHORTNESS OF BREATH: 0
CHEST TIGHTNESS: 0

## 2022-04-07 ENCOUNTER — TELEPHONE (OUTPATIENT)
Dept: PRIMARY CARE CLINIC | Age: 62
End: 2022-04-07

## 2022-04-07 DIAGNOSIS — E11.42 DIABETIC POLYNEUROPATHY ASSOCIATED WITH TYPE 2 DIABETES MELLITUS (HCC): Primary | ICD-10-CM

## 2022-04-07 RX ORDER — GABAPENTIN 600 MG/1
600 TABLET ORAL DAILY
Qty: 30 TABLET | Refills: 0 | Status: SHIPPED | OUTPATIENT
Start: 2022-04-07 | End: 2022-04-28

## 2022-04-07 NOTE — TELEPHONE ENCOUNTER
Increasing gabapentin from 300 to 600 mg. Sent this to his pharmacy. He can take 2 of the 300 mg tablets until he runs out and then refill it as the 600 mg tablets.

## 2022-04-07 NOTE — TELEPHONE ENCOUNTER
Called pt back he asked if I could call him in 15 minutes.  Can one of you all give him a call with the below message

## 2022-04-07 NOTE — TELEPHONE ENCOUNTER
Spoke with pt he states he has seen you for this problem before regarding his neuropathy. Pt states you gave him Gabapentin for this problems and this medication has not touched the pain. He states he is in need of something stronger.

## 2022-04-25 ENCOUNTER — TELEPHONE (OUTPATIENT)
Dept: PRIMARY CARE CLINIC | Age: 62
End: 2022-04-25

## 2022-04-25 NOTE — TELEPHONE ENCOUNTER
Please call: Because his history is somewhat complicated he would have to make an appointment.   Let him know Dr. Jerome Lamar is out for the month

## 2022-04-25 NOTE — TELEPHONE ENCOUNTER
----- Message from Trudi Carrel sent at 4/22/2022  4:08 PM EDT -----  Subject: Message to Provider    QUESTIONS  Information for Provider? Patient would like a callback from PCP regarding   foot pain. Patient stated he is still having numbness, pain, and stinging   even with increasing Gabapentin. Patient stated neuropathy in his feet has   been going on for a year and he is having difficulty walking. The   medication is giving no relief and he is not wanting to continue   Gabapentin if it is not working. Patient did not want to schedule an appt,   would just like to speak with Dr. Dennis Blackwell over the phone if possible.   ---------------------------------------------------------------------------  --------------  [address]  What is the best way for the office to contact you? Do not leave any   message, patient will call back for answer  Preferred Call Back Phone Number? [phone]  ---------------------------------------------------------------------------  --------------  SCRIPT ANSWERS  Relationship to Patient?  Self

## 2022-04-28 ENCOUNTER — TELEPHONE (OUTPATIENT)
Dept: PRIMARY CARE CLINIC | Age: 62
End: 2022-04-28

## 2022-04-28 ENCOUNTER — OFFICE VISIT (OUTPATIENT)
Dept: PRIMARY CARE CLINIC | Age: 62
End: 2022-04-28
Payer: MEDICAID

## 2022-04-28 DIAGNOSIS — E11.42 DIABETIC POLYNEUROPATHY ASSOCIATED WITH TYPE 2 DIABETES MELLITUS (HCC): Primary | ICD-10-CM

## 2022-04-28 DIAGNOSIS — R53.83 FATIGUE, UNSPECIFIED TYPE: ICD-10-CM

## 2022-04-28 DIAGNOSIS — B18.2 CHRONIC HEPATITIS C WITHOUT HEPATIC COMA (HCC): ICD-10-CM

## 2022-04-28 DIAGNOSIS — E11.9 TYPE 2 DIABETES MELLITUS WITHOUT COMPLICATION, WITHOUT LONG-TERM CURRENT USE OF INSULIN (HCC): ICD-10-CM

## 2022-04-28 LAB — HBA1C MFR BLD: 6.2 %

## 2022-04-28 PROCEDURE — 2022F DILAT RTA XM EVC RTNOPTHY: CPT | Performed by: FAMILY MEDICINE

## 2022-04-28 PROCEDURE — 3046F HEMOGLOBIN A1C LEVEL >9.0%: CPT | Performed by: FAMILY MEDICINE

## 2022-04-28 PROCEDURE — 83036 HEMOGLOBIN GLYCOSYLATED A1C: CPT | Performed by: FAMILY MEDICINE

## 2022-04-28 PROCEDURE — 99214 OFFICE O/P EST MOD 30 MIN: CPT | Performed by: FAMILY MEDICINE

## 2022-04-28 PROCEDURE — 4004F PT TOBACCO SCREEN RCVD TLK: CPT | Performed by: FAMILY MEDICINE

## 2022-04-28 PROCEDURE — G8427 DOCREV CUR MEDS BY ELIG CLIN: HCPCS | Performed by: FAMILY MEDICINE

## 2022-04-28 PROCEDURE — 3017F COLORECTAL CA SCREEN DOC REV: CPT | Performed by: FAMILY MEDICINE

## 2022-04-28 PROCEDURE — G8420 CALC BMI NORM PARAMETERS: HCPCS | Performed by: FAMILY MEDICINE

## 2022-04-28 RX ORDER — L-METHYLFOLATE-ALGAE-VIT B12-B6 CAP 3-90.314-2-35 MG 3-90.314-2-35 MG
1 CAP ORAL DAILY
Qty: 90 CAPSULE | Refills: 3 | Status: SHIPPED | OUTPATIENT
Start: 2022-04-28

## 2022-04-28 RX ORDER — GABAPENTIN 800 MG/1
800 TABLET ORAL 3 TIMES DAILY
Qty: 90 TABLET | Refills: 3 | Status: SHIPPED | OUTPATIENT
Start: 2022-04-28 | End: 2022-07-26

## 2022-04-28 NOTE — PROGRESS NOTES
Exam  Constitutional:       Appearance: He is well-developed. Cardiovascular:      Rate and Rhythm: Normal rate and regular rhythm. Heart sounds: No murmur heard. No friction rub. No gallop. Pulmonary:      Effort: Pulmonary effort is normal.      Breath sounds: Normal breath sounds. No wheezing or rales. Abdominal:      General: Bowel sounds are normal. There is no distension. Palpations: Abdomen is soft. There is no mass. Tenderness: There is no abdominal tenderness. Skin:     General: Skin is warm and dry. Findings: No rash. Comments: Scattered patches of depigmentation over skin surface. Neurological:      Comments: Foot Exam: Skin warm, dry and intact w/o callus or erythema. Sensation intact to 10gm monofilament             Chemistry        Component Value Date/Time     (L) 07/14/2021 1022    K 4.3 07/14/2021 1022    K 4.0 04/18/2021 0614    CL 99 07/14/2021 1022    CO2 19 (L) 07/14/2021 1022    BUN 10 07/14/2021 1022    CREATININE 0.8 07/14/2021 1022        Component Value Date/Time    CALCIUM 9.4 07/14/2021 1022    ALKPHOS 241 (H) 11/15/2021 1420     (H) 11/15/2021 1420     (H) 11/15/2021 1420    BILITOT 0.5 11/15/2021 1420          Lab Results   Component Value Date    WBC 5.6 07/14/2021    HGB 15.2 07/14/2021    HCT 44.5 07/14/2021    .0 (H) 07/14/2021    PLT 60 (L) 07/14/2021     Lab Results   Component Value Date    LABA1C 7.3 07/09/2021     No results found for: EAG  Lab Results   Component Value Date    LABA1C 7.3 07/09/2021     No components found for: CHLPL  No results found for: TRIG  No results found for: HDL  No results found for: LDLCALC  No results found for: LABVLDL      Assessment   Plan   1. Diabetic polyneuropathy associated with type 2 diabetes mellitus ([facility] [address]  I am not completely sure that this is a diabetic neuropathy. We will titrate up his gabapentin to 800 mg 3 times a day and add Metanx.   Follow-up with Dr. Kervin Brannon in 6 weeks. In the meantime we will also check vitamin R18 and folic acid levels to see if they are contributing to his symptoms  - L-methylfolate-B6-B12 (METANX) 7-11.828-4-27 MG CAPS capsule; Take 1 capsule by mouth daily  Dispense: 90 capsule; Refill: 3  - gabapentin (NEURONTIN) 800 MG tablet; Take 1 tablet by mouth 3 times daily. Dispense: 90 tablet; Refill: 3  - Vitamin B12; Future  - Folate; Future    2. Type 2 diabetes mellitus without complication, without long-term current use of insulin (HCC)  Hemoglobin A1c was 6.2 today. This represents excellent control with diet and lifestyle  - Lipid Panel; Future  - Comprehensive Metabolic Panel; Future  - POCT glycosylated hemoglobin (Hb A1C)  - Microalbumin / Creatinine Urine Ratio; Future  - HM DIABETES FOOT EXAM    3. Chronic hepatitis C without hepatic coma (HCC)  We will check his hepatitis C viral load. I am contemplating if his antiviral treatment was contributing to his symptoms  - Hep C AB RLFX HCV PCR-A; Future    4. Fatigue, unspecified type  Of uncertain etiology. We will begin work-up with laboratory evaluation and follow-up with Dr. Malgorzata Munson in 6 weeks  - Vitamin B12; Future  - Folate; Future  - CBC with Auto Differential; Future      Discussed use, benefit, and side effects of prescribed medications. Barriers to medication compliance addressed. All patient questions answered. Pt voiced understanding. RTC Return in about 6 weeks (around 6/9/2022).

## 2022-05-17 ENCOUNTER — OFFICE VISIT (OUTPATIENT)
Dept: CARDIOLOGY CLINIC | Age: 62
End: 2022-05-17
Payer: MEDICAID

## 2022-05-17 VITALS
SYSTOLIC BLOOD PRESSURE: 142 MMHG | HEIGHT: 70 IN | WEIGHT: 168 LBS | HEART RATE: 74 BPM | DIASTOLIC BLOOD PRESSURE: 90 MMHG | BODY MASS INDEX: 24.05 KG/M2

## 2022-05-17 DIAGNOSIS — R42 DIZZINESS: ICD-10-CM

## 2022-05-17 DIAGNOSIS — R55 SYNCOPE, UNSPECIFIED SYNCOPE TYPE: Primary | ICD-10-CM

## 2022-05-17 DIAGNOSIS — R94.09 ABNORMAL TILT TABLE TEST: ICD-10-CM

## 2022-05-17 DIAGNOSIS — R55 SYNCOPE AND COLLAPSE: ICD-10-CM

## 2022-05-17 PROCEDURE — 4004F PT TOBACCO SCREEN RCVD TLK: CPT | Performed by: NURSE PRACTITIONER

## 2022-05-17 PROCEDURE — G8420 CALC BMI NORM PARAMETERS: HCPCS | Performed by: NURSE PRACTITIONER

## 2022-05-17 PROCEDURE — 99214 OFFICE O/P EST MOD 30 MIN: CPT | Performed by: NURSE PRACTITIONER

## 2022-05-17 PROCEDURE — G8427 DOCREV CUR MEDS BY ELIG CLIN: HCPCS | Performed by: NURSE PRACTITIONER

## 2022-05-17 PROCEDURE — 93000 ELECTROCARDIOGRAM COMPLETE: CPT | Performed by: NURSE PRACTITIONER

## 2022-05-17 PROCEDURE — 3017F COLORECTAL CA SCREEN DOC REV: CPT | Performed by: NURSE PRACTITIONER

## 2022-05-17 NOTE — PROGRESS NOTES
[address]   Electrophysiology  Office Visit  Date: 5/17/2022    Chief Complaint   Patient presents with    Loss of Consciousness       Cardiac HX: Paty Varela is a 57 y.o. man with PMH HTN, hepatitis C, DM, syncope, originally presented 4/2021 to the ER for syncopal event, echo showed normal EF 55-60%, underwent tilt table test (6/2021) that was positive    Interval History/HPI: Patient is here for for neurocardiogenic syncope. He started with syncopal episodes [place]. In April 2021 he had another syncopal episode while standing folding towels were about 15 minutes when he passed out. Echo unremarkable. He did undergo tilt table test which was positive. He was started on midodrine in February 2022 by Dr. Juanjo Stanley. Had no further episodes of syncope since February. He states he has not started the midodrine and has not been wearing compression socks. Continues to not work. Has occasional dizziness or lightheadedness. No CP, SOB, palpitations, BLE edema. Home medications:   Current Outpatient Medications on File Prior to Visit   Medication Sig Dispense Refill    L-methylfolate-B6-B12 (METANX) 4-23.395-6-05 MG CAPS capsule Take 1 capsule by mouth daily 90 capsule 3    gabapentin (NEURONTIN) 800 MG tablet Take 1 tablet by mouth 3 times daily. 90 tablet 3    midodrine (PROAMATINE) 5 MG tablet Take 1 tablet by mouth 2 times daily 60 tablet 3    Sofosbuvir-Velpatasvir 400-100 MG TABS Take 1 tablet by mouth daily       No current facility-administered medications on file prior to visit. Past Medical History:   Diagnosis Date    Chronic hepatitis C without hepatic coma ([facility] - Diagnosed in [facility]     Type 2 diabetes mellitus without complication, without long-term current use of insulin ([facility] 7/9/2021        History reviewed. No pertinent surgical history. Family History:  Reviewed.  family history includes No Known Problems in his brother, brother, brother, and sister; Other in his mother. Review of System:    · Constitutional: No fevers, chills. · Cardiovascular: No for chest pain, No for dyspnea on exertion, No for palpitations or No for loss of consciousness. No cough, hemoptysis, No for pleuritic pain, or phlebitis. · Respiratory: No for cough or wheezing. No hematemesis. · Gastrointestinal: No blood in stools. · Genitourinary: No hematuria. · Musculoskeletal: No gait disturbance,    · Integumentary: No rash or pruritis. · Psychiatric: No anxiety, or depression. · Hem/Lymph: No abnormal bruising or bleeding. Physical Examination:  Vitals:    05/17/22 1427   BP: (!) 142/90   Pulse: 74         Wt Readings from Last 3 Encounters:   05/17/22 168 lb (76.2 kg)   03/22/22 167 lb (75.8 kg)   02/24/22 169 lb 9.6 oz (76.9 kg)       · Constitutional: Oriented. No distress. · Head: Normocephalic and atraumatic. · Neck: Neck supple. No rigidity. No JVD present. · Cardiovascular: Normal rate, regular rhythm, S1&S2. · Pulmonary/Chest: Bilateral respiratory sounds. No wheezes, No rhonchi. · Musculoskeletal: No tenderness. No edema    · Neurological: Alert and oriented. Cranial nerve appears intact, No Gross deficit   · Skin: Skin is warm and dry. No rash noted. · Psychiatric: Has a normal mood, affect and behavior     Labs:  Reviewed. No results for input(s): NA, K, CL, CO2, PHOS, BUN, CREATININE, CA in the last 72 hours. Invalid input(s):  TSH  No results for input(s): WBC, HGB, HCT, MCV, PLT in the last 72 hours.   Lab Results   Component Value Date    TROPONINI <0.01 04/16/2021     No results found for: BNP  Lab Results   Component Value Date    PROTIME 12.3 11/15/2021    PROTIME 12.9 07/14/2021    PROTIME 14.6 04/17/2021    INR 1.08 11/15/2021    INR 1.14 07/14/2021    INR 1.26 04/17/2021     No results found for: CHOL, HDL, TRIG    ECG: Personally reviewed: NSR, HR 74, , QRS 90, QTc 432    ECHO:  6/2021   Summary   Normal left ventricular size. Preserved ejection fraction estimated at 55%-60%. Normal diastolic function. Thickened aortic valve leaflets. Trace-mild tricuspid regurgitation. The pulmonic valve is not well visualized. Estimated pulmonary artery systolic pressure is at 22 mmHg assuming a right   atrial pressure of 3 mmHg. Stress Test: n/a    Cardiac Angiography: n/a    Problem List:   Patient Active Problem List    Diagnosis Date Noted    Cigarette nicotine dependence without complication 15/13/9091    Diabetic polyneuropathy [facility] with type [facility] diabetes mellitus [facility] 02/07/2022    Abnormal tilt table test 07/09/2021    Pancytopenia ([facility] 07/09/2021    Smoker 07/09/2021    Vitiligo 07/09/2021    Type 2 diabetes mellitus without complication, without long-term current use of insulin ([facility] 07/09/2021    Chronic hepatitis C without hepatic coma ([facility]     Orthostatic syncope 04/17/2021        Assessment:   1. Syncope, unspecified syncope type    2. Abnormal tilt table test    3. Syncope and collapse    4. Dizziness      Neurocardiogenic syncope  - Tilt table test positive  - On midodrine 5 mg twice daily-advised patient to start taking  - Adequate hydration, adequate salt intake  - Knee-high compression stockings-advised patient to start utilizing  - Daily BP checks  - Lifestyle modifications-isometric exercises  - F/u 6 months or sooner if symptoms reoccur  - ECG ordered and results personally reviewed     EF of 75-66%  No known systolic HF  No known CAD  No known AF    Tobacco use was discussed with the patient and education provided. All questions and concerns were addressed to the patient/family. Alternatives to my treatment were discussed. The note was completed using EMR. Every effort was made to ensure accuracy; however, inadvertent computerized transcription errors may be present.      Patient received education regarding their diagnosis, treatment and medications while in the office today.      Devora Dorsey, [address] [facility]

## 2022-06-08 PROBLEM — F17.200 SMOKER: Status: RESOLVED | Noted: 2021-07-09 | Resolved: 2022-06-08

## 2022-06-08 NOTE — PATIENT INSTRUCTIONS
Learning About Diabetes and Exercise  Can you exercise if you have diabetes? When you have diabetes, it's important to get regular exercise. This helps control your blood sugar level. You can still play sports, run, ride a bike, go swimming, and do other activities when you have diabetes. How can exercise help you manage diabetes? Your body turns the food you eat into glucose, a type of sugar. You need this sugar for energy. When you have diabetes, the sugar builds up in your blood. But when you exercise, your body uses sugar. This helps keep it from building up in your blood and results in lower blood sugar and better control of diabetes. Exercise may help you in other ways too. It can help you reach and stay at a healthy weight. It also helps improve blood pressure and cholesterol, which can reduce the risk of heart disease. Exercise can make you feel stronger and happier. It can help you relax and sleep better, and give you confidence in other things you do. How can you exercise safely? Before you start a new exercise program, talk to your doctor about how and when to exercise. You may need to have a medical exam and tests before you begin. Some types of exercise can be harmful if your diabetes is causing other problems, such as problems with your feet. Your doctor can tell you what types of exercise are good choices for you. These tips can help you exercise safely when you have diabetes. If your diabetes is controlled by diet or medicine that doesn't lower your blood sugar, you don't need to eat a snack before you exercise. · Check your blood sugar before you exercise. And be careful about what you eat. ? If your blood sugar is less than 100, eat a carbohydrate snack before you exercise. ? Be careful when you exercise if your blood sugar is over 300. High blood sugar can make you dehydrated. And that makes your blood sugar levels go even higher.  If you have ketones in your blood or urine and your blood sugar is over 300, do not exercise. · Don't try to do too much at first. Build up your exercise program bit by bit. Try to get at least 30 minutes of exercise on most days of the week. Walking is a good choice. You also may want to do other activities, such as riding a bike or swimming. You might try running or gardening. Try to include muscle-strengthening exercises at least 2 times a week. These exercises include push-ups and weight training. You can also use rubber tubing or stretch bands. You stretch or pull the tubing or band to build muscle strength. If you want to exercise more, slowly increase how hard or long you exercise. · You may get symptoms of low blood sugar during exercise or up to 24 hours later. Some symptoms of low blood sugar, such as sweating, a fast heartbeat, or feeling tired, can be confused with what can happen anytime you exercise. Other symptoms may include feeling anxious, dizzy, weak, or shaky. So it's a good idea to check your blood sugar again. · You can treat low blood sugar by eating or drinking something that has 15 grams of carbohydrate. These should be quick-sugar foods. Quick-sugar foods such as glucose tablets, table sugar, honey, fruit juice, regular (not diet) soda pop, or hard candy can help raise blood sugar. Check your blood sugar level again 15 minutes after having a quick-sugar food to make sure your level is getting back to your target range. · Drink plenty of water before, during, and after you exercise. · Wear medical alert jewelry that says you have diabetes. You can buy this at most drugstores. · Pay attention to your body. If you are used to exercise and notice that you can't do as much as usual, talk to your doctor. Follow-up care is a key part of your treatment and safety. Be sure to make and go to all appointments, and call your doctor if you are having problems.  It's also a good idea to know your test results and keep a list of the medicines you take.  Where can you learn more? Go to [email or web address] and sign in to your [facility] account. Enter I057 in the [employer] box to learn more about \"Learning About Diabetes and Exercise. \"     If you do not have an account, please click on the \"Sign Up Now\" link. Current as of: December 20, 2019               Content Version: 12.5  © 5572-9260 Healthwise, Incorporated. Care instructions adapted under license by [facility] ([facility]). If you have questions about a medical condition or this instruction, always ask your healthcare professional. [address] any warranty or liability for your use of this information.

## 2022-06-08 NOTE — PROGRESS NOTES
2022     Gloria Burns (:  1960) is a 57 y.o. male, here for evaluation of the following medical concerns:    HPI  {Visit Chronic CHP (Optional):40996}    Review of Systems   Constitutional: Negative for activity change, appetite change, fatigue and unexpected weight change. Eyes: Negative for visual disturbance. Respiratory: Negative for cough, chest tightness and shortness of breath. Cardiovascular: Negative for chest pain, palpitations and leg swelling. Gastrointestinal: Negative for abdominal distention, abdominal pain, diarrhea and nausea. Endocrine: Negative for polydipsia, polyphagia and polyuria. Genitourinary: Negative for decreased urine volume, dysuria and frequency. Musculoskeletal: Negative for gait problem and myalgias. Skin: Negative for rash and wound. Neurological: Positive for numbness. Negative for dizziness, syncope (No episodes since Cardio visit), weakness and light-headedness. Hematological: Does not bruise/bleed easily. Prior to Visit Medications    Medication Sig Taking? Authorizing Provider   P-efilitetwnnq-Q4-B12 Sirisha Butts) 1-35.779-4-74 MG CAPS capsule Take 1 capsule by mouth daily  Bandar Amador MD   gabapentin (NEURONTIN) 800 MG tablet Take 1 tablet by mouth 3 times daily. Bandar Amador MD   midodrine (PROAMATINE) 5 MG tablet Take 1 tablet by mouth 2 times daily  Gallo Allen MD   Sofosbuvir-Velpatasvir 400-100 MG TABS Take 1 tablet by mouth daily  Historical Provider, MD        Social History     Tobacco Use    Smoking status: Current Every Day Smoker     Packs/day: 0.00     Years: 40.00     Pack years: 0.00     Types: Cigars    Smokeless tobacco: Never Used    Tobacco comment: 2 cigars daily    Substance Use Topics    Alcohol use: Yes     Comment: 48 oz beer/day        There were no vitals filed for this visit.   Estimated body mass index is 24.11 kg/m² as calculated from the following:    Height as of 22: 5' 10\" (1.778 m). Weight as of 5/17/22: 168 lb (76.2 kg). Physical Exam  Vitals reviewed. Constitutional:       Appearance: Normal appearance. He is well-developed and normal weight. HENT:      Head: Normocephalic and atraumatic. Nose: Nose normal.   Eyes:      Extraocular Movements: Extraocular movements intact. Conjunctiva/sclera: Conjunctivae normal.   Cardiovascular:      Rate and Rhythm: Normal rate and regular rhythm. Pulses: Normal pulses. Heart sounds: Normal heart sounds. No murmur heard. No friction rub. No gallop. Pulmonary:      Effort: Pulmonary effort is normal.      Breath sounds: Normal breath sounds. No wheezing or rales. Abdominal:      General: Bowel sounds are normal.      Palpations: Abdomen is soft. Musculoskeletal:         General: Normal range of motion. Cervical back: Normal range of motion and neck supple. Skin:     General: Skin is warm and dry. Capillary Refill: Capillary refill takes less than 2 seconds. Comments: Scattered areas of depigmentation   Neurological:      Mental Status: He is alert. Mental status is at baseline. Psychiatric:         Mood and Affect: Mood normal.         Behavior: Behavior normal.         Thought Content: Thought content normal.         Judgment: Judgment normal.         ASSESSMENT/PLAN:  There are no diagnoses linked to this encounter. No follow-ups on file. An electronic signature was used to authenticate this note.     --Renee Jc DO on 6/8/2022 at 6:34 PM

## 2022-06-09 ENCOUNTER — OFFICE VISIT (OUTPATIENT)
Dept: PRIMARY CARE CLINIC | Age: 62
End: 2022-06-09
Payer: MEDICAID

## 2022-06-09 VITALS
BODY MASS INDEX: 24.17 KG/M2 | HEART RATE: 69 BPM | TEMPERATURE: 97.1 F | DIASTOLIC BLOOD PRESSURE: 88 MMHG | HEIGHT: 70 IN | SYSTOLIC BLOOD PRESSURE: 144 MMHG | WEIGHT: 168.8 LBS

## 2022-06-09 DIAGNOSIS — F17.210 CIGARETTE NICOTINE DEPENDENCE WITHOUT COMPLICATION: ICD-10-CM

## 2022-06-09 DIAGNOSIS — E11.9 TYPE 2 DIABETES MELLITUS WITHOUT COMPLICATION, WITHOUT LONG-TERM CURRENT USE OF INSULIN (HCC): Primary | ICD-10-CM

## 2022-06-09 DIAGNOSIS — Z12.5 SCREENING FOR MALIGNANT NEOPLASM OF PROSTATE: ICD-10-CM

## 2022-06-09 DIAGNOSIS — Z23 NEED FOR SHINGLES VACCINE: ICD-10-CM

## 2022-06-09 DIAGNOSIS — Z13.220 SCREENING FOR HYPERLIPIDEMIA: ICD-10-CM

## 2022-06-09 DIAGNOSIS — B18.2 CHRONIC HEPATITIS C WITHOUT HEPATIC COMA (HCC): ICD-10-CM

## 2022-06-09 DIAGNOSIS — D75.89 MACROCYTOSIS: ICD-10-CM

## 2022-06-09 DIAGNOSIS — Z23 NEED FOR PROPHYLACTIC VACCINATION AGAINST STREPTOCOCCUS PNEUMONIAE (PNEUMOCOCCUS): ICD-10-CM

## 2022-06-09 DIAGNOSIS — Z13.89 SCREENING FOR NEPHROPATHY: ICD-10-CM

## 2022-06-09 DIAGNOSIS — E11.42 DIABETIC POLYNEUROPATHY ASSOCIATED WITH TYPE 2 DIABETES MELLITUS (HCC): ICD-10-CM

## 2022-06-09 PROCEDURE — 3017F COLORECTAL CA SCREEN DOC REV: CPT | Performed by: STUDENT IN AN ORGANIZED HEALTH CARE EDUCATION/TRAINING PROGRAM

## 2022-06-09 PROCEDURE — G8420 CALC BMI NORM PARAMETERS: HCPCS | Performed by: STUDENT IN AN ORGANIZED HEALTH CARE EDUCATION/TRAINING PROGRAM

## 2022-06-09 PROCEDURE — 99214 OFFICE O/P EST MOD 30 MIN: CPT | Performed by: STUDENT IN AN ORGANIZED HEALTH CARE EDUCATION/TRAINING PROGRAM

## 2022-06-09 PROCEDURE — 4004F PT TOBACCO SCREEN RCVD TLK: CPT | Performed by: STUDENT IN AN ORGANIZED HEALTH CARE EDUCATION/TRAINING PROGRAM

## 2022-06-09 PROCEDURE — 3044F HG A1C LEVEL LT 7.0%: CPT | Performed by: STUDENT IN AN ORGANIZED HEALTH CARE EDUCATION/TRAINING PROGRAM

## 2022-06-09 PROCEDURE — 2022F DILAT RTA XM EVC RTNOPTHY: CPT | Performed by: STUDENT IN AN ORGANIZED HEALTH CARE EDUCATION/TRAINING PROGRAM

## 2022-06-09 PROCEDURE — G8427 DOCREV CUR MEDS BY ELIG CLIN: HCPCS | Performed by: STUDENT IN AN ORGANIZED HEALTH CARE EDUCATION/TRAINING PROGRAM

## 2022-06-09 ASSESSMENT — PATIENT HEALTH QUESTIONNAIRE - PHQ9
10. IF YOU CHECKED OFF ANY PROBLEMS, HOW DIFFICULT HAVE THESE PROBLEMS MADE IT FOR YOU TO DO YOUR WORK, TAKE CARE OF THINGS AT HOME, OR GET ALONG WITH OTHER PEOPLE: 0
3. TROUBLE FALLING OR STAYING ASLEEP: 0
1. LITTLE INTEREST OR PLEASURE IN DOING THINGS: 0
6. FEELING BAD ABOUT YOURSELF - OR THAT YOU ARE A FAILURE OR HAVE LET YOURSELF OR YOUR FAMILY DOWN: 0
2. FEELING DOWN, DEPRESSED OR HOPELESS: 0
SUM OF ALL RESPONSES TO PHQ9 QUESTIONS 1 & 2: 0
SUM OF ALL RESPONSES TO PHQ QUESTIONS 1-9: 0
SUM OF ALL RESPONSES TO PHQ QUESTIONS 1-9: 0
7. TROUBLE CONCENTRATING ON THINGS, SUCH AS READING THE NEWSPAPER OR WATCHING TELEVISION: 0
SUM OF ALL RESPONSES TO PHQ QUESTIONS 1-9: 0
9. THOUGHTS THAT YOU WOULD BE BETTER OFF DEAD, OR OF HURTING YOURSELF: 0
8. MOVING OR SPEAKING SO SLOWLY THAT OTHER PEOPLE COULD HAVE NOTICED. OR THE OPPOSITE, BEING SO FIGETY OR RESTLESS THAT YOU HAVE BEEN MOVING AROUND A LOT MORE THAN USUAL: 0
5. POOR APPETITE OR OVEREATING: 0
SUM OF ALL RESPONSES TO PHQ QUESTIONS 1-9: 0
4. FEELING TIRED OR HAVING LITTLE ENERGY: 0

## 2022-06-09 ASSESSMENT — ENCOUNTER SYMPTOMS
ABDOMINAL DISTENTION: 0
NAUSEA: 0
COUGH: 0
CHEST TIGHTNESS: 0
SHORTNESS OF BREATH: 0
DIARRHEA: 0
ABDOMINAL PAIN: 0

## 2022-06-09 NOTE — PROGRESS NOTES
2022     Manjit Childs (:  1960) is a 57 y.o. male, here for evaluation of the following medical concerns:    Diabetes Mellitus Type 2: Current symptoms/problems include neuropathy. Medication compliance: Not applicable  Diabetic diet compliance:  compliant most of the time,  Weight trend: stable  Current exercise: no regular exercise  Barriers: none    Home blood sugar records: patient does not test  Any episodes of hypoglycemia? no  Eye exam current (within one year): no   reports that he has been smoking cigars. He has been smoking about 0.00 packs per day for the past 40.00 years. He has never used smokeless tobacco.   Daily Aspirin? No    Lab Results   Component Value Date    LABA1C 6.2 2022    LABA1C 7.3 2021     Lab Results   Component Value Date    LABMICR YES 11/10/2020    CREATININE 0.8 2021     Lab Results   Component Value Date     (H) 11/15/2021     (H) 11/15/2021     No results found for: CHOL, TRIG, HDL, LDLCALC, LDLDIRECT       Review of Systems   Constitutional: Positive for fatigue. Eyes: Negative for visual disturbance. Respiratory: Negative for cough, chest tightness and shortness of breath. Cardiovascular: Negative for chest pain, palpitations and leg swelling. Endocrine: Negative for polydipsia, polyphagia and polyuria. Genitourinary: Negative for frequency. Skin: Negative for rash. Neurological: Negative for dizziness, syncope (No episodes since Cardio visit), weakness and light-headedness. Prior to Visit Medications    Medication Sig Taking? Authorizing Provider   L-methylfolate-B6-B12 [facility]) 3-25.609-0-35 MG CAPS capsule Take 1 capsule by mouth daily Yes Pauline Crigler, MD   gabapentin (NEURONTIN) 800 MG tablet Take 1 tablet by mouth 3 times daily.  Yes Pauline Crigler, MD   midodrine (PROAMATINE) 5 MG tablet Take 1 tablet by mouth 2 times daily Yes Yandy Garcia MD        Social History     Tobacco Use  Smoking status: Current Every Day Smoker     Packs/day: 0.00     Years: 40.00     Pack years: 0.00     Types: Cigars    Smokeless tobacco: Never Used    Tobacco comment: 2 cigars daily    Substance Use Topics    Alcohol use: Yes     Comment: 48 oz beer/day        Vitals:    06/09/22 1410   BP: (!) 144/88   Pulse: 69   Temp: 97.1 °F (36.2 °C)   TempSrc: Axillary   Weight: 168 lb 12.8 oz (76.6 kg)   Height: 5' 10\" (1.778 m)     Estimated body mass index is 24.22 kg/m² as calculated from the following:    Height as of this encounter: 5' 10\" (1.778 m). Weight as of this encounter: 168 lb 12.8 oz (76.6 kg). Physical Exam  Vitals reviewed. Constitutional:       Appearance: Normal appearance. He is well-developed and normal weight. HENT:      Head: Normocephalic and atraumatic. Right Ear: Tympanic membrane, ear canal and external ear normal.      Left Ear: Tympanic membrane, ear canal and external ear normal.      Nose: Nose normal.   Eyes:      Extraocular Movements: Extraocular movements intact. Conjunctiva/sclera: Conjunctivae normal.   Cardiovascular:      Rate and Rhythm: Normal rate and regular rhythm. Pulses: Normal pulses. Heart sounds: Normal heart sounds. No murmur heard. No friction rub. No gallop. Pulmonary:      Effort: Pulmonary effort is normal.      Breath sounds: Normal breath sounds. No wheezing or rales. Abdominal:      General: Bowel sounds are normal. There is no distension. Palpations: Abdomen is soft. There is no mass. Tenderness: There is no abdominal tenderness. Musculoskeletal:         General: Normal range of motion. Cervical back: Normal range of motion and neck supple. Skin:     General: Skin is warm and dry. Capillary Refill: Capillary refill takes less than 2 seconds. Findings: No rash. Neurological:      Mental Status: He is alert. Mental status is at baseline.    Psychiatric:         Mood and Affect: Mood normal. Behavior: Behavior normal.         Thought Content: Thought content normal.         Judgment: Judgment normal.       Ace Metzger was seen today for hypertension. Diagnoses and all orders for this visit:    Type 2 diabetes mellitus without complication, without long-term current use of insulin ([facility]): A1c remains at goal.  Doing well with lifestyle modifications. No changes at this time. Diabetic polyneuropathy associated with type 2 diabetes mellitus ([facility]): Persistent neuropathy despite high-dose gabapentin. Referring to pain management. -     Amb External Referral To Pain Medicine    Chronic [facility] without [facility] Previously treated for hepatitis C, but not checked to ensure resolution. Labs as below. -     Hepatitis C Genotype; Future  -     Hepatitis C RNA, quantitative, PCR; Future    Fatigue, unspecified: Continues to complain of persistent low energy. The differential for this patient is wide especially given his vague diffuse demyelination, which is supposedly related to his diabetes. Need to be systematic in the approach. Macrocytic and anemic. Checking labs as below. Denies alcohol abuse. Updating cancer screenings. Denies depressive symptoms. Follow-up 4 weeks. Macrocytosis  -     Vitamin B12 & Folate; Future  -     Path Review, Smear; Future    Screening for hyperlipidemia  -     Lipid Panel; Future    Screening for nephropathy  -     Microalbumin / Creatinine Urine Ratio; Future    Screening for malignant neoplasm of prostate  -     PSA, Prostatic Specific Antigen; Future    Return in about 4 weeks (around 7/7/2022) for Blood Pressure. An electronic signature was used to authenticate this note.     --Ladarius Frank, DO on 6/9/2022 at 4:07 PM

## 2022-07-01 DIAGNOSIS — B18.2 CHRONIC HEPATITIS C WITHOUT HEPATIC COMA (HCC): ICD-10-CM

## 2022-07-01 DIAGNOSIS — Z13.220 SCREENING FOR HYPERLIPIDEMIA: ICD-10-CM

## 2022-07-01 DIAGNOSIS — D75.89 MACROCYTOSIS: ICD-10-CM

## 2022-07-01 DIAGNOSIS — Z12.5 SCREENING FOR MALIGNANT NEOPLASM OF PROSTATE: ICD-10-CM

## 2022-07-01 LAB
BLOOD SMEAR REVIEW: NORMAL
CHOLESTEROL, TOTAL: 149 MG/DL (ref 0–199)
HDLC SERPL-MCNC: 37 MG/DL (ref 40–60)
LDL CHOLESTEROL CALCULATED: 61 MG/DL
PROSTATE SPECIFIC ANTIGEN: 0.39 NG/ML (ref 0–4)
TRIGL SERPL-MCNC: 256 MG/DL (ref 0–150)
VLDLC SERPL CALC-MCNC: 51 MG/DL

## 2022-07-02 LAB
FOLATE: 18.43 NG/ML (ref 4.78–24.2)
VITAMIN B-12: 800 PG/ML (ref 211–911)

## 2022-07-04 LAB
HCV QNT BY NAAT IU/ML: NOT DETECTED IU/ML
HCV QNT BY NAAT LOG IU/ML: NOT DETECTED LOG IU/ML
INTERPRETATION: NOT DETECTED

## 2022-07-06 ENCOUNTER — TELEPHONE (OUTPATIENT)
Dept: PRIMARY CARE CLINIC | Age: 62
End: 2022-07-06

## 2022-07-06 LAB — HEMATOLOGY PATH CONSULT: NORMAL

## 2022-07-07 LAB — HEPATITIS C GENOTYPE: NORMAL

## 2022-07-13 ENCOUNTER — TELEPHONE (OUTPATIENT)
Dept: PRIMARY CARE CLINIC | Age: 62
End: 2022-07-13

## 2022-07-13 NOTE — TELEPHONE ENCOUNTER
Pt call in and still hasn't received a call about his labs from 7/1/22. Explain to him that you have 12-24 to return a call. Pt is concern because the tests were done on 7/1/22.

## 2022-07-26 ENCOUNTER — OFFICE VISIT (OUTPATIENT)
Dept: PRIMARY CARE CLINIC | Age: 62
End: 2022-07-26
Payer: MEDICAID

## 2022-07-26 VITALS
WEIGHT: 168 LBS | BODY MASS INDEX: 24.05 KG/M2 | SYSTOLIC BLOOD PRESSURE: 128 MMHG | HEART RATE: 73 BPM | TEMPERATURE: 98.4 F | DIASTOLIC BLOOD PRESSURE: 76 MMHG | HEIGHT: 70 IN

## 2022-07-26 DIAGNOSIS — E11.42 DIABETIC POLYNEUROPATHY ASSOCIATED WITH TYPE 2 DIABETES MELLITUS (HCC): ICD-10-CM

## 2022-07-26 DIAGNOSIS — E11.9 TYPE 2 DIABETES MELLITUS WITHOUT COMPLICATION, WITHOUT LONG-TERM CURRENT USE OF INSULIN (HCC): Primary | ICD-10-CM

## 2022-07-26 PROBLEM — R94.09 ABNORMAL TILT TABLE TEST: Status: RESOLVED | Noted: 2021-07-09 | Resolved: 2022-07-26

## 2022-07-26 LAB — HBA1C MFR BLD: 6.2 %

## 2022-07-26 PROCEDURE — 83036 HEMOGLOBIN GLYCOSYLATED A1C: CPT | Performed by: STUDENT IN AN ORGANIZED HEALTH CARE EDUCATION/TRAINING PROGRAM

## 2022-07-26 PROCEDURE — 3044F HG A1C LEVEL LT 7.0%: CPT | Performed by: STUDENT IN AN ORGANIZED HEALTH CARE EDUCATION/TRAINING PROGRAM

## 2022-07-26 PROCEDURE — 99214 OFFICE O/P EST MOD 30 MIN: CPT | Performed by: STUDENT IN AN ORGANIZED HEALTH CARE EDUCATION/TRAINING PROGRAM

## 2022-07-26 RX ORDER — PREGABALIN 50 MG/1
50 CAPSULE ORAL 3 TIMES DAILY
Qty: 90 CAPSULE | Refills: 1 | Status: SHIPPED | OUTPATIENT
Start: 2022-07-26 | End: 2022-09-01

## 2022-07-26 ASSESSMENT — ENCOUNTER SYMPTOMS
SHORTNESS OF BREATH: 0
CHEST TIGHTNESS: 0
NAUSEA: 0
ABDOMINAL PAIN: 0
DIARRHEA: 0
ABDOMINAL DISTENTION: 0

## 2022-07-26 NOTE — PROGRESS NOTES
2022     Master Armstrong (:  1960) is a 57 y.o. male, here for evaluation of the following medical concerns:    HPI  Neuropathy  He describes symptoms of burning, lancinating pain, and tingling. Onset of symptoms was  several years ago . Symptoms are currently of severe severity. Symptoms occur all day and last hours. The patient denies cramping, hypersensitivity, and allodynia. Symptoms are symmetric. He also describes autonomic symptoms of orthostasis, but was seen by cardiology and diagnosed with a sympathetic neuropathy. Has been doing better on midodrine. He denies  bloating, early satiety, diarrhea, alternating diarrhea and constipation, dry eyes and dry mouth, blurred vision, lack of sweating, and sexual dysfunction. Previous treatment has included gabapentin 800 mg 3 times daily, which has not improved symptoms. Review of Systems   Constitutional:  Positive for fatigue. Negative for activity change, appetite change and unexpected weight change. Eyes:  Negative for visual disturbance. Respiratory:  Negative for chest tightness and shortness of breath. Gastrointestinal:  Negative for abdominal distention, abdominal pain, diarrhea and nausea. Endocrine: Negative for polydipsia, polyphagia and polyuria. Genitourinary:  Negative for decreased urine volume, dysuria and frequency. Musculoskeletal:  Negative for gait problem and myalgias. Skin:  Negative for rash and wound. Neurological:  Positive for numbness. Negative for dizziness, weakness and light-headedness. Hematological:  Does not bruise/bleed easily. Prior to Visit Medications    Medication Sig Taking? Authorizing Provider   pregabalin (LYRICA) 50 MG capsule Take 1 capsule by mouth in the morning and 1 capsule at noon and 1 capsule before bedtime. Do all this for 30 days.  Yes Mae Lemus, DO   L-methylfolate-B6-B12 [facility]) 7-90.837-0-93 MG CAPS capsule Take 1 capsule by mouth daily  Patient not taking: Reported on 7/26/2022  Jerry Valencia MD   midodrine (PROAMATINE) 5 MG tablet Take 1 tablet by mouth 2 times daily  Patient not taking: Reported on 7/26/2022  Bonilla Vega MD        Social History     Tobacco Use    Smoking status: Every Day     Packs/day: 0.00     Years: 40.00     Pack years: 0.00     Types: Cigars, Cigarettes    Smokeless tobacco: Never    Tobacco comments:     2 cigars daily    Substance Use Topics    Alcohol use: Yes     Comment: 48 oz beer/day        Vitals:    07/26/22 1053 07/26/22 1105   BP: (!) 147/96 128/76   Pulse: 73    Temp: 98.4 °F (36.9 °C)    TempSrc: Temporal    Weight: 168 lb (76.2 kg)    Height: 5' 10\" (1.778 m)      Estimated body mass index is 24.11 kg/m² as calculated from the following:    Height as of this encounter: 5' 10\" (1.778 m). Weight as of this encounter: 168 lb (76.2 kg). Physical Exam  Vitals reviewed. Constitutional:       Appearance: Normal appearance. He is well-developed and normal weight. HENT:      Head: Normocephalic and atraumatic. Nose: Nose normal.   Eyes:      Extraocular Movements: Extraocular movements intact. Conjunctiva/sclera: Conjunctivae normal.   Cardiovascular:      Rate and Rhythm: Normal rate and regular rhythm. Pulses: Normal pulses. Heart sounds: Normal heart sounds. No murmur heard. No friction rub. No gallop. Pulmonary:      Effort: Pulmonary effort is normal.      Breath sounds: Normal breath sounds. No wheezing or rales. Abdominal:      General: Bowel sounds are normal. There is no distension. Palpations: Abdomen is soft. There is no mass. Tenderness: There is no abdominal tenderness. Musculoskeletal:         General: Normal range of motion. Cervical back: Normal range of motion and neck supple. Skin:     General: Skin is warm and dry. Capillary Refill: Capillary refill takes less than 2 seconds. Findings: No rash. Neurological:      Mental Status: He is alert. Mental status is at baseline. Sensory: No sensory deficit. Motor: No weakness. Psychiatric:         Mood and Affect: Mood normal.         Behavior: Behavior normal.         Thought Content: Thought content normal.         Judgment: Judgment normal.       ASSESSMENT/PLAN:  Diabetic polyneuropathy associated with type 2 diabetes mellitus ([facility] [address]): Diabetes has been well controlled for several years, but nerve damage has persisted. Has known sympathetic neuropathy treated on midodrine by cardiology. Had been on gabapentin 800 3 times daily with minimal improvement. Discussed switching to Lyrica, which patient is willing to try, but I explained to him I would be surprised if this worked given minimal improvement on gabapentin. Discussed possible referral to Dr. Jorge Estrada for possible nerve stimulator treatment. He is willing to discuss this with her. Referral placed. Follow-up 6 weeks to discuss their appointment as well as possibly increase Lyrica.  - External Referral To Spine Surgery  - pregabalin (LYRICA) 50 MG capsule; Take 1 capsule by mouth in the morning and 1 capsule at noon and 1 capsule before bedtime. Do all this for 30 days. Dispense: 90 capsule; Refill: 1    Return in about 6 weeks (around 9/6/2022) for neuropathy. An electronic signature was used to authenticate this note.     --Leslee Harmon, DO on 7/26/2022 at 11:33 AM

## 2022-08-09 ENCOUNTER — HOSPITAL ENCOUNTER (INPATIENT)
Age: 62
LOS: 7 days | Discharge: HOME | DRG: 282 | End: 2022-08-16
Attending: EMERGENCY MEDICINE | Admitting: INTERNAL MEDICINE
Payer: MEDICAID

## 2022-08-09 ENCOUNTER — APPOINTMENT (OUTPATIENT)
Dept: CT IMAGING | Age: 62
DRG: 282 | End: 2022-08-09
Payer: MEDICAID

## 2022-08-09 DIAGNOSIS — K85.90 ACUTE PANCREATITIS WITHOUT INFECTION OR NECROSIS, UNSPECIFIED PANCREATITIS TYPE: Primary | ICD-10-CM

## 2022-08-09 PROBLEM — K85.91 ACUTE PANCREATITIS WITH UNINFECTED NECROSIS: Status: ACTIVE | Noted: 2022-08-09

## 2022-08-09 LAB
A/G RATIO: 1.4 (ref 1.1–2.2)
ALBUMIN SERPL-MCNC: 4.3 G/DL (ref 3.4–5)
ALP BLD-CCNC: 113 U/L (ref 40–129)
ALT SERPL-CCNC: 59 U/L (ref 10–40)
ANION GAP SERPL CALCULATED.3IONS-SCNC: 13 MMOL/L (ref 3–16)
AST SERPL-CCNC: 77 U/L (ref 15–37)
BACTERIA: ABNORMAL /HPF
BASOPHILS ABSOLUTE: 0 K/UL (ref 0–0.2)
BASOPHILS RELATIVE PERCENT: 0.1 %
BILIRUB SERPL-MCNC: 1.6 MG/DL (ref 0–1)
BILIRUB SERPL-MCNC: 1.8 MG/DL (ref 0–1)
BILIRUBIN DIRECT: 0.5 MG/DL (ref 0–0.3)
BILIRUBIN URINE: NEGATIVE
BILIRUBIN, INDIRECT: 1.1 MG/DL (ref 0–1)
BLOOD, URINE: ABNORMAL
BUN BLDV-MCNC: 10 MG/DL (ref 7–20)
CALCIUM SERPL-MCNC: 9.6 MG/DL (ref 8.3–10.6)
CHLORIDE BLD-SCNC: 96 MMOL/L (ref 99–110)
CLARITY: CLEAR
CO2: 28 MMOL/L (ref 21–32)
COLOR: YELLOW
CREAT SERPL-MCNC: 0.8 MG/DL (ref 0.8–1.3)
EOSINOPHILS ABSOLUTE: 0 K/UL (ref 0–0.6)
EOSINOPHILS RELATIVE PERCENT: 0 %
EPITHELIAL CELLS, UA: ABNORMAL /HPF (ref 0–5)
GFR AFRICAN AMERICAN: >60
GFR NON-AFRICAN AMERICAN: >60
GLUCOSE BLD-MCNC: 232 MG/DL (ref 70–99)
GLUCOSE BLD-MCNC: 279 MG/DL (ref 70–99)
GLUCOSE URINE: >=1000 MG/DL
HCT VFR BLD CALC: 45.2 % (ref 40.5–52.5)
HEMOGLOBIN: 15.3 G/DL (ref 13.5–17.5)
HYALINE CASTS: ABNORMAL /LPF (ref 0–2)
INR BLD: 1.21 (ref 0.87–1.14)
KETONES, URINE: 15 MG/DL
LEUKOCYTE ESTERASE, URINE: NEGATIVE
LIPASE: 600 U/L (ref 13–60)
LYMPHOCYTES ABSOLUTE: 1 K/UL (ref 1–5.1)
LYMPHOCYTES RELATIVE PERCENT: 10.4 %
MCH RBC QN AUTO: 34.8 PG (ref 26–34)
MCHC RBC AUTO-ENTMCNC: 33.9 G/DL (ref 31–36)
MCV RBC AUTO: 102.7 FL (ref 80–100)
MICROSCOPIC EXAMINATION: YES
MONOCYTES ABSOLUTE: 1 K/UL (ref 0–1.3)
MONOCYTES RELATIVE PERCENT: 10.5 %
MUCUS: ABNORMAL /LPF
NEUTROPHILS ABSOLUTE: 7.5 K/UL (ref 1.7–7.7)
NEUTROPHILS RELATIVE PERCENT: 79 %
NITRITE, URINE: POSITIVE
PDW BLD-RTO: 13.1 % (ref 12.4–15.4)
PERFORMED ON: ABNORMAL
PH UA: 6 (ref 5–8)
PLATELET # BLD: 74 K/UL (ref 135–450)
PMV BLD AUTO: 9 FL (ref 5–10.5)
POTASSIUM SERPL-SCNC: 4.9 MMOL/L (ref 3.5–5.1)
PROTEIN UA: ABNORMAL MG/DL
PROTHROMBIN TIME: 15.2 SEC (ref 11.7–14.5)
RBC # BLD: 4.4 M/UL (ref 4.2–5.9)
RBC UA: ABNORMAL /HPF (ref 0–4)
SODIUM BLD-SCNC: 137 MMOL/L (ref 136–145)
SPECIFIC GRAVITY UA: 1.02 (ref 1–1.03)
TOTAL PROTEIN: 7.4 G/DL (ref 6.4–8.2)
URINE TYPE: ABNORMAL
UROBILINOGEN, URINE: 0.2 E.U./DL
WBC # BLD: 9.4 K/UL (ref 4–11)
WBC UA: ABNORMAL /HPF (ref 0–5)

## 2022-08-09 PROCEDURE — 2580000003 HC RX 258: Performed by: INTERNAL MEDICINE

## 2022-08-09 PROCEDURE — 99285 EMERGENCY DEPT VISIT HI MDM: CPT

## 2022-08-09 PROCEDURE — 85610 PROTHROMBIN TIME: CPT

## 2022-08-09 PROCEDURE — 2500000003 HC RX 250 WO HCPCS: Performed by: INTERNAL MEDICINE

## 2022-08-09 PROCEDURE — 83036 HEMOGLOBIN GLYCOSYLATED A1C: CPT

## 2022-08-09 PROCEDURE — 82247 BILIRUBIN TOTAL: CPT

## 2022-08-09 PROCEDURE — 6360000002 HC RX W HCPCS: Performed by: PHYSICIAN ASSISTANT

## 2022-08-09 PROCEDURE — 96374 THER/PROPH/DIAG INJ IV PUSH: CPT

## 2022-08-09 PROCEDURE — 85025 COMPLETE CBC W/AUTO DIFF WBC: CPT

## 2022-08-09 PROCEDURE — 81001 URINALYSIS AUTO W/SCOPE: CPT

## 2022-08-09 PROCEDURE — 96375 TX/PRO/DX INJ NEW DRUG ADDON: CPT

## 2022-08-09 PROCEDURE — 6360000002 HC RX W HCPCS: Performed by: INTERNAL MEDICINE

## 2022-08-09 PROCEDURE — 6360000004 HC RX CONTRAST MEDICATION: Performed by: PHYSICIAN ASSISTANT

## 2022-08-09 PROCEDURE — 80053 COMPREHEN METABOLIC PANEL: CPT

## 2022-08-09 PROCEDURE — 83690 ASSAY OF LIPASE: CPT

## 2022-08-09 PROCEDURE — 99223 1ST HOSP IP/OBS HIGH 75: CPT | Performed by: SURGERY

## 2022-08-09 PROCEDURE — 74177 CT ABD & PELVIS W/CONTRAST: CPT

## 2022-08-09 PROCEDURE — 6370000000 HC RX 637 (ALT 250 FOR IP): Performed by: INTERNAL MEDICINE

## 2022-08-09 PROCEDURE — 82248 BILIRUBIN DIRECT: CPT

## 2022-08-09 PROCEDURE — 1200000000 HC SEMI PRIVATE

## 2022-08-09 PROCEDURE — 36415 COLL VENOUS BLD VENIPUNCTURE: CPT

## 2022-08-09 RX ORDER — SODIUM CHLORIDE, SODIUM LACTATE, POTASSIUM CHLORIDE, CALCIUM CHLORIDE 600; 310; 30; 20 MG/100ML; MG/100ML; MG/100ML; MG/100ML
INJECTION, SOLUTION INTRAVENOUS CONTINUOUS
Status: DISCONTINUED | OUTPATIENT
Start: 2022-08-09 | End: 2022-08-09

## 2022-08-09 RX ORDER — SODIUM CHLORIDE, SODIUM LACTATE, POTASSIUM CHLORIDE, CALCIUM CHLORIDE 600; 310; 30; 20 MG/100ML; MG/100ML; MG/100ML; MG/100ML
INJECTION, SOLUTION INTRAVENOUS CONTINUOUS
Status: DISCONTINUED | OUTPATIENT
Start: 2022-08-09 | End: 2022-08-10

## 2022-08-09 RX ORDER — DEXTROSE MONOHYDRATE 100 MG/ML
INJECTION, SOLUTION INTRAVENOUS CONTINUOUS PRN
Status: DISCONTINUED | OUTPATIENT
Start: 2022-08-09 | End: 2022-08-16 | Stop reason: HOSPADM

## 2022-08-09 RX ORDER — INSULIN LISPRO 100 [IU]/ML
0-4 INJECTION, SOLUTION INTRAVENOUS; SUBCUTANEOUS EVERY 4 HOURS
Status: DISCONTINUED | OUTPATIENT
Start: 2022-08-09 | End: 2022-08-12

## 2022-08-09 RX ORDER — ONDANSETRON 2 MG/ML
4 INJECTION INTRAMUSCULAR; INTRAVENOUS ONCE
Status: COMPLETED | OUTPATIENT
Start: 2022-08-09 | End: 2022-08-09

## 2022-08-09 RX ORDER — HYDRALAZINE HYDROCHLORIDE 20 MG/ML
10 INJECTION INTRAMUSCULAR; INTRAVENOUS EVERY 6 HOURS PRN
Status: DISCONTINUED | OUTPATIENT
Start: 2022-08-09 | End: 2022-08-16 | Stop reason: HOSPADM

## 2022-08-09 RX ADMIN — SODIUM CHLORIDE, POTASSIUM CHLORIDE, SODIUM LACTATE AND CALCIUM CHLORIDE: 600; 310; 30; 20 INJECTION, SOLUTION INTRAVENOUS at 22:12

## 2022-08-09 RX ADMIN — HYDROMORPHONE HYDROCHLORIDE 1 MG: 1 INJECTION, SOLUTION INTRAMUSCULAR; INTRAVENOUS; SUBCUTANEOUS at 11:24

## 2022-08-09 RX ADMIN — ONDANSETRON 4 MG: 2 INJECTION INTRAMUSCULAR; INTRAVENOUS at 11:24

## 2022-08-09 RX ADMIN — HYDROMORPHONE HYDROCHLORIDE 0.25 MG: 1 INJECTION, SOLUTION INTRAMUSCULAR; INTRAVENOUS; SUBCUTANEOUS at 22:05

## 2022-08-09 RX ADMIN — HYDROMORPHONE HYDROCHLORIDE 0.5 MG: 1 INJECTION, SOLUTION INTRAMUSCULAR; INTRAVENOUS; SUBCUTANEOUS at 21:23

## 2022-08-09 RX ADMIN — HYDRALAZINE HYDROCHLORIDE 10 MG: 20 INJECTION INTRAMUSCULAR; INTRAVENOUS at 16:04

## 2022-08-09 RX ADMIN — IOPAMIDOL 75 ML: 755 INJECTION, SOLUTION INTRAVENOUS at 13:10

## 2022-08-09 RX ADMIN — SODIUM CHLORIDE, POTASSIUM CHLORIDE, SODIUM LACTATE AND CALCIUM CHLORIDE: 600; 310; 30; 20 INJECTION, SOLUTION INTRAVENOUS at 17:55

## 2022-08-09 RX ADMIN — INSULIN GLARGINE 10 UNITS: 100 INJECTION, SOLUTION SUBCUTANEOUS at 22:07

## 2022-08-09 RX ADMIN — HYDROMORPHONE HYDROCHLORIDE 0.5 MG: 1 INJECTION, SOLUTION INTRAMUSCULAR; INTRAVENOUS; SUBCUTANEOUS at 18:22

## 2022-08-09 RX ADMIN — ASCORBIC ACID, VITAMIN A PALMITATE, CHOLECALCIFEROL, THIAMINE HYDROCHLORIDE, RIBOFLAVIN-5 PHOSPHATE SODIUM, PYRIDOXINE HYDROCHLORIDE, NIACINAMIDE, DEXPANTHENOL, ALPHA-TOCOPHEROL ACETATE, VITAMIN K1, FOLIC ACID, BIOTIN, CYANOCOBALAMIN: 200; 3300; 200; 6; 3.6; 6; 40; 15; 10; 150; 600; 60; 5 INJECTION, SOLUTION INTRAVENOUS at 22:47

## 2022-08-09 ASSESSMENT — PAIN DESCRIPTION - LOCATION
LOCATION: ABDOMEN

## 2022-08-09 ASSESSMENT — PAIN DESCRIPTION - PAIN TYPE
TYPE: ACUTE PAIN

## 2022-08-09 ASSESSMENT — PAIN DESCRIPTION - FREQUENCY
FREQUENCY: CONTINUOUS
FREQUENCY: INTERMITTENT
FREQUENCY: CONTINUOUS

## 2022-08-09 ASSESSMENT — PAIN SCALES - GENERAL
PAINLEVEL_OUTOF10: 7
PAINLEVEL_OUTOF10: 4
PAINLEVEL_OUTOF10: 10
PAINLEVEL_OUTOF10: 8
PAINLEVEL_OUTOF10: 5
PAINLEVEL_OUTOF10: 4
PAINLEVEL_OUTOF10: 6

## 2022-08-09 ASSESSMENT — PAIN DESCRIPTION - ONSET
ONSET: GRADUAL
ONSET: ON-GOING

## 2022-08-09 ASSESSMENT — PAIN DESCRIPTION - ORIENTATION
ORIENTATION: MID
ORIENTATION: RIGHT

## 2022-08-09 ASSESSMENT — PAIN DESCRIPTION - DESCRIPTORS
DESCRIPTORS: DISCOMFORT;GNAWING
DESCRIPTORS: SHARP
DESCRIPTORS: GNAWING
DESCRIPTORS: GNAWING
DESCRIPTORS: THROBBING
DESCRIPTORS: GNAWING
DESCRIPTORS: GNAWING

## 2022-08-09 ASSESSMENT — ENCOUNTER SYMPTOMS
CONSTIPATION: 0
SHORTNESS OF BREATH: 0
DIARRHEA: 0
COUGH: 0
VOMITING: 1
ABDOMINAL PAIN: 1
NAUSEA: 1
RESPIRATORY NEGATIVE: 1

## 2022-08-09 ASSESSMENT — PAIN - FUNCTIONAL ASSESSMENT
PAIN_FUNCTIONAL_ASSESSMENT: 0-10
PAIN_FUNCTIONAL_ASSESSMENT: 0-10

## 2022-08-09 NOTE — H&P
Hospital Medicine      History & Physical        Reason for admission/ Chief Complaint-Abd pain    History Obtained From:  patient    HISTORY OF PRESENT ILLNESS:    The patient is a 57 y.o. male hx of DM2 complicated by neuropathy,recurrent neurocardiogenic syncope on midodrine,Chr hepatitis Cs/p treatment,Tobacco and marijuana use,  -presents with diffuse abdominal pain of 4 days duration,worse in the epigastrum and rated 9/10,aggravated by po intake with no relieving factors. No radiation. Associated nausea and nonbloody emesis  Has been unable to tolerate po and had a few sips of beer the day prior to presentation here. In the ED,lipase was elevated in the 600s and CT abd confirmed pancreatitis. No prior history of a similar illness. Drinks occasionally-maybe 2 drinks or less per week-per patient      ? ?  ED Triage Vitals [08/09/22 1034]   Enc Vitals Group      BP (!) 160/107      Heart Rate 91      Resp 18      Temp 98.1 °F (36.7 °C)      Temp Source Oral      SpO2 99 %      Weight 168 lb (76.2 kg)      Height 6' (1.829 m)      Head Circumference       Peak Flow       Pain Score       Pain Loc       Pain Edu? Excl. in [address]? Medications   hydrALAZINE (APRESOLINE) injection 10 mg (10 mg IntraVENous Given 8/9/22 1604)   HYDROmorphone (DILAUDID) injection 0.5 mg (0.5 mg IntraVENous Given 8/9/22 1822)   lactated ringers infusion ( IntraVENous New Bag 8/9/22 1755)   HYDROmorphone (DILAUDID) injection 1 mg (1 mg IntraVENous Given 8/9/22 1124)   ondansetron (ZOFRAN) injection 4 mg (4 mg IntraVENous Given 8/9/22 1124)   iopamidol (ISOVUE-370) 76 % injection 75 mL (75 mLs IntraVENous Given 8/9/22 1310)         Past Medical History:        Diagnosis Date    Chronic hepatitis C without hepatic coma ([address] - Diagnosed in 30s     Type 2 diabetes mellitus without complication, without long-term current use of insulin ([facility] [address] 7/9/2021       Past Surgical History:    History reviewed.  No pertinent surgical history. Medications Prior to Admission:    Not in a hospital admission. Allergies:  Patient has no known allergies. Social History:   TOBACCO:   reports that he has been smoking cigars and cigarettes. He has never used smokeless tobacco.  ETOH:   reports current alcohol use. Patient currently lives with family     Family History:       Problem Relation Age of Onset    Other Mother         COPD    No Known Problems Sister     No Known Problems Brother     No Known Problems Brother     No Known Problems Brother        REVIEW OF SYSTEMS:  10 point ROS obtained, as per HPI, otherwise NEG    PHYSICAL EXAM:  BP (!) 132/94   Pulse 85   Temp 98.1 °F (36.7 °C) (Oral)   Resp 18   Ht 6' (1.829 m)   Wt 168 lb (76.2 kg)   SpO2 100%   BMI 22.78 kg/m²   General appearance: alert, cooperative and appears in no distress  Head: Normocephalic, without obvious abnormality, atraumatic  Eyes: conjunctivae/corneas clear, EOM's intact. Neck: no JVD, supple,   Lungs: clear to auscultation bilaterally  Heart: regular rate and rhythm, S1, S2 normal, no murmur, regular rate and rhythm   Abdomen:soft,  diffuse tenderness. +BS. NO PERITONEAL SIGNS  Extremities:Pedal edema non-pitting, no sign of DVT  Skin: Skin color, texture, turgor normal. No rashes or lesions  Neurologic: Alert and oriented X 3, normal strength and tone.       DATA:    CBC with Differential:    Lab Results   Component Value Date/Time    WBC 9.4 08/09/2022 11:29 AM    RBC 4.40 08/09/2022 11:29 AM    HGB 15.3 08/09/2022 11:29 AM    HCT 45.2 08/09/2022 11:29 AM    PLT 74 08/09/2022 11:29 AM    .7 08/09/2022 11:29 AM    LYMPHOPCT 10.4 08/09/2022 11:29 AM     BMP:    Lab Results   Component Value Date/Time     08/09/2022 11:29 AM    K 4.9 08/09/2022 11:29 AM    K 4.0 04/18/2021 06:14 AM    CL 96 08/09/2022 11:29 AM    CO2 28 08/09/2022 11:29 AM    BUN 10 08/09/2022 11:29 AM    CREATININE 0.8 08/09/2022 11:29 AM    CALCIUM 9.6 08/09/2022 11:29 AM GFRAA >60 08/09/2022 11:29 AM    LABGLOM >60 08/09/2022 11:29 AM    GLUCOSE 279 08/09/2022 11:29 AM       CT abd   Extensive pancreatitis with extensive peripancreatic fat stranding. No pseudocyst formation or evidence of vascular compromise. No abscess identified. 2. Cholelithiasis without acute gallbladder inflammation identified. ASSESSMENT / PLAN:     #Acute Pancreatitis-severe/extensive  -assoc hemoconcentration  -no significant ETOH use but could be related. CT abd with nonobstructive cholelithiasis  NPO,Pain control  -Lactated ringers 2 l at 250cc/hr,then decrease to 150cc/hr  Imaging with gallstones,elevated bili but normal alk phos  Rt upper quadrant ultrasound ordered  Gen surg c/s I  for possible edson  GI consult  Pain control    # Macrocytosis/low platelets  -raising concerns for possible significant ETOH use vs hep C  -Check B12,Folate and TSH,INR    # HTN benign  Likely provoked by pain  Hydralazine IV PRN       #Chronic hepatitis C without hepatic coma  Recently completed treatment      # Type 2 diabetes mellitus ,controlled  Accuchecks qac and hs  SSI,lantus  NPO for now  hba1c    #Hypertension-hold midodrine for now  Had previously been on BP meds-stopped sec to recurrent syncope       #  Diabetic polyneuropathy associated with type 2 diabetes mellitus   -on lyrica      # Neurocardiogenic syncope, multifactorial including autonomic dysfunction in the setting of diabetic neuropathy  -on midodrine and follows with [facility]      # Cigarette nicotine dependence without complication,occ Marijuana use  Smoking cessation counseling      #Vitiligo-baseline    Disposition DC IN 3 DAYS    ____________________________  N. MD Travis  Hospitalist Service

## 2022-08-09 NOTE — ED PROVIDER NOTES
[facility]          PHYSICIAN ASSISTANT NOTE       Date of evaluation: 8/9/2022    Chief Complaint     Abdominal Pain (Abd pain x4 days with vomiting )      History of Present Illness     Jayme Julio is a 57 y.o. male who presents with abdominal pain. Patient presents with gradually worsening upper abdominal pain for the past few days. Patient has associated nausea and vomiting. Normal bowel movements. No fever. Patient reports he was previously treated for hepatitis C. No history of abdominal surgery. Patient has never had endoscopy or colonoscopy. Patient reports occasional alcohol use. Review of Systems     Review of Systems   Constitutional: Negative. Negative for fever. Respiratory: Negative. Negative for cough and shortness of breath. Cardiovascular: Negative. Negative for chest pain. Gastrointestinal:  Positive for abdominal pain, nausea and vomiting. Negative for constipation and diarrhea. Musculoskeletal: Negative. Skin: Negative. Neurological: Negative. All other systems reviewed and are negative. Past Medical, Surgical, Family, and Social History     He has a past medical history of Chronic hepatitis C without hepatic coma ([facility] [address] - Diagnosed in 35s and Type 2 diabetes mellitus without complication, without long-term current use of insulin ([facility] [address] He has no past surgical history on file. His family history includes No Known Problems in his brother, brother, brother, and sister; Other in his mother. He reports that he has been smoking cigars and cigarettes. He has never used smokeless tobacco. He reports current alcohol use. He reports that he does not currently use drugs.     Medications     Previous Medications    L-METHYLFOLATE-B6-B12 (METANX) 3-90.314-2-35 MG CAPS CAPSULE    Take 1 capsule by mouth daily    MIDODRINE (PROAMATINE) 5 MG TABLET    Take 1 tablet by mouth 2 times daily    PREGABALIN (LYRICA) 50 MG CAPSULE Take 1 capsule by mouth in the morning and 1 capsule at noon and 1 capsule before bedtime. Do all this for 30 days. Allergies     He has No Known Allergies. Physical Exam     INITIAL VITALS: BP: (!) 160/107, Temp: 98.1 °F (36.7 °C), Heart Rate: 91, Resp: 18, SpO2: 99 %  Physical Exam  Vitals and nursing note reviewed. Constitutional:       General: He is not in acute distress. Appearance: He is not toxic-appearing. Comments: Appears in pain   Cardiovascular:      Rate and Rhythm: Normal rate and regular rhythm. Pulmonary:      Effort: Pulmonary effort is normal.      Breath sounds: Normal breath sounds. Abdominal:      General: Abdomen is flat. Palpations: Abdomen is soft. Tenderness: abdominal tenderness in the epigastric area and left upper quadrant There is no guarding or rebound. Skin:     General: Skin is warm and dry. Neurological:      General: No focal deficit present. Mental Status: He is alert and oriented to person, place, and time. Psychiatric:         Mood and Affect: Mood normal.         Behavior: Behavior normal.       Diagnostic Results         RADIOLOGY:  CT ABDOMEN PELVIS W IV CONTRAST Additional Contrast? None   Final Result      1. Extensive pancreatitis with extensive peripancreatic fat stranding. No pseudocyst formation or evidence of vascular compromise. No abscess identified. 2. Cholelithiasis without acute gallbladder inflammation identified.                  LABS:   Results for orders placed or performed during the hospital encounter of 08/09/22   CBC with Auto Differential   Result Value Ref Range    WBC 9.4 4.0 - 11.0 K/uL    RBC 4.40 4.20 - 5.90 M/uL    Hemoglobin 15.3 13.5 - 17.5 g/dL    Hematocrit 45.2 40.5 - 52.5 %    .7 (H) 80.0 - 100.0 fL    MCH 34.8 (H) 26.0 - 34.0 pg    MCHC 33.9 31.0 - 36.0 g/dL    RDW 13.1 12.4 - 15.4 %    Platelets 74 (L) 773 - 450 K/uL    MPV 9.0 5.0 - 10.5 fL    Neutrophils % 79.0 %    Lymphocytes % 10.4 % Monocytes % 10.5 %    Eosinophils % 0.0 %    Basophils % 0.1 %    Neutrophils Absolute 7.5 1.7 - 7.7 K/uL    Lymphocytes Absolute 1.0 1.0 - 5.1 K/uL    Monocytes Absolute 1.0 0.0 - 1.3 K/uL    Eosinophils Absolute 0.0 0.0 - 0.6 K/uL    Basophils Absolute 0.0 0.0 - 0.2 K/uL   CMP   Result Value Ref Range    Sodium 137 136 - 145 mmol/L    Potassium 4.9 3.5 - 5.1 mmol/L    Chloride 96 (L) 99 - 110 mmol/L    CO2 28 21 - 32 mmol/L    Anion Gap 13 3 - 16    Glucose 279 (H) 70 - 99 mg/dL    BUN 10 7 - 20 mg/dL    Creatinine 0.8 0.8 - 1.3 mg/dL    GFR Non-African American >60 >60    GFR African American >60 >60    Calcium 9.6 8.3 - 10.6 mg/dL    Total Protein 7.4 6.4 - 8.2 g/dL    Albumin 4.3 3.4 - 5.0 g/dL    Albumin/Globulin Ratio 1.4 1.1 - 2.2    Total Bilirubin 1.8 (H) 0.0 - 1.0 mg/dL    Alkaline Phosphatase 113 40 - 129 U/L    ALT 59 (H) 10 - 40 U/L    AST 77 (H) 15 - 37 U/L   Lipase   Result Value Ref Range    Lipase 600.0 (H) 13.0 - 60.0 U/L       ED BEDSIDE ULTRASOUND:  No results found. RECENT VITALS:  BP: (!) 163/104, Temp: 98.1 °F (36.7 °C), Heart Rate: 76, Resp: 18, SpO2: 97 %     Procedures         ED Course     Nursing Notes, Past Medical Hx,Past Surgical Hx, Social Hx, Allergies, and Family Hx were reviewed. The patient was given the following medications:  Orders Placed This Encounter   Medications    HYDROmorphone (DILAUDID) injection 1 mg    ondansetron (ZOFRAN) injection 4 mg    iopamidol (ISOVUE-370) 76 % injection 75 mL       CONSULTS:  Valeriano 26 / ASSESSMENT / Darron Cronin is a 57 y.o. male with abdominal pain. Patient is well-appearing though appears in pain. Patient has normal vitals with exception of hypertension. Patient has tenderness to the epigastric and left upper quadrant. No peritoneal signs. Patient given pain medication. Labs are stable with the exception of an elevated lipase at 600. Normal LFTs.   CT of the abdomen obtained due to first episode of pancreatitis. CT reveals extensive pancreatitis but no abscess, pseudocyst, signs of necrosis and gallbladder looks normal.  Patient will be admitted for further management. This patient was also evaluated by the attending physician. All care plans were discussed and agreed upon. Clinical Impression     1. Acute pancreatitis without infection or necrosis, unspecified pancreatitis type        Disposition     PATIENT REFERRED TO:  No follow-up provider specified.     DISCHARGE MEDICATIONS:  New Prescriptions    No medications on file       DISPOSITION Decision To Admit 08/09/2022 01:53:31 PM       Reginaldo Reynolds PA-C  08/09/22 1417

## 2022-08-10 PROBLEM — K85.90 ACUTE PANCREATITIS WITHOUT INFECTION OR NECROSIS: Status: ACTIVE | Noted: 2022-08-09

## 2022-08-10 LAB
ALBUMIN SERPL-MCNC: 3.7 G/DL (ref 3.4–5)
ALP BLD-CCNC: 86 U/L (ref 40–129)
ALT SERPL-CCNC: 40 U/L (ref 10–40)
ANION GAP SERPL CALCULATED.3IONS-SCNC: 12 MMOL/L (ref 3–16)
AST SERPL-CCNC: 40 U/L (ref 15–37)
BASOPHILS ABSOLUTE: 0 K/UL (ref 0–0.2)
BASOPHILS RELATIVE PERCENT: 0.3 %
BILIRUB SERPL-MCNC: 1.1 MG/DL (ref 0–1)
BILIRUBIN DIRECT: 0.4 MG/DL (ref 0–0.3)
BILIRUBIN, INDIRECT: 0.7 MG/DL (ref 0–1)
BUN BLDV-MCNC: 8 MG/DL (ref 7–20)
CALCIUM SERPL-MCNC: 8.8 MG/DL (ref 8.3–10.6)
CHLORIDE BLD-SCNC: 99 MMOL/L (ref 99–110)
CO2: 27 MMOL/L (ref 21–32)
CREAT SERPL-MCNC: 0.5 MG/DL (ref 0.8–1.3)
EOSINOPHILS ABSOLUTE: 0 K/UL (ref 0–0.6)
EOSINOPHILS RELATIVE PERCENT: 0 %
ESTIMATED AVERAGE GLUCOSE: 142.7 MG/DL
GFR AFRICAN AMERICAN: >60
GFR NON-AFRICAN AMERICAN: >60
GLUCOSE BLD-MCNC: 166 MG/DL (ref 70–99)
GLUCOSE BLD-MCNC: 193 MG/DL (ref 70–99)
GLUCOSE BLD-MCNC: 199 MG/DL (ref 70–99)
GLUCOSE BLD-MCNC: 209 MG/DL (ref 70–99)
GLUCOSE BLD-MCNC: 217 MG/DL (ref 70–99)
GLUCOSE BLD-MCNC: 233 MG/DL (ref 70–99)
GLUCOSE BLD-MCNC: 249 MG/DL (ref 70–99)
GLUCOSE BLD-MCNC: 252 MG/DL (ref 70–99)
HBA1C MFR BLD: 6.6 %
HCT VFR BLD CALC: 41.2 % (ref 40.5–52.5)
HEMOGLOBIN: 14 G/DL (ref 13.5–17.5)
LIPASE: 600 U/L (ref 13–60)
LYMPHOCYTES ABSOLUTE: 0.8 K/UL (ref 1–5.1)
LYMPHOCYTES RELATIVE PERCENT: 8.1 %
MAGNESIUM: 1.3 MG/DL (ref 1.8–2.4)
MAGNESIUM: 2.3 MG/DL (ref 1.8–2.4)
MCH RBC QN AUTO: 34.9 PG (ref 26–34)
MCHC RBC AUTO-ENTMCNC: 34.1 G/DL (ref 31–36)
MCV RBC AUTO: 102.4 FL (ref 80–100)
MONOCYTES ABSOLUTE: 1 K/UL (ref 0–1.3)
MONOCYTES RELATIVE PERCENT: 9.5 %
NEUTROPHILS ABSOLUTE: 8.4 K/UL (ref 1.7–7.7)
NEUTROPHILS RELATIVE PERCENT: 82.1 %
PDW BLD-RTO: 13.2 % (ref 12.4–15.4)
PERFORMED ON: ABNORMAL
PHOSPHORUS: 3.4 MG/DL (ref 2.5–4.9)
PLATELET # BLD: 53 K/UL (ref 135–450)
PMV BLD AUTO: 9.1 FL (ref 5–10.5)
POTASSIUM SERPL-SCNC: 3.8 MMOL/L (ref 3.5–5.1)
RBC # BLD: 4.02 M/UL (ref 4.2–5.9)
SODIUM BLD-SCNC: 138 MMOL/L (ref 136–145)
TOTAL PROTEIN: 6.4 G/DL (ref 6.4–8.2)
WBC # BLD: 10.2 K/UL (ref 4–11)

## 2022-08-10 PROCEDURE — 6370000000 HC RX 637 (ALT 250 FOR IP): Performed by: INTERNAL MEDICINE

## 2022-08-10 PROCEDURE — 2580000003 HC RX 258: Performed by: INTERNAL MEDICINE

## 2022-08-10 PROCEDURE — 2580000003 HC RX 258: Performed by: NURSE PRACTITIONER

## 2022-08-10 PROCEDURE — 85025 COMPLETE CBC W/AUTO DIFF WBC: CPT

## 2022-08-10 PROCEDURE — 6360000002 HC RX W HCPCS: Performed by: INTERNAL MEDICINE

## 2022-08-10 PROCEDURE — 82746 ASSAY OF FOLIC ACID SERUM: CPT

## 2022-08-10 PROCEDURE — 2500000003 HC RX 250 WO HCPCS: Performed by: INTERNAL MEDICINE

## 2022-08-10 PROCEDURE — 1200000000 HC SEMI PRIVATE

## 2022-08-10 PROCEDURE — 36415 COLL VENOUS BLD VENIPUNCTURE: CPT

## 2022-08-10 PROCEDURE — 83735 ASSAY OF MAGNESIUM: CPT

## 2022-08-10 PROCEDURE — 80076 HEPATIC FUNCTION PANEL: CPT

## 2022-08-10 PROCEDURE — 82607 VITAMIN B-12: CPT

## 2022-08-10 PROCEDURE — 80069 RENAL FUNCTION PANEL: CPT

## 2022-08-10 PROCEDURE — 6360000002 HC RX W HCPCS: Performed by: NURSE PRACTITIONER

## 2022-08-10 PROCEDURE — 99232 SBSQ HOSP IP/OBS MODERATE 35: CPT | Performed by: SURGERY

## 2022-08-10 PROCEDURE — 83690 ASSAY OF LIPASE: CPT

## 2022-08-10 RX ORDER — SODIUM CHLORIDE, SODIUM LACTATE, POTASSIUM CHLORIDE, CALCIUM CHLORIDE 600; 310; 30; 20 MG/100ML; MG/100ML; MG/100ML; MG/100ML
INJECTION, SOLUTION INTRAVENOUS CONTINUOUS
Status: DISCONTINUED | OUTPATIENT
Start: 2022-08-10 | End: 2022-08-12

## 2022-08-10 RX ORDER — ACETAMINOPHEN 650 MG/1
650 SUPPOSITORY RECTAL EVERY 6 HOURS PRN
Status: DISCONTINUED | OUTPATIENT
Start: 2022-08-10 | End: 2022-08-16 | Stop reason: HOSPADM

## 2022-08-10 RX ORDER — ONDANSETRON 2 MG/ML
4 INJECTION INTRAMUSCULAR; INTRAVENOUS EVERY 6 HOURS PRN
Status: DISCONTINUED | OUTPATIENT
Start: 2022-08-10 | End: 2022-08-16 | Stop reason: HOSPADM

## 2022-08-10 RX ORDER — SODIUM CHLORIDE 9 MG/ML
INJECTION, SOLUTION INTRAVENOUS PRN
Status: DISCONTINUED | OUTPATIENT
Start: 2022-08-10 | End: 2022-08-16 | Stop reason: HOSPADM

## 2022-08-10 RX ORDER — SODIUM CHLORIDE 0.9 % (FLUSH) 0.9 %
5-40 SYRINGE (ML) INJECTION PRN
Status: DISCONTINUED | OUTPATIENT
Start: 2022-08-10 | End: 2022-08-16 | Stop reason: HOSPADM

## 2022-08-10 RX ORDER — POLYETHYLENE GLYCOL 3350 17 G/17G
17 POWDER, FOR SOLUTION ORAL DAILY PRN
Status: DISCONTINUED | OUTPATIENT
Start: 2022-08-10 | End: 2022-08-16 | Stop reason: HOSPADM

## 2022-08-10 RX ORDER — POTASSIUM CHLORIDE 7.45 MG/ML
10 INJECTION INTRAVENOUS PRN
Status: DISCONTINUED | OUTPATIENT
Start: 2022-08-10 | End: 2022-08-16 | Stop reason: HOSPADM

## 2022-08-10 RX ORDER — MAGNESIUM SULFATE IN WATER 40 MG/ML
2000 INJECTION, SOLUTION INTRAVENOUS PRN
Status: DISCONTINUED | OUTPATIENT
Start: 2022-08-10 | End: 2022-08-16 | Stop reason: HOSPADM

## 2022-08-10 RX ORDER — SODIUM CHLORIDE 0.9 % (FLUSH) 0.9 %
5-40 SYRINGE (ML) INJECTION EVERY 12 HOURS SCHEDULED
Status: DISCONTINUED | OUTPATIENT
Start: 2022-08-10 | End: 2022-08-16 | Stop reason: HOSPADM

## 2022-08-10 RX ORDER — ENOXAPARIN SODIUM 100 MG/ML
40 INJECTION SUBCUTANEOUS DAILY
Status: DISCONTINUED | OUTPATIENT
Start: 2022-08-10 | End: 2022-08-16 | Stop reason: HOSPADM

## 2022-08-10 RX ORDER — MIDODRINE HYDROCHLORIDE 5 MG/1
5 TABLET ORAL 2 TIMES DAILY
Status: DISCONTINUED | OUTPATIENT
Start: 2022-08-10 | End: 2022-08-10

## 2022-08-10 RX ORDER — ACETAMINOPHEN 325 MG/1
650 TABLET ORAL EVERY 6 HOURS PRN
Status: DISCONTINUED | OUTPATIENT
Start: 2022-08-10 | End: 2022-08-16 | Stop reason: HOSPADM

## 2022-08-10 RX ORDER — ONDANSETRON 4 MG/1
4 TABLET, ORALLY DISINTEGRATING ORAL EVERY 8 HOURS PRN
Status: DISCONTINUED | OUTPATIENT
Start: 2022-08-10 | End: 2022-08-16 | Stop reason: HOSPADM

## 2022-08-10 RX ADMIN — HYDROMORPHONE HYDROCHLORIDE 0.5 MG: 1 INJECTION, SOLUTION INTRAMUSCULAR; INTRAVENOUS; SUBCUTANEOUS at 14:44

## 2022-08-10 RX ADMIN — HYDROMORPHONE HYDROCHLORIDE 0.5 MG: 1 INJECTION, SOLUTION INTRAMUSCULAR; INTRAVENOUS; SUBCUTANEOUS at 19:17

## 2022-08-10 RX ADMIN — SODIUM CHLORIDE, POTASSIUM CHLORIDE, SODIUM LACTATE AND CALCIUM CHLORIDE: 600; 310; 30; 20 INJECTION, SOLUTION INTRAVENOUS at 10:00

## 2022-08-10 RX ADMIN — INSULIN LISPRO 1 UNITS: 100 INJECTION, SOLUTION INTRAVENOUS; SUBCUTANEOUS at 08:16

## 2022-08-10 RX ADMIN — HYDROMORPHONE HYDROCHLORIDE 0.5 MG: 1 INJECTION, SOLUTION INTRAMUSCULAR; INTRAVENOUS; SUBCUTANEOUS at 00:47

## 2022-08-10 RX ADMIN — HYDRALAZINE HYDROCHLORIDE 10 MG: 20 INJECTION INTRAMUSCULAR; INTRAVENOUS at 05:04

## 2022-08-10 RX ADMIN — ACETAMINOPHEN 650 MG: 325 TABLET, FILM COATED ORAL at 22:31

## 2022-08-10 RX ADMIN — SODIUM CHLORIDE, POTASSIUM CHLORIDE, SODIUM LACTATE AND CALCIUM CHLORIDE 125 ML/HR: 600; 310; 30; 20 INJECTION, SOLUTION INTRAVENOUS at 20:33

## 2022-08-10 RX ADMIN — MAGNESIUM SULFATE 6000 MG: 500 INJECTION, SOLUTION INTRAMUSCULAR; INTRAVENOUS at 10:25

## 2022-08-10 RX ADMIN — HYDROMORPHONE HYDROCHLORIDE 1 MG: 1 INJECTION, SOLUTION INTRAMUSCULAR; INTRAVENOUS; SUBCUTANEOUS at 06:58

## 2022-08-10 RX ADMIN — HYDROMORPHONE HYDROCHLORIDE 0.5 MG: 1 INJECTION, SOLUTION INTRAMUSCULAR; INTRAVENOUS; SUBCUTANEOUS at 03:57

## 2022-08-10 RX ADMIN — HYDROMORPHONE HYDROCHLORIDE 0.5 MG: 1 INJECTION, SOLUTION INTRAMUSCULAR; INTRAVENOUS; SUBCUTANEOUS at 22:20

## 2022-08-10 RX ADMIN — ASCORBIC ACID, VITAMIN A PALMITATE, CHOLECALCIFEROL, THIAMINE HYDROCHLORIDE, RIBOFLAVIN-5 PHOSPHATE SODIUM, PYRIDOXINE HYDROCHLORIDE, NIACINAMIDE, DEXPANTHENOL, ALPHA-TOCOPHEROL ACETATE, VITAMIN K1, FOLIC ACID, BIOTIN, CYANOCOBALAMIN: 200; 3300; 200; 6; 3.6; 6; 40; 15; 10; 150; 600; 60; 5 INJECTION, SOLUTION INTRAVENOUS at 19:21

## 2022-08-10 RX ADMIN — HYDROMORPHONE HYDROCHLORIDE 0.5 MG: 1 INJECTION, SOLUTION INTRAMUSCULAR; INTRAVENOUS; SUBCUTANEOUS at 10:12

## 2022-08-10 RX ADMIN — ENOXAPARIN SODIUM 40 MG: 100 INJECTION SUBCUTANEOUS at 22:22

## 2022-08-10 ASSESSMENT — PAIN DESCRIPTION - DESCRIPTORS
DESCRIPTORS: ACHING
DESCRIPTORS: THROBBING

## 2022-08-10 ASSESSMENT — PAIN SCALES - GENERAL
PAINLEVEL_OUTOF10: 7
PAINLEVEL_OUTOF10: 3
PAINLEVEL_OUTOF10: 7
PAINLEVEL_OUTOF10: 3
PAINLEVEL_OUTOF10: 8

## 2022-08-10 ASSESSMENT — PAIN DESCRIPTION - ORIENTATION
ORIENTATION: RIGHT
ORIENTATION: MID

## 2022-08-10 ASSESSMENT — PAIN DESCRIPTION - LOCATION
LOCATION: ABDOMEN
LOCATION: ABDOMEN
LOCATION: HEAD
LOCATION: ABDOMEN

## 2022-08-10 ASSESSMENT — PAIN DESCRIPTION - FREQUENCY: FREQUENCY: CONTINUOUS

## 2022-08-10 ASSESSMENT — PAIN DESCRIPTION - ONSET: ONSET: ON-GOING

## 2022-08-10 NOTE — PROGRESS NOTES
Colorectal Surgery   Daily Progress Note  Patient: Cristiano Russ      CC: pancreatitis    SUBJECTIVE:   Patient rested well overnight. Afebrile with persistent hypertension. Complains of abdominal pain that is stable since admission. Pain is improved with pain regimen. Denies N/V. Urinating without issues. ROS:   A 14 point review of systems was conducted, significant findings as noted above. All other systems negative. OBJECTIVE:    PHYSICAL EXAM:    Vitals:    08/10/22 0047 08/10/22 0117 08/10/22 0356 08/10/22 0357   BP:   (!) 184/113 (!) 171/109   Pulse:   73    Resp: 18 18 18    Temp:   98.5 °F (36.9 °C)    TempSrc:   Oral    SpO2:   100%    Weight:       Height:         General appearance: Alert, no acute distress, grooming appropriate  Neck: Trachea midline  Chest/Lungs: Normal effort, no accessory muscle use, on RA  Cardiovascular: RRR  Abdomen: Soft, non-tender, nondistended no guarding/rigidity  Skin: Warm and dry, no rashes  Extremities: No edema, no cyanosis  Neuro: A&Ox3, no focal deficits, sensation intact    LABS:   Recent Labs     08/09/22  1129   WBC 9.4   HGB 15.3   HCT 45.2   .7*   PLT 74*        Recent Labs     08/09/22  1129      K 4.9   CL 96*   CO2 28   BUN 10   CREATININE 0.8        Recent Labs     08/09/22  1129   AST 77*   ALT 59*   BILIDIR 0.5*   BILITOT 1.6*  1.8*   ALKPHOS 113        Recent Labs     08/09/22  1129   LIPASE 600.0*        Recent Labs     08/09/22  1129 08/09/22  2000   PROT 7.4  --    INR  --  1.21*      No results for input(s): CKTOTAL, CKMB, CKMBINDEX, TROPONINI in the last 72 hours. ASSESSMENT & PLAN:   This is a 57 y.o. male with significant history of diabetes and neuropathy, presented to the ED with 4 days of epigastric abdominal pain. He is afebrile hemodynamically stable labs show no leukocytosis. His LFTs are elevated with a hyperbilirubinemia of 1.8. Triglycerides are 279 and lipase is 600.  CT scan shows extensive pancreatitis with peripancreatic fat stranding consistent with acute pancreatitis. Given his extensive alcohol history most likely secondary to alcoholism however does have some gallstones reported on CT scan very small on review. General surgery consulted for evaluation for possible gallstone pancreatitis.     - F/u RUQ U/S  - F/u LFTs   - F/u GI consult  - Continue NPO for now, IVF and strict I/Os  - Abx not indicated at this time  - [facility] protocol   - Will continue to follow    Angelika July, DO, [address] [facility]  PGY1, General Surgery  08/10/22  6:12 AM  [place]  Pager: [phone]

## 2022-08-10 NOTE — PROGRESS NOTES
Progress Note  Admit Date: 8/9/2022       Overnight Events: none noted     CC: F/U for acute pancreatitis   Interval History: feeling ok, still with pain but feels he needs to eat soon. folic acid, thiamine, multi-vitamin with vitamin K infusion   IntraVENous Daily    insulin lispro  0-4 Units SubCUTAneous Q4H    insulin glargine  10 Units SubCUTAneous Nightly      PRN Medications: hydrALAZINE, dextrose, glucose, dextrose bolus **OR** dextrose bolus, glucagon (rDNA), dextrose, HYDROmorphone **OR** HYDROmorphone  Diet: Diet NPO Exceptions are: Ice Chips  Continuous Infusions:   lactated ringers 250 mL/hr at 08/10/22 1000    dextrose      dextrose       PHYSICAL EXAM:  BP (!) 161/93   Pulse 82   Temp 98.1 °F (36.7 °C) (Oral)   Resp 18   Ht 6' (1.829 m)   Wt 168 lb (76.2 kg)   SpO2 99%   BMI 22.78 kg/m²   Recent Labs     08/09/22  2002 08/10/22  0050 08/10/22  0500 08/10/22  0730 08/10/22  1135   POCGLU 232* 252* 249* 233* 209*       Intake/Output Summary (Last 24 hours) at 8/10/2022 1601  Last data filed at 8/10/2022 0700  Gross per 24 hour   Intake --   Output 350 ml   Net -350 ml       General appearance: alert, cooperative and appears in no distress  Head: Normocephalic, without obvious abnormality, atraumatic  Eyes: conjunctivae/corneas clear, EOM's intact. Neck: no JVD, supple,  Lungs: clear to auscultation bilaterally  Heart: regular rate and rhythm, S1, S2 normal, no murmur, regular rate and rhythm  Abdomen:soft,  diffuse tenderness. +BS. NO PERITONEAL SIGNS  Extremities:Pedal edema non-pitting, no sign of DVT  Skin: Skin color, texture, turgor normal. No rashes or lesions  Neurologic: Alert and oriented X 3, normal strength and tone.     LABS:  Recent Labs     08/09/22  1129 08/10/22  0802   WBC 9.4 10.2   HGB 15.3 14.0   HCT 45.2 41.2   PLT 74* 53*                                                                    Recent Labs     08/09/22  1129 08/10/22  0802    138   K 4.9 3.8   CL 96* 99   CO2 28 27   BUN 10 8   CREATININE 0.8 0.5*   GLUCOSE 279* 217*     Recent Labs     08/09/22  1129 08/10/22  0802   AST 77* 40*   ALT 59* 40   BILITOT 1.6*  1.8* 1.1*   ALKPHOS 113 86     No results for input(s): TROPONINI in the last 72 hours. No results for input(s): BNP in the last 72 hours. No results for input(s): CHOL, HDL in the last 72 hours. Invalid input(s): LDLCALCU  Recent Labs     08/09/22 2000   INR 1.21*     Assessment & Plan:    Patient Active Problem List:    #Acute Pancreatitis-severe/extensive  -assoc hemoconcentration  -no significant ETOH use but could be related. CT abd with nonobstructive cholelithiasis  NPO,Pain control  -Lactated ringers 2 l at 250cc/hr,then decrease to 150cc/hr  Imaging with gallstones,elevated bili but normal alk phos  Rt upper quadrant ultrasound ordered  Gen surg c/s I  for possible edson  GI consult appreciated, await US.    Pain controlled     # Macrocytosis/low platelets  -raising concerns for possible significant ETOH use vs hep C  -Check B12,Folate and TSH,INR     # HTN benign  Likely provoked by pain  Hydralazine IV PRN         #Chronic hepatitis C without hepatic coma  Recently completed treatment       # Type 2 diabetes mellitus ,controlled  Accuchecks qac and hs  SSI,lantus  NPO for now  hba1c     #Hypertension-hold midodrine for now  Had previously been on BP meds-stopped sec to recurrent syncope         #  Diabetic polyneuropathy associated with type 2 diabetes mellitus   -on lyrica        # Neurocardiogenic syncope, multifactorial including autonomic dysfunction in the setting of diabetic neuropathy  -on midodrine and follows with [facility]       # Cigarette nicotine dependence without complication,occ Marijuana use  Smoking cessation counseling       #Vitiligo-baseline         Orthostatic syncope     Pancytopenia ([facility] [address]     Chronic hepatitis C without hepatic coma ([facility] [address]     Vitiligo     Type 2 diabetes mellitus without complication, without long-term current use of insulin ([facility] [address]     Diabetic polyneuropathy associated with type 2 diabetes mellitus ([facility] [address]     Cigarette nicotine dependence without complication     Acute pancreatitis with uninfected necrosis        The patient and / or the family were informed of the results of any tests, a time was given to answer questions, a plan was proposed and they agreed with plan.   Disposition: inpt    Prior

## 2022-08-10 NOTE — PROGRESS NOTES
4 Eyes Admission Assessment     I agree as the admission nurse that 2 RN's have performed a thorough Head to Toe Skin Assessment on the patient. ALL assessment sites listed below have been assessed on admission. Areas assessed by both nurses: Annemarie Remedies  [x]   Head, Face, and Ears   [x]   Shoulders, Back, and Chest  [x]   Arms, Elbows, and Hands   [x]   Coccyx, Sacrum, and Ischium  [x]   Legs, Feet, and Heels        Does the Patient have Skin Breakdown?   No         César Prevention initiated:  No   Wound Care Orders initiated:  No      [facility] nurse consulted for Pressure Injury (Stage 3,4, Unstageable, DTI, NWPT, and Complex wounds) or César score 18 or lower:  No      Nurse 1 eSignature: Electronically signed by Ishaan Blandon RN on 8/9/22 at 11:40 PM EDT    **SHARE this note so that the co-signing nurse is able to place an eSignature**    Nurse 2 eSignature: Electronically signed by Ina Hammer RN on 8/11/22 at 2:59 AM EDT

## 2022-08-10 NOTE — CARE COORDINATION
CM following for  d/c planning:      CM attempted to meet with pt  at  bedside :      Door closed w/ sign  reading  Patient asleep :  Do Not enter. Per Chart  patient admitted with  Acute Pancreatitis:    Pain control :  GI following :     Plan:  GB ultrasound  Monitor lft's and lipase  Continue iv hydration: NPO IVF :    Will cont to follow w/ D/C planning and  needs  . Electronically signed by Channie Mortimer, RN on 8/10/2022 at 3:31 PM       Channie Mortimer RN Case Manager  [facility]  [address] [place] [address].   [place] [id number] [phone]  Fax [phone]

## 2022-08-10 NOTE — CONSULTS
General Surgery   Resident Consult Note    Reason for Consult: Pancreatitis    History of Present Illness:   Katheryn Pagan is a 57 y.o. male with past medical history of diabetes neuropathy alcohol use who presents to [facility]. with 4 days of epigastric abdominal pain nausea and nonbloody nonbilious vomiting. Denies fevers chills constipation diarrhea melena hematochezia. He hopes that the pain would resolve but presents the ED due to unrelenting pain. Denies any previous episodes of similar abdominal pain. Has never had any previous episodes of pancreatitis in the past.  Denies any history of gallstones. Denies any abdominal pain with fatty food intake. Reports a significant alcohol history with currently a sixpack every second day. Intermittent with 3-4 drinks of bourbon or vodka in the evening to help him fall asleep due to his neuropathic pain. No blood thinners. Does take Lyrica for his neuropathic pain. No abdominal surgeries. Past Medical History:        Diagnosis Date    Chronic hepatitis C without hepatic coma ([facility] [address] - Diagnosed in 30s     Type 2 diabetes mellitus without complication, without long-term current use of insulin ([facility] [address] 7/9/2021       Past Surgical History:     History reviewed. No pertinent surgical history. Allergies:  Patient has no known allergies. Medications:   Home Meds  No current facility-administered medications on file prior to encounter. Current Outpatient Medications on File Prior to Encounter   Medication Sig Dispense Refill    pregabalin (LYRICA) 50 MG capsule Take 1 capsule by mouth in the morning and 1 capsule at noon and 1 capsule before bedtime. Do all this for 30 days.  90 capsule 1    L-methylfolate-B6-B12 (METANX) 1-97.274-5-47 MG CAPS capsule Take 1 capsule by mouth daily (Patient not taking: No sig reported) 90 capsule 3    midodrine (PROAMATINE) 5 MG tablet Take 1 tablet by mouth 2 times daily (Patient not taking: No sig reported) 60 tablet 3 Current Meds  hydrALAZINE (APRESOLINE) injection 10 mg, Q6H PRN  lactated ringers infusion, Continuous  sodium chloride 0.9 % 6,502 mL with folic acid 1 mg, adult multi-vitamin with vitamin k 10 mL, thiamine 100 mg, Daily  dextrose 10 % infusion, Continuous PRN  insulin lispro (1 Unit Dial) 0-4 Units, Q4H  glucose chewable tablet 16 g, PRN  dextrose bolus 10% 125 mL, PRN   Or  dextrose bolus 10% 250 mL, PRN  glucagon (rDNA) injection 1 mg, PRN  dextrose 10 % infusion, Continuous PRN  insulin glargine (LANTUS;BASAGLAR) injection pen 10 Units, Nightly  HYDROmorphone (DILAUDID) injection 0.25 mg, Q3H PRN   Or  HYDROmorphone (DILAUDID) injection 0.5 mg, Q3H PRN        Family History:   Family History   Problem Relation Age of Onset    Other Mother         COPD    No Known Problems Sister     No Known Problems Brother     No Known Problems Brother     No Known Problems Brother        Social History:   TOBACCO:   reports that he has been smoking cigars and cigarettes. He has never used smokeless tobacco.  ETOH:   reports current alcohol use. DRUGS:   reports that he does not currently use drugs. ROS: A 14 point review of systems was conducted, significant findings as noted in HPI. All other systems negative.     Physical exam:    Vitals:    08/09/22 1900 08/09/22 1940 08/09/22 2123 08/09/22 2205   BP: (!) 169/99 (!) 156/85     Pulse:  89     Resp:  18 18 18   Temp:  97.8 °F (36.6 °C)     TempSrc:  Oral     SpO2:  99%     Weight:       Height:           General appearance: alert, no acute distress, grooming appropriate  Eyes: no gross deficits  Neck: trachea midline  Chest/Lungs: normal effort, no accessory muscle use, on RA  Cardiovascular: RRR  Abdomen: Soft nontender nondistended no guarding/rigidity  Skin: warm and dry, no rashes  Extremities: no edema, no cyanosis  Neuro: A&Ox3, no focal deficits, sensation intact    Labs:    CBC:   Recent Labs     08/09/22  1129   WBC 9.4   HGB 15.3   HCT 45.2   .7* PLT 74*     BMP:   Recent Labs     08/09/22  1129      K 4.9   CL 96*   CO2 28   BUN 10   CREATININE 0.8     PT/INR:   Recent Labs     08/09/22 2000   PROTIME 15.2*   INR 1.21*     APTT: No results for input(s): APTT in the last 72 hours. Liver Profile:  Lab Results   Component Value Date/Time    AST 77 08/09/2022 11:29 AM    ALT 59 08/09/2022 11:29 AM    BILIDIR 0.3 11/15/2021 02:20 PM    BILITOT 1.8 08/09/2022 11:29 AM    ALKPHOS 113 08/09/2022 11:29 AM     Lab Results   Component Value Date/Time    CHOL 149 07/01/2022 01:36 PM    HDL 37 07/01/2022 01:36 PM    TRIG 256 07/01/2022 01:36 PM     UA:   Lab Results   Component Value Date/Time    COLORU Yellow 08/09/2022 02:43 PM    PHUR 6.0 08/09/2022 02:43 PM    WBCUA 0-2 08/09/2022 02:43 PM    RBCUA 0-2 08/09/2022 02:43 PM    MUCUS 2+ 08/09/2022 02:43 PM    BACTERIA 2+ 08/09/2022 02:43 PM    CLARITYU Clear 08/09/2022 02:43 PM    SPECGRAV 1.020 08/09/2022 02:43 PM    LEUKOCYTESUR Negative 08/09/2022 02:43 PM    UROBILINOGEN 0.2 08/09/2022 02:43 PM    BILIRUBINUR Negative 08/09/2022 02:43 PM    BLOODU TRACE-INTACT 08/09/2022 02:43 PM    GLUCOSEU >=1000 08/09/2022 02:43 PM       Imaging:   CT ABDOMEN PELVIS W IV CONTRAST Additional Contrast? None   Final Result      1. Extensive pancreatitis with extensive peripancreatic fat stranding. No pseudocyst formation or evidence of vascular compromise. No abscess identified. 2. Cholelithiasis without acute gallbladder inflammation identified. Assessment/Plan: This is a 57 y.o. male with significant history of diabetes and neuropathy, presented to the ED with 4 days of epigastric abdominal pain. He is afebrile hemodynamically stable labs show no leukocytosis. His LFTs are elevated with a hyperbilirubinemia of 1.8. Triglycerides are 279 and lipase is 600. CT scan shows extensive pancreatitis with peripancreatic fat stranding consistent with acute pancreatitis.   Given his extensive alcohol history most likely secondary to alcoholism however does have some gallstones reported on CT scan very small on review.  General surgery consulted for evaluation for possible gallstone pancreatitis.    -Continue n.p.o.  -Continue IV fluids  -No antibiotics necessary  -pain medication PRN  -Follow-up right upper quadrant ultrasound  -Follow-up indirect and direct bilirubins added on this evening  -Follow-up a.m. labs if bilirubin continues to climb may need MRCP  - GI consult, will follow up JYOTI De Luna MD  Surgical Resident PGY 5  08/09/22  10:15 PM  [phone]

## 2022-08-10 NOTE — PROGRESS NOTES
Pt admitted to [id number]. Pt is axo x4. VSS. C/o pain to abdomen not relieved with dilaudid given in ED, MD notified. IV fluids infusing. Skin assessment complete. Pt educated on plan of care and instructed to call for assistance.

## 2022-08-10 NOTE — CONSULTS
GI:    Seen at bed side and evaluated  Records and images reviewed  Admitted with acute pancreatitis  Excessive alcohol consumption  Cholelithiasis noted on ct of abdomen  Hepatitis c . Completed therapy    Lipase 600  No leukocytosis     PE:  Abdomen soft minimally tender . No organomegaly    ASS:  Acute pancreatitis likely alcohol induced.   Lft's consistent with alcoholic liver, today improved    Plan:  GB ultrasound  Monitor lft's and lipase  Continue iv hydration  I agree with Dr Valentino Mormon plan    Thank you    Ava Robles.  8-10-22  45 minutes

## 2022-08-11 ENCOUNTER — APPOINTMENT (OUTPATIENT)
Dept: ULTRASOUND IMAGING | Age: 62
DRG: 282 | End: 2022-08-11
Payer: MEDICAID

## 2022-08-11 LAB
ALBUMIN SERPL-MCNC: 3.4 G/DL (ref 3.4–5)
ALP BLD-CCNC: 81 U/L (ref 40–129)
ALT SERPL-CCNC: 29 U/L (ref 10–40)
ANION GAP SERPL CALCULATED.3IONS-SCNC: 8 MMOL/L (ref 3–16)
AST SERPL-CCNC: 36 U/L (ref 15–37)
BASOPHILS ABSOLUTE: 0 K/UL (ref 0–0.2)
BASOPHILS RELATIVE PERCENT: 0.1 %
BILIRUB SERPL-MCNC: 1.1 MG/DL (ref 0–1)
BILIRUBIN DIRECT: 0.5 MG/DL (ref 0–0.3)
BILIRUBIN, INDIRECT: 0.6 MG/DL (ref 0–1)
BUN BLDV-MCNC: 6 MG/DL (ref 7–20)
CALCIUM SERPL-MCNC: 8.4 MG/DL (ref 8.3–10.6)
CHLORIDE BLD-SCNC: 99 MMOL/L (ref 99–110)
CO2: 28 MMOL/L (ref 21–32)
CREAT SERPL-MCNC: 0.6 MG/DL (ref 0.8–1.3)
EOSINOPHILS ABSOLUTE: 0 K/UL (ref 0–0.6)
EOSINOPHILS RELATIVE PERCENT: 0.1 %
GFR AFRICAN AMERICAN: >60
GFR NON-AFRICAN AMERICAN: >60
GLUCOSE BLD-MCNC: 109 MG/DL (ref 70–99)
GLUCOSE BLD-MCNC: 118 MG/DL (ref 70–99)
GLUCOSE BLD-MCNC: 142 MG/DL (ref 70–99)
GLUCOSE BLD-MCNC: 159 MG/DL (ref 70–99)
GLUCOSE BLD-MCNC: 208 MG/DL (ref 70–99)
HCT VFR BLD CALC: 36.9 % (ref 40.5–52.5)
HEMOGLOBIN: 12.6 G/DL (ref 13.5–17.5)
LIPASE: 161 U/L (ref 13–60)
LYMPHOCYTES ABSOLUTE: 1.2 K/UL (ref 1–5.1)
LYMPHOCYTES RELATIVE PERCENT: 13.1 %
MAGNESIUM: 1.8 MG/DL (ref 1.8–2.4)
MCH RBC QN AUTO: 34.9 PG (ref 26–34)
MCHC RBC AUTO-ENTMCNC: 34.2 G/DL (ref 31–36)
MCV RBC AUTO: 102.2 FL (ref 80–100)
MONOCYTES ABSOLUTE: 0.9 K/UL (ref 0–1.3)
MONOCYTES RELATIVE PERCENT: 10.4 %
NEUTROPHILS ABSOLUTE: 6.7 K/UL (ref 1.7–7.7)
NEUTROPHILS RELATIVE PERCENT: 76.3 %
PDW BLD-RTO: 13 % (ref 12.4–15.4)
PERFORMED ON: ABNORMAL
PHOSPHORUS: 2.4 MG/DL (ref 2.5–4.9)
PLATELET # BLD: 49 K/UL (ref 135–450)
PLATELET SLIDE REVIEW: ABNORMAL
PMV BLD AUTO: 9.3 FL (ref 5–10.5)
POTASSIUM SERPL-SCNC: 3.7 MMOL/L (ref 3.5–5.1)
RBC # BLD: 3.61 M/UL (ref 4.2–5.9)
SODIUM BLD-SCNC: 135 MMOL/L (ref 136–145)
STOMATOCYTES: ABNORMAL
TARGET CELLS: ABNORMAL
TOTAL PROTEIN: 6 G/DL (ref 6.4–8.2)
WBC # BLD: 8.8 K/UL (ref 4–11)

## 2022-08-11 PROCEDURE — 36415 COLL VENOUS BLD VENIPUNCTURE: CPT

## 2022-08-11 PROCEDURE — 6370000000 HC RX 637 (ALT 250 FOR IP): Performed by: INTERNAL MEDICINE

## 2022-08-11 PROCEDURE — 1200000000 HC SEMI PRIVATE

## 2022-08-11 PROCEDURE — 84443 ASSAY THYROID STIM HORMONE: CPT

## 2022-08-11 PROCEDURE — 80069 RENAL FUNCTION PANEL: CPT

## 2022-08-11 PROCEDURE — 99231 SBSQ HOSP IP/OBS SF/LOW 25: CPT | Performed by: SURGERY

## 2022-08-11 PROCEDURE — 80076 HEPATIC FUNCTION PANEL: CPT

## 2022-08-11 PROCEDURE — 83735 ASSAY OF MAGNESIUM: CPT

## 2022-08-11 PROCEDURE — 2580000003 HC RX 258: Performed by: INTERNAL MEDICINE

## 2022-08-11 PROCEDURE — 2500000003 HC RX 250 WO HCPCS

## 2022-08-11 PROCEDURE — 6360000002 HC RX W HCPCS: Performed by: INTERNAL MEDICINE

## 2022-08-11 PROCEDURE — 2580000003 HC RX 258

## 2022-08-11 PROCEDURE — 83690 ASSAY OF LIPASE: CPT

## 2022-08-11 PROCEDURE — 85025 COMPLETE CBC W/AUTO DIFF WBC: CPT

## 2022-08-11 PROCEDURE — 76705 ECHO EXAM OF ABDOMEN: CPT

## 2022-08-11 RX ORDER — GAUZE BANDAGE 2" X 2"
100 BANDAGE TOPICAL DAILY
Status: DISCONTINUED | OUTPATIENT
Start: 2022-08-11 | End: 2022-08-16 | Stop reason: HOSPADM

## 2022-08-11 RX ORDER — ACETAMINOPHEN 500 MG
500 TABLET ORAL ONCE
Status: DISCONTINUED | OUTPATIENT
Start: 2022-08-11 | End: 2022-08-16 | Stop reason: HOSPADM

## 2022-08-11 RX ORDER — OXYCODONE HYDROCHLORIDE AND ACETAMINOPHEN 5; 325 MG/1; MG/1
1 TABLET ORAL EVERY 4 HOURS PRN
Status: DISCONTINUED | OUTPATIENT
Start: 2022-08-11 | End: 2022-08-13

## 2022-08-11 RX ORDER — IBUPROFEN 400 MG/1
400 TABLET ORAL ONCE
Status: COMPLETED | OUTPATIENT
Start: 2022-08-11 | End: 2022-08-11

## 2022-08-11 RX ADMIN — HYDRALAZINE HYDROCHLORIDE 10 MG: 20 INJECTION INTRAMUSCULAR; INTRAVENOUS at 21:04

## 2022-08-11 RX ADMIN — HYDROMORPHONE HYDROCHLORIDE 0.5 MG: 1 INJECTION, SOLUTION INTRAMUSCULAR; INTRAVENOUS; SUBCUTANEOUS at 14:01

## 2022-08-11 RX ADMIN — SODIUM CHLORIDE, POTASSIUM CHLORIDE, SODIUM LACTATE AND CALCIUM CHLORIDE: 600; 310; 30; 20 INJECTION, SOLUTION INTRAVENOUS at 17:19

## 2022-08-11 RX ADMIN — SODIUM PHOSPHATE, MONOBASIC, MONOHYDRATE 15 MMOL: 276; 142 INJECTION, SOLUTION INTRAVENOUS at 11:42

## 2022-08-11 RX ADMIN — OXYCODONE HYDROCHLORIDE AND ACETAMINOPHEN 1 TABLET: 5; 325 TABLET ORAL at 20:58

## 2022-08-11 RX ADMIN — INSULIN LISPRO 1 UNITS: 100 INJECTION, SOLUTION INTRAVENOUS; SUBCUTANEOUS at 13:30

## 2022-08-11 RX ADMIN — HYDROMORPHONE HYDROCHLORIDE 0.5 MG: 1 INJECTION, SOLUTION INTRAMUSCULAR; INTRAVENOUS; SUBCUTANEOUS at 17:14

## 2022-08-11 RX ADMIN — HYDROMORPHONE HYDROCHLORIDE 0.5 MG: 1 INJECTION, SOLUTION INTRAMUSCULAR; INTRAVENOUS; SUBCUTANEOUS at 07:30

## 2022-08-11 RX ADMIN — ENOXAPARIN SODIUM 40 MG: 100 INJECTION SUBCUTANEOUS at 10:52

## 2022-08-11 RX ADMIN — HYDROMORPHONE HYDROCHLORIDE 0.5 MG: 1 INJECTION, SOLUTION INTRAMUSCULAR; INTRAVENOUS; SUBCUTANEOUS at 04:39

## 2022-08-11 RX ADMIN — SODIUM CHLORIDE, POTASSIUM CHLORIDE, SODIUM LACTATE AND CALCIUM CHLORIDE 125 ML/HR: 600; 310; 30; 20 INJECTION, SOLUTION INTRAVENOUS at 04:51

## 2022-08-11 RX ADMIN — HYDROMORPHONE HYDROCHLORIDE 0.5 MG: 1 INJECTION, SOLUTION INTRAMUSCULAR; INTRAVENOUS; SUBCUTANEOUS at 10:52

## 2022-08-11 RX ADMIN — HYDROMORPHONE HYDROCHLORIDE 0.5 MG: 1 INJECTION, SOLUTION INTRAMUSCULAR; INTRAVENOUS; SUBCUTANEOUS at 01:36

## 2022-08-11 RX ADMIN — IBUPROFEN 400 MG: 400 TABLET, FILM COATED ORAL at 04:32

## 2022-08-11 RX ADMIN — THIAMINE HCL TAB 100 MG 100 MG: 100 TAB at 17:14

## 2022-08-11 RX ADMIN — SODIUM CHLORIDE, PRESERVATIVE FREE 10 ML: 5 INJECTION INTRAVENOUS at 21:06

## 2022-08-11 ASSESSMENT — PAIN SCALES - GENERAL
PAINLEVEL_OUTOF10: 7
PAINLEVEL_OUTOF10: 7
PAINLEVEL_OUTOF10: 8
PAINLEVEL_OUTOF10: 6
PAINLEVEL_OUTOF10: 6
PAINLEVEL_OUTOF10: 8
PAINLEVEL_OUTOF10: 7
PAINLEVEL_OUTOF10: 7
PAINLEVEL_OUTOF10: 8
PAINLEVEL_OUTOF10: 7
PAINLEVEL_OUTOF10: 6

## 2022-08-11 ASSESSMENT — PAIN DESCRIPTION - LOCATION
LOCATION: ABDOMEN;HEAD
LOCATION: ABDOMEN
LOCATION: HEAD;ABDOMEN
LOCATION: ABDOMEN

## 2022-08-11 ASSESSMENT — PAIN DESCRIPTION - DESCRIPTORS
DESCRIPTORS: ACHING

## 2022-08-11 ASSESSMENT — PAIN DESCRIPTION - ORIENTATION
ORIENTATION: RIGHT

## 2022-08-11 NOTE — PROGRESS NOTES
Pt c/o abdominal pain requesting dilaudid q3h as ordered prn. Pt tolerating clear liquids without nausea or vomiting. Pt up ad vickie in room.

## 2022-08-11 NOTE — CARE COORDINATION
CM following for  d/c planning:    Patient admitted with  Acute Pancreatitis:    Pain control :  GI following :     Plan:    Continue iv hydration IVF :    CLD today advance  as  tolerated  . Will cont to follow w/ D/C planning and  needs  . No CM  needs  anticipated at this time      Electronically signed by Miguelito Khoury RN on 8/11/2022 at 1:11 PM       Miguelito Khoury RN Case Manager  [facility]  [address] [place] [address].   [place] [address]  [phone]  Fax [phone]

## 2022-08-11 NOTE — PROGRESS NOTES
Colorectal Surgery   Daily Progress Note  Patient: Trent Reagan      CC: pancreatitis    SUBJECTIVE:   Patient rested well overnight. Continues to be afebrile with persistent hypertension. Denies N/V. Urinating without issues. Abdominal pain is controlled with pain meds. Denies flatus or BM. Tolerating a cup of apple sauce and juice yesterday. ROS:   A 14 point review of systems was conducted, significant findings as noted above. All other systems negative. OBJECTIVE:    PHYSICAL EXAM:    Vitals:    08/11/22 0045 08/11/22 0136 08/11/22 0255 08/11/22 0439   BP: (!) 177/99 (!) 159/97 (!) 160/96    Pulse: 85  98    Resp: 18 16 17 16   Temp: 98.6 °F (37 °C)  98.4 °F (36.9 °C)    TempSrc: Oral  Tympanic    SpO2: 98%  95%    Weight:       Height:         General appearance: Alert, no acute distress, grooming appropriate  Neck: Trachea midline  Chest/Lungs: Normal effort, no accessory muscle use, on RA  Cardiovascular: RRR  Abdomen: Soft, non-tender, nondistended no guarding/rigidity  Skin: Warm and dry, no rashes  Extremities: No edema, no cyanosis  Neuro: A&Ox3, no focal deficits, sensation intact    LABS:   Recent Labs     08/09/22  1129 08/10/22  0802   WBC 9.4 10.2   HGB 15.3 14.0   HCT 45.2 41.2   .7* 102.4*   PLT 74* 53*          Recent Labs     08/09/22  1129 08/10/22  0802    138   K 4.9 3.8   CL 96* 99   CO2 28 27   PHOS  --  3.4   BUN 10 8   CREATININE 0.8 0.5*          Recent Labs     08/09/22  1129 08/10/22  0802   AST 77* 40*   ALT 59* 40   BILIDIR 0.5* 0.4*   BILITOT 1.6*  1.8* 1.1*   ALKPHOS 113 86          Recent Labs     08/09/22  1129 08/10/22  0802   LIPASE 600.0* 600.0*          Recent Labs     08/09/22 1129 08/09/22  2000 08/10/22  0802   PROT 7.4  --  6.4   INR  --  1.21*  --         No results for input(s): CKTOTAL, CKMB, CKMBINDEX, TROPONINI in the last 72 hours.       ASSESSMENT & PLAN:   This is a 57 y.o. male with significant history of diabetes and neuropathy, presented to the ED with 4 days of epigastric abdominal pain. He is afebrile hemodynamically stable labs show no leukocytosis. His LFTs are elevated with a hyperbilirubinemia of 1.8. Triglycerides are 279 and lipase is 600. CT scan shows extensive pancreatitis with peripancreatic fat stranding consistent with acute pancreatitis. Given his extensive alcohol history most likely secondary to alcoholism however does have some gallstones reported on CT scan very small on review. General surgery consulted for evaluation for possible gallstone pancreatitis.     - F/u RUQ U/S  - Okay to start diet after RUQ U/S  - LFTs improving, continue to monitor  - GI recs: GB U/S, monitor LFTs & lipase  - Continue NPO for now, IVF and strict I/Os  - Abx not indicated at this time  - CIWA protocol   - Will continue to follow    Yara Holder DO, [facility]  PGY1, General Surgery  08/11/22  6:01 AM  [place]  Pager: [phone]

## 2022-08-11 NOTE — PROGRESS NOTES
Progress Note  PGY-3    CC: abdominal pain  Patient's PCP: Cheyanne Amin, DO    Interval History   No acute events over night. Patient was seen at bed side this morning. His abdominal pain is improving from 9/10 in severity to 6/10 in severity. He denied nausea, vomiting, chest pain and palpitations. Abdominal US showed cholelithiasis without evidence of acute cholecystitis. After discussing with surgery, diet was advanced from NPO to clear liquid diet. MEDICATIONS:     No current facility-administered medications on file prior to encounter. Current Outpatient Medications on File Prior to Encounter   Medication Sig Dispense Refill    pregabalin (LYRICA) 50 MG capsule Take 1 capsule by mouth in the morning and 1 capsule at noon and 1 capsule before bedtime. Do all this for 30 days.  90 capsule 1    L-methylfolate-B6-B12 (METANX) 5-49.724-0-83 MG CAPS capsule Take 1 capsule by mouth daily (Patient not taking: No sig reported) 90 capsule 3    midodrine (PROAMATINE) 5 MG tablet Take 1 tablet by mouth 2 times daily (Patient not taking: No sig reported) 60 tablet 3         Scheduled Meds:   acetaminophen  500 mg Oral Once    sodium phosphate IVPB  15 mmol IntraVENous Once    sodium chloride flush  5-40 mL IntraVENous 2 times per day    enoxaparin  40 mg SubCUTAneous Daily    insulin glargine  5 Units SubCUTAneous Nightly    folic acid, thiamine, multi-vitamin with vitamin K infusion   IntraVENous Daily    insulin lispro  0-4 Units SubCUTAneous Q4H      Continuous Infusions:   sodium chloride      lactated ringers 125 mL/hr (08/11/22 3075)    dextrose      dextrose       PRN Meds:sodium chloride flush, sodium chloride, ondansetron **OR** ondansetron, polyethylene glycol, acetaminophen **OR** acetaminophen, potassium chloride, magnesium sulfate, hydrALAZINE, dextrose, glucose, dextrose bolus **OR** dextrose bolus, glucagon (rDNA), dextrose, HYDROmorphone **OR** HYDROmorphone    Allergies: No Known Allergies    PHYSICAL EXAM:       Vitals: BP (!) 162/95   Pulse 88   Temp 98.3 °F (36.8 °C) (Oral)   Resp 16   Ht 6' (1.829 m)   Wt 168 lb (76.2 kg)   SpO2 97%   BMI 22.78 kg/m²     I/O:    Intake/Output Summary (Last 24 hours) at 8/11/2022 1525  Last data filed at 8/11/2022 1306  Gross per 24 hour   Intake 480 ml   Output 775 ml   Net -295 ml     I/O this shift: In: 480 [P.O.:480]  Out: 175 [Urine:175]  I/O last 3 completed shifts: In: 0   Out: 950 [Urine:950]    Physical Examination:   General appearance: alert, appears stated age and cooperative. Skin: Skin color, texture, turgor normal.   HEENT: Head: Normocephalic. Neck: no adenopathy. Lungs: clear to auscultation bilaterally. Heart: regular rate and rhythm. Abdomen: soft, non-tender; bowel sounds normal.  Extremities: extremities normal, atraumatic, no cyanosis or edema  Lymphatic: No significant lymph node enlargement palpable. Neurologic: Mental status: Alert, oriented, thought content appropriate. DATA:       Labs:  CBC:   Recent Labs     08/09/22  1129 08/10/22  0802 08/11/22  0738   WBC 9.4 10.2 8.8   HGB 15.3 14.0 12.6*   HCT 45.2 41.2 36.9*   PLT 74* 53* 49*       BMP:   Recent Labs     08/09/22  1129 08/10/22  0802 08/11/22  0738    138 135*   K 4.9 3.8 3.7   CL 96* 99 99   CO2 28 27 28   BUN 10 8 6*   CREATININE 0.8 0.5* 0.6*   GLUCOSE 279* 217* 109*     LFT's:   Recent Labs     08/09/22  1129 08/10/22  0802 08/11/22  0738   AST 77* 40* 36   ALT 59* 40 29   BILITOT 1.6*  1.8* 1.1* 1.1*   ALKPHOS 113 86 81     Troponin: No results for input(s): TROPONINI in the last 72 hours. BNP: No results for input(s): BNP in the last 72 hours. ABGs: No results for input(s): PHART, TLV4AHT, PO2ART in the last 72 hours. INR:   Recent Labs     08/09/22 2000   INR 1.21*     Lipids: No results for input(s): CHOL, HDL in the last 72 hours.     Invalid input(s): LDLCALCU    U/A:  Recent Labs     08/09/22  [facility]   PHUR 6.0   WBCUA 0-2   RBCUA 0-2   MUCUS 2+*   BACTERIA 2+*   CLARITYU Clear   SPECGRAV 1.020   LEUKOCYTESUR Negative   UROBILINOGEN 0.2   BILIRUBINUR Negative   BLOODU TRACE-INTACT*   GLUCOSEU >=1000*       Rad:  US ABDOMEN LIMITED   Final Result      1. Cholelithiasis without evidence of acute cholecystitis. 2. Mild ascites. CT ABDOMEN PELVIS W IV CONTRAST Additional Contrast? None   Final Result      1. Extensive pancreatitis with extensive peripancreatic fat stranding. No pseudocyst formation or evidence of vascular compromise. No abscess identified. 2. Cholelithiasis without acute gallbladder inflammation identified. ASSESSMENT AND PLAN:   Dar Red is a 57 y.o. male, who was admitted with Acute pancreatitis without infection or necrosis. Acute Pancreatitis-severe/extensive  -assoc hemoconcentration  -no significant ETOH use but could be related  CT abd with nonobstructive cholelithiasis, abd u/s r/o acute cholecystitis   - Advanced diet from NPO to clear liquid  - Pain control with Dilaudid   - Lactated ringer at 125 cc/hr  Imaging with gallstones,elevated bili but normal alk phos  Gen surg c/s for possible edson- no acute surgical interventions indicated (acute cholecystitis ruled out)  - GI consult appreciated     Chronic macrocytosis/low platelets  -raising concerns for possible significant ETOH use vs hep C  - F/u B12,Folate and TSH,INR     HTN benign (improving)   Likely provoked by pain  Hydralazine IV PRN     Chronic hepatitis C without hepatic coma  Recently completed treatment. Follows with GI outpatient      Type 2 diabetes mellitus ,controlled  A1c 6.6%.   Accuchecks qac and hs  SSI,lantus 5 U  Clear liquid for now     Hypertension-hold midodrine for now  Had previously been on BP meds-stopped sec to recurrent syncope     Diabetic polyneuropathy associated with type 2 diabetes mellitus   -on lyrica        Neurocardiogenic syncope, multifactorial including autonomic dysfunction in the setting of diabetic neuropathy  -on midodrine and follows with [facility]     Cigarette nicotine dependence without complication,occ Marijuana use  Smoking cessation counseling     Vitiligo-baseline      Code Status:Full Code  FEN: LR @ 125 ml/hr  PPX: Lovenox  DISPO: [facility]     This patient has been staffed and discussed with Lauryn Arroyo MD.   -----------------------------  Cordell Field MD, PGY-3  Internal Medicine

## 2022-08-11 NOTE — PROGRESS NOTES
Physician Progress Note      Vincenzo Katz  [facility] #:                  [id number]  :                       1960  ADMIT DATE:       2022 10:29 AM  DISCH DATE:  RESPONDING  PROVIDER #:        Paul Martin MD          QUERY TEXT:    Patient admitted with alcoholic pancreatitis. Noted documentation of   pancytopenia in PN 8/10. In order to support the diagnosis of pancytopenia   please include additional clinical indicators in your documentation. Or   please document if the diagnosis of pancytopenia has been ruled out after   further study. The medical record reflects the following:  Risk Factors: 58 yo w/ alcoholic pancreatitis, hep C  Clinical Indicators: Per PN 8/10:   Pancytopenia ([address] 8/10 WBC 10.2, RBC   4.02, Platelets 74. Treatment: Lab monitoring  Options provided:  -- Pancytopenia was ruled out  -- Pancytopenia present as evidenced by, Please document evidence. -- Other - I will add my own diagnosis  -- Disagree - Not applicable / Not valid  -- Disagree - Clinically unable to determine / Unknown  -- Refer to Clinical Documentation Reviewer    PROVIDER RESPONSE TEXT:    Pancytopenia was ruled out after study.     Query created by: Natalya Myers on 2022 12:11 PM      Electronically signed by:  Paul Martin MD 2022 12:45 PM

## 2022-08-12 LAB
ALBUMIN SERPL-MCNC: 3.4 G/DL (ref 3.4–5)
ALP BLD-CCNC: 105 U/L (ref 40–129)
ALT SERPL-CCNC: 35 U/L (ref 10–40)
ANION GAP SERPL CALCULATED.3IONS-SCNC: 10 MMOL/L (ref 3–16)
AST SERPL-CCNC: 57 U/L (ref 15–37)
BASOPHILS ABSOLUTE: 0.1 K/UL (ref 0–0.2)
BASOPHILS RELATIVE PERCENT: 0.9 %
BILIRUB SERPL-MCNC: 1.2 MG/DL (ref 0–1)
BILIRUBIN DIRECT: 0.5 MG/DL (ref 0–0.3)
BILIRUBIN, INDIRECT: 0.7 MG/DL (ref 0–1)
BUN BLDV-MCNC: 4 MG/DL (ref 7–20)
CALCIUM SERPL-MCNC: 8.6 MG/DL (ref 8.3–10.6)
CHLORIDE BLD-SCNC: 95 MMOL/L (ref 99–110)
CO2: 25 MMOL/L (ref 21–32)
CREAT SERPL-MCNC: 0.5 MG/DL (ref 0.8–1.3)
EOSINOPHILS ABSOLUTE: 0 K/UL (ref 0–0.6)
EOSINOPHILS RELATIVE PERCENT: 0.2 %
GFR AFRICAN AMERICAN: >60
GFR NON-AFRICAN AMERICAN: >60
GLUCOSE BLD-MCNC: 164 MG/DL (ref 70–99)
GLUCOSE BLD-MCNC: 165 MG/DL (ref 70–99)
GLUCOSE BLD-MCNC: 186 MG/DL (ref 70–99)
GLUCOSE BLD-MCNC: 197 MG/DL (ref 70–99)
GLUCOSE BLD-MCNC: 212 MG/DL (ref 70–99)
GLUCOSE BLD-MCNC: 217 MG/DL (ref 70–99)
GLUCOSE BLD-MCNC: 222 MG/DL (ref 70–99)
HCT VFR BLD CALC: 38.3 % (ref 40.5–52.5)
HEMOGLOBIN: 13 G/DL (ref 13.5–17.5)
LIPASE: 96 U/L (ref 13–60)
LYMPHOCYTES ABSOLUTE: 1.2 K/UL (ref 1–5.1)
LYMPHOCYTES RELATIVE PERCENT: 16.2 %
MAGNESIUM: 1.5 MG/DL (ref 1.8–2.4)
MCH RBC QN AUTO: 34.6 PG (ref 26–34)
MCHC RBC AUTO-ENTMCNC: 34.1 G/DL (ref 31–36)
MCV RBC AUTO: 101.7 FL (ref 80–100)
MONOCYTES ABSOLUTE: 1.1 K/UL (ref 0–1.3)
MONOCYTES RELATIVE PERCENT: 15.6 %
NEUTROPHILS ABSOLUTE: 4.8 K/UL (ref 1.7–7.7)
NEUTROPHILS RELATIVE PERCENT: 67.1 %
PDW BLD-RTO: 12.9 % (ref 12.4–15.4)
PERFORMED ON: ABNORMAL
PHOSPHORUS: 2.6 MG/DL (ref 2.5–4.9)
PLATELET # BLD: 52 K/UL (ref 135–450)
PMV BLD AUTO: 9.5 FL (ref 5–10.5)
POTASSIUM SERPL-SCNC: 3.2 MMOL/L (ref 3.5–5.1)
RBC # BLD: 3.76 M/UL (ref 4.2–5.9)
SODIUM BLD-SCNC: 130 MMOL/L (ref 136–145)
TOTAL PROTEIN: 6.3 G/DL (ref 6.4–8.2)
TSH REFLEX: 1.76 UIU/ML (ref 0.27–4.2)
WBC # BLD: 7.2 K/UL (ref 4–11)

## 2022-08-12 PROCEDURE — 99231 SBSQ HOSP IP/OBS SF/LOW 25: CPT | Performed by: SURGERY

## 2022-08-12 PROCEDURE — 2580000003 HC RX 258

## 2022-08-12 PROCEDURE — 6360000002 HC RX W HCPCS: Performed by: INTERNAL MEDICINE

## 2022-08-12 PROCEDURE — 2580000003 HC RX 258: Performed by: INTERNAL MEDICINE

## 2022-08-12 PROCEDURE — 6370000000 HC RX 637 (ALT 250 FOR IP): Performed by: INTERNAL MEDICINE

## 2022-08-12 PROCEDURE — 36415 COLL VENOUS BLD VENIPUNCTURE: CPT

## 2022-08-12 PROCEDURE — 2060000000 HC ICU INTERMEDIATE R&B

## 2022-08-12 PROCEDURE — 80069 RENAL FUNCTION PANEL: CPT

## 2022-08-12 PROCEDURE — 83690 ASSAY OF LIPASE: CPT

## 2022-08-12 PROCEDURE — 83735 ASSAY OF MAGNESIUM: CPT

## 2022-08-12 PROCEDURE — 85025 COMPLETE CBC W/AUTO DIFF WBC: CPT

## 2022-08-12 PROCEDURE — 80076 HEPATIC FUNCTION PANEL: CPT

## 2022-08-12 PROCEDURE — 2580000003 HC RX 258: Performed by: STUDENT IN AN ORGANIZED HEALTH CARE EDUCATION/TRAINING PROGRAM

## 2022-08-12 PROCEDURE — 2500000003 HC RX 250 WO HCPCS

## 2022-08-12 RX ORDER — INSULIN LISPRO 100 [IU]/ML
0-4 INJECTION, SOLUTION INTRAVENOUS; SUBCUTANEOUS
Status: DISCONTINUED | OUTPATIENT
Start: 2022-08-12 | End: 2022-08-16 | Stop reason: HOSPADM

## 2022-08-12 RX ORDER — INSULIN LISPRO 100 [IU]/ML
0-4 INJECTION, SOLUTION INTRAVENOUS; SUBCUTANEOUS
Status: DISCONTINUED | OUTPATIENT
Start: 2022-08-12 | End: 2022-08-12

## 2022-08-12 RX ORDER — SODIUM CHLORIDE, SODIUM LACTATE, POTASSIUM CHLORIDE, CALCIUM CHLORIDE 600; 310; 30; 20 MG/100ML; MG/100ML; MG/100ML; MG/100ML
INJECTION, SOLUTION INTRAVENOUS CONTINUOUS
Status: ACTIVE | OUTPATIENT
Start: 2022-08-12 | End: 2022-08-13

## 2022-08-12 RX ADMIN — SODIUM CHLORIDE, POTASSIUM CHLORIDE, SODIUM LACTATE AND CALCIUM CHLORIDE: 600; 310; 30; 20 INJECTION, SOLUTION INTRAVENOUS at 08:45

## 2022-08-12 RX ADMIN — OXYCODONE HYDROCHLORIDE AND ACETAMINOPHEN 1 TABLET: 5; 325 TABLET ORAL at 15:42

## 2022-08-12 RX ADMIN — SODIUM CHLORIDE, POTASSIUM CHLORIDE, SODIUM LACTATE AND CALCIUM CHLORIDE: 600; 310; 30; 20 INJECTION, SOLUTION INTRAVENOUS at 15:54

## 2022-08-12 RX ADMIN — OXYCODONE HYDROCHLORIDE AND ACETAMINOPHEN 1 TABLET: 5; 325 TABLET ORAL at 12:05

## 2022-08-12 RX ADMIN — HYDROMORPHONE HYDROCHLORIDE 0.5 MG: 1 INJECTION, SOLUTION INTRAMUSCULAR; INTRAVENOUS; SUBCUTANEOUS at 21:06

## 2022-08-12 RX ADMIN — OXYCODONE HYDROCHLORIDE AND ACETAMINOPHEN 1 TABLET: 5; 325 TABLET ORAL at 00:38

## 2022-08-12 RX ADMIN — POTASSIUM CHLORIDE 10 MEQ: 7.46 INJECTION, SOLUTION INTRAVENOUS at 22:30

## 2022-08-12 RX ADMIN — HYDROMORPHONE HYDROCHLORIDE 0.5 MG: 1 INJECTION, SOLUTION INTRAMUSCULAR; INTRAVENOUS; SUBCUTANEOUS at 16:57

## 2022-08-12 RX ADMIN — HYDROMORPHONE HYDROCHLORIDE 0.5 MG: 1 INJECTION, SOLUTION INTRAMUSCULAR; INTRAVENOUS; SUBCUTANEOUS at 08:25

## 2022-08-12 RX ADMIN — HYDROMORPHONE HYDROCHLORIDE 0.25 MG: 1 INJECTION, SOLUTION INTRAMUSCULAR; INTRAVENOUS; SUBCUTANEOUS at 03:09

## 2022-08-12 RX ADMIN — OXYCODONE HYDROCHLORIDE AND ACETAMINOPHEN 1 TABLET: 5; 325 TABLET ORAL at 20:03

## 2022-08-12 RX ADMIN — SODIUM PHOSPHATE, MONOBASIC, MONOHYDRATE 15 MMOL: 276; 142 INJECTION, SOLUTION INTRAVENOUS at 12:08

## 2022-08-12 RX ADMIN — INSULIN LISPRO 1 UNITS: 100 INJECTION, SOLUTION INTRAVENOUS; SUBCUTANEOUS at 16:07

## 2022-08-12 RX ADMIN — THIAMINE HCL TAB 100 MG 100 MG: 100 TAB at 08:25

## 2022-08-12 RX ADMIN — OXYCODONE HYDROCHLORIDE AND ACETAMINOPHEN 1 TABLET: 5; 325 TABLET ORAL at 05:54

## 2022-08-12 RX ADMIN — ENOXAPARIN SODIUM 40 MG: 100 INJECTION SUBCUTANEOUS at 08:25

## 2022-08-12 RX ADMIN — ACETAMINOPHEN 650 MG: 325 TABLET, FILM COATED ORAL at 22:33

## 2022-08-12 RX ADMIN — SODIUM CHLORIDE, PRESERVATIVE FREE 10 ML: 5 INJECTION INTRAVENOUS at 20:22

## 2022-08-12 RX ADMIN — HYDRALAZINE HYDROCHLORIDE 10 MG: 20 INJECTION INTRAMUSCULAR; INTRAVENOUS at 20:04

## 2022-08-12 RX ADMIN — HYDRALAZINE HYDROCHLORIDE 10 MG: 20 INJECTION INTRAMUSCULAR; INTRAVENOUS at 15:51

## 2022-08-12 RX ADMIN — SODIUM CHLORIDE, PRESERVATIVE FREE 10 ML: 5 INJECTION INTRAVENOUS at 08:25

## 2022-08-12 RX ADMIN — INSULIN LISPRO 1 UNITS: 100 INJECTION, SOLUTION INTRAVENOUS; SUBCUTANEOUS at 20:18

## 2022-08-12 RX ADMIN — POTASSIUM CHLORIDE 10 MEQ: 7.46 INJECTION, SOLUTION INTRAVENOUS at 23:32

## 2022-08-12 RX ADMIN — HYDRALAZINE HYDROCHLORIDE 10 MG: 20 INJECTION INTRAMUSCULAR; INTRAVENOUS at 03:09

## 2022-08-12 RX ADMIN — HYDROMORPHONE HYDROCHLORIDE 0.5 MG: 1 INJECTION, SOLUTION INTRAMUSCULAR; INTRAVENOUS; SUBCUTANEOUS at 13:17

## 2022-08-12 RX ADMIN — POTASSIUM CHLORIDE 10 MEQ: 7.46 INJECTION, SOLUTION INTRAVENOUS at 20:11

## 2022-08-12 ASSESSMENT — PAIN DESCRIPTION - ORIENTATION
ORIENTATION: MID;UPPER
ORIENTATION: MID
ORIENTATION: MID
ORIENTATION: MID;UPPER
ORIENTATION: MID;RIGHT
ORIENTATION: MID

## 2022-08-12 ASSESSMENT — PAIN - FUNCTIONAL ASSESSMENT
PAIN_FUNCTIONAL_ASSESSMENT: ACTIVITIES ARE NOT PREVENTED
PAIN_FUNCTIONAL_ASSESSMENT: PREVENTS OR INTERFERES SOME ACTIVE ACTIVITIES AND ADLS
PAIN_FUNCTIONAL_ASSESSMENT: ACTIVITIES ARE NOT PREVENTED

## 2022-08-12 ASSESSMENT — PAIN SCALES - GENERAL
PAINLEVEL_OUTOF10: 6
PAINLEVEL_OUTOF10: 7
PAINLEVEL_OUTOF10: 8
PAINLEVEL_OUTOF10: 7
PAINLEVEL_OUTOF10: 6
PAINLEVEL_OUTOF10: 3
PAINLEVEL_OUTOF10: 7
PAINLEVEL_OUTOF10: 8
PAINLEVEL_OUTOF10: 7

## 2022-08-12 ASSESSMENT — PAIN DESCRIPTION - LOCATION
LOCATION: ABDOMEN

## 2022-08-12 ASSESSMENT — PAIN DESCRIPTION - FREQUENCY
FREQUENCY: CONTINUOUS
FREQUENCY: CONTINUOUS

## 2022-08-12 ASSESSMENT — PAIN DESCRIPTION - DESCRIPTORS
DESCRIPTORS: ACHING;DISCOMFORT
DESCRIPTORS: ACHING;DULL
DESCRIPTORS: DULL;ACHING
DESCRIPTORS: DULL
DESCRIPTORS: ACHING
DESCRIPTORS: ACHING
DESCRIPTORS: ACHING;DISCOMFORT

## 2022-08-12 ASSESSMENT — PAIN DESCRIPTION - ONSET
ONSET: ON-GOING
ONSET: ON-GOING

## 2022-08-12 ASSESSMENT — PAIN DESCRIPTION - PAIN TYPE
TYPE: ACUTE PAIN
TYPE: ACUTE PAIN

## 2022-08-12 NOTE — PROGRESS NOTES
Tolerating full liquid diet without c/o nausea.  Pain managed effectively with prn medications and rest.

## 2022-08-12 NOTE — PROGRESS NOTES
PM Assessment and Mediations completed. BP (!) 159/101   Pulse 91   Temp 99 °F (37.2 °C) (Oral)   Resp 16   Ht 6' (1.829 m)   Wt 168 lb (76.2 kg)   SpO2 97%   BMI 22.78 kg/m²     Alert and oriented x4. C/o head ache and abdomen pain 8/10. Pain/Discomfort is being managed with PRN analgesics per MD orders (See MAR). Patient is able to express and rate pain using numerical scale. C/O hunger, he is tolerating full liquids. Using urinal. Urine output straw color. No odor. Gave PRN hydralazine for elevated BP. Calls appropriately. Plan of care and safety measures reviewed with the patient. Needed items including call light in reach.        Electronically signed by Nguyen Castrejon RN on 8/11/2022 at 9:12 PM

## 2022-08-12 NOTE — PROGRESS NOTES
Pt transferred into room 3312. Aox4, RA, BP remains elevated, c/o pain 7/10 however pt just received pain medication prior to transfer. Up independently in room. Oriented to room and use of call light. Denies needs. Call light in reach.

## 2022-08-12 NOTE — PROGRESS NOTES
GI Progress Note    Caro Breen is a 57 y.o. male patient. 1. Acute pancreatitis without infection or necrosis, unspecified pancreatitis type        Admit Date: 8/9/2022    Subjective:       Feels better  Tolerating clear liquid  Lipase 161      ROS:  Cardiovascular ROS: negative  Gastrointestinal ROS: as above  Respiratory ROS: negative    Scheduled Meds:   acetaminophen  500 mg Oral Once    thiamine mononitrate  100 mg Oral Daily    sodium chloride flush  5-40 mL IntraVENous 2 times per day    enoxaparin  40 mg SubCUTAneous Daily    insulin glargine  5 Units SubCUTAneous Nightly    insulin lispro  0-4 Units SubCUTAneous Q4H       Continuous Infusions:   sodium chloride      lactated ringers 125 mL/hr at 08/11/22 1719    dextrose      dextrose         PRN Meds:  oxyCODONE-acetaminophen, sodium chloride flush, sodium chloride, ondansetron **OR** ondansetron, polyethylene glycol, acetaminophen **OR** acetaminophen, potassium chloride, magnesium sulfate, hydrALAZINE, dextrose, glucose, dextrose bolus **OR** dextrose bolus, glucagon (rDNA), dextrose, HYDROmorphone **OR** HYDROmorphone      Objective:       Patient Vitals for the past 24 hrs:   BP Temp Temp src Pulse Resp SpO2   08/11/22 1549 (!) 167/94 98.4 °F (36.9 °C) Oral 96 16 96 %   08/11/22 1329 (!) 162/95 98.3 °F (36.8 °C) Oral 88 16 97 %   08/11/22 1031 (!) 144/99 98.5 °F (36.9 °C) Oral 88 18 99 %   08/11/22 0509 -- -- -- -- 18 --   08/11/22 0439 -- -- -- -- 16 --   08/11/22 0255 (!) 160/96 98.4 °F (36.9 °C) Tympanic 98 17 95 %   08/11/22 0206 -- -- -- -- 15 --   08/11/22 0136 (!) 159/97 -- -- -- 16 --   08/11/22 0045 (!) 177/99 98.6 °F (37 °C) Oral 85 18 98 %   08/10/22 2250 -- -- -- -- 15 --   08/10/22 2220 -- -- -- -- 18 --       Exam:    VITALS:  BP (!) 167/94   Pulse 96   Temp 98.4 °F (36.9 °C) (Oral)   Resp 16   Ht 6' (1.829 m)   Wt 168 lb (76.2 kg)   SpO2 96%   BMI 22.78 kg/m²   TEMPERATURE:  Current - Temp: 98.4 °F (36.9 °C);  Max - Temp  Avg: 98.4 °F (36.9 °C)  Min: 98.3 °F (36.8 °C)  Max: 98.6 °F (37 °C)    NAD  General appearance: alert, appears stated age, cooperative and no distress  Head: Normocephalic, without obvious abnormality, atraumatic  Neck: supple, symmetrical, trachea midline and thyroid not enlarged, symmetric, no tenderness/mass/nodules  CVS:  RRR, Nl s1s2  Lungs CTA Bilaterally, normal effort  Abdomen: soft, mildly tender; bowel sounds normal; no masses,  no organomegaly  AAOx3, No asterixis or encephalopathy  Extremities: No edema. Lab Data:  Recent Labs     08/09/22  1129 08/10/22  0802 08/11/22  0738   WBC 9.4 10.2 8.8   HGB 15.3 14.0 12.6*   HCT 45.2 41.2 36.9*   .7* 102.4* 102.2*   PLT 74* 53* 49*     Recent Labs     08/09/22  1129 08/10/22  0802 08/11/22  0738    138 135*   K 4.9 3.8 3.7   CL 96* 99 99   CO2 28 27 28   PHOS  --  3.4 2.4*   BUN 10 8 6*   CREATININE 0.8 0.5* 0.6*     Recent Labs     08/09/22  1129 08/10/22  0802 08/11/22  0738   AST 77* 40* 36   ALT 59* 40 29   BILIDIR 0.5* 0.4* 0.5*   BILITOT 1.6*  1.8* 1.1* 1.1*   ALKPHOS 113 86 81     Recent Labs     08/09/22  1129 08/10/22  0802 08/11/22  0738   LIPASE 600.0* 600.0* 161.0*     Recent Labs     08/09/22 1129 08/09/22  2000 08/10/22  0802 08/11/22  0738   PROT 7.4  --  6.4 6.0*   INR  --  1.21*  --   --      No results for input(s): PTT in the last 72 hours. No results for input(s): OCCULTBLD in the last 72 hours. Assessment:       Principal Problem:    Acute pancreatitis without infection or necrosis  Resolved Problems:    * No resolved hospital problems.  *          Recommendations:       Monitor lipase and Lft's  Alcohol abstinence   Discussed with patient      Akin Olvera MD  8/11/2022  8:48 PM  30 minutes

## 2022-08-12 NOTE — PROGRESS NOTES
Pt reported increased severe pain with attempting full liquids on breakfast tray, did order solid food for lunch and will attempt as tolerates. Rec'ed prn percocet for discomfort.

## 2022-08-12 NOTE — PROGRESS NOTES
4 Eyes transfer Assessment     I agree as the receiving nurse that 2 RN's have performed a thorough Head to Toe Skin Assessment on the patient. ALL assessment sites listed below have been assessed on admission. Areas assessed by both nurses:   [x]   Head, Face, and Ears   [x]   Shoulders, Back, and Chest  [x]   Arms, Elbows, and Hands   [x]   Coccyx, Sacrum, and Ischium  [x]   Legs, Feet, and Heels        Does the Patient have Skin Breakdown?   No         César Prevention initiated:  No   Wound Care Orders initiated:  No      [facility] nurse consulted for Pressure Injury (Stage 3,4, Unstageable, DTI, NWPT, and Complex wounds) or César score 18 or lower:  No      Nurse 1 eSignature: Electronically signed by Lakshmi Noble RN on 8/12/22 at 1:18 AM EDT    **SHARE this note so that the co-signing nurse is able to place an eSignature**    Nurse 2 eSignature: Electronically signed by Sam Bolivar RN on 8/12/22 at 1:19 AM EDT

## 2022-08-12 NOTE — PROGRESS NOTES
Colorectal Surgery   Daily Progress Note  Patient: Merle Arreola      CC: pancreatitis    SUBJECTIVE:   Patient rested well overnight. Denies N/V. Urinating without issues. Abdominal pain is controlled with pain meds. Denies flatus or BM.    ROS:   A 14 point review of systems was conducted, significant findings as noted above. All other systems negative. OBJECTIVE:    PHYSICAL EXAM:    Vitals:    08/12/22 0108 08/12/22 0309 08/12/22 0400 08/12/22 0554   BP:  (!) 189/114 (!) 159/96    Pulse:  85 (!) 107    Resp: 18 18  20   Temp:  97.7 °F (36.5 °C)     TempSrc:  Oral     SpO2:  96%     Weight:       Height:         General appearance: Alert, no acute distress, grooming appropriate  Neck: Trachea midline  Chest/Lungs: Normal effort, no accessory muscle use, on RA  Cardiovascular: RRR  Abdomen: Soft, non-tender, nondistended no guarding/rigidity  Skin: Warm and dry, no rashes  Extremities: No edema, no cyanosis  Neuro: A&Ox3, no focal deficits, sensation intact    LABS:   Recent Labs     08/10/22  0802 08/11/22  0738   WBC 10.2 8.8   HGB 14.0 12.6*   HCT 41.2 36.9*   .4* 102.2*   PLT 53* 49*          Recent Labs     08/10/22  0802 08/11/22  0738    135*   K 3.8 3.7   CL 99 99   CO2 27 28   PHOS 3.4 2.4*   BUN 8 6*   CREATININE 0.5* 0.6*          Recent Labs     08/10/22  0802 08/11/22  0738   AST 40* 36   ALT 40 29   BILIDIR 0.4* 0.5*   BILITOT 1.1* 1.1*   ALKPHOS 86 81          Recent Labs     08/10/22  0802 08/11/22  0738   LIPASE 600.0* 161.0*          Recent Labs     08/09/22  2000 08/10/22  0802 08/11/22  0738   PROT  --  6.4 6.0*   INR 1.21*  --   --         No results for input(s): CKTOTAL, CKMB, CKMBINDEX, TROPONINI in the last 72 hours. ASSESSMENT & PLAN:   This is a 57 y.o. male with significant history of diabetes and neuropathy, presented to the ED with 4 days of epigastric abdominal pain. He is afebrile hemodynamically stable labs show no leukocytosis.   His LFTs are elevated with a hyperbilirubinemia of 1.8. Triglycerides are 279 and lipase is 600. CT scan shows extensive pancreatitis with peripancreatic fat stranding consistent with acute pancreatitis. Given his extensive alcohol history most likely secondary to alcoholism however does have some gallstones reported on CT scan very small on review. General surgery consulted for evaluation for possible gallstone pancreatitis. - RUQ U/S showed cholelithiasis and sludge without evidence of acute cholecystitis, and 2 mm gallbladder wall thickness  - No surgical intervention at this time  - Continue FLD.  Advance diet as tolerated  - LFTs improving, continue to monitor  - GI recs: GB U/S, monitor LFTs & lipase  - Abx not indicated at this time  - CIWA protocol   - Will continue to follow    Nirmal Hernandez DO, [address] [facility]  PGY1, General Surgery  08/12/22  7:05 AM  [place]  Pager: [phone]

## 2022-08-12 NOTE — PROGRESS NOTES
4 Eyes Admission Assessment     I agree as the admission nurse that 2 RN's have performed a thorough Head to Toe Skin Assessment on the patient. ALL assessment sites listed below have been assessed on admission. Areas assessed by both nurses: Raj Osler and Rosanne  [x]   Head, Face, and Ears   [x]   Shoulders, Back, and Chest  [x]   Arms, Elbows, and Hands   [x]   Coccyx, Sacrum, and Ischium  [x]   Legs, Feet, and Heels        Does the Patient have Skin Breakdown?   No         César Prevention initiated:  No   Wound Care Orders initiated:  Yes      Jorge Cortes consulted for Pressure Injury (Stage 3,4, Unstageable, DTI, NWPT, and Complex wounds) or César score 18 or lower:  No      Nurse 1 eSignature: Electronically signed by Rossy Jaimes RN on 8/12/22 at 7:42 PM EDT    **SHARE this note so that the co-signing nurse is able to place an eSignature**    Nurse 2 eSignature: {Esignature:[id number]}

## 2022-08-12 NOTE — PROGRESS NOTES
BP (!) 171/106   Pulse 90   Temp 99 °F (37.2 °C) (Oral)   Resp 16   Ht 6' (1.829 m)   Wt 168 lb (76.2 kg)   SpO2 99%   BMI 22.78 kg/m²     BP remains elevated. Pain improved 6/10 following percocet. 2045 /101. Gave PRN hydralazine  2330 /106. Notified MD via perfect serve. Awaiting response.

## 2022-08-12 NOTE — PROGRESS NOTES
BP (!) 163/98   Pulse 90   Temp 99 °F (37.2 °C) (Oral)   Resp 16   Ht 6' (1.829 m)   Wt 168 lb (76.2 kg)   SpO2 99%   BMI 22.78 kg/m²     Updated report called To María SHELTON. Patient transferred to [id number] with all belongings.

## 2022-08-12 NOTE — PROGRESS NOTES
Patient aware of room change to 3312. Report called to Kaela Best RN. Awaiting response from MD @ BP prior to move.

## 2022-08-12 NOTE — PROGRESS NOTES
Pt transferred from room 3312 to [id number] at this time with pt belongings. Report given to [facility], RN.

## 2022-08-12 NOTE — CARE COORDINATION
Social Work: Discussed in Interdisciplinary Rounds:    SW following. Per chart review, pt admitted from home with acute pancreatitis. No therapy recs, anticipated discharge plan is home no needs. SW to follow for discharge disposition.     PRATIMA Mahan  [phone]

## 2022-08-12 NOTE — PROGRESS NOTES
Progress Note  PGY-3    CC: abdominal pain  Patient's PCP: Timmy Chowdhury, DO    Interval History   No acute events over night. Patient was seen at bed side this morning. We attempted to change the patient's diet to a regular diet but the patient did not tolerate it. He complains about abdominal pain that did not improve and thus we changed his diet back to a full liquid diet. Otherwise, patient denied chest pain, nausea, vomiting, palpitation and changes in bowel movements    MEDICATIONS:     No current facility-administered medications on file prior to encounter. Current Outpatient Medications on File Prior to Encounter   Medication Sig Dispense Refill    pregabalin (LYRICA) 50 MG capsule Take 1 capsule by mouth in the morning and 1 capsule at noon and 1 capsule before bedtime. Do all this for 30 days.  90 capsule 1    L-methylfolate-B6-B12 (METANX) [id number] MG CAPS capsule Take 1 capsule by mouth daily (Patient not taking: No sig reported) 90 capsule 3    midodrine (PROAMATINE) 5 MG tablet Take 1 tablet by mouth 2 times daily (Patient not taking: No sig reported) 60 tablet 3         Scheduled Meds:   insulin lispro  0-4 Units SubCUTAneous 4x Daily AC & HS    sodium phosphate IVPB  15 mmol IntraVENous Once    acetaminophen  500 mg Oral Once    thiamine mononitrate  100 mg Oral Daily    sodium chloride flush  5-40 mL IntraVENous 2 times per day    enoxaparin  40 mg SubCUTAneous Daily    insulin glargine  5 Units SubCUTAneous Nightly      Continuous Infusions:   sodium chloride      dextrose      dextrose       PRN Meds:oxyCODONE-acetaminophen, sodium chloride flush, sodium chloride, ondansetron **OR** ondansetron, polyethylene glycol, acetaminophen **OR** acetaminophen, potassium chloride, magnesium sulfate, hydrALAZINE, dextrose, glucose, dextrose bolus **OR** dextrose bolus, glucagon (rDNA), dextrose, HYDROmorphone **OR** HYDROmorphone    Allergies: No Known Allergies    PHYSICAL EXAM:       Vitals: BP (!) 166/101   Pulse 94   Temp 99 °F (37.2 °C) (Oral)   Resp 18   Ht 6' (1.829 m)   Wt 168 lb (76.2 kg)   SpO2 98%   BMI 22.78 kg/m²     I/O:    Intake/Output Summary (Last 24 hours) at 8/12/2022 1420  Last data filed at 8/12/2022 1209  Gross per 24 hour   Intake 888 ml   Output 2000 ml   Net -1112 ml       I/O this shift:  In: -   Out: 700 [Urine:700]  I/O last 3 completed shifts: In: 80 [P.O.:660; I.V.:708]  Out: 2075 [Urine:2075]    Physical Examination:   General appearance: alert, appears stated age and cooperative. Skin: Skin color, texture, turgor normal.   HEENT: Head: Normocephalic. Neck: no adenopathy. Lungs: clear to auscultation bilaterally. Heart: regular rate and rhythm. Abdomen: soft, non-tender; bowel sounds normal.  Extremities: extremities normal, atraumatic, no cyanosis or edema  Lymphatic: No significant lymph node enlargement palpable. Neurologic: Mental status: Alert, oriented, thought content appropriate. DATA:       Labs:  CBC:   Recent Labs     08/10/22  0802 08/11/22  0738 08/12/22  0815   WBC 10.2 8.8 7.2   HGB 14.0 12.6* 13.0*   HCT 41.2 36.9* 38.3*   PLT 53* 49* 52*         BMP:   Recent Labs     08/10/22  0802 08/11/22  0738 08/12/22  0815    135* 130*   K 3.8 3.7 3.2*   CL 99 99 95*   CO2 27 28 25   BUN 8 6* 4*   CREATININE 0.5* 0.6* 0.5*   GLUCOSE 217* 109* 164*       LFT's:   Recent Labs     08/10/22  0802 08/11/22  0738 08/12/22  0815   AST 40* 36 57*   ALT 40 29 35   BILITOT 1.1* 1.1* 1.2*   ALKPHOS 86 81 105       Troponin: No results for input(s): TROPONINI in the last 72 hours. BNP: No results for input(s): BNP in the last 72 hours. ABGs: No results for input(s): PHART, KXI3FLM, PO2ART in the last 72 hours. INR:   Recent Labs     08/09/22 2000   INR 1.21*       Lipids: No results for input(s): CHOL, HDL in the last 72 hours.     Invalid input(s): LDLCALCU    U/A:  Recent Labs     08/09/22  1443   COLORU Yellow   PHUR 6.0   WBCUA 0-2   RBCUA 0-2   MUCUS 2+* BACTERIA 2+*   CLARITYU Clear   SPECGRAV 1.020   LEUKOCYTESUR Negative   UROBILINOGEN 0.2   BILIRUBINUR Negative   BLOODU TRACE-INTACT*   GLUCOSEU >=1000*         Rad:  US ABDOMEN LIMITED   Final Result      1. Cholelithiasis without evidence of acute cholecystitis. 2. Mild ascites. CT ABDOMEN PELVIS W IV CONTRAST Additional Contrast? None   Final Result      1. Extensive pancreatitis with extensive peripancreatic fat stranding. No pseudocyst formation or evidence of vascular compromise. No abscess identified. 2. Cholelithiasis without acute gallbladder inflammation identified. ASSESSMENT AND PLAN:   Ulises Padgett is a 57 y.o. male, who was admitted with Acute pancreatitis without infection or necrosis. Acute Pancreatitis-severe/extensive  -assoc hemoconcentration  -no significant ETOH use but could be related  CT abd with nonobstructive cholelithiasis, abd u/s r/o acute cholecystitis   - Did not tolerate regular diet. Changed back to a full liquid diet. Will attempt again tomorrow  - Pain control with Dilaudid   - Lactated ringer @ 100 ml/hr for 10 hr  Imaging with gallstones,elevated bili but normal alk phos  Gen surg c/s for possible edson- no acute surgical interventions indicated (acute cholecystitis ruled out)  - GI consult appreciated     Chronic macrocytosis/low platelets  -raising concerns for possible significant ETOH use vs hep C  - F/u B12,Folate and TSH,INR     HTN benign (improving)   Likely provoked by pain  Hydralazine IV PRN     Chronic hepatitis C without hepatic coma  Recently completed treatment. Follows with GI outpatient      Type 2 diabetes mellitus ,controlled  A1c 6.6%.   Accuchecks qac and hs  SSI,lantus 5 U  Clear liquid for now     Hypertension-hold midodrine for now  Had previously been on BP meds-stopped sec to recurrent syncope     Diabetic polyneuropathy associated with type 2 diabetes mellitus   -on lyrica        Neurocardiogenic syncope,

## 2022-08-13 LAB
ALBUMIN SERPL-MCNC: 3.7 G/DL (ref 3.4–5)
ALP BLD-CCNC: 96 U/L (ref 40–129)
ALT SERPL-CCNC: 37 U/L (ref 10–40)
ANION GAP SERPL CALCULATED.3IONS-SCNC: 14 MMOL/L (ref 3–16)
ANION GAP SERPL CALCULATED.3IONS-SCNC: 15 MMOL/L (ref 3–16)
AST SERPL-CCNC: 59 U/L (ref 15–37)
BASOPHILS ABSOLUTE: 0 K/UL (ref 0–0.2)
BASOPHILS RELATIVE PERCENT: 0 %
BILIRUB SERPL-MCNC: 1 MG/DL (ref 0–1)
BILIRUBIN DIRECT: 0.4 MG/DL (ref 0–0.3)
BILIRUBIN, INDIRECT: 0.6 MG/DL (ref 0–1)
BUN BLDV-MCNC: 3 MG/DL (ref 7–20)
BUN BLDV-MCNC: 3 MG/DL (ref 7–20)
CALCIUM SERPL-MCNC: 8.8 MG/DL (ref 8.3–10.6)
CALCIUM SERPL-MCNC: 8.9 MG/DL (ref 8.3–10.6)
CHLORIDE BLD-SCNC: 98 MMOL/L (ref 99–110)
CHLORIDE BLD-SCNC: 99 MMOL/L (ref 99–110)
CO2: 22 MMOL/L (ref 21–32)
CO2: 23 MMOL/L (ref 21–32)
CREAT SERPL-MCNC: 0.5 MG/DL (ref 0.8–1.3)
CREAT SERPL-MCNC: 0.6 MG/DL (ref 0.8–1.3)
EOSINOPHILS ABSOLUTE: 0.1 K/UL (ref 0–0.6)
EOSINOPHILS RELATIVE PERCENT: 1 %
GFR AFRICAN AMERICAN: >60
GFR AFRICAN AMERICAN: >60
GFR NON-AFRICAN AMERICAN: >60
GFR NON-AFRICAN AMERICAN: >60
GLUCOSE BLD-MCNC: 206 MG/DL (ref 70–99)
GLUCOSE BLD-MCNC: 215 MG/DL (ref 70–99)
GLUCOSE BLD-MCNC: 224 MG/DL (ref 70–99)
GLUCOSE BLD-MCNC: 225 MG/DL (ref 70–99)
GLUCOSE BLD-MCNC: 229 MG/DL (ref 70–99)
GLUCOSE BLD-MCNC: 292 MG/DL (ref 70–99)
HCT VFR BLD CALC: 37.6 % (ref 40.5–52.5)
HEMOGLOBIN: 12.8 G/DL (ref 13.5–17.5)
LIPASE: 101 U/L (ref 13–60)
LYMPHOCYTES ABSOLUTE: 1.4 K/UL (ref 1–5.1)
LYMPHOCYTES RELATIVE PERCENT: 24 %
MAGNESIUM: 2.1 MG/DL (ref 1.8–2.4)
MCH RBC QN AUTO: 34.8 PG (ref 26–34)
MCHC RBC AUTO-ENTMCNC: 34 G/DL (ref 31–36)
MCV RBC AUTO: 102.3 FL (ref 80–100)
MONOCYTES ABSOLUTE: 1 K/UL (ref 0–1.3)
MONOCYTES RELATIVE PERCENT: 17 %
NEUTROPHILS ABSOLUTE: 3.4 K/UL (ref 1.7–7.7)
NEUTROPHILS RELATIVE PERCENT: 58 %
PDW BLD-RTO: 12.8 % (ref 12.4–15.4)
PERFORMED ON: ABNORMAL
PHOSPHORUS: 2.7 MG/DL (ref 2.5–4.9)
PHOSPHORUS: 2.7 MG/DL (ref 2.5–4.9)
PLATELET # BLD: 61 K/UL (ref 135–450)
PMV BLD AUTO: 9.5 FL (ref 5–10.5)
POTASSIUM REFLEX MAGNESIUM: 3.6 MMOL/L (ref 3.5–5.1)
POTASSIUM SERPL-SCNC: 3.6 MMOL/L (ref 3.5–5.1)
RBC # BLD: 3.67 M/UL (ref 4.2–5.9)
SODIUM BLD-SCNC: 135 MMOL/L (ref 136–145)
SODIUM BLD-SCNC: 136 MMOL/L (ref 136–145)
TOTAL PROTEIN: 6.4 G/DL (ref 6.4–8.2)
WBC # BLD: 5.9 K/UL (ref 4–11)

## 2022-08-13 PROCEDURE — 6360000002 HC RX W HCPCS: Performed by: INTERNAL MEDICINE

## 2022-08-13 PROCEDURE — 99232 SBSQ HOSP IP/OBS MODERATE 35: CPT | Performed by: SURGERY

## 2022-08-13 PROCEDURE — 80076 HEPATIC FUNCTION PANEL: CPT

## 2022-08-13 PROCEDURE — 6370000000 HC RX 637 (ALT 250 FOR IP): Performed by: STUDENT IN AN ORGANIZED HEALTH CARE EDUCATION/TRAINING PROGRAM

## 2022-08-13 PROCEDURE — 2580000003 HC RX 258: Performed by: INTERNAL MEDICINE

## 2022-08-13 PROCEDURE — 83690 ASSAY OF LIPASE: CPT

## 2022-08-13 PROCEDURE — 6370000000 HC RX 637 (ALT 250 FOR IP): Performed by: INTERNAL MEDICINE

## 2022-08-13 PROCEDURE — 84100 ASSAY OF PHOSPHORUS: CPT

## 2022-08-13 PROCEDURE — 85025 COMPLETE CBC W/AUTO DIFF WBC: CPT

## 2022-08-13 PROCEDURE — 80069 RENAL FUNCTION PANEL: CPT

## 2022-08-13 PROCEDURE — 36415 COLL VENOUS BLD VENIPUNCTURE: CPT

## 2022-08-13 PROCEDURE — 83735 ASSAY OF MAGNESIUM: CPT

## 2022-08-13 PROCEDURE — 2060000000 HC ICU INTERMEDIATE R&B

## 2022-08-13 PROCEDURE — 6370000000 HC RX 637 (ALT 250 FOR IP): Performed by: SURGERY

## 2022-08-13 RX ORDER — OXYCODONE HYDROCHLORIDE 5 MG/1
5 TABLET ORAL EVERY 4 HOURS PRN
Status: DISCONTINUED | OUTPATIENT
Start: 2022-08-13 | End: 2022-08-16 | Stop reason: HOSPADM

## 2022-08-13 RX ORDER — OXYCODONE HYDROCHLORIDE 5 MG/1
10 TABLET ORAL EVERY 4 HOURS PRN
Status: DISCONTINUED | OUTPATIENT
Start: 2022-08-13 | End: 2022-08-16 | Stop reason: HOSPADM

## 2022-08-13 RX ORDER — HYDRALAZINE HYDROCHLORIDE 20 MG/ML
20 INJECTION INTRAMUSCULAR; INTRAVENOUS EVERY 8 HOURS PRN
Status: DISCONTINUED | OUTPATIENT
Start: 2022-08-13 | End: 2022-08-16 | Stop reason: HOSPADM

## 2022-08-13 RX ORDER — SODIUM CHLORIDE, SODIUM LACTATE, POTASSIUM CHLORIDE, CALCIUM CHLORIDE 600; 310; 30; 20 MG/100ML; MG/100ML; MG/100ML; MG/100ML
INJECTION, SOLUTION INTRAVENOUS CONTINUOUS
Status: ACTIVE | OUTPATIENT
Start: 2022-08-13 | End: 2022-08-13

## 2022-08-13 RX ADMIN — HYDROMORPHONE HYDROCHLORIDE 0.5 MG: 1 INJECTION, SOLUTION INTRAMUSCULAR; INTRAVENOUS; SUBCUTANEOUS at 04:04

## 2022-08-13 RX ADMIN — INSULIN LISPRO 1 UNITS: 100 INJECTION, SOLUTION INTRAVENOUS; SUBCUTANEOUS at 16:48

## 2022-08-13 RX ADMIN — INSULIN LISPRO 1 UNITS: 100 INJECTION, SOLUTION INTRAVENOUS; SUBCUTANEOUS at 06:31

## 2022-08-13 RX ADMIN — OXYCODONE HYDROCHLORIDE AND ACETAMINOPHEN 1 TABLET: 5; 325 TABLET ORAL at 04:40

## 2022-08-13 RX ADMIN — HYDRALAZINE HYDROCHLORIDE 20 MG: 20 INJECTION INTRAMUSCULAR; INTRAVENOUS at 23:28

## 2022-08-13 RX ADMIN — HYDROMORPHONE HYDROCHLORIDE 0.5 MG: 1 INJECTION, SOLUTION INTRAMUSCULAR; INTRAVENOUS; SUBCUTANEOUS at 20:07

## 2022-08-13 RX ADMIN — ENOXAPARIN SODIUM 40 MG: 100 INJECTION SUBCUTANEOUS at 09:01

## 2022-08-13 RX ADMIN — MAGNESIUM SULFATE HEPTAHYDRATE 2000 MG: 40 INJECTION, SOLUTION INTRAVENOUS at 01:58

## 2022-08-13 RX ADMIN — HYDROMORPHONE HYDROCHLORIDE 0.5 MG: 1 INJECTION, SOLUTION INTRAMUSCULAR; INTRAVENOUS; SUBCUTANEOUS at 23:15

## 2022-08-13 RX ADMIN — HYDROMORPHONE HYDROCHLORIDE 0.5 MG: 1 INJECTION, SOLUTION INTRAMUSCULAR; INTRAVENOUS; SUBCUTANEOUS at 12:13

## 2022-08-13 RX ADMIN — SODIUM CHLORIDE, POTASSIUM CHLORIDE, SODIUM LACTATE AND CALCIUM CHLORIDE: 600; 310; 30; 20 INJECTION, SOLUTION INTRAVENOUS at 09:02

## 2022-08-13 RX ADMIN — HYDROMORPHONE HYDROCHLORIDE 0.5 MG: 1 INJECTION, SOLUTION INTRAMUSCULAR; INTRAVENOUS; SUBCUTANEOUS at 16:46

## 2022-08-13 RX ADMIN — HYDRALAZINE HYDROCHLORIDE 10 MG: 20 INJECTION INTRAMUSCULAR; INTRAVENOUS at 03:26

## 2022-08-13 RX ADMIN — DIBASIC SODIUM PHOSPHATE, MONOBASIC POTASSIUM PHOSPHATE AND MONOBASIC SODIUM PHOSPHATE 2 TABLET: 852; 155; 130 TABLET ORAL at 12:06

## 2022-08-13 RX ADMIN — INSULIN LISPRO 1 UNITS: 100 INJECTION, SOLUTION INTRAVENOUS; SUBCUTANEOUS at 12:06

## 2022-08-13 RX ADMIN — HYDROMORPHONE HYDROCHLORIDE 0.5 MG: 1 INJECTION, SOLUTION INTRAMUSCULAR; INTRAVENOUS; SUBCUTANEOUS at 01:05

## 2022-08-13 RX ADMIN — OXYCODONE HYDROCHLORIDE AND ACETAMINOPHEN 1 TABLET: 5; 325 TABLET ORAL at 00:01

## 2022-08-13 RX ADMIN — HYDROMORPHONE HYDROCHLORIDE 0.5 MG: 1 INJECTION, SOLUTION INTRAMUSCULAR; INTRAVENOUS; SUBCUTANEOUS at 09:04

## 2022-08-13 RX ADMIN — OXYCODONE 10 MG: 5 TABLET ORAL at 21:57

## 2022-08-13 RX ADMIN — THIAMINE HCL TAB 100 MG 100 MG: 100 TAB at 09:02

## 2022-08-13 RX ADMIN — POTASSIUM CHLORIDE 10 MEQ: 7.46 INJECTION, SOLUTION INTRAVENOUS at 01:07

## 2022-08-13 RX ADMIN — INSULIN LISPRO 2 UNITS: 100 INJECTION, SOLUTION INTRAVENOUS; SUBCUTANEOUS at 22:00

## 2022-08-13 ASSESSMENT — PAIN SCALES - GENERAL
PAINLEVEL_OUTOF10: 7
PAINLEVEL_OUTOF10: 8
PAINLEVEL_OUTOF10: 9
PAINLEVEL_OUTOF10: 8
PAINLEVEL_OUTOF10: 7
PAINLEVEL_OUTOF10: 3
PAINLEVEL_OUTOF10: 9
PAINLEVEL_OUTOF10: 8

## 2022-08-13 ASSESSMENT — PAIN DESCRIPTION - ONSET
ONSET: ON-GOING

## 2022-08-13 ASSESSMENT — PAIN DESCRIPTION - DESCRIPTORS
DESCRIPTORS: ACHING
DESCRIPTORS: ACHING
DESCRIPTORS: ACHING;CRAMPING;DISCOMFORT
DESCRIPTORS: ACHING;CRAMPING;DISCOMFORT
DESCRIPTORS: ACHING
DESCRIPTORS: ACHING;SHARP
DESCRIPTORS: ACHING;DISCOMFORT

## 2022-08-13 ASSESSMENT — PAIN DESCRIPTION - LOCATION
LOCATION: ABDOMEN

## 2022-08-13 ASSESSMENT — PAIN DESCRIPTION - ORIENTATION
ORIENTATION: MID

## 2022-08-13 ASSESSMENT — PAIN DESCRIPTION - PAIN TYPE
TYPE: ACUTE PAIN

## 2022-08-13 ASSESSMENT — PAIN DESCRIPTION - FREQUENCY
FREQUENCY: CONTINUOUS

## 2022-08-13 ASSESSMENT — PAIN - FUNCTIONAL ASSESSMENT
PAIN_FUNCTIONAL_ASSESSMENT: PREVENTS OR INTERFERES SOME ACTIVE ACTIVITIES AND ADLS

## 2022-08-13 NOTE — PROGRESS NOTES
GI Progress Note    Ashlee Han is a 57 y.o. male patient.   1. Acute pancreatitis without infection or necrosis, unspecified pancreatitis type        Admit Date: 8/9/2022    Subjective:       Has had abdominal pain  Diet changed back to clear liquid  No nausea or vomiting  afebrile      ROS:  Cardiovascular ROS: negative  Gastrointestinal ROS: as above  Respiratory ROS: negative    Scheduled Meds:   insulin lispro  0-4 Units SubCUTAneous 4x Daily AC & HS    acetaminophen  500 mg Oral Once    thiamine mononitrate  100 mg Oral Daily    sodium chloride flush  5-40 mL IntraVENous 2 times per day    enoxaparin  40 mg SubCUTAneous Daily    insulin glargine  5 Units SubCUTAneous Nightly       Continuous Infusions:   lactated ringers 100 mL/hr at 08/12/22 1554    sodium chloride      dextrose      dextrose         PRN Meds:  oxyCODONE-acetaminophen, sodium chloride flush, sodium chloride, ondansetron **OR** ondansetron, polyethylene glycol, acetaminophen **OR** acetaminophen, potassium chloride, magnesium sulfate, hydrALAZINE, dextrose, glucose, dextrose bolus **OR** dextrose bolus, glucagon (rDNA), dextrose, HYDROmorphone **OR** HYDROmorphone      Objective:       Patient Vitals for the past 24 hrs:   BP Temp Temp src Pulse Resp SpO2   08/12/22 2003 (!) 171/103 -- -- -- -- --   08/12/22 1752 (!) 149/109 98.1 °F (36.7 °C) Oral 96 18 --   08/12/22 1600 (!) 158/86 -- -- 89 -- --   08/12/22 1542 (!) 170/102 98.9 °F (37.2 °C) Oral 88 18 99 %   08/12/22 0815 (!) 166/101 99 °F (37.2 °C) Oral 94 18 98 %   08/12/22 0554 -- -- -- -- 20 --   08/12/22 0400 (!) 159/96 -- -- (!) 107 -- --   08/12/22 0309 (!) 189/114 97.7 °F (36.5 °C) Oral 85 18 96 %   08/12/22 0108 -- -- -- -- 18 --   08/12/22 0100 (!) 177/106 99 °F (37.2 °C) Oral 83 18 99 %   08/12/22 0030 (!) 163/98 -- -- 90 -- --   08/11/22 2330 (!) 171/106 99 °F (37.2 °C) Oral 90 16 99 %   08/11/22 2128 -- -- -- -- 18 --       Exam:    VITALS:  BP (!) 171/103   Pulse 96   Temp

## 2022-08-13 NOTE — PROGRESS NOTES
Colorectal Surgery   Daily Progress Note  Patient: Carolyn Hence      CC: pancreatitis    SUBJECTIVE:   Continued to complains of upper abdominal pain of radiates to the back. Pain is controlled with IV pain medication. Have pain associated with low fiber diet. ROS:   A 14 point review of systems was conducted, significant findings as noted above. All other systems negative. OBJECTIVE:    PHYSICAL EXAM:    Vitals:    08/13/22 0326 08/13/22 0629 08/13/22 0854 08/13/22 0856   BP: (!) 183/118 (!) 186/121  (!) 164/114   Pulse: 96   92   Resp: 19   18   Temp: 98 °F (36.7 °C)   98.2 °F (36.8 °C)   TempSrc: Oral   Oral   SpO2: 96%      Weight:   168 lb 12.8 oz (76.6 kg)    Height:         General appearance: Alert, no acute distress, grooming appropriate  Neck: Trachea midline  Chest/Lungs: Normal effort, no accessory muscle use, on RA  Cardiovascular: RRR  Abdomen: Soft, mildly-tender in the epigastric area, nondistended no guarding/rigidity  Skin: Warm and dry, no rashes  Extremities: No edema, no cyanosis  Neuro: A&Ox3, no focal deficits, sensation intact    LABS:   Recent Labs     08/12/22  0815 08/13/22  0338   WBC 7.2 5.9   HGB 13.0* 12.8*   HCT 38.3* 37.6*   .7* 102.3*   PLT 52* 61*          Recent Labs     08/12/22  0815 08/13/22  0338   * 135*  136   K 3.2* 3.6  3.6   CL 95* 98*  99   CO2 25 22  23   PHOS 2.6 2.7  2.7   BUN 4* 3*  3*   CREATININE 0.5* 0.6*  0.5*          Recent Labs     08/12/22  0815 08/13/22  0338   AST 57* 59*   ALT 35 37   BILIDIR 0.5* 0.4*   BILITOT 1.2* 1.0   ALKPHOS 105 96          Recent Labs     08/12/22  0815 08/13/22  0338   LIPASE 96.0* 101.0*          Recent Labs     08/12/22  0815 08/13/22  0338   PROT 6.3* 6.4        No results for input(s): CKTOTAL, CKMB, CKMBINDEX, TROPONINI in the last 72 hours.       ASSESSMENT & PLAN:   This is a 57 y.o. male with significant history of diabetes and neuropathy, presented to the ED with 4 days of epigastric abdominal pain. He is afebrile hemodynamically stable labs show no leukocytosis. His LFTs are elevated with a hyperbilirubinemia of 1.8. Triglycerides are 279 and lipase is 600. CT scan shows extensive pancreatitis with peripancreatic fat stranding consistent with acute pancreatitis. Given his extensive alcohol history most likely secondary to alcoholism however does have some gallstones reported on CT scan very small on review. General surgery consulted for evaluation for possible gallstone pancreatitis. - RUQ U/S showed cholelithiasis and sludge without evidence of acute cholecystitis, and 2 mm gallbladder wall thickness  - No surgical intervention at this time  - Advance diet as tolerated, was advanced to soft food and low fiber diet today  - LFTs PERRL, conjunctiva normal, continue to monitor  - Abx not indicated at this time  - CIWA protocol   - Will continue to follow    Tyler Monte DO, [facility]  PGY1, General Surgery  08/13/22  9:16 AM  Perfect[employer]  Pager: [phone]     I have seen, examined, and reviewed the patients chart. I agree with the residents assessment and have made appropriate changes.     Cassy Asa

## 2022-08-13 NOTE — PROGRESS NOTES
Hospitalist Progress Note  8/13/2022 8:17 AM  Subjective:   Admit Date: 8/9/2022  PCP: Tung Hayes DO  Interval History:     He continues to experience significant pain to epigastric region that radiates to his back. He denies nausea, vomiting, dysuria, diarrhea. He reports constipation and has not have a BM for several days. He also reports lightheadedness. He denies shortness of breath chest pain palpitations. He has not been eating and drinking much due to abdominal pain. ADULT DIET; Full Liquid     I/O last 3 completed shifts: In: 5127 [P.O.:420;  I.V.:708; IV Piggyback:450]  Out: 4300 [Urine:4300]   Date 08/13/22 0000 - 08/13/22 2359   Shift 8066-4331 1084-8301 4082-6464 24 Hour Total   INTAKE   P.O.(mL/kg/hr) 240(0.4)   240   Shift Total(mL/kg) 240(3.1)   240(3.1)   OUTPUT   Urine(mL/kg/hr) 1300(2.1)   1300   Shift Total(mL/kg) 1300(17.1)   1300(17.1)   Weight (kg) 76.2 76.2 76.2 76.2     Patient Vitals for the past 96 hrs (Last 3 readings):   Weight   08/09/22 1034 168 lb (76.2 kg)     Medications:      sodium chloride      dextrose      dextrose        insulin lispro  0-4 Units SubCUTAneous 4x Daily AC & HS    acetaminophen  500 mg Oral Once    thiamine mononitrate  100 mg Oral Daily    sodium chloride flush  5-40 mL IntraVENous 2 times per day    enoxaparin  40 mg SubCUTAneous Daily    insulin glargine  5 Units SubCUTAneous Nightly       Recent Labs     08/11/22  0738 08/12/22  0815 08/13/22 0338   WBC 8.8 7.2 5.9   HGB 12.6* 13.0* 12.8*   PLT 49* 52* 61*     Recent Labs     08/11/22  0738 08/12/22  0815 08/13/22  0338   * 130* 135*  136   K 3.7 3.2* 3.6  3.6   CL 99 95* 98*  99   CO2 28 25 22 23   BUN 6* 4* 3*  3*   CREATININE 0.6* 0.5* 0.6*  0.5*   GLUCOSE 109* 164* 225*  224*     Recent Labs     08/11/22  0738 08/12/22  0815 08/13/22  0338   AST 36 57* 59*   ALT 29 35 37   BILITOT 1.1* 1.2* 1.0   ALKPHOS 81 105 96     Lab Results   Component Value Date/Time    TRIG 256 07/01/2022 01:36 PM    HDL 37 2022 01:36 PM    LDLCALC 61 2022 01:36 PM    CHOL 149 2022 01:36 PM     No results found for: PHART, PO2ART, BVE8JDS  No results for input(s): INR in the last 72 hours. No results for input(s): CKTOTAL, CKMB, TROPONINI in the last 72 hours. No results for input(s): DDIMER in the last 72 hours. No components found for: HGBA1C  No results found for: TSH  Urine Culture:  No results found for this or any previous visit. Objective:   Vitals: BP (!) 186/121   Pulse 96   Temp 98 °F (36.7 °C) (Oral)   Resp 19   Ht 6' (1.829 m)   Wt 168 lb (76.2 kg)   SpO2 96%   BMI 22.78 kg/m²   Pulse Ox: SpO2  Av.8 %  Min: 95 %  Max: 99 %  Supplemental O2:      GEN alert, in no distress  HEENT normocephalic, anicteric sclera, EOMI, mucosa dry, no stridor  NECK supple, trachea midline  RESP on RA, in no distress, clear to auscultation  CARDS RRR, S1, S2, no murmurs, no edema, radial pulse 2+, DP pulse 2+  ABD hypoactive, soft, moderate tenderness over epigastric region  MSK no cyanosis, no clubbing  SKIN vitiligo. warm, dry  NEURO alert, oriented x 3, no facial asymmetry, no dysarthria, moving spontaneously  PSYCH normal mood      Assessment/Plan   The patient is a 57 y.o. male with history of DM2 complicated by neuropathy, recurrent neurocardiogenic syncope on midodrine, chronic hepatitis C s/p treatment,tobacco and marijuana use, who presented with 4 days of worsening diffuse abdominal pain,  epigastrum and rated 9/10, aggravated by po intake with no relieving factors. He reported feeling nauseated and vomiting non-bloody emesis  He has not been tolerating po intake and had a few sips of beer the day prior to presentation. Admission labs showed elevated lipase of 600. Patient reports drinking 2-4 beers daily. Extensive Acute Pancreatitis  - adm lipase 600.  - CT A/P w contrast 22 showed extensive pancreatitis with extensive peripancreatic fat stranding.  No pseudocyst formation or evidence of vascular compromise. No abscess identified. Cholelithiasis without acute gallbladder inflammation identified.  - US Abdomen 8/11/22 showed Small gallstones. Gallbladder sludge. Wall thickness 2 mm. No reported sonographic Roman sign. CBD 4 mm diameter.  - Continue IVF's. - Pain control with bowel regimen  - Antiemetics as needed. Transaminitis, Indirect Hyperbilirubinemia  History of Hepatitis C  - recently completed treatment   - US abdomen 8/11/22 showed liver with upper limits of echogenicity. - CT A/p with contrast 8/9/22 showed liver and spleen are unremarkable. Elevated BP  - likely related to pain. History of orthostatic syncope and on midodrine 5 mg TID at home. Thrombocytopenia  - adm platelet 74. History of thrombocytopenia, noted as far out as 11/2020.    - CT of A/P with IV contrast 8/9/22 showed unremarkable liver and spleen. - possible related to hepatitis C infection. - peripheral smear. Macrocytosis  - noted since 11/2020. - peripheral smear. DM  Diabetic Neuropathy  - A1c 6.6  on 8/9/22.  - Home on lyrica 50 mg TID. - Started lantus 5 units HS, low SSI.  - Increase lantus to 5 units BID. Continue low SSI. History of Orthostatic Syncope  - likely related to autonomic dysfunction in the setting of diabetic neuropathy.  - Home on midodrine 5 mg BID. Hold in the setting elevated BP. Current Smoker  - offered NRT  - Encourage smoking cessation. Occasional Marijuana Use      Advance Directive: Full Code   Discharge planning: when tolerates oral intake and pain controlled.           Jessy Monsalve MD  [facility] Hospitalist

## 2022-08-14 LAB
ALBUMIN SERPL-MCNC: 3.4 G/DL (ref 3.4–5)
ALP BLD-CCNC: 111 U/L (ref 40–129)
ALT SERPL-CCNC: 34 U/L (ref 10–40)
ANION GAP SERPL CALCULATED.3IONS-SCNC: 14 MMOL/L (ref 3–16)
AST SERPL-CCNC: 47 U/L (ref 15–37)
BASOPHILS ABSOLUTE: 0 K/UL (ref 0–0.2)
BASOPHILS RELATIVE PERCENT: 0.2 %
BILIRUB SERPL-MCNC: 0.8 MG/DL (ref 0–1)
BILIRUBIN DIRECT: 0.3 MG/DL (ref 0–0.3)
BILIRUBIN, INDIRECT: 0.5 MG/DL (ref 0–1)
BUN BLDV-MCNC: 3 MG/DL (ref 7–20)
C-REACTIVE PROTEIN: 15.4 MG/L (ref 0–5.1)
CALCIUM SERPL-MCNC: 8.8 MG/DL (ref 8.3–10.6)
CHLORIDE BLD-SCNC: 98 MMOL/L (ref 99–110)
CO2: 23 MMOL/L (ref 21–32)
CREAT SERPL-MCNC: 0.5 MG/DL (ref 0.8–1.3)
EOSINOPHILS ABSOLUTE: 0.1 K/UL (ref 0–0.6)
EOSINOPHILS RELATIVE PERCENT: 0.8 %
FOLATE: >20 NG/ML (ref 4.78–24.2)
GFR AFRICAN AMERICAN: >60
GFR NON-AFRICAN AMERICAN: >60
GLUCOSE BLD-MCNC: 213 MG/DL (ref 70–99)
GLUCOSE BLD-MCNC: 237 MG/DL (ref 70–99)
GLUCOSE BLD-MCNC: 242 MG/DL (ref 70–99)
GLUCOSE BLD-MCNC: 259 MG/DL (ref 70–99)
GLUCOSE BLD-MCNC: 285 MG/DL (ref 70–99)
HCT VFR BLD CALC: 37.5 % (ref 40.5–52.5)
HEMOGLOBIN: 12.6 G/DL (ref 13.5–17.5)
LYMPHOCYTES ABSOLUTE: 1.2 K/UL (ref 1–5.1)
LYMPHOCYTES RELATIVE PERCENT: 16.6 %
MAGNESIUM: 1.6 MG/DL (ref 1.8–2.4)
MCH RBC QN AUTO: 34.3 PG (ref 26–34)
MCHC RBC AUTO-ENTMCNC: 33.6 G/DL (ref 31–36)
MCV RBC AUTO: 102.2 FL (ref 80–100)
MONOCYTES ABSOLUTE: 1.6 K/UL (ref 0–1.3)
MONOCYTES RELATIVE PERCENT: 22 %
NEUTROPHILS ABSOLUTE: 4.4 K/UL (ref 1.7–7.7)
NEUTROPHILS RELATIVE PERCENT: 60.4 %
PDW BLD-RTO: 12.8 % (ref 12.4–15.4)
PERFORMED ON: ABNORMAL
PHOSPHORUS: 2.7 MG/DL (ref 2.5–4.9)
PLATELET # BLD: 69 K/UL (ref 135–450)
PMV BLD AUTO: 9 FL (ref 5–10.5)
POTASSIUM SERPL-SCNC: 3.8 MMOL/L (ref 3.5–5.1)
RBC # BLD: 3.67 M/UL (ref 4.2–5.9)
SODIUM BLD-SCNC: 135 MMOL/L (ref 136–145)
TOTAL PROTEIN: 6.3 G/DL (ref 6.4–8.2)
VITAMIN B-12: 1041 PG/ML (ref 211–911)
WBC # BLD: 7.2 K/UL (ref 4–11)

## 2022-08-14 PROCEDURE — 99232 SBSQ HOSP IP/OBS MODERATE 35: CPT | Performed by: SURGERY

## 2022-08-14 PROCEDURE — 80076 HEPATIC FUNCTION PANEL: CPT

## 2022-08-14 PROCEDURE — 2580000003 HC RX 258: Performed by: INTERNAL MEDICINE

## 2022-08-14 PROCEDURE — 80069 RENAL FUNCTION PANEL: CPT

## 2022-08-14 PROCEDURE — 6370000000 HC RX 637 (ALT 250 FOR IP)

## 2022-08-14 PROCEDURE — 6370000000 HC RX 637 (ALT 250 FOR IP): Performed by: INTERNAL MEDICINE

## 2022-08-14 PROCEDURE — 2060000000 HC ICU INTERMEDIATE R&B

## 2022-08-14 PROCEDURE — 86140 C-REACTIVE PROTEIN: CPT

## 2022-08-14 PROCEDURE — 6370000000 HC RX 637 (ALT 250 FOR IP): Performed by: SURGERY

## 2022-08-14 PROCEDURE — 85025 COMPLETE CBC W/AUTO DIFF WBC: CPT

## 2022-08-14 PROCEDURE — 6360000002 HC RX W HCPCS: Performed by: INTERNAL MEDICINE

## 2022-08-14 PROCEDURE — 83735 ASSAY OF MAGNESIUM: CPT

## 2022-08-14 RX ORDER — MAGNESIUM SULFATE IN WATER 40 MG/ML
2000 INJECTION, SOLUTION INTRAVENOUS ONCE
Status: COMPLETED | OUTPATIENT
Start: 2022-08-14 | End: 2022-08-14

## 2022-08-14 RX ORDER — INSULIN LISPRO 100 [IU]/ML
3 INJECTION, SOLUTION INTRAVENOUS; SUBCUTANEOUS
Status: DISCONTINUED | OUTPATIENT
Start: 2022-08-14 | End: 2022-08-16 | Stop reason: HOSPADM

## 2022-08-14 RX ORDER — DOCUSATE SODIUM 100 MG/1
100 CAPSULE, LIQUID FILLED ORAL DAILY
Status: DISCONTINUED | OUTPATIENT
Start: 2022-08-14 | End: 2022-08-16 | Stop reason: HOSPADM

## 2022-08-14 RX ADMIN — INSULIN LISPRO 2 UNITS: 100 INJECTION, SOLUTION INTRAVENOUS; SUBCUTANEOUS at 22:10

## 2022-08-14 RX ADMIN — INSULIN LISPRO 3 UNITS: 100 INJECTION, SOLUTION INTRAVENOUS; SUBCUTANEOUS at 16:38

## 2022-08-14 RX ADMIN — OXYCODONE 10 MG: 5 TABLET ORAL at 17:40

## 2022-08-14 RX ADMIN — ENOXAPARIN SODIUM 40 MG: 100 INJECTION SUBCUTANEOUS at 08:48

## 2022-08-14 RX ADMIN — SODIUM CHLORIDE, PRESERVATIVE FREE 10 ML: 5 INJECTION INTRAVENOUS at 20:22

## 2022-08-14 RX ADMIN — HYDROMORPHONE HYDROCHLORIDE 0.25 MG: 1 INJECTION, SOLUTION INTRAMUSCULAR; INTRAVENOUS; SUBCUTANEOUS at 20:14

## 2022-08-14 RX ADMIN — HYDROMORPHONE HYDROCHLORIDE 0.5 MG: 1 INJECTION, SOLUTION INTRAMUSCULAR; INTRAVENOUS; SUBCUTANEOUS at 11:02

## 2022-08-14 RX ADMIN — MAGNESIUM SULFATE HEPTAHYDRATE 2000 MG: 40 INJECTION, SOLUTION INTRAVENOUS at 06:33

## 2022-08-14 RX ADMIN — DIBASIC SODIUM PHOSPHATE, MONOBASIC POTASSIUM PHOSPHATE AND MONOBASIC SODIUM PHOSPHATE 2 TABLET: 852; 155; 130 TABLET ORAL at 11:02

## 2022-08-14 RX ADMIN — HYDROMORPHONE HYDROCHLORIDE 0.5 MG: 1 INJECTION, SOLUTION INTRAMUSCULAR; INTRAVENOUS; SUBCUTANEOUS at 16:22

## 2022-08-14 RX ADMIN — THIAMINE HCL TAB 100 MG 100 MG: 100 TAB at 08:48

## 2022-08-14 RX ADMIN — OXYCODONE 10 MG: 5 TABLET ORAL at 08:53

## 2022-08-14 RX ADMIN — INSULIN LISPRO 2 UNITS: 100 INJECTION, SOLUTION INTRAVENOUS; SUBCUTANEOUS at 16:40

## 2022-08-14 RX ADMIN — HYDROMORPHONE HYDROCHLORIDE 0.5 MG: 1 INJECTION, SOLUTION INTRAMUSCULAR; INTRAVENOUS; SUBCUTANEOUS at 07:38

## 2022-08-14 RX ADMIN — INSULIN LISPRO 1 UNITS: 100 INJECTION, SOLUTION INTRAVENOUS; SUBCUTANEOUS at 07:38

## 2022-08-14 RX ADMIN — OXYCODONE 10 MG: 5 TABLET ORAL at 03:33

## 2022-08-14 RX ADMIN — OXYCODONE 5 MG: 5 TABLET ORAL at 22:14

## 2022-08-14 RX ADMIN — DOCUSATE SODIUM 100 MG: 100 CAPSULE, LIQUID FILLED ORAL at 11:03

## 2022-08-14 RX ADMIN — MAGNESIUM SULFATE HEPTAHYDRATE 2000 MG: 40 INJECTION, SOLUTION INTRAVENOUS at 16:27

## 2022-08-14 RX ADMIN — INSULIN LISPRO 1 UNITS: 100 INJECTION, SOLUTION INTRAVENOUS; SUBCUTANEOUS at 11:08

## 2022-08-14 RX ADMIN — SODIUM CHLORIDE, PRESERVATIVE FREE 10 ML: 5 INJECTION INTRAVENOUS at 08:48

## 2022-08-14 RX ADMIN — POLYETHYLENE GLYCOL 3350 17 G: 17 POWDER, FOR SOLUTION ORAL at 16:42

## 2022-08-14 ASSESSMENT — PAIN DESCRIPTION - DESCRIPTORS
DESCRIPTORS: ACHING

## 2022-08-14 ASSESSMENT — PAIN DESCRIPTION - ONSET
ONSET: ON-GOING

## 2022-08-14 ASSESSMENT — PAIN DESCRIPTION - LOCATION
LOCATION: ABDOMEN

## 2022-08-14 ASSESSMENT — PAIN DESCRIPTION - FREQUENCY
FREQUENCY: CONTINUOUS

## 2022-08-14 ASSESSMENT — PAIN - FUNCTIONAL ASSESSMENT
PAIN_FUNCTIONAL_ASSESSMENT: PREVENTS OR INTERFERES SOME ACTIVE ACTIVITIES AND ADLS

## 2022-08-14 ASSESSMENT — PAIN DESCRIPTION - PAIN TYPE
TYPE: ACUTE PAIN

## 2022-08-14 ASSESSMENT — PAIN SCALES - GENERAL
PAINLEVEL_OUTOF10: 8
PAINLEVEL_OUTOF10: 4
PAINLEVEL_OUTOF10: 7
PAINLEVEL_OUTOF10: 4
PAINLEVEL_OUTOF10: 6
PAINLEVEL_OUTOF10: 5
PAINLEVEL_OUTOF10: 1

## 2022-08-14 ASSESSMENT — PAIN DESCRIPTION - ORIENTATION
ORIENTATION: MID

## 2022-08-14 NOTE — PROGRESS NOTES
Hospitalist Progress Note  8/14/2022 12:33 PM  Subjective:   Admit Date: 8/9/2022  PCP: Isaiah Howard, DO  Interval History:     He continues to experience significant epigastric abdominal pain without nausea, vomiting, dysuria, diarrhea. He is able to eat and drink a lot of more. He denies lightheadedness shortness of breath chest pain and palpitations. f    ADULT DIET; Dysphagia - Soft and Bite Sized; 5 carb choices (75 gm/meal); Low Fat (less than or equal to 50 gm/day); Low Fiber     I/O last 3 completed shifts:   In: 1959 [P.O.:840; IV Piggyback:200]  Out: 3871 [Urine:4450]   Date 08/14/22 0000 - 08/14/22 2359   Shift 6873-1132 7019-9339 3700-4312 24 Hour Total   INTAKE   P.O.(mL/kg/hr) 240(0.4) 240  480   IV Piggyback(mL/kg)  98.4(1.3)  98.4(1.3)   Shift Total(mL/kg) 240(3.1) 338.4(4.5)  578.4(7.7)   OUTPUT   Urine(mL/kg/hr) 700(1.1) 400  1100   Shift Total(mL/kg) 700(9.1) 400(5.3)  1100(14.7)   Weight (kg) 76.6 74.8 74.8 74.8       Patient Vitals for the past 96 hrs (Last 3 readings):   Weight   08/14/22 0941 165 lb (74.8 kg)   08/13/22 0854 168 lb 12.8 oz (76.6 kg)       Medications:      sodium chloride      dextrose      dextrose        docusate sodium  100 mg Oral Daily    insulin glargine  15 Units SubCUTAneous BID    insulin lispro  0-4 Units SubCUTAneous 4x Daily AC & HS    acetaminophen  500 mg Oral Once    thiamine mononitrate  100 mg Oral Daily    sodium chloride flush  5-40 mL IntraVENous 2 times per day    enoxaparin  40 mg SubCUTAneous Daily       Recent Labs     08/12/22  0815 08/13/22  0338 08/14/22  0349   WBC 7.2 5.9 7.2   HGB 13.0* 12.8* 12.6*   PLT 52* 61* 69*       Recent Labs     08/12/22  0815 08/13/22  0338 08/14/22  0349   * 135*  136 135*   K 3.2* 3.6  3.6 3.8   CL 95* 98*  99 98*   CO2 25 22 23 23   BUN 4* 3*  3* 3*   CREATININE 0.5* 0.6*  0.5* 0.5*   GLUCOSE 164* 225*  224* 213*       Recent Labs     08/12/22  0815 08/13/22  0338 08/14/22  0349   AST 57* 59* 47*   ALT 35 37 34   BILITOT 1.2* 1.0 0.8   ALKPHOS 105 96 111       Lab Results   Component Value Date/Time    TRIG 256 2022 01:36 PM    HDL 37 2022 01:36 PM    LDLCALC 61 2022 01:36 PM    CHOL 149 2022 01:36 PM     No results found for: PHART, PO2ART, PGC1VPZ  No results for input(s): INR in the last 72 hours. No results for input(s): CKTOTAL, CKMB, TROPONINI in the last 72 hours. No results for input(s): DDIMER in the last 72 hours. No components found for: HGBA1C  No results found for: TSH  Urine Culture:  No results found for this or any previous visit. Objective:   Vitals: BP (!) 173/104   Pulse 99   Temp 98.9 °F (37.2 °C) (Oral)   Resp 18   Ht 6' (1.829 m)   Wt 165 lb (74.8 kg)   SpO2 95%   BMI 22.38 kg/m²   Pulse Ox: SpO2  Av.3 %  Min: 92 %  Max: 97 %  Supplemental O2:      GEN alert, in no distress  HEENT normocephalic, anicteric sclera, EOMI, mucosa dry, no stridor  NECK supple, trachea midline  RESP on RA, in no distress, clear to auscultation  CARDS RRR, S1, S2, no murmurs, no edema, radial pulse 2+, DP pulse 2+  ABD hypoactive, soft, moderate tenderness over epigastric region  MSK no cyanosis, no clubbing  SKIN vitiligo. warm, dry  NEURO alert, oriented x 3, no facial asymmetry, no dysarthria, moving spontaneously  PSYCH normal mood      Assessment/Plan   The patient is a 57 y.o. male with history of DM2 complicated by neuropathy, recurrent neurocardiogenic syncope on midodrine, chronic hepatitis C s/p treatment,tobacco and marijuana use, who presented with 4 days of worsening diffuse abdominal pain,  epigastrum and rated 9/10, aggravated by po intake with no relieving factors. He reported feeling nauseated and vomiting non-bloody emesis  He has not been tolerating po intake and had a few sips of beer the day prior to presentation. Admission labs showed elevated lipase of 600. Patient reports drinking 2-4 beers daily.     Extensive Acute Pancreatitis  - adm lipase 600.  - CT A/P w contrast 8/9/22 showed extensive pancreatitis with extensive peripancreatic fat stranding. No pseudocyst formation or evidence of vascular compromise. No abscess identified. Cholelithiasis without acute gallbladder inflammation identified.  - US Abdomen 8/11/22 showed Small gallstones. Gallbladder sludge. Wall thickness 2 mm. No reported sonographic Roman sign. CBD 4 mm diameter.  - Continue IVF's. - Pain control with bowel regimen  - Antiemetics as needed. - advance diet as tolerated. Transaminitis, Indirect Hyperbilirubinemia  History of Hepatitis C  - recently completed treatment   - US abdomen 8/11/22 showed liver with upper limits of echogenicity. - CT A/p with contrast 8/9/22 showed liver and spleen are unremarkable. Elevated BP  - likely related to pain. History of orthostatic syncope and on midodrine 5 mg TID at home. Thrombocytopenia  - adm platelet 74. History of thrombocytopenia, noted as far out as 11/2020.    - CT of A/P with IV contrast 8/9/22 showed unremarkable liver and spleen. - possible related to hepatitis C infection. - peripheral smear.  - HIV    Macrocytosis  - noted since 11/2020. - peripheral smear. DM  Diabetic Neuropathy  - A1c 6.6  on 8/9/22.  - Home on lyrica 50 mg TID. - Started lantus 5 units HS, low SSI.  - Increase lantus to 5 units BID. Continue low SSI. History of Orthostatic Syncope  - likely related to autonomic dysfunction in the setting of diabetic neuropathy.  - Home on midodrine 5 mg BID. Hold in the setting elevated BP. Current Smoker  - offered NRT  - Encourage smoking cessation. Occasional Marijuana Use      Advance Directive: Full Code   Discharge planning: when tolerates oral intake and pain controlled.           Anna Garcia MD  [facility] Hospitalist

## 2022-08-14 NOTE — PLAN OF CARE
Problem: Discharge Planning  Goal: Discharge to home or other facility with appropriate resources  Outcome: Progressing  Flowsheets (Taken 8/12/2022 0100 by Meenu Guthrie, RN)  Discharge to home or other facility with appropriate resources: Identify barriers to discharge with patient and caregiver  Note: Patient aware of POC. Diet advanced as tolerated, remains in pain management. Problem: Pain  Goal: Verbalizes/displays adequate comfort level or baseline comfort level  Outcome: Progressing  Note:   Pt complaining of 9/10 abd pain this PM. PRN meds given. Pt instructed to call for new/increasing pain levels. Pt verbalized understanding. Will continue to monitor.         Problem: Chronic Conditions and Co-morbidities  Goal: Patient's chronic conditions and co-morbidity symptoms are monitored and maintained or improved  Outcome: Progressing  Flowsheets (Taken 8/12/2022 0100 by Meenu Guthrie RN)  Care Plan - Patient's Chronic Conditions and Co-Morbidity Symptoms are Monitored and Maintained or Improved: Monitor and assess patient's chronic conditions and comorbid symptoms for stability, deterioration, or improvement

## 2022-08-14 NOTE — PROGRESS NOTES
GI Progress Note    Zay Mena is a 57 y.o. male patient.   1. Acute pancreatitis without infection or necrosis, unspecified pancreatitis type        Admit Date: 8/9/2022    Subjective:       Seen at bed side and re evaluated  Records update  Feels better  Abdominal pain resolved  No nausea or vomiting   No hematochezia      ROS:  Cardiovascular ROS: negative  Gastrointestinal ROS: as above  Respiratory ROS: negative    Scheduled Meds:   docusate sodium  100 mg Oral Daily    insulin lispro  3 Units SubCUTAneous TID WC    insulin glargine  12 Units SubCUTAneous BID    magnesium sulfate  2,000 mg IntraVENous Once    insulin lispro  0-4 Units SubCUTAneous 4x Daily AC & HS    acetaminophen  500 mg Oral Once    thiamine mononitrate  100 mg Oral Daily    sodium chloride flush  5-40 mL IntraVENous 2 times per day    enoxaparin  40 mg SubCUTAneous Daily       Continuous Infusions:   sodium chloride      dextrose      dextrose         PRN Meds:  hydrALAZINE, oxyCODONE **OR** oxyCODONE, sodium chloride flush, sodium chloride, ondansetron **OR** ondansetron, polyethylene glycol, acetaminophen **OR** acetaminophen, potassium chloride, magnesium sulfate, hydrALAZINE, dextrose, glucose, dextrose bolus **OR** dextrose bolus, glucagon (rDNA), dextrose, HYDROmorphone **OR** HYDROmorphone      Objective:       Patient Vitals for the past 24 hrs:   BP Temp Temp src Pulse Resp SpO2 Weight   08/14/22 1622 -- -- -- -- 16 -- --   08/14/22 1132 -- -- -- -- 18 -- --   08/14/22 0941 -- -- -- -- -- -- 165 lb (74.8 kg)   08/14/22 0923 -- -- -- -- 18 -- --   08/14/22 0837 (!) 173/104 98.9 °F (37.2 °C) Oral 99 18 95 % --   08/14/22 0808 -- -- -- -- 18 -- --   08/14/22 0337 (!) 165/102 99 °F (37.2 °C) Oral 84 17 92 % --   08/13/22 2315 (!) 186/113 98.4 °F (36.9 °C) Oral 91 17 97 % --   08/13/22 2037 -- -- -- -- 16 -- --   08/13/22 2013 (!) 172/106 98.9 °F (37.2 °C) Oral 91 16 97 % --   08/13/22 1716 -- -- -- -- 16 -- --       Exam:    VITALS: BP (!) 173/104   Pulse 99   Temp 98.9 °F (37.2 °C) (Oral)   Resp 16   Ht 6' (1.829 m)   Wt 165 lb (74.8 kg)   SpO2 95%   BMI 22.38 kg/m²   TEMPERATURE:  Current - Temp: 98.9 °F (37.2 °C); Max - Temp  Av.8 °F (37.1 °C)  Min: 98.4 °F (36.9 °C)  Max: 99 °F (37.2 °C)    NAD  General appearance: alert, appears stated age, cooperative and no distress  Head: Normocephalic, without obvious abnormality, atraumatic  Neck: supple, symmetrical, trachea midline and thyroid not enlarged, symmetric, no tenderness/mass/nodules  CVS:  RRR, Nl s1s2  Lungs CTA Bilaterally, normal effort  Abdomen: soft, non-tender; bowel sounds normal; no masses,  no organomegaly  AAOx3, No asterixis or encephalopathy  Extremities: No edema. Lab Data:  Recent Labs     22   WBC 7.2 5.9 7.2   HGB 13.0* 12.8* 12.6*   HCT 38.3* 37.6* 37.5*   .7* 102.3* 102.2*   PLT 52* 61* 69*     Recent Labs     22   * 135*  136 135*   K 3.2* 3.6  3.6 3.8   CL 95* 98*  99 98*   CO2 25 22  23 23   PHOS 2.6 2.7  2.7 2.7   BUN 4* 3*  3* 3*   CREATININE 0.5* 0.6*  0.5* 0.5*     Recent Labs     22   AST 57* 59* 47*   ALT 35 37 34   BILIDIR 0.5* 0.4* 0.3   BILITOT 1.2* 1.0 0.8   ALKPHOS 105 96 111     Recent Labs     22   LIPASE 96.0* 101.0*     Recent Labs     22   PROT 6.3* 6.4 6.3*     No results for input(s): PTT in the last 72 hours. No results for input(s): OCCULTBLD in the last 72 hours. Radiology review: as in chart    Assessment:       Principal Problem:    Acute pancreatitis without infection or necrosis  Resolved Problems:    * No resolved hospital problems. *    Cholelithiasis  Alcohol abuse    Recommendations:       Discussed the need to abstain from alcohol  He never had colon cancer screening.  Discussed op colonoscopy  Will follow as op  All questions answered    Pat Schmidt MD  8/14/2022  4:33 PM  40 minutes

## 2022-08-15 LAB
ALBUMIN SERPL-MCNC: 3.7 G/DL (ref 3.4–5)
ANION GAP SERPL CALCULATED.3IONS-SCNC: 12 MMOL/L (ref 3–16)
BASOPHILS ABSOLUTE: 0 K/UL (ref 0–0.2)
BASOPHILS RELATIVE PERCENT: 0.1 %
BLOOD SMEAR REVIEW: NORMAL
BUN BLDV-MCNC: 5 MG/DL (ref 7–20)
CALCIUM SERPL-MCNC: 9 MG/DL (ref 8.3–10.6)
CHLORIDE BLD-SCNC: 96 MMOL/L (ref 99–110)
CO2: 24 MMOL/L (ref 21–32)
CREAT SERPL-MCNC: 0.5 MG/DL (ref 0.8–1.3)
EOSINOPHILS ABSOLUTE: 0.1 K/UL (ref 0–0.6)
EOSINOPHILS RELATIVE PERCENT: 1.4 %
GFR AFRICAN AMERICAN: >60
GFR NON-AFRICAN AMERICAN: >60
GLUCOSE BLD-MCNC: 182 MG/DL (ref 70–99)
GLUCOSE BLD-MCNC: 205 MG/DL (ref 70–99)
GLUCOSE BLD-MCNC: 221 MG/DL (ref 70–99)
GLUCOSE BLD-MCNC: 254 MG/DL (ref 70–99)
GLUCOSE BLD-MCNC: 278 MG/DL (ref 70–99)
HCT VFR BLD CALC: 37.2 % (ref 40.5–52.5)
HEMOGLOBIN: 12.7 G/DL (ref 13.5–17.5)
LYMPHOCYTES ABSOLUTE: 1.5 K/UL (ref 1–5.1)
LYMPHOCYTES RELATIVE PERCENT: 19.6 %
MAGNESIUM: 2 MG/DL (ref 1.8–2.4)
MCH RBC QN AUTO: 34.6 PG (ref 26–34)
MCHC RBC AUTO-ENTMCNC: 34.1 G/DL (ref 31–36)
MCV RBC AUTO: 101.5 FL (ref 80–100)
MONOCYTES ABSOLUTE: 1.7 K/UL (ref 0–1.3)
MONOCYTES RELATIVE PERCENT: 22.2 %
NEUTROPHILS ABSOLUTE: 4.3 K/UL (ref 1.7–7.7)
NEUTROPHILS RELATIVE PERCENT: 56.7 %
PDW BLD-RTO: 12.8 % (ref 12.4–15.4)
PERFORMED ON: ABNORMAL
PHOSPHORUS: 2.9 MG/DL (ref 2.5–4.9)
PLATELET # BLD: 84 K/UL (ref 135–450)
PMV BLD AUTO: 8.6 FL (ref 5–10.5)
POTASSIUM SERPL-SCNC: 3.8 MMOL/L (ref 3.5–5.1)
RBC # BLD: 3.67 M/UL (ref 4.2–5.9)
SODIUM BLD-SCNC: 132 MMOL/L (ref 136–145)
WBC # BLD: 7.7 K/UL (ref 4–11)

## 2022-08-15 PROCEDURE — 85025 COMPLETE CBC W/AUTO DIFF WBC: CPT

## 2022-08-15 PROCEDURE — 6370000000 HC RX 637 (ALT 250 FOR IP): Performed by: INTERNAL MEDICINE

## 2022-08-15 PROCEDURE — 80069 RENAL FUNCTION PANEL: CPT

## 2022-08-15 PROCEDURE — 2580000003 HC RX 258: Performed by: INTERNAL MEDICINE

## 2022-08-15 PROCEDURE — 6370000000 HC RX 637 (ALT 250 FOR IP): Performed by: SURGERY

## 2022-08-15 PROCEDURE — 2060000000 HC ICU INTERMEDIATE R&B

## 2022-08-15 PROCEDURE — 6360000002 HC RX W HCPCS: Performed by: INTERNAL MEDICINE

## 2022-08-15 PROCEDURE — 99231 SBSQ HOSP IP/OBS SF/LOW 25: CPT | Performed by: SURGERY

## 2022-08-15 PROCEDURE — 83735 ASSAY OF MAGNESIUM: CPT

## 2022-08-15 PROCEDURE — 6370000000 HC RX 637 (ALT 250 FOR IP)

## 2022-08-15 RX ORDER — SENNA PLUS 8.6 MG/1
2 TABLET ORAL 2 TIMES DAILY
Status: DISCONTINUED | OUTPATIENT
Start: 2022-08-15 | End: 2022-08-16 | Stop reason: HOSPADM

## 2022-08-15 RX ADMIN — INSULIN LISPRO 3 UNITS: 100 INJECTION, SOLUTION INTRAVENOUS; SUBCUTANEOUS at 17:33

## 2022-08-15 RX ADMIN — THIAMINE HCL TAB 100 MG 100 MG: 100 TAB at 09:34

## 2022-08-15 RX ADMIN — OXYCODONE 10 MG: 5 TABLET ORAL at 09:33

## 2022-08-15 RX ADMIN — INSULIN LISPRO 3 UNITS: 100 INJECTION, SOLUTION INTRAVENOUS; SUBCUTANEOUS at 07:44

## 2022-08-15 RX ADMIN — HYDROMORPHONE HYDROCHLORIDE 0.5 MG: 1 INJECTION, SOLUTION INTRAMUSCULAR; INTRAVENOUS; SUBCUTANEOUS at 02:22

## 2022-08-15 RX ADMIN — OXYCODONE 10 MG: 5 TABLET ORAL at 20:50

## 2022-08-15 RX ADMIN — SODIUM CHLORIDE, PRESERVATIVE FREE 10 ML: 5 INJECTION INTRAVENOUS at 09:38

## 2022-08-15 RX ADMIN — INSULIN LISPRO 1 UNITS: 100 INJECTION, SOLUTION INTRAVENOUS; SUBCUTANEOUS at 12:14

## 2022-08-15 RX ADMIN — OXYCODONE 10 MG: 5 TABLET ORAL at 05:35

## 2022-08-15 RX ADMIN — INSULIN LISPRO 2 UNITS: 100 INJECTION, SOLUTION INTRAVENOUS; SUBCUTANEOUS at 17:33

## 2022-08-15 RX ADMIN — SODIUM CHLORIDE, PRESERVATIVE FREE 10 ML: 5 INJECTION INTRAVENOUS at 20:50

## 2022-08-15 RX ADMIN — INSULIN LISPRO 3 UNITS: 100 INJECTION, SOLUTION INTRAVENOUS; SUBCUTANEOUS at 12:14

## 2022-08-15 RX ADMIN — SENNOSIDES 17.2 MG: 8.6 TABLET, FILM COATED ORAL at 20:50

## 2022-08-15 RX ADMIN — DOCUSATE SODIUM 100 MG: 100 CAPSULE, LIQUID FILLED ORAL at 09:34

## 2022-08-15 RX ADMIN — OXYCODONE 10 MG: 5 TABLET ORAL at 14:24

## 2022-08-15 RX ADMIN — ENOXAPARIN SODIUM 40 MG: 100 INJECTION SUBCUTANEOUS at 09:34

## 2022-08-15 RX ADMIN — INSULIN LISPRO 2 UNITS: 100 INJECTION, SOLUTION INTRAVENOUS; SUBCUTANEOUS at 07:44

## 2022-08-15 ASSESSMENT — PAIN DESCRIPTION - PAIN TYPE
TYPE: ACUTE PAIN

## 2022-08-15 ASSESSMENT — PAIN SCALES - GENERAL
PAINLEVEL_OUTOF10: 5
PAINLEVEL_OUTOF10: 8
PAINLEVEL_OUTOF10: 8
PAINLEVEL_OUTOF10: 1
PAINLEVEL_OUTOF10: 7
PAINLEVEL_OUTOF10: 2
PAINLEVEL_OUTOF10: 4
PAINLEVEL_OUTOF10: 6
PAINLEVEL_OUTOF10: 1
PAINLEVEL_OUTOF10: 2
PAINLEVEL_OUTOF10: 1
PAINLEVEL_OUTOF10: 8
PAINLEVEL_OUTOF10: 1
PAINLEVEL_OUTOF10: 2
PAINLEVEL_OUTOF10: 7
PAINLEVEL_OUTOF10: 1
PAINLEVEL_OUTOF10: 8
PAINLEVEL_OUTOF10: 1
PAINLEVEL_OUTOF10: 2

## 2022-08-15 ASSESSMENT — PAIN DESCRIPTION - LOCATION
LOCATION: ABDOMEN

## 2022-08-15 ASSESSMENT — PAIN DESCRIPTION - DESCRIPTORS
DESCRIPTORS: ACHING
DESCRIPTORS: ACHING
DESCRIPTORS: STABBING
DESCRIPTORS: ACHING

## 2022-08-15 ASSESSMENT — PAIN DESCRIPTION - FREQUENCY
FREQUENCY: CONTINUOUS

## 2022-08-15 ASSESSMENT — PAIN DESCRIPTION - ORIENTATION
ORIENTATION: MID

## 2022-08-15 ASSESSMENT — PAIN DESCRIPTION - ONSET
ONSET: ON-GOING

## 2022-08-15 NOTE — PLAN OF CARE
Problem: Discharge Planning  Goal: Discharge to home or other facility with appropriate resources  8/15/2022 0106 by Kelly Sutton RN  Outcome: Progressing     Problem: Pain  Goal: Verbalizes/displays adequate comfort level or baseline comfort level  8/15/2022 0106 by Kelly Sutton RN  Outcome: Progressing  Note:   Pt complaining of 5-7/10 abd pain this PM. PRN meds given. Pt instructed to call for new/increasing pain levels. Pt verbalized understanding. Will continue to monitor. Problem: Safety - Adult  Goal: Free from fall injury  8/15/2022 0106 by Kelly Sutton RN  Outcome: Progressing     Problem: Gastrointestinal - Adult  Goal: Maintains or returns to baseline bowel function  Outcome: Progressing  Note: No BM this shift. Problem: Gastrointestinal - Adult  Goal: Maintains adequate nutritional intake  Outcome: Progressing  Note:   Encouraging PO intake at each encounter, consumed % of offered food. Fluids provided as bedside within reach.

## 2022-08-15 NOTE — PROGRESS NOTES
Colorectal Surgery   Daily Progress Note  Patient: Phyllis Juarez      CC: pancreatitis    SUBJECTIVE:   Patient rested well overnight. Pain is well controlled. Tolerating diet without nausea no vomiting. No BM at this time. ROS:   A 14 point review of systems was conducted, significant findings as noted above. All other systems negative. OBJECTIVE:    PHYSICAL EXAM:    Vitals:    08/14/22 2044 08/15/22 0000 08/15/22 0252 08/15/22 0537   BP:  (!) 163/119  (!) 163/103   Pulse:  90  93   Resp: 17 16 16 17   Temp:  98.4 °F (36.9 °C)  99.4 °F (37.4 °C)   TempSrc:  Oral  Oral   SpO2:  97%  91%   Weight:       Height:         General appearance: Alert, no acute distress, grooming appropriate  Neck: Trachea midline  Chest/Lungs: Normal effort, no accessory muscle use, on RA  Cardiovascular: RRR  Abdomen: Soft, mildly-tender in the epigastric area, nondistended no guarding/rigidity  Skin: Warm and dry, no rashes  Extremities: No edema, no cyanosis  Neuro: A&Ox3, no focal deficits, sensation intact    LABS:   Recent Labs     08/14/22  0349 08/15/22  0509   WBC 7.2 7.7   HGB 12.6* 12.7*   HCT 37.5* 37.2*   .2* 101.5*   PLT 69* 84*          Recent Labs     08/14/22  0349 08/15/22  0509   * 132*   K 3.8 3.8   CL 98* 96*   CO2 23 24   PHOS 2.7 2.9   BUN 3* 5*   CREATININE 0.5* 0.5*          Recent Labs     08/13/22  0338 08/14/22  0349   AST 59* 47*   ALT 37 34   BILIDIR 0.4* 0.3   BILITOT 1.0 0.8   ALKPHOS 96 111          Recent Labs     08/12/22  0815 08/13/22  0338   LIPASE 96.0* 101.0*          Recent Labs     08/13/22  0338 08/14/22  0349   PROT 6.4 6.3*        No results for input(s): CKTOTAL, CKMB, CKMBINDEX, TROPONINI in the last 72 hours. ASSESSMENT & PLAN:   This is a 57 y.o. male with significant history of diabetes and neuropathy, presented to the ED with 4 days of epigastric abdominal pain. He is afebrile hemodynamically stable labs show no leukocytosis.   His LFTs are elevated with a hyperbilirubinemia of 1.8. Triglycerides are 279 and lipase is 600. CT scan shows extensive pancreatitis with peripancreatic fat stranding consistent with acute pancreatitis. Given his extensive alcohol history most likely secondary to alcoholism however does have some gallstones reported on CT scan very small on review. General surgery consulted for evaluation for possible gallstone pancreatitis. - RUQ U/S showed cholelithiasis and sludge without evidence of acute cholecystitis, and 2 mm gallbladder wall thickness.  - No surgical intervention at this time.  Will follow this outpatient  - Continue diet as tolerated, was advanced to soft food and low fiber diet today  - GI following; and planning for colonoscopy outpatient  - LFTs PERRL, conjunctiva normal, continue to monitor  - Continue bowel regimen and give suppository today  - Abx not indicated at this time  - CIWA protocol   - Will continue to follow    Eulogio Raymond DO, [facility]  PGY1, General Surgery  08/15/22  7:01 AM  [employer]  Pager: [phone]

## 2022-08-16 VITALS
TEMPERATURE: 98.5 F | RESPIRATION RATE: 16 BRPM | DIASTOLIC BLOOD PRESSURE: 107 MMHG | HEART RATE: 96 BPM | WEIGHT: 175.04 LBS | HEIGHT: 72 IN | BODY MASS INDEX: 23.71 KG/M2 | OXYGEN SATURATION: 95 % | SYSTOLIC BLOOD PRESSURE: 164 MMHG

## 2022-08-16 LAB
ALBUMIN SERPL-MCNC: 3.4 G/DL (ref 3.4–5)
ALP BLD-CCNC: 122 U/L (ref 40–129)
ALT SERPL-CCNC: 27 U/L (ref 10–40)
ANION GAP SERPL CALCULATED.3IONS-SCNC: 9 MMOL/L (ref 3–16)
AST SERPL-CCNC: 34 U/L (ref 15–37)
BASOPHILS ABSOLUTE: 0 K/UL (ref 0–0.2)
BASOPHILS RELATIVE PERCENT: 0.2 %
BILIRUB SERPL-MCNC: 0.7 MG/DL (ref 0–1)
BILIRUBIN DIRECT: 0.3 MG/DL (ref 0–0.3)
BILIRUBIN, INDIRECT: 0.4 MG/DL (ref 0–1)
BUN BLDV-MCNC: 5 MG/DL (ref 7–20)
CALCIUM SERPL-MCNC: 8.7 MG/DL (ref 8.3–10.6)
CHLORIDE BLD-SCNC: 99 MMOL/L (ref 99–110)
CO2: 25 MMOL/L (ref 21–32)
CREAT SERPL-MCNC: 0.6 MG/DL (ref 0.8–1.3)
EOSINOPHILS ABSOLUTE: 0.1 K/UL (ref 0–0.6)
EOSINOPHILS RELATIVE PERCENT: 1.6 %
GFR AFRICAN AMERICAN: >60
GFR NON-AFRICAN AMERICAN: >60
GLUCOSE BLD-MCNC: 190 MG/DL (ref 70–99)
GLUCOSE BLD-MCNC: 199 MG/DL (ref 70–99)
GLUCOSE BLD-MCNC: 201 MG/DL (ref 70–99)
HCT VFR BLD CALC: 35.8 % (ref 40.5–52.5)
HEMOGLOBIN: 12.3 G/DL (ref 13.5–17.5)
LYMPHOCYTES ABSOLUTE: 1.6 K/UL (ref 1–5.1)
LYMPHOCYTES RELATIVE PERCENT: 23 %
MAGNESIUM: 1.7 MG/DL (ref 1.8–2.4)
MCH RBC QN AUTO: 34.6 PG (ref 26–34)
MCHC RBC AUTO-ENTMCNC: 34.2 G/DL (ref 31–36)
MCV RBC AUTO: 101.3 FL (ref 80–100)
MONOCYTES ABSOLUTE: 1.6 K/UL (ref 0–1.3)
MONOCYTES RELATIVE PERCENT: 23.2 %
NEUTROPHILS ABSOLUTE: 3.6 K/UL (ref 1.7–7.7)
NEUTROPHILS RELATIVE PERCENT: 52 %
PDW BLD-RTO: 12.8 % (ref 12.4–15.4)
PERFORMED ON: ABNORMAL
PERFORMED ON: ABNORMAL
PHOSPHORUS: 3.1 MG/DL (ref 2.5–4.9)
PLATELET # BLD: 93 K/UL (ref 135–450)
PMV BLD AUTO: 8.4 FL (ref 5–10.5)
POTASSIUM SERPL-SCNC: 3.5 MMOL/L (ref 3.5–5.1)
RBC # BLD: 3.54 M/UL (ref 4.2–5.9)
SODIUM BLD-SCNC: 133 MMOL/L (ref 136–145)
TOTAL PROTEIN: 6.4 G/DL (ref 6.4–8.2)
WBC # BLD: 7 K/UL (ref 4–11)

## 2022-08-16 PROCEDURE — 6370000000 HC RX 637 (ALT 250 FOR IP): Performed by: INTERNAL MEDICINE

## 2022-08-16 PROCEDURE — 6370000000 HC RX 637 (ALT 250 FOR IP)

## 2022-08-16 PROCEDURE — 80076 HEPATIC FUNCTION PANEL: CPT

## 2022-08-16 PROCEDURE — 6370000000 HC RX 637 (ALT 250 FOR IP): Performed by: SURGERY

## 2022-08-16 PROCEDURE — 6360000002 HC RX W HCPCS: Performed by: INTERNAL MEDICINE

## 2022-08-16 PROCEDURE — 99231 SBSQ HOSP IP/OBS SF/LOW 25: CPT | Performed by: SURGERY

## 2022-08-16 PROCEDURE — 80069 RENAL FUNCTION PANEL: CPT

## 2022-08-16 PROCEDURE — 2580000003 HC RX 258: Performed by: INTERNAL MEDICINE

## 2022-08-16 PROCEDURE — 85025 COMPLETE CBC W/AUTO DIFF WBC: CPT

## 2022-08-16 PROCEDURE — 83735 ASSAY OF MAGNESIUM: CPT

## 2022-08-16 RX ORDER — MAGNESIUM SULFATE IN WATER 40 MG/ML
2000 INJECTION, SOLUTION INTRAVENOUS ONCE
Status: COMPLETED | OUTPATIENT
Start: 2022-08-16 | End: 2022-08-16

## 2022-08-16 RX ORDER — THIAMINE MONONITRATE (VIT B1) 100 MG
100 TABLET ORAL DAILY
Qty: 30 TABLET | Refills: 1 | Status: SHIPPED | OUTPATIENT
Start: 2022-08-17 | End: 2022-08-27

## 2022-08-16 RX ORDER — LANOLIN ALCOHOL/MO/W.PET/CERES
800 CREAM (GRAM) TOPICAL ONCE
Status: COMPLETED | OUTPATIENT
Start: 2022-08-16 | End: 2022-08-16

## 2022-08-16 RX ORDER — POTASSIUM CHLORIDE 20 MEQ/1
40 TABLET, EXTENDED RELEASE ORAL ONCE
Status: COMPLETED | OUTPATIENT
Start: 2022-08-16 | End: 2022-08-16

## 2022-08-16 RX ORDER — OXYCODONE HYDROCHLORIDE 10 MG/1
10 TABLET ORAL EVERY 8 HOURS PRN
Qty: 15 TABLET | Refills: 0 | Status: ON HOLD | OUTPATIENT
Start: 2022-08-16 | End: 2022-08-27 | Stop reason: HOSPADM

## 2022-08-16 RX ADMIN — POTASSIUM CHLORIDE 40 MEQ: 1500 TABLET, EXTENDED RELEASE ORAL at 06:50

## 2022-08-16 RX ADMIN — THIAMINE HCL TAB 100 MG 100 MG: 100 TAB at 09:01

## 2022-08-16 RX ADMIN — ENOXAPARIN SODIUM 40 MG: 100 INJECTION SUBCUTANEOUS at 09:01

## 2022-08-16 RX ADMIN — INSULIN LISPRO 3 UNITS: 100 INJECTION, SOLUTION INTRAVENOUS; SUBCUTANEOUS at 12:17

## 2022-08-16 RX ADMIN — SENNOSIDES 17.2 MG: 8.6 TABLET, FILM COATED ORAL at 09:01

## 2022-08-16 RX ADMIN — DOCUSATE SODIUM 100 MG: 100 CAPSULE, LIQUID FILLED ORAL at 09:01

## 2022-08-16 RX ADMIN — OXYCODONE 10 MG: 5 TABLET ORAL at 05:07

## 2022-08-16 RX ADMIN — SODIUM CHLORIDE, PRESERVATIVE FREE 10 ML: 5 INJECTION INTRAVENOUS at 08:59

## 2022-08-16 RX ADMIN — OXYCODONE 10 MG: 5 TABLET ORAL at 08:58

## 2022-08-16 RX ADMIN — OXYCODONE 10 MG: 5 TABLET ORAL at 00:59

## 2022-08-16 RX ADMIN — POTASSIUM CHLORIDE 40 MEQ: 1500 TABLET, EXTENDED RELEASE ORAL at 12:15

## 2022-08-16 RX ADMIN — Medication 800 MG: at 12:15

## 2022-08-16 RX ADMIN — INSULIN LISPRO 1 UNITS: 100 INJECTION, SOLUTION INTRAVENOUS; SUBCUTANEOUS at 08:16

## 2022-08-16 RX ADMIN — INSULIN LISPRO 3 UNITS: 100 INJECTION, SOLUTION INTRAVENOUS; SUBCUTANEOUS at 08:16

## 2022-08-16 RX ADMIN — MAGNESIUM SULFATE HEPTAHYDRATE 2000 MG: 40 INJECTION, SOLUTION INTRAVENOUS at 07:06

## 2022-08-16 RX ADMIN — OXYCODONE 10 MG: 5 TABLET ORAL at 13:57

## 2022-08-16 ASSESSMENT — PAIN SCALES - GENERAL
PAINLEVEL_OUTOF10: 7
PAINLEVEL_OUTOF10: 7
PAINLEVEL_OUTOF10: 4
PAINLEVEL_OUTOF10: 7
PAINLEVEL_OUTOF10: 8

## 2022-08-16 ASSESSMENT — PAIN DESCRIPTION - ONSET
ONSET: ON-GOING

## 2022-08-16 ASSESSMENT — PAIN - FUNCTIONAL ASSESSMENT
PAIN_FUNCTIONAL_ASSESSMENT: PREVENTS OR INTERFERES SOME ACTIVE ACTIVITIES AND ADLS

## 2022-08-16 ASSESSMENT — PAIN DESCRIPTION - DESCRIPTORS
DESCRIPTORS: DULL;GNAWING
DESCRIPTORS: DULL;GNAWING
DESCRIPTORS: STABBING
DESCRIPTORS: STABBING
DESCRIPTORS: DULL

## 2022-08-16 ASSESSMENT — PAIN DESCRIPTION - ORIENTATION
ORIENTATION: MID
ORIENTATION: MID
ORIENTATION: MID;UPPER

## 2022-08-16 ASSESSMENT — PAIN DESCRIPTION - FREQUENCY
FREQUENCY: CONTINUOUS

## 2022-08-16 ASSESSMENT — PAIN DESCRIPTION - LOCATION
LOCATION: ABDOMEN

## 2022-08-16 ASSESSMENT — PAIN DESCRIPTION - PAIN TYPE
TYPE: ACUTE PAIN

## 2022-08-16 ASSESSMENT — PAIN SCALES - WONG BAKER: WONGBAKER_NUMERICALRESPONSE: 0

## 2022-08-16 NOTE — PROGRESS NOTES
General Surgery   Daily Progress Note  Patient: Percy Ortiz      CC: pancreatitis    SUBJECTIVE:   Patient rested well overnight. Pain is well controlled on PO narcotics. VSS, afebrile, ambulatory, voiding freely, having BMs    ROS:   A 14 point review of systems was conducted, significant findings as noted above. All other systems negative. OBJECTIVE:    PHYSICAL EXAM:    Vitals:    08/15/22 2120 08/15/22 2335 08/16/22 0129 08/16/22 0507   BP:  (!) 161/98  138/89   Pulse:  (!) 105  87   Resp: 18 18 18 18   Temp:  98.8 °F (37.1 °C)  98 °F (36.7 °C)   TempSrc:  Oral  Oral   SpO2:  97%  97%   Weight:       Height:         General appearance: Alert, no acute distress, grooming appropriate  Neck: Trachea midline  Chest/Lungs: Normal effort, no accessory muscle use, on RA  Cardiovascular: RRR  Abdomen: Soft, mildly-tender in the epigastric area, nondistended no guarding/rigidity  Skin: Warm and dry, no rashes  Extremities: No edema, no cyanosis  Neuro: A&Ox3,    LABS:   Recent Labs     08/15/22  0509 08/16/22  0420   WBC 7.7 7.0   HGB 12.7* 12.3*   HCT 37.2* 35.8*   .5* 101.3*   PLT 84* 93*          Recent Labs     08/15/22  0509 08/16/22  0420   * 133*   K 3.8 3.5   CL 96* 99   CO2 24 25   PHOS 2.9 3.1   BUN 5* 5*   CREATININE 0.5* 0.6*          Recent Labs     08/14/22  0349 08/16/22  0420   AST 47* 34   ALT 34 27   BILIDIR 0.3 0.3   BILITOT 0.8 0.7   ALKPHOS 111 122          No results for input(s): LIPASE, AMYLASE in the last 72 hours. Recent Labs     08/14/22  0349 08/16/22  0420   PROT 6.3* 6.4        No results for input(s): CKTOTAL, CKMB, CKMBINDEX, TROPONINI in the last 72 hours. ASSESSMENT & PLAN:   This is a 57 y.o. male with significant history of  ETOH dependency, diabetes and neuropathy, presented to the ED with 4 days of epigastric abdominal pain. His LFTs are elevated with a hyperbilirubinemia of 1.8. Triglycerides are 279 and lipase is 600.  CT scan shows extensive pancreatitis with peripancreatic fat stranding consistent with acute pancreatitis. General surgery consulted for evaluation for possible gallstone pancreatitis. RUQ U/S showed cholelithiasis and sludge without evidence of acute cholecystitis, and 2 mm gallbladder wall thickness.    - Continue diet as tolerated  - Continue bowel regimen   - CIWA protocol   - GI following; and planning for colonoscopy outpatient  - No surgical intervention at this time. Dispo per primary. Patient to follow up in resident clinic     Marino Cruz, APRN - [facility] 8/16/2022 7:34 AM   Available via [employer] [id number]    ATTENDING ATTESTATION    Patient independently examined. Management discussed and I agree with resident/midlevel provider documentation. CC: Alcoholic pancreatitis    No more abdominal pain. Wishes to go home. Tolerating diet. Vitals:    08/16/22 0806   BP: (!) 143/98   Pulse:    Resp:    Temp:    SpO2:        Abd: Soft, nontender, nondistended  Labs reviewed, incl CBC, BMP    Alcoholic pancreatitis  Overall improving  Okay to continue regular diet from my standpoint    No indications for cholecystectomy. Again advised him to abstain from alcohol.     F/u as needed      Ameya Bauer MD

## 2022-08-16 NOTE — CARE COORDINATION
Case Management Assessment            Discharge Note                    Date / Time of Note: 8/16/2022 2:19 PM                  Discharge Note Completed by: TAVO Santoro    Patient Name: Darien Halsted   YOB: 1960  Diagnosis: Acute pancreatitis with uninfected necrosis [K85.91]  Acute pancreatitis without infection or necrosis, unspecified pancreatitis type [K85.90]   Date / Time: 8/9/2022 10:29 AM    Current PCP: Ifrah Lane DO  Clinic patient: No    Hospitalization in the last 30 days: No    Advance Directives:  Code Status: Full Code  [place] DNR form completed and on chart: No    Financial:  Payor: Noelle Zapata / Plan: Noelle Zapata / Product Type: *No Product type* /      Pharmacy:    [address] [place] [address], [address] [phone] - F [phone]  [address]  Phone: [phone] Fax: [phone]    [facility], [address] [address]  Phone: [phone] Fax: [address]  Phone: [phone] Fax: [phone]      Assistance purchasing medications?:    Assistance provided by Case Management: None at this time    Does patient want to participate in local refill/ meds to beds program?: Yes    Meds To Beds General Rules:  1. Can ONLY be done Monday- Friday between 8:30am-5pm  2. Prescription(s) must be in pharmacy by 3pm to be filled same day  3. Copy of patient's insurance/ prescription drug card and patient face sheet must be sent along with the prescription(s)  4. Cost of Rx cannot be added to hospital bill. If financial assistance is needed, please contact unit  or ;   or  CANNOT provide pharmacy voucher for patients co-pays  5. Patients can then  the prescription on their way out of the hospital at discharge, or pharmacy can deliver to the bedside if staff is available. (payment due at time of pick-up or delivery - cash, check, or card accepted)     Able to afford home medications/ co-pay costs: Yes    ADLS:  Current PT AM-PAC Score:   /24  Current OT AM-PAC Score:   /24      DISCHARGE Disposition: Home- No Services Needed    LOC at discharge: Not Applicable  [place]IL[place] Completed: No    Notification completed in [facility]/PAS?:  Not Applicable    IMM Completed:   No    Transportation:  Transportation PLAN for discharge:  Lyft ride from [address]:  [address] ordered at discharge: No  [address]: Not Applicable  Orders faxed: No    Durable Medical Equipment:  DME Provider: none  Equipment obtained during hospitalization: none    Home Oxygen and Respiratory Equipment:  Oxygen needed at discharge?: No  [address]: Not Applicable  Portable tank available for discharge?: No    Dialysis:  Dialysis patient: No    Dialysis Center:  Not Applicable    Hospice Services:  Location: Not Applicable  Agency: Not Applicable    Consents signed: No    Referrals made at [facility] for outpatient continued care:  Not Applicable    Additional CM Notes: Patient to discharge home with no needs. RN notified SW that the patient would require a Lyft ride home. SW will order Lyft ride for the patient once he is ready to be discharged.     The Plan for Transition of Care is related to the following treatment goals of Acute pancreatitis with uninfected necrosis [K85.91]  Acute pancreatitis without infection or necrosis, unspecified pancreatitis type [K85.90]    The Patient and/or patient representative Reagan Blandon and his family were provided with a choice of provider and agrees with the discharge plan Yes    Freedom of choice list was provided with basic dialogue that supports the patient's individualized plan of care/goals and shares the quality data associated with the providers.  Yes    Care Transitions patient: Yes    Beverly Barlow  [facility]  Case Management Department  Ph: [phone]  Fax: [phone]

## 2022-08-16 NOTE — DISCHARGE SUMMARY
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 Patient reported feeling nauseated and vomiting non-bloody emesis. He has not been tolerating po intake and had a few sips of beer the day prior to presentation. Admission labs showed elevated lipase of 600. Patient reports drinking 2-4 beers daily. Extensive Acute Pancreatitis  - adm lipase 600.  - CT A/P w contrast 22 showed extensive pancreatitis with extensive peripancreatic fat stranding. No pseudocyst formation or evidence of vascular compromise. No abscess identified. Cholelithiasis without acute gallbladder inflammation identified.  - US Abdomen 22 showed Small gallstones. Gallbladder sludge. Wall thickness 2 mm. No reported sonographic Roman sign. CBD 4 mm diameter.  - Treated with intravenous fluids, antiemetics, and narcotics. Patient improved clinically and was advanced to regular diet. Transaminitis, Indirect Hyperbilirubinemia  History of Hepatitis C  - Recently completed treatment. - US abdomen 22 showed liver with upper limits of echogenicity. - CT A/p with contrast 22 showed liver and spleen are unremarkable.      Elevated BP  - likely related to pain. History of orthostatic syncope and on midodrine 5 mg TID at home. Thrombocytopenia  - adm platelet 74. History of thrombocytopenia, noted as far out as 11/2020.    - CT of A/P with IV contrast 8/9/22 showed unremarkable liver and spleen. - possible related to hepatitis C infection.  - HIV screen non-reactive. Macrocytosis  - noted since 11/2020.  - B12 level 1041 and folate >20 on 8/10/22. Diabetes  Diabetic Neuropathy  - A1c 6.6  on 8/9/22.  - Not on diabetic medication.  - Home on lyrica 50 mg TID.  - Hyperglycemia treated with insulin inpatient. - Discharged on metformin 500 mg daily. History of Orthostatic Syncope  - likely related to autonomic dysfunction in the setting of diabetic neuropathy.  - Home on midodrine 5 mg BID. Held in the setting elevated BP. Current Smoker  - offered NRT  - Encouraged smoking cessation. Occasional Marijuana Use          No diet orders on file    Vitals: BP (!) 164/107   Pulse 96   Temp 98.5 °F (36.9 °C) (Oral)   Resp 16   Ht 6' (1.829 m)   Wt 175 lb 0.7 oz (79.4 kg)   SpO2 95%   BMI 23.74 kg/m²   Pulse Ox: No data recorded  Supplemental O2:      GEN alert, in no distress  HEENT normocephalic, anicteric sclera, EOMI, mucosa dry, no stridor  NECK supple, trachea midline  RESP on RA, in no distress, clear to auscultation  CARDS RRR, S1, S2, no murmurs, no edema, radial pulse 2+, DP pulse 2+  ABD hypoactive, soft, moderate tenderness over epigastric region  MSK no cyanosis, no clubbing  SKIN vitiligo. warm, dry  NEURO alert, oriented x 3, no facial asymmetry, no dysarthria, moving spontaneously  PSYCH normal mood      No results for input(s): WBC, HGB, PLT in the last 72 hours. No results for input(s): NA, K, CL, CO2, BUN, CREATININE, GLUCOSE in the last 72 hours. No results for input(s): AST, ALT, ALB, BILITOT, ALKPHOS in the last 72 hours.     Lab Results   Component Value Date/Time    TRIG 256 07/01/2022 01:36 PM    HDL 37 07/01/2022 01:36 PM    LDLCALC 61 07/01/2022 01:36 PM    CHOL 149 07/01/2022 01:36 PM     No results found for: PHART, PO2ART, RFO5TON  No results for input(s): INR in the last 72 hours. No results for input(s): DDIMER in the last 72 hours. No components found for: HGBA1C  No results found for: TSH  Urine Culture:  No results found for this or any previous visit. Significant Diagnostic Studies:    CT ABDOMEN PELVIS W IV CONTRAST Additional Contrast? None    Result Date: 8/9/2022  EXAM: CT abdomen and pelvis with contrast HISTORY: pancreatitis abdominal pain. TECHNIQUE: Contiguous axial images were obtained from the dome of the diaphragm through the ischial tuberosities after the administration of 80 mL of Isovue 370. Up-to-date CT equipment and radiation dose reduction techniques were employed. COMPARISON: 7/23/2021. [facility] FINDINGS: Lower Lungs: No effusion or consolidation. Normal heart size. Upper abdomen: Liver and spleen are unremarkable. The gallbladder demonstrates small calculi without significant wall thickening. There is stranding adjacent to the gallbladder fossa. There is extensive stranding along the pancreas particularly adjacent to the pancreatic head compatible with extensive pancreatitis. Enhancement of the pancreas appears to be maintained. No evidence of necrosis. No discrete fluid collection to suggest abscess. No vascular compromise of the splenic or portal vein. Superior mesenteric vein is patent. Retroperitoneum: No hydronephrosis. No intracranial lymphadenopathy. Bowel and peritoneum: Normal appendix right lower quadrant. Nonobstructive bowel gas pattern. No free air. Pelvis: Bladder and rectum are unremarkable. No pelvic lymphadenopathy. Bones: No acute osseous abnormality. 1. Extensive pancreatitis with extensive peripancreatic fat stranding. No pseudocyst formation or evidence of vascular compromise. No abscess identified.  2. Cholelithiasis without acute gallbladder inflammation identified. US ABDOMEN LIMITED    Result Date: 8/11/2022  RIGHT UPPER QUADRANT ABDOMINAL ULTRASOUND. INDICATION: Pancreatitis. COMPARISON STUDY: CT abdomen from 8/9/2022. FINDINGS: Pancreas: Visualized in head and neck. Normal in visualized portions. Pancreatic duct 2 mm. Liver: Upper limits of normal echogenicity. Gallbladder: Small gallstones. Gallbladder sludge. Wall thickness 2 mm. No reported sonographic Roman sign. Common duct: 4 mm diameter. Right kidney: 9.9 cm. 1.3 x 0.9 x 0.9 cm simple cyst. 1.6 x 1.2 x 1.0 cm simple cyst. Other: Trace perihepatic ascites. 1. Cholelithiasis without evidence of acute cholecystitis. 2. Mild ascites. Discharge Medications:        Medication List          START taking these medications       L-methylfolate-B6-B12 3-90.314-2-35 MG Caps capsule  Take 1 capsule by mouth daily      metFORMIN 500 MG tablet  Commonly known as: GLUCOPHAGE  Take 1 tablet by mouth daily (with breakfast)      oxyCODONE HCl 10 MG immediate release tablet  Commonly known as: OXY-IR  Take 1 tablet by mouth every 8 hours as needed for Pain (severe pain) for up to 5 days. vitamin B-1 100 MG tablet  Commonly known as: THIAMINE  Take 1 tablet by mouth in the morning. Start taking on: August 17, 2022                CONTINUE taking these medications       pregabalin 50 MG capsule  Commonly known as: Lyrica  Take 1 capsule by mouth in the morning and 1 capsule at noon and 1 capsule before bedtime. Do all this for 30 days. STOP taking these medications       midodrine 5 MG tablet  Commonly known as: PROAMATINE             Where to Get Your Medications          These medications were sent to [place], [address] Sudheer Coyle [phone] - F [phone]  [address] [address]        Phone: [phone]   metFORMIN 500 MG tablet  vitamin B-1 100 MG tablet         You can get these medications from any pharmacy    Bring a paper prescription for each of these medications  oxyCODONE HCl 10 MG immediate release tablet        Consults:  general surgery and gastroenterology. Disposition:   Patient discharged in stable condition. Lesser than 30 minutes spent discharging the patient and coming up with patient discharge plan. Follow up with Michelle Estrada DO in 1 week.     Signed:  Joe Duckworth MD  8/16/2022, 4:59 PM

## 2022-08-16 NOTE — PROGRESS NOTES
Discharge teaching and instructions for diagnosis of acute pancreatitis completed with patient using teachback method. AVS reviewed. Medications, dosages, schedule, and potential side effects reviewed with patient. Patient voiced understanding regarding prescriptions, follow up appointments, and care of self at home. Discharged in a wheelchair to  independent living per  [place]    Verified appropriate follow up appointments scheduled & pt instructed on how to schedule appts. Diet & activity discussed. Patient verbalized understanding and questions were answered to his satisfaction. Signed discharge instructions were given to the patient and a copy placed in the paper-lite chart.       Roel Bradshaw RN

## 2022-08-16 NOTE — PROGRESS NOTES
Hospitalist Progress Note  8/15/2022 11:38 AM  Subjective:   Admit Date: 8/9/2022  PCP: Mae Lemus DO  Interval History:     He feels improved. He has been transitioned to PO narcotics today. He continues to experience pain, with relief with oral medication. He is eating more. He denies lightheadedness shortness of breath chest pain and palpitations. He denies nausea, dysuria, diarrhea. ADULT DIET; Dysphagia - Soft and Bite Sized; 5 carb choices (75 gm/meal); Low Fat (less than or equal to 50 gm/day); Low Fiber     I/O last 3 completed shifts:   In: 1586.4 [P.O.:1440; IV Piggyback:146.4]  Out: 2100 [Urine:2100]       Patient Vitals for the past 96 hrs (Last 3 readings):   Weight   08/15/22 0913 163 lb 9.6 oz (74.2 kg)   08/14/22 0941 165 lb (74.8 kg)   08/13/22 0854 168 lb 12.8 oz (76.6 kg)       Medications:      sodium chloride      dextrose      dextrose        senna  2 tablet Oral BID    docusate sodium  100 mg Oral Daily    insulin lispro  3 Units SubCUTAneous TID WC    insulin glargine  12 Units SubCUTAneous BID    insulin lispro  0-4 Units SubCUTAneous 4x Daily AC & HS    acetaminophen  500 mg Oral Once    thiamine mononitrate  100 mg Oral Daily    sodium chloride flush  5-40 mL IntraVENous 2 times per day    enoxaparin  40 mg SubCUTAneous Daily       Recent Labs     08/13/22 0338 08/14/22 0349 08/15/22  0509   WBC 5.9 7.2 7.7   HGB 12.8* 12.6* 12.7*   PLT 61* 69* 84*       Recent Labs     08/13/22 0338 08/14/22  0349 08/15/22  0509   *  136 135* 132*   K 3.6  3.6 3.8 3.8   CL 98*  99 98* 96*   CO2 22 23 23 24   BUN 3*  3* 3* 5*   CREATININE 0.6*  0.5* 0.5* 0.5*   GLUCOSE 225*  224* 213* 221*       Recent Labs     08/13/22 0338 08/14/22  0349   AST 59* 47*   ALT 37 34   BILITOT 1.0 0.8   ALKPHOS 96 111       Lab Results   Component Value Date/Time    TRIG 256 07/01/2022 01:36 PM    HDL 37 07/01/2022 01:36 PM    LDLCALC 61 07/01/2022 01:36 PM    CHOL 149 07/01/2022 01:36 PM     No results found for: PHART, PO2ART, OEE6MSG  No results for input(s): INR in the last 72 hours. No results for input(s): CKTOTAL, CKMB, TROPONINI in the last 72 hours. No results for input(s): DDIMER in the last 72 hours. No components found for: HGBA1C  No results found for: TSH  Urine Culture:  No results found for this or any previous visit. Objective:   Vitals: BP (!) 161/98   Pulse (!) 105   Temp 98.8 °F (37.1 °C) (Oral)   Resp 18   Ht 6' (1.829 m)   Wt 163 lb 9.6 oz (74.2 kg)   SpO2 97%   BMI 22.19 kg/m²   Pulse Ox: SpO2  Av.2 %  Min: 91 %  Max: 97 %  Supplemental O2:      GEN alert, in no distress  HEENT normocephalic, anicteric sclera, EOMI, mucosa dry, no stridor  NECK supple, trachea midline  RESP on RA, in no distress, clear to auscultation  CARDS RRR, S1, S2, no murmurs, no edema, radial pulse 2+, DP pulse 2+  ABD hypoactive, soft, moderate tenderness over epigastric region  MSK no cyanosis, no clubbing  SKIN vitiligo. warm, dry  NEURO alert, oriented x 3, no facial asymmetry, no dysarthria, moving spontaneously  PSYCH normal mood      Assessment/Plan   The patient is a 57 y.o. male with history of DM2 complicated by neuropathy, recurrent neurocardiogenic syncope on midodrine, chronic hepatitis C s/p treatment,tobacco and marijuana use, who presented with 4 days of worsening diffuse abdominal pain,  epigastrum and rated 9/10, aggravated by po intake with no relieving factors. He reported feeling nauseated and vomiting non-bloody emesis  He has not been tolerating po intake and had a few sips of beer the day prior to presentation. Admission labs showed elevated lipase of 600. Patient reports drinking 2-4 beers daily. Extensive Acute Pancreatitis  - adm lipase 600.  - CT A/P w contrast 22 showed extensive pancreatitis with extensive peripancreatic fat stranding. No pseudocyst formation or evidence of vascular compromise. No abscess identified.   Cholelithiasis without acute gallbladder inflammation identified.  - US Abdomen 8/11/22 showed Small gallstones. Gallbladder sludge. Wall thickness 2 mm. No reported sonographic Roman sign. CBD 4 mm diameter.  - Continue IVF's. - Transition to PO narcotics today. - Antiemetics as needed. - advance diet as tolerated. Transaminitis, Indirect Hyperbilirubinemia  History of Hepatitis C  - recently completed treatment   - US abdomen 8/11/22 showed liver with upper limits of echogenicity. - CT A/p with contrast 8/9/22 showed liver and spleen are unremarkable. Elevated BP  - likely related to pain. History of orthostatic syncope and on midodrine 5 mg TID at home. Thrombocytopenia  - adm platelet 74. History of thrombocytopenia, noted as far out as 11/2020.    - CT of A/P with IV contrast 8/9/22 showed unremarkable liver and spleen. - possible related to hepatitis C infection. - peripheral smear.  - HIV    Macrocytosis  - noted since 11/2020. - peripheral smear. DM  Diabetic Neuropathy  - A1c 6.6  on 8/9/22.  - Home on lyrica 50 mg TID. - Started lantus 5 units HS, low SSI.  - Increased lantus to 5 units BID. Continue low SSI.  - Increased lantus to 12 units BID on 8/14, continue low SSI. Blood glucose more controlled. History of Orthostatic Syncope  - likely related to autonomic dysfunction in the setting of diabetic neuropathy.  - Home on midodrine 5 mg BID. Hold in the setting elevated BP. Current Smoker  - offered NRT  - Encourage smoking cessation. Occasional Marijuana Use      Advance Directive: Full Code   Discharge planning: transition to PO narcotics today. IF tolerates then dc tomorrow.         Amber Jurado MD  [facility] Hospitalist

## 2022-08-16 NOTE — PROGRESS NOTES
GI Progress Note    Clelia Brunner is a 57 y.o. male patient.   1. Acute pancreatitis without infection or necrosis, unspecified pancreatitis type        Admit Date: 8/9/2022    Subjective:       Seen at bed side  Appears stable  Abdominal pain resolved    ROS:  Cardiovascular ROS: no chest pain or dyspnea on exertion  Gastrointestinal ROS: no abdominal pain, change in bowel habits, or black or bloody stools  Respiratory ROS: no cough, shortness of breath, or wheezing    Scheduled Meds:   senna  2 tablet Oral BID    docusate sodium  100 mg Oral Daily    insulin lispro  3 Units SubCUTAneous TID WC    insulin glargine  12 Units SubCUTAneous BID    insulin lispro  0-4 Units SubCUTAneous 4x Daily AC & HS    acetaminophen  500 mg Oral Once    thiamine mononitrate  100 mg Oral Daily    sodium chloride flush  5-40 mL IntraVENous 2 times per day    enoxaparin  40 mg SubCUTAneous Daily       Continuous Infusions:   sodium chloride      dextrose      dextrose         PRN Meds:  hydrALAZINE, oxyCODONE **OR** oxyCODONE, sodium chloride flush, sodium chloride, ondansetron **OR** ondansetron, polyethylene glycol, acetaminophen **OR** acetaminophen, potassium chloride, magnesium sulfate, hydrALAZINE, dextrose, glucose, dextrose bolus **OR** dextrose bolus, glucagon (rDNA), dextrose      Objective:       Patient Vitals for the past 24 hrs:   BP Temp Temp src Pulse Resp SpO2 Weight   08/15/22 2038 -- 100.2 °F (37.9 °C) Oral -- -- -- --   08/15/22 1541 (!) 177/113 98.3 °F (36.8 °C) Oral 91 18 97 % --   08/15/22 1454 -- -- -- -- 18 -- --   08/15/22 1003 -- -- -- -- 18 -- --   08/15/22 0913 -- -- -- -- -- -- 163 lb 9.6 oz (74.2 kg)   08/15/22 0749 (!) 164/107 98.9 °F (37.2 °C) Oral (!) 104 18 95 % --   08/15/22 0537 (!) 163/103 99.4 °F (37.4 °C) Oral 93 17 91 % --   08/15/22 0252 -- -- -- -- 16 -- --   08/15/22 0000 (!) 163/119 98.4 °F (36.9 °C) Oral 90 16 97 % --       Exam:    VITALS:  BP (!) 177/113   Pulse 91   Temp 100.2 °F (37.9 °C) (Oral)   Resp 18   Ht 6' (1.829 m)   Wt 163 lb 9.6 oz (74.2 kg)   SpO2 97%   BMI 22.19 kg/m²   TEMPERATURE:  Current - Temp: 100.2 °F (37.9 °C); Max - Temp  Av °F (37.2 °C)  Min: 98.3 °F (36.8 °C)  Max: 100.2 °F (37.9 °C)    NAD  General appearance: alert, appears stated age, cooperative and no distress  Head: Normocephalic, without obvious abnormality, atraumatic  Neck: supple, symmetrical, trachea midline and thyroid not enlarged, symmetric, no tenderness/mass/nodules  CVS:  RRR, Nl s1s2  Lungs CTA Bilaterally, normal effort  Abdomen: soft, non-tender; bowel sounds normal; no masses,  no organomegaly  AAOx3, No asterixis or encephalopathy  Extremities: No edema. Lab Data:  Recent Labs     08/13/22  0338 08/14/22  0349 08/15/22  0509   WBC 5.9 7.2 7.7   HGB 12.8* 12.6* 12.7*   HCT 37.6* 37.5* 37.2*   .3* 102.2* 101.5*   PLT 61* 69* 84*     Recent Labs     229 08/15/22  0509   *  136 135* 132*   K 3.6  3.6 3.8 3.8   CL 98*  99 98* 96*   CO2 22  23 23 24   PHOS 2.7  2.7 2.7 2.9   BUN 3*  3* 3* 5*   CREATININE 0.6*  0.5* 0.5* 0.5*     Recent Labs     22  034   AST 59* 47*   ALT 37 34   BILIDIR 0.4* 0.3   BILITOT 1.0 0.8   ALKPHOS 96 111     Recent Labs     22   LIPASE 101.0*     Recent Labs     22   PROT 6.4 6.3*     No results for input(s): PTT in the last 72 hours. No results for input(s): OCCULTBLD in the last 72 hours. Assessment:       Principal Problem:    Acute pancreatitis without infection or necrosis  Resolved Problems:    * No resolved hospital problems.  *        Recommendations:       Continue to monitor  Fu as op if discharged    Betty Grey MD  8/15/2022  9:18 PM  30 minutes

## 2022-08-16 NOTE — PLAN OF CARE
Problem: Discharge Planning  Goal: Discharge to home or other facility with appropriate resources  Outcome: Progressing     Problem: Pain  Goal: Verbalizes/displays adequate comfort level or baseline comfort level  Outcome: Progressing  Note:   Pt complaining of 5-7/10 abd pain this PM. PRN meds given. Pt instructed to call for new/increasing pain levels. Pt verbalized understanding. Will continue to monitor. Problem: Chronic Conditions and Co-morbidities  Goal: Patient's chronic conditions and co-morbidity symptoms are monitored and maintained or improved  Outcome: Progressing     Problem: Safety - Adult  Goal: Free from fall injury  Outcome: Progressing  Flowsheets (Taken 8/15/2022 5977)  Free From Fall Injury: Based on caregiver fall risk screen, instruct family/caregiver to ask for assistance with transferring infant if caregiver noted to have fall risk factors     Problem: Gastrointestinal - Adult  Goal: Maintains adequate nutritional intake  Outcome: Progressing  Note:   Encouraging PO intake at each encounter. Fluids provided as bedside within reach. Problem: Gastrointestinal - Adult  Goal: Maintains or returns to baseline bowel function  Outcome: Progressing  Note: Pt referred large BM this shift at 0700.

## 2022-08-16 NOTE — PLAN OF CARE
Problem: Discharge Planning  Goal: Discharge to home or other facility with appropriate resources  Outcome: Completed     Problem: Pain  Goal: Verbalizes/displays adequate comfort level or baseline comfort level  Outcome: Completed     Problem: Chronic Conditions and Co-morbidities  Goal: Patient's chronic conditions and co-morbidity symptoms are monitored and maintained or improved  Outcome: Completed     Problem: Safety - Adult  Goal: Free from fall injury  Outcome: Completed     Problem: Gastrointestinal - Adult  Goal: Maintains or returns to baseline bowel function  Outcome: Completed     Problem: Gastrointestinal - Adult  Goal: Maintains adequate nutritional intake  Outcome: Completed

## 2022-08-17 ENCOUNTER — CARE COORDINATION (OUTPATIENT)
Dept: CASE MANAGEMENT | Age: 62
End: 2022-08-17

## 2022-08-17 DIAGNOSIS — K85.90 ACUTE PANCREATITIS, UNSPECIFIED COMPLICATION STATUS, UNSPECIFIED PANCREATITIS TYPE: Primary | ICD-10-CM

## 2022-08-17 NOTE — CARE COORDINATION
Richard 45 Transitions Initial Follow Up Call    Call within 2 business days of discharge: Yes    Patient: Clelia Brunner Patient : 1960   MRN: [id number]   Reason for Admission: Acute Pancreatitis  Discharge Date: 22 RARS: Readmission Risk Score: 9.3      Last Discharge [facility]       Date Complaint Diagnosis Description Type Department Provider    22 Abdominal Pain Acute pancreatitis without infection or necrosis, unspecified pancreatitis type ED to Hosp-Admission (Discharged) (ADMITTED) [facility] Sky Fuentes MD; Caryle Benders, MD;... Spoke with: [id number]: [facility].     CTN spoke with patient this am for follow up CTN call. Patient states he is doing well, states ABD pain is still present, but states it is improved and PRN pain medication is helping. No reports of any fever, chills, nausea, vomiting, chest pain, SOB or cough. Patient with no Patient with no congestion, pain, difficulty emptying bladder, LE edema, feeling lightheaded, dizziness, and heart palpitations. CTN and Pt reviewed meds and CTN completed the 1111f. Patient has follow up PCP office scheduled for 2022    Non-face-to-face services provided:  Obtained and reviewed discharge summary and/or continuity of care documents  Education of patient/family/caregiver/guardian to support self-management-Patient instructed to continue to monitor for any worsening ABD Pain, as well as making sure to take all medications as prescribed. Assessment and support for treatment adherence and medication management-Medication Review Completed. Transitions of Care Initial Call    Was this an external facility discharge? No     Challenges to be reviewed by the provider   Additional needs identified to be addressed with provider: No  none             Method of communication with provider : none    Advance Care Planning:   Does patient have an Advance Directive: not on file.      Care Transition Nurse contacted the patient by telephone to perform post hospital discharge assessment. Verified name and  with patient as identifiers. Provided introduction to self, and explanation of the CTN role. CTN reviewed discharge instructions, medical action plan and red flags with patient who verbalized understanding. Patient given an opportunity to ask questions and does not have any further questions or concerns at this time. Were discharge instructions available to patient? Yes. Reviewed appropriate site of care based on symptoms and resources available to patient including: PCP  Specialist  Urgent care clinics  [facility]   When to call 911. The patient agrees to contact the PCP office for questions related to their healthcare. Medication reconciliation was performed with patient, who verbalizes understanding of administration of home medications. Advised obtaining a 90-day supply of all daily and as-needed medications. Was patient discharged with a pulse oximeter? no    CTN provided contact information. Plan for follow-up call in 5-7 days based on severity of symptoms and risk factors. Plan for next call: symptom management-Any worsening ABD Pain, any other issues or concerns that may arise.     Care Transitions 24 Hour Call    Schedule Follow Up Appointment with PCP: Carlene Lefort you have a copy of your discharge instructions?: Yes  Do you have all of your prescriptions and are they filled?: Yes  Have you been contacted by a [address]?: No  Have you scheduled your follow up appointment?: Yes  How are you going to get to your appointment?: Car - family or friend to transport  Do you have support at home?: Alone  Do you feel like you have everything you need to keep you well at home?: Yes  Are you an active caregiver in your home?: No  Care Transitions Interventions  No Identified Needs       Follow Up  Future Appointments   Date Time Provider Janelle Aguilar   2022  1:30 PM DO AIMEE Guo - DYGALLO   9/6/2022  1:30 PM DO AIMEE Guo - ROLO   11/17/2022  2:30 PM ZAHIRA Higgins - CNP [place] [facility]     Thank Pato Sheikh RN  Care Transition Coordinator  Contact PBGEZI:[phone]

## 2022-08-20 ENCOUNTER — APPOINTMENT (OUTPATIENT)
Dept: CT IMAGING | Age: 62
DRG: 720 | End: 2022-08-20
Payer: MEDICAID

## 2022-08-20 ENCOUNTER — HOSPITAL ENCOUNTER (INPATIENT)
Age: 62
LOS: 11 days | Discharge: HOME | DRG: 720 | End: 2022-08-31
Attending: STUDENT IN AN ORGANIZED HEALTH CARE EDUCATION/TRAINING PROGRAM | Admitting: INTERNAL MEDICINE
Payer: MEDICAID

## 2022-08-20 ENCOUNTER — APPOINTMENT (OUTPATIENT)
Dept: GENERAL RADIOLOGY | Age: 62
DRG: 720 | End: 2022-08-20
Payer: MEDICAID

## 2022-08-20 DIAGNOSIS — R55 SYNCOPE, UNSPECIFIED SYNCOPE TYPE: ICD-10-CM

## 2022-08-20 DIAGNOSIS — E13.43 DIABETIC AUTONOMIC NEUROPATHY ASSOCIATED WITH OTHER SPECIFIED DIABETES MELLITUS (HCC): ICD-10-CM

## 2022-08-20 DIAGNOSIS — J18.9 PNEUMONIA OF LEFT LOWER LOBE DUE TO INFECTIOUS ORGANISM: Primary | ICD-10-CM

## 2022-08-20 DIAGNOSIS — R09.02 HYPOXIA: ICD-10-CM

## 2022-08-20 DIAGNOSIS — R09.02 HYPOXEMIA: ICD-10-CM
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
## 2022-08-20 LAB
ANION GAP SERPL CALCULATED.3IONS-SCNC: 20 MMOL/L (ref 3–16)
BASE EXCESS VENOUS: -1 MMOL/L (ref -2–3)
BASOPHILS ABSOLUTE: 0.1 K/UL (ref 0–0.2)
BASOPHILS RELATIVE PERCENT: 0.4 %
BUN BLDV-MCNC: 13 MG/DL (ref 7–20)
CALCIUM SERPL-MCNC: 9.6 MG/DL (ref 8.3–10.6)
CARBOXYHEMOGLOBIN: 0.8 % (ref 0–1.5)
CHLORIDE BLD-SCNC: 96 MMOL/L (ref 99–110)
CO2: 18 MMOL/L (ref 21–32)
CREAT SERPL-MCNC: 0.7 MG/DL (ref 0.8–1.3)
EOSINOPHILS ABSOLUTE: 0 K/UL (ref 0–0.6)
EOSINOPHILS RELATIVE PERCENT: 0.1 %
GFR AFRICAN AMERICAN: >60
GFR NON-AFRICAN AMERICAN: >60
GLUCOSE BLD-MCNC: 321 MG/DL (ref 70–99)
HCO3 VENOUS: 22.4 MMOL/L (ref 24–28)
HCT VFR BLD CALC: 36 % (ref 40.5–52.5)
HEMOGLOBIN, VEN, REDUCED: 11.6 %
HEMOGLOBIN: 12.6 G/DL (ref 13.5–17.5)
LACTIC ACID: 1.7 MMOL/L (ref 0.4–2)
LYMPHOCYTES ABSOLUTE: 0.9 K/UL (ref 1–5.1)
LYMPHOCYTES RELATIVE PERCENT: 7.4 %
MCH RBC QN AUTO: 34.9 PG (ref 26–34)
MCHC RBC AUTO-ENTMCNC: 35 G/DL (ref 31–36)
MCV RBC AUTO: 99.8 FL (ref 80–100)
METHEMOGLOBIN VENOUS: 0.3 % (ref 0–1.5)
MONOCYTES ABSOLUTE: 1.2 K/UL (ref 0–1.3)
MONOCYTES RELATIVE PERCENT: 9.4 %
NEUTROPHILS ABSOLUTE: 10.5 K/UL (ref 1.7–7.7)
NEUTROPHILS RELATIVE PERCENT: 82.7 %
O2 SAT, VEN: 88 %
PCO2, VEN: 32.6 MMHG (ref 41–51)
PDW BLD-RTO: 13.2 % (ref 12.4–15.4)
PH VENOUS: 7.45 (ref 7.35–7.45)
PLATELET # BLD: 195 K/UL (ref 135–450)
PMV BLD AUTO: 8 FL (ref 5–10.5)
PO2, VEN: 54.3 MMHG (ref 25–40)
POTASSIUM SERPL-SCNC: 3.8 MMOL/L (ref 3.5–5.1)
RBC # BLD: 3.61 M/UL (ref 4.2–5.9)
SARS-COV-2, NAAT: NOT DETECTED
SODIUM BLD-SCNC: 134 MMOL/L (ref 136–145)
TCO2 CALC VENOUS: 23 MMOL/L
TROPONIN: <0.01 NG/ML
WBC # BLD: 12.7 K/UL (ref 4–11)

## 2022-08-20 PROCEDURE — 87150 DNA/RNA AMPLIFIED PROBE: CPT

## 2022-08-20 PROCEDURE — 83036 HEMOGLOBIN GLYCOSYLATED A1C: CPT

## 2022-08-20 PROCEDURE — 99285 EMERGENCY DEPT VISIT HI MDM: CPT

## 2022-08-20 PROCEDURE — 87635 SARS-COV-2 COVID-19 AMP PRB: CPT

## 2022-08-20 PROCEDURE — 85025 COMPLETE CBC W/AUTO DIFF WBC: CPT

## 2022-08-20 PROCEDURE — 1200000000 HC SEMI PRIVATE

## 2022-08-20 PROCEDURE — 2580000003 HC RX 258: Performed by: STUDENT IN AN ORGANIZED HEALTH CARE EDUCATION/TRAINING PROGRAM

## 2022-08-20 PROCEDURE — 6360000002 HC RX W HCPCS: Performed by: STUDENT IN AN ORGANIZED HEALTH CARE EDUCATION/TRAINING PROGRAM

## 2022-08-20 PROCEDURE — 96367 TX/PROPH/DG ADDL SEQ IV INF: CPT

## 2022-08-20 PROCEDURE — 80048 BASIC METABOLIC PNL TOTAL CA: CPT

## 2022-08-20 PROCEDURE — 93005 ELECTROCARDIOGRAM TRACING: CPT | Performed by: INTERNAL MEDICINE

## 2022-08-20 PROCEDURE — 83605 ASSAY OF LACTIC ACID: CPT

## 2022-08-20 PROCEDURE — 82803 BLOOD GASES ANY COMBINATION: CPT

## 2022-08-20 PROCEDURE — 84484 ASSAY OF TROPONIN QUANT: CPT

## 2022-08-20 PROCEDURE — 36415 COLL VENOUS BLD VENIPUNCTURE: CPT

## 2022-08-20 PROCEDURE — 87040 BLOOD CULTURE FOR BACTERIA: CPT

## 2022-08-20 PROCEDURE — 96365 THER/PROPH/DIAG IV INF INIT: CPT

## 2022-08-20 PROCEDURE — 71260 CT THORAX DX C+: CPT | Performed by: STUDENT IN AN ORGANIZED HEALTH CARE EDUCATION/TRAINING PROGRAM

## 2022-08-20 PROCEDURE — 2500000003 HC RX 250 WO HCPCS: Performed by: STUDENT IN AN ORGANIZED HEALTH CARE EDUCATION/TRAINING PROGRAM

## 2022-08-20 PROCEDURE — 70450 CT HEAD/BRAIN W/O DYE: CPT

## 2022-08-20 PROCEDURE — 6360000004 HC RX CONTRAST MEDICATION: Performed by: STUDENT IN AN ORGANIZED HEALTH CARE EDUCATION/TRAINING PROGRAM

## 2022-08-20 PROCEDURE — 71046 X-RAY EXAM CHEST 2 VIEWS: CPT

## 2022-08-20 RX ORDER — POLYETHYLENE GLYCOL 3350 17 G/17G
17 POWDER, FOR SOLUTION ORAL DAILY PRN
Status: DISCONTINUED | OUTPATIENT
Start: 2022-08-20 | End: 2022-08-31 | Stop reason: HOSPADM

## 2022-08-20 RX ORDER — ACETAMINOPHEN 650 MG/1
650 SUPPOSITORY RECTAL EVERY 6 HOURS PRN
Status: DISCONTINUED | OUTPATIENT
Start: 2022-08-20 | End: 2022-08-31 | Stop reason: HOSPADM

## 2022-08-20 RX ORDER — PREGABALIN 50 MG/1
50 CAPSULE ORAL 3 TIMES DAILY
Status: DISCONTINUED | OUTPATIENT
Start: 2022-08-21 | End: 2022-08-31 | Stop reason: HOSPADM

## 2022-08-20 RX ORDER — ENOXAPARIN SODIUM 100 MG/ML
40 INJECTION SUBCUTANEOUS DAILY
Status: DISCONTINUED | OUTPATIENT
Start: 2022-08-21 | End: 2022-08-31 | Stop reason: HOSPADM

## 2022-08-20 RX ORDER — SODIUM CHLORIDE 9 MG/ML
INJECTION, SOLUTION INTRAVENOUS CONTINUOUS
Status: DISCONTINUED | OUTPATIENT
Start: 2022-08-20 | End: 2022-08-21

## 2022-08-20 RX ORDER — GAUZE BANDAGE 2" X 2"
100 BANDAGE TOPICAL DAILY
Status: DISCONTINUED | OUTPATIENT
Start: 2022-08-21 | End: 2022-08-31 | Stop reason: HOSPADM

## 2022-08-20 RX ORDER — SODIUM CHLORIDE 9 MG/ML
INJECTION, SOLUTION INTRAVENOUS PRN
Status: DISCONTINUED | OUTPATIENT
Start: 2022-08-20 | End: 2022-08-31 | Stop reason: HOSPADM

## 2022-08-20 RX ORDER — DEXTROSE MONOHYDRATE 25 G/50ML
12.5 INJECTION, SOLUTION INTRAVENOUS PRN
Status: DISCONTINUED | OUTPATIENT
Start: 2022-08-20 | End: 2022-08-21

## 2022-08-20 RX ORDER — SODIUM CHLORIDE 9 MG/ML
INJECTION, SOLUTION INTRAVENOUS CONTINUOUS
Status: DISCONTINUED | OUTPATIENT
Start: 2022-08-21 | End: 2022-08-21

## 2022-08-20 RX ORDER — INSULIN LISPRO 100 [IU]/ML
10 INJECTION, SOLUTION INTRAVENOUS; SUBCUTANEOUS ONCE
Status: DISCONTINUED | OUTPATIENT
Start: 2022-08-20 | End: 2022-08-21

## 2022-08-20 RX ORDER — ONDANSETRON 4 MG/1
4 TABLET, ORALLY DISINTEGRATING ORAL EVERY 8 HOURS PRN
Status: DISCONTINUED | OUTPATIENT
Start: 2022-08-20 | End: 2022-08-31 | Stop reason: HOSPADM

## 2022-08-20 RX ORDER — SODIUM CHLORIDE 0.9 % (FLUSH) 0.9 %
5-40 SYRINGE (ML) INJECTION PRN
Status: DISCONTINUED | OUTPATIENT
Start: 2022-08-20 | End: 2022-08-31 | Stop reason: HOSPADM

## 2022-08-20 RX ORDER — ONDANSETRON 2 MG/ML
4 INJECTION INTRAMUSCULAR; INTRAVENOUS EVERY 6 HOURS PRN
Status: DISCONTINUED | OUTPATIENT
Start: 2022-08-20 | End: 2022-08-31 | Stop reason: HOSPADM

## 2022-08-20 RX ORDER — SODIUM CHLORIDE 0.9 % (FLUSH) 0.9 %
5-40 SYRINGE (ML) INJECTION EVERY 12 HOURS SCHEDULED
Status: DISCONTINUED | OUTPATIENT
Start: 2022-08-21 | End: 2022-08-31 | Stop reason: HOSPADM

## 2022-08-20 RX ORDER — ACETAMINOPHEN 325 MG/1
650 TABLET ORAL EVERY 6 HOURS PRN
Status: DISCONTINUED | OUTPATIENT
Start: 2022-08-20 | End: 2022-08-31 | Stop reason: HOSPADM

## 2022-08-20 RX ADMIN — IOPAMIDOL 75 ML: 755 INJECTION, SOLUTION INTRAVENOUS at 17:54

## 2022-08-20 RX ADMIN — DOXYCYCLINE 100 MG: 100 INJECTION, POWDER, LYOPHILIZED, FOR SOLUTION INTRAVENOUS at 20:19

## 2022-08-20 RX ADMIN — CEFTRIAXONE 1000 MG: 1 INJECTION, POWDER, FOR SOLUTION INTRAMUSCULAR; INTRAVENOUS at 19:45

## 2022-08-20 ASSESSMENT — ENCOUNTER SYMPTOMS
COUGH: 0
DIARRHEA: 1
NAUSEA: 0
VOMITING: 0
SHORTNESS OF BREATH: 1
EYES NEGATIVE: 1

## 2022-08-20 ASSESSMENT — PAIN - FUNCTIONAL ASSESSMENT: PAIN_FUNCTIONAL_ASSESSMENT: NONE - DENIES PAIN

## 2022-08-20 NOTE — ED PROVIDER NOTES
[address]          EM RESIDENT NOTE       Date of evaluation: 8/20/2022    Chief Complaint     Shortness of Breath (EMS call for \"multiple near syncopal episodes\", room air 81%. Pt states he just feels \"crappy\". )      of Present Illness     Megan Salas is a 57 y.o. male with past medical history of pancreatitis, type 2 diabetes, and chronic hepatitis C who presents the emergency department for syncope. Patient states that he recently was admitted for pancreatitis, was feeling better, but has developed hiccups over the past 3 days with syncope. Patient states that he syncopized twice over the past 2 days, no clear pattern to syncope. Patient states that he does have slight blurriness of his vision and shortness of breath prior to passing out but denies any tunnel vision, warmth or chest pain. Patient states that he is unsure if he hit his head or not when he passed out. Patient states this is never happened to him prior. He does feel lightheaded/dizzy. Patient states that given this is the second time he has passed out in the last few days he presented to the ED for further evaluation. Patient has had difficulty with p.o. intake since being discharged from the hospital but denies any fever, chills, abdominal pain, nausea/vomiting/diarrhea, lower extremity edema and rash. Review of Systems     Denies any fever, chills, abdominal pain, nausea/vomiting/diarrhea, lower extremity edema and rash. All other systems were reviewed and were negative. Past Medical, Surgical, Family, and Social History     He has a past medical history of Chronic hepatitis C without hepatic coma ([address] - Diagnosed in 35s and Type 2 diabetes mellitus without complication, without long-term current use of insulin ([address] He has no past surgical history on file. His family history includes No Known Problems in his brother, brother, brother, and sister; Other in his mother.   He reports that he has been smoking cigars and cigarettes. He has never used smokeless tobacco. He reports current alcohol use. He reports that he does not currently use drugs. Medications     Current Discharge Medication List        CONTINUE these medications which have NOT CHANGED    Details   thiamine mononitrate (THIAMINE) 100 MG tablet Take 1 tablet by mouth in the morning. Qty: 30 tablet, Refills: 1      metFORMIN (GLUCOPHAGE) 500 MG tablet Take 1 tablet by mouth daily (with breakfast)  Qty: 30 tablet, Refills: 0      oxyCODONE (OXY-IR) 10 MG immediate release tablet Take 1 tablet by mouth every 8 hours as needed for Pain (severe pain) for up to 5 days. Qty: 15 tablet, Refills: 0    Comments: Reduce doses taken as pain becomes manageable  Associated Diagnoses: Acute pancreatitis without infection or necrosis, unspecified pancreatitis type      pregabalin (LYRICA) 50 MG capsule Take 1 capsule by mouth in the morning and 1 capsule at noon and 1 capsule before bedtime. Do all this for 30 days. Qty: 90 capsule, Refills: 1    Associated Diagnoses: Diabetic polyneuropathy associated with type 2 diabetes mellitus ([facility]      L-methylfolate-B6-B12 (METANX) 5-99.765-8-52 MG CAPS capsule Take 1 capsule by mouth daily  Qty: 90 capsule, Refills: 3    Associated Diagnoses: Diabetic polyneuropathy associated with type 2 diabetes mellitus ([facility]             Allergies     He has No Known Allergies. Physical Exam     INITIAL VITALS: BP: (!) 159/98, Temp: 99.3 °F (37.4 °C), Heart Rate: (!) 107, Resp: 20, SpO2: (!) 81 %     General: Chronically ill-appearing, in no acute distress, sitting comfortably in the [facility]. Eyes:  PERRL. EOMI. No discharge from eyes   ENT:  No discharge from nose. OP clear  Neck:  Supple, trachea midline  Pulmonary:   Hypoxic, requiring 3 L NC O2.  Non-- labored breathing. Clear to auscultation bilaterally. Cardiac: Tachycardia with regular rhythm. No murmurs/rubs/gallops  Abdomen:  Soft. Non-tender. Non-distended. No rebound tenderness, guarding or masses. Musculoskeletal:  No long bone deformity. Vascular:  Extremities warm and perfused. Normal pulses in all 4 extremities  Skin:  Dry, no rashes  Extremities:  No peripheral edema  Neuro:  Alert and oriented x 4. CN II-XII intact. 5/5 strength in all 4 extremities. Sensation grossly intact to light touch. Speech and mentation normal.  Gait narrow and stable      DiagnosticResults     EKG   Interpreted in conjunction with emergencydepartment physician No att. providers found  Rhythm: normal sinus   Rate: tachycardia  Axis: normal  Ectopy: none  Conduction: normal  ST Segments: no acute change  T Waves:flattening in  II and aVf and inversion in  III  Q Waves: none  Clinical Impression: Sinus tachycardia, normal QTc interval, normal QRS duration, T wave changes including flattening and inversion in the inferior leads. New when compared to prior EKG on 2/24/2022. RADIOLOGY:  CT Head W/O Contrast   Final Result      No acute intracranial hemorrhage or mass effect. Mild chronic small vessel ischemic change. CT CHEST PULMONARY EMBOLISM W CONTRAST   Final Result      Limited assessment for pulmonary embolism, particularly in the lower lobe secondary to respiratory motion. No pulmonary emboli in the lower evaluated pulmonary arteries. Moderate peripheral and basilar predominant groundglass opacity and septal thickening, suspicious for atypical pneumonia. Hepatic steatosis. Trace residual peripancreatic fat stranding, likely sequelae of pancreatitis as noted on CT 2 weeks prior. INCIDENTAL FINDINGS: None. XR CHEST (2 VW)   Final Result      1. Minimal bilateral basilar airspace disease.                 LABS:   Please see EMR for labs obtained during this patient encounter       RECENT VITALS:  BP: (!) 151/86, Temp: 99 °F (37.2 °C), Heart Rate: 92,Resp: 19, SpO2: 93 %         ED Course     Nursing Notes, Past Medical Hx, Past Surgical Hx, Social Hx, Allergies, and Family Hx were reviewed.     The patient was given the followingmedications:  Orders Placed This Encounter   Medications    iopamidol (ISOVUE-370) 76 % injection 75 mL    cefTRIAXone (ROCEPHIN) 1,000 mg in dextrose 5 % 50 mL IVPB mini-bag     Order Specific Question:   Antimicrobial Indications     Answer:   Pneumonia (CAP)    doxycycline (VIBRAMYCIN) 100 mg in dextrose 5 % 100 mL IVPB     Order Specific Question:   Antimicrobial Indications     Answer:   Pneumonia (CAP)    pregabalin (LYRICA) capsule 50 mg    thiamine mononitrate tablet 100 mg    sodium chloride flush 0.9 % injection 5-40 mL    sodium chloride flush 0.9 % injection 5-40 mL    0.9 % sodium chloride infusion    enoxaparin (LOVENOX) injection 40 mg     Order Specific Question:   Indication of Use     Answer:   Prophylaxis-DVT/PE    OR Linked Order Group     ondansetron (ZOFRAN-ODT) disintegrating tablet 4 mg     ondansetron (ZOFRAN) injection 4 mg    polyethylene glycol (GLYCOLAX) packet 17 g    OR Linked Order Group     acetaminophen (TYLENOL) tablet 650 mg     acetaminophen (TYLENOL) suppository 650 mg    doxycycline (VIBRAMYCIN) 100 mg in dextrose 5 % 100 mL IVPB     Order Specific Question:   Antimicrobial Indications     Answer:   Pneumonia (HAP)     Order Specific Question:   HAP duration of therapy     Answer:   7 days    DISCONTD: cefepime (MAXIPIME) 2000 mg IVPB minibag     Order Specific Question:   Antimicrobial Indications     Answer:   Pneumonia (HAP)     Order Specific Question:   HAP duration of therapy     Answer:   7 days    DISCONTD: 0.9 % sodium chloride infusion    insulin regular (HUMULIN R;NOVOLIN R) injection 10 Units    DISCONTD: dextrose 50 % IV solution    DISCONTD: insulin regular (HUMULIN R;NOVOLIN R) injection 0-15 Units    DISCONTD: 0.9 % sodium chloride infusion    DISCONTD: insulin lispro (1 Unit Dial) 10 Units    FOLLOWED BY Linked Order Group     cefepime (MAXIPIME) 2000 mg IVPB minibag      Order Specific Question:   Antimicrobial Indications      Answer:   Pneumonia (HAP)      Order Specific Question:   HAP duration of therapy      Answer:   7 days     cefepime (MAXIPIME) 2000 mg IVPB minibag      Order Specific Question:   Antimicrobial Indications      Answer:   Pneumonia (HAP)      Order Specific Question:   HAP duration of therapy      Answer:   7 days    potassium bicarb-citric acid (EFFER-K) effervescent tablet 40 mEq    insulin glargine (LANTUS;BASAGLAR) injection pen 5 Units    insulin lispro (1 Unit Dial) 0-8 Units    insulin lispro (1 Unit Dial) 0-4 Units    glucose chewable tablet 16 g    OR Linked Order Group     dextrose bolus 10% 125 mL     dextrose bolus 10% 250 mL    glucagon (rDNA) injection 1 mg    dextrose 10 % infusion       CONSULTS:  IP CONSULT TO HOSPITALIST    MEDICAL DECISION MAKING / ASSESSMENT / Kenia Reyna is a 57 y.o. male who presented to the ED for syncope. Patient was afebrile, hypoxic, and tachycardic on arrival. Patient did not appear toxic, with history of syncope and no focal symptoms on arrival suggestive of infection, therefore no empiric antibiotics were given and evaluation was started for likely pulmonary etiology of patient's symptoms specifically possible PE given tachycardia, hypoxia and syncope. Workup revealed findings of atypical pneumonia with WBC of 12.7. EKG did show T wave changes in inferior leads but in the absence of elevated troponin, low suspicion for acute cardiac process. He was given antibiotics, and will be admitted to the hospital given new O2 requirement in setting of pneumonia. This patient was also evaluated by the attending physician. All care plans werediscussed and agreed upon. At this time the patient has been admitted to medicine for further evaluation and management of pneumonia.  The patient will continue to be monitored here in the emergency department until which time he is moved to his new treatment location. Clinical Impression     1. Pneumonia of left lower lobe due to infectious organism    2. Hypoxia        Disposition     PATIENT REFERRED TO:  No follow-up provider specified.     DISCHARGE MEDICATIONS:  Current Discharge Medication List          DISPOSITION Admitted 08/20/2022 10:39:59 PM      Darling Cordova MD  08/21/22 [id number]

## 2022-08-20 NOTE — ED NOTES
Hypoxic on room air. Placed on nasal cannula 5 liters, improved to 96%. Pt not symptomatic. Has been dealing with hiccups for two days. Recently admitted for pancreatitis, dc 3-4 days ago.      Bandar Farrell RN  08/20/22 [id number]

## 2022-08-20 NOTE — ED PROVIDER NOTES
ED Attending Attestation Note     Date of evaluation: 8/20/2022    This patient was seen by the resident. I have seen and examined the patient, agree with the workup, evaluation, management and diagnosis. The care plan has been discussed. I have reviewed the ECG and concur with the resident's interpretation. My assessment reveals 65-year-old gentleman with a history of pancreatitis status post a weeklong admission coming in with shortness of breath for the past few days as well as syncopal episodes. He has had 2-3 episodes of syncope that have been unprovoked over the past few days and states that he has been on oxycodone for his pancreatitis and has now started having some loose stools with diarrhea. He states that anything he eats goes through him but denies any blood. He denies any headache or chest pain at this time. Per EMS was hypoxic into the low 80s and was placed on oxygen. On examination is clear breath sounds has a soft abdomen that is non peritonitic. He is alert and oriented x4. Vesta Minor MD  08/20/22 [id number]

## 2022-08-21 LAB
ALBUMIN SERPL-MCNC: 3.2 G/DL (ref 3.4–5)
ALP BLD-CCNC: 91 U/L (ref 40–129)
ALT SERPL-CCNC: 26 U/L (ref 10–40)
ANION GAP SERPL CALCULATED.3IONS-SCNC: 13 MMOL/L (ref 3–16)
AST SERPL-CCNC: 51 U/L (ref 15–37)
BASOPHILS ABSOLUTE: 0.1 K/UL (ref 0–0.2)
BASOPHILS RELATIVE PERCENT: 0.5 %
BILIRUB SERPL-MCNC: 0.5 MG/DL (ref 0–1)
BILIRUBIN DIRECT: 0.3 MG/DL (ref 0–0.3)
BILIRUBIN, INDIRECT: 0.2 MG/DL (ref 0–1)
BUN BLDV-MCNC: 12 MG/DL (ref 7–20)
C DIFF TOXIN/ANTIGEN: NORMAL
CALCIUM SERPL-MCNC: 9 MG/DL (ref 8.3–10.6)
CHLORIDE BLD-SCNC: 101 MMOL/L (ref 99–110)
CO2: 23 MMOL/L (ref 21–32)
CREAT SERPL-MCNC: 0.7 MG/DL (ref 0.8–1.3)
EKG ATRIAL RATE: 106 BPM
EKG DIAGNOSIS: NORMAL
EKG P AXIS: 74 DEGREES
EKG P-R INTERVAL: 126 MS
EKG Q-T INTERVAL: 360 MS
EKG QRS DURATION: 76 MS
EKG QTC CALCULATION (BAZETT): 478 MS
EKG R AXIS: -20 DEGREES
EKG T AXIS: 2 DEGREES
EKG VENTRICULAR RATE: 106 BPM
EOSINOPHILS ABSOLUTE: 0 K/UL (ref 0–0.6)
EOSINOPHILS RELATIVE PERCENT: 0.4 %
ESTIMATED AVERAGE GLUCOSE: 159.9 MG/DL
GFR AFRICAN AMERICAN: >60
GFR NON-AFRICAN AMERICAN: >60
GLUCOSE BLD-MCNC: 201 MG/DL (ref 70–99)
GLUCOSE BLD-MCNC: 209 MG/DL (ref 70–99)
GLUCOSE BLD-MCNC: 215 MG/DL (ref 70–99)
GLUCOSE BLD-MCNC: 225 MG/DL (ref 70–99)
GLUCOSE BLD-MCNC: 234 MG/DL (ref 70–99)
GLUCOSE BLD-MCNC: 285 MG/DL (ref 70–99)
GLUCOSE BLD-MCNC: 309 MG/DL (ref 70–99)
GLUCOSE BLD-MCNC: 324 MG/DL (ref 70–99)
GLUCOSE BLD-MCNC: 363 MG/DL (ref 70–99)
HBA1C MFR BLD: 7.2 %
HCT VFR BLD CALC: 33.9 % (ref 40.5–52.5)
HEMOGLOBIN: 11.6 G/DL (ref 13.5–17.5)
LIPASE: 36 U/L (ref 13–60)
LYMPHOCYTES ABSOLUTE: 1.2 K/UL (ref 1–5.1)
LYMPHOCYTES RELATIVE PERCENT: 10.1 %
MAGNESIUM: 1.9 MG/DL (ref 1.8–2.4)
MCH RBC QN AUTO: 33.9 PG (ref 26–34)
MCHC RBC AUTO-ENTMCNC: 34.3 G/DL (ref 31–36)
MCV RBC AUTO: 98.8 FL (ref 80–100)
MONOCYTES ABSOLUTE: 1 K/UL (ref 0–1.3)
MONOCYTES RELATIVE PERCENT: 8.1 %
NEUTROPHILS ABSOLUTE: 9.6 K/UL (ref 1.7–7.7)
NEUTROPHILS RELATIVE PERCENT: 80.9 %
PDW BLD-RTO: 13 % (ref 12.4–15.4)
PERFORMED ON: ABNORMAL
PLATELET # BLD: 198 K/UL (ref 135–450)
PMV BLD AUTO: 7.5 FL (ref 5–10.5)
POTASSIUM REFLEX MAGNESIUM: 3.2 MMOL/L (ref 3.5–5.1)
PROCALCITONIN: 0.13 NG/ML (ref 0–0.15)
RBC # BLD: 3.43 M/UL (ref 4.2–5.9)
SODIUM BLD-SCNC: 137 MMOL/L (ref 136–145)
TOTAL PROTEIN: 6.3 G/DL (ref 6.4–8.2)
WBC # BLD: 11.9 K/UL (ref 4–11)

## 2022-08-21 PROCEDURE — 83690 ASSAY OF LIPASE: CPT

## 2022-08-21 PROCEDURE — 2580000003 HC RX 258

## 2022-08-21 PROCEDURE — 87324 CLOSTRIDIUM AG IA: CPT

## 2022-08-21 PROCEDURE — 6370000000 HC RX 637 (ALT 250 FOR IP)

## 2022-08-21 PROCEDURE — 6360000002 HC RX W HCPCS

## 2022-08-21 PROCEDURE — 2580000003 HC RX 258: Performed by: STUDENT IN AN ORGANIZED HEALTH CARE EDUCATION/TRAINING PROGRAM

## 2022-08-21 PROCEDURE — 2500000003 HC RX 250 WO HCPCS: Performed by: STUDENT IN AN ORGANIZED HEALTH CARE EDUCATION/TRAINING PROGRAM

## 2022-08-21 PROCEDURE — 84145 PROCALCITONIN (PCT): CPT

## 2022-08-21 PROCEDURE — 94761 N-INVAS EAR/PLS OXIMETRY MLT: CPT

## 2022-08-21 PROCEDURE — 80048 BASIC METABOLIC PNL TOTAL CA: CPT

## 2022-08-21 PROCEDURE — 80076 HEPATIC FUNCTION PANEL: CPT

## 2022-08-21 PROCEDURE — 85025 COMPLETE CBC W/AUTO DIFF WBC: CPT

## 2022-08-21 PROCEDURE — 2700000000 HC OXYGEN THERAPY PER DAY

## 2022-08-21 PROCEDURE — 36415 COLL VENOUS BLD VENIPUNCTURE: CPT

## 2022-08-21 PROCEDURE — 83735 ASSAY OF MAGNESIUM: CPT

## 2022-08-21 PROCEDURE — 87449 NOS EACH ORGANISM AG IA: CPT

## 2022-08-21 PROCEDURE — 1200000000 HC SEMI PRIVATE

## 2022-08-21 PROCEDURE — 6370000000 HC RX 637 (ALT 250 FOR IP): Performed by: STUDENT IN AN ORGANIZED HEALTH CARE EDUCATION/TRAINING PROGRAM

## 2022-08-21 RX ORDER — BACLOFEN 10 MG/1
10 TABLET ORAL 2 TIMES DAILY PRN
Status: DISPENSED | OUTPATIENT
Start: 2022-08-21 | End: 2022-08-28

## 2022-08-21 RX ORDER — PANTOPRAZOLE SODIUM 40 MG/1
40 TABLET, DELAYED RELEASE ORAL
Status: DISPENSED | OUTPATIENT
Start: 2022-08-21 | End: 2022-08-28

## 2022-08-21 RX ORDER — INSULIN LISPRO 100 [IU]/ML
0-8 INJECTION, SOLUTION INTRAVENOUS; SUBCUTANEOUS
Status: DISCONTINUED | OUTPATIENT
Start: 2022-08-21 | End: 2022-08-22

## 2022-08-21 RX ORDER — DEXTROSE MONOHYDRATE 100 MG/ML
INJECTION, SOLUTION INTRAVENOUS CONTINUOUS PRN
Status: DISCONTINUED | OUTPATIENT
Start: 2022-08-21 | End: 2022-08-31 | Stop reason: HOSPADM

## 2022-08-21 RX ORDER — INSULIN LISPRO 100 [IU]/ML
0-4 INJECTION, SOLUTION INTRAVENOUS; SUBCUTANEOUS NIGHTLY
Status: DISCONTINUED | OUTPATIENT
Start: 2022-08-21 | End: 2022-08-22

## 2022-08-21 RX ADMIN — PANTOPRAZOLE SODIUM 40 MG: 40 TABLET, DELAYED RELEASE ORAL at 18:00

## 2022-08-21 RX ADMIN — SODIUM CHLORIDE, PRESERVATIVE FREE 10 ML: 5 INJECTION INTRAVENOUS at 09:40

## 2022-08-21 RX ADMIN — CEFEPIME 2000 MG: 2 INJECTION, POWDER, FOR SOLUTION INTRAVENOUS at 17:58

## 2022-08-21 RX ADMIN — INSULIN GLARGINE 5 UNITS: 100 INJECTION, SOLUTION SUBCUTANEOUS at 22:28

## 2022-08-21 RX ADMIN — DOXYCYCLINE 100 MG: 100 INJECTION, POWDER, LYOPHILIZED, FOR SOLUTION INTRAVENOUS at 06:25

## 2022-08-21 RX ADMIN — POTASSIUM BICARBONATE 40 MEQ: 782 TABLET, EFFERVESCENT ORAL at 21:18

## 2022-08-21 RX ADMIN — INSULIN HUMAN 6 UNITS: 100 INJECTION, SOLUTION PARENTERAL at 06:54

## 2022-08-21 RX ADMIN — SODIUM CHLORIDE: 9 INJECTION, SOLUTION INTRAVENOUS at 06:23

## 2022-08-21 RX ADMIN — INSULIN HUMAN 10 UNITS: 100 INJECTION, SOLUTION PARENTERAL at 04:11

## 2022-08-21 RX ADMIN — DOXYCYCLINE 100 MG: 100 INJECTION, POWDER, LYOPHILIZED, FOR SOLUTION INTRAVENOUS at 22:30

## 2022-08-21 RX ADMIN — PREGABALIN 50 MG: 50 CAPSULE ORAL at 09:40

## 2022-08-21 RX ADMIN — PREGABALIN 50 MG: 50 CAPSULE ORAL at 14:22

## 2022-08-21 RX ADMIN — SODIUM CHLORIDE: 9 INJECTION, SOLUTION INTRAVENOUS at 01:53

## 2022-08-21 RX ADMIN — POTASSIUM BICARBONATE 40 MEQ: 782 TABLET, EFFERVESCENT ORAL at 11:07

## 2022-08-21 RX ADMIN — INSULIN LISPRO 6 UNITS: 100 INJECTION, SOLUTION INTRAVENOUS; SUBCUTANEOUS at 17:50

## 2022-08-21 RX ADMIN — CEFEPIME 2000 MG: 2 INJECTION, POWDER, FOR SOLUTION INTRAVENOUS at 01:54

## 2022-08-21 RX ADMIN — SODIUM CHLORIDE: 9 INJECTION, SOLUTION INTRAVENOUS at 00:21

## 2022-08-21 RX ADMIN — Medication 100 MG: at 09:40

## 2022-08-21 RX ADMIN — CEFEPIME 2000 MG: 2 INJECTION, POWDER, FOR SOLUTION INTRAVENOUS at 09:40

## 2022-08-21 RX ADMIN — BACLOFEN 10 MG: 10 TABLET ORAL at 17:53

## 2022-08-21 RX ADMIN — ENOXAPARIN SODIUM 40 MG: 100 INJECTION SUBCUTANEOUS at 09:41

## 2022-08-21 RX ADMIN — INSULIN HUMAN 10 UNITS: 100 INJECTION, SOLUTION PARENTERAL at 01:50

## 2022-08-21 RX ADMIN — INSULIN LISPRO 2 UNITS: 100 INJECTION, SOLUTION INTRAVENOUS; SUBCUTANEOUS at 12:57

## 2022-08-21 RX ADMIN — PREGABALIN 50 MG: 50 CAPSULE ORAL at 21:17

## 2022-08-21 NOTE — ED PROVIDER NOTES
[address] [facility] RESIDENT NOTE     Date of evaluation: 8/20/2022    ADDENDUM:      Care of this patient was assumed from Dr. Arnaldo Peterson. Please see his note for full HPI and documentation of initial management. .  The patient was seen for Shortness of Breath (EMS call for \"multiple near syncopal episodes\", room air 81%. Pt states he just feels \"crappy\". )  . The patient's initial evaluation and plan have been discussed with the prior provider who initially evaluated the patient. Nursing Notes, Past Medical Hx, Past Surgical Hx, Social Hx, Allergies, and Family Hx were all reviewed. Diagnostic Results       RADIOLOGY:  CT CHEST PULMONARY EMBOLISM W CONTRAST   Final Result      Limited assessment for pulmonary embolism, particularly in the lower lobe secondary to respiratory motion. No pulmonary emboli in the lower evaluated pulmonary arteries. Moderate peripheral and basilar predominant groundglass opacity and septal thickening, suspicious for atypical pneumonia. Hepatic steatosis. Trace residual peripancreatic fat stranding, likely sequelae of pancreatitis as noted on CT 2 weeks prior. INCIDENTAL FINDINGS: None. XR CHEST (2 VW)   Final Result      1. Minimal bilateral basilar airspace disease.                 LABS:   Results for orders placed or performed during the hospital encounter of 08/20/22   CBC with Auto Differential   Result Value Ref Range    WBC 12.7 (H) 4.0 - 11.0 K/uL    RBC 3.61 (L) 4.20 - 5.90 M/uL    Hemoglobin 12.6 (L) 13.5 - 17.5 g/dL    Hematocrit 36.0 (L) 40.5 - 52.5 %    MCV 99.8 80.0 - 100.0 fL    MCH 34.9 (H) 26.0 - 34.0 pg    MCHC 35.0 31.0 - 36.0 g/dL    RDW 13.2 12.4 - 15.4 %    Platelets 015 689 - 073 K/uL    MPV 8.0 5.0 - 10.5 fL    Neutrophils % 82.7 %    Lymphocytes % 7.4 %    Monocytes % 9.4 %    Eosinophils % 0.1 %    Basophils % 0.4 %    Neutrophils Absolute 10.5 (H) 1.7 - 7.7 K/uL    Lymphocytes Absolute 0.9 (L) 1.0 - 5.1 K/uL    Monocytes Absolute 1.2 0.0 - 1.3 K/uL    Eosinophils Absolute 0.0 0.0 - 0.6 K/uL    Basophils Absolute 0.1 0.0 - 0.2 K/uL   BMP   Result Value Ref Range    Sodium 134 (L) 136 - 145 mmol/L    Potassium 3.8 3.5 - 5.1 mmol/L    Chloride 96 (L) 99 - 110 mmol/L    CO2 18 (L) 21 - 32 mmol/L    Anion Gap 20 (H) 3 - 16    Glucose 321 (H) 70 - 99 mg/dL    BUN 13 7 - 20 mg/dL    Creatinine 0.7 (L) 0.8 - 1.3 mg/dL    GFR Non-African American >60 >60    GFR African American >60 >60    Calcium 9.6 8.3 - 10.6 mg/dL   Troponin   Result Value Ref Range    Troponin <0.01 <0.01 ng/mL       RECENT VITALS:  BP: 122/70, Temp: 99.3 °F (37.4 °C), Heart Rate: (!) 108, Resp: 28, SpO2: 99 %     Procedures       ED Course as of 08/21/22 0323   Sat Aug 20, 2022   1840 The patient's CT scan of her chest showed findings consistent with atypical pneumonia. And at this time will be started on antibiotics given his recent hospitalization and hypoxia that he is having at this time in addition to his tachypnea [EI]      ED Course User Index  [EI] Carolyn Kearns MD       The patient was given the following medications:  Orders Placed This Encounter   Medications    iopamidol (ISOVUE-370) 76 % injection 75 mL    cefTRIAXone (ROCEPHIN) 1,000 mg in dextrose 5 % 50 mL IVPB mini-bag     Order Specific Question:   Antimicrobial Indications     Answer:   Pneumonia (CAP)    doxycycline (VIBRAMYCIN) 100 mg in dextrose 5 % 100 mL IVPB     Order Specific Question:   Antimicrobial Indications     Answer:   Pneumonia (CAP)       CONSULTS:  Valeriano Carlisle / KENYA / Ramon Adam is a 57 y.o. male who was treated here in the emergency department for pneumonia. CT scan of his chest revealed evidence of atypical pneumonia. He was started on doxycycline and ceftriaxone. His laboratory evaluation revealed a mild leukocytosis to 12.7.   CT scan of his head did not reveal any acute abnormalities. The patient maintained an oxygen requirement which is new for him. His lactate was not elevated. Blood cultures were sent and are still pending. His EKG did not show any signs of acute ischemia and his troponin was negative. His VBG did not show any signs of acidosis. While in the ED, the patient was mildly tachycardic but without any hypotension. He will be admitted to the hospital in stable condition for further evaluation and management of his pneumonia. This patient was also evaluated by the attending physician. All care plans were discussed and agreed upon. Clinical Impression     1. Pneumonia of left lower lobe due to infectious organism    2. Hypoxia        Disposition     PATIENT REFERRED TO:  No follow-up provider specified.     DISCHARGE MEDICATIONS:  New Prescriptions    No medications on file       DISPOSITION Decision To Admit 08/20/2022 06:42:08 PM         Zora Cr MD  08/21/22 [id number]

## 2022-08-21 NOTE — H&P
Internal Medicine  PGY 1  History & Physical      CC : Hiccups for 2 days and fatigue    History Obtained From:  patient    HISTORY OF PRESENT ILLNESS:  Patient is a 70-year-old male with a past medical history of type 2 diabetes complicated by neuropathy, chronic hepatitis C s/p treatment, tobacco use, and recurrent neurocardiogenic syncope who presented with 2 days of constant hiccups and fatigue. Patient states that he hiccups have not gone away and are constant. He also states that he has been having fatigue for about the past week. He describes it as a lack of energy. He also states that he had a syncopal episode. States he had some slight blurriness of his vision with shortness of breath prior to passing out. He is not sure if he hit his head. He denies any difficulty with p.o. intake and states he has been able to eat and drink just fine. Denies any aspiration events. He does state that he has had some watery diarrhea over the past several days. Denies any blood in the stool. Denies any fever, chills, nausea, vomiting, abdominal pain, or chest pain. In the emergency department lactic acid was 1.7. On presentation his oxygen saturation was in the low 80s he was placed on 5 L nasal cannula brought back up to the mid 90s. Patient had a mild leukocytosis of 12.7. Chest x-ray showed minimal bilateral basilar airspace disease. CT PE showed no evidence of pulmonary emboli. Moderate peripheral and basilar predominant groundglass opacity and septal thickening, suspicious for atypical pneumonia. CT of the head was negative. Past Medical History:        Diagnosis Date    Chronic hepatitis C without hepatic coma ([facility] [address] - Diagnosed in 30s     Type 2 diabetes mellitus without complication, without long-term current use of insulin ([facility] [address] 7/9/2021       Past Surgical History:    History reviewed. No pertinent surgical history.     Medications Priorto Admission:    Not in a hospital admission. Allergies:  Patient has no known allergies. Social History:   TOBACCO:   reports that he has been smoking cigars and cigarettes. He has never used smokeless tobacco.  ETOH:   reports current alcohol use. DRUGS : none  Patient currently lives alone    Family History:       Problem Relation Age of Onset    Other Mother         COPD    No Known Problems Sister     No Known Problems Brother     No Known Problems Brother     No Known Problems Brother        Review of Systems   Constitutional:         Hiccups the past few days that have not stopped   HENT: Negative. Eyes: Negative. Respiratory:  Positive for shortness of breath. Negative for cough. Cardiovascular:  Positive for palpitations. Gastrointestinal:  Positive for diarrhea. Negative for nausea and vomiting. Genitourinary: Negative. Musculoskeletal: Negative. Skin: Negative. Neurological: Negative. Psychiatric/Behavioral: Negative. Physical Exam  Vitals reviewed. Constitutional:       Appearance: Normal appearance. He is normal weight. Comments: Positive for hiccups   HENT:      Head: Normocephalic and atraumatic. Eyes:      Extraocular Movements: Extraocular movements intact. Conjunctiva/sclera: Conjunctivae normal.      Pupils: Pupils are equal, round, and reactive to light. Cardiovascular:      Rate and Rhythm: Regular rhythm. Tachycardia present. Heart sounds: Normal heart sounds. Pulmonary:      Effort: Pulmonary effort is normal.      Breath sounds: Normal breath sounds. Abdominal:      General: Abdomen is flat. Bowel sounds are normal.      Palpations: Abdomen is soft. Musculoskeletal:         General: Normal range of motion. Skin:     General: Skin is warm and dry. Neurological:      General: No focal deficit present. Mental Status: He is alert and oriented to person, place, and time. Mental status is at baseline.    Psychiatric:         Mood and Affect: Mood normal. Behavior: Behavior normal.     Physical exam:       Vitals:    08/20/22 2000   BP: 122/70   Pulse: (!) 108   Resp: 28   Temp:    SpO2: 99%       DATA:    Labs:  CBC:   Recent Labs     08/20/22  1546   WBC 12.7*   HGB 12.6*   HCT 36.0*          BMP:   Recent Labs     08/20/22  1546   *   K 3.8   CL 96*   CO2 18*   BUN 13   CREATININE 0.7*   GLUCOSE 321*     LFT's: No results for input(s): AST, ALT, ALB, BILITOT, ALKPHOS in the last 72 hours. Troponin:   Recent Labs     08/20/22  1546   TROPONINI <0.01     BNP:No results for input(s): BNP in the last 72 hours. ABGs: No results for input(s): PHART, TJE3VWK, PO2ART in the last 72 hours. INR: No results for input(s): INR in the last 72 hours. U/A:No results for input(s): NITRITE, COLORU, PHUR, LABCAST, WBCUA, RBCUA, MUCUS, TRICHOMONAS, YEAST, BACTERIA, CLARITYU, SPECGRAV, LEUKOCYTESUR, UROBILINOGEN, BILIRUBINUR, BLOODU, GLUCOSEU, AMORPHOUS in the last 72 hours. Invalid input(s): KETONESU    CT Head W/O Contrast   Final Result      No acute intracranial hemorrhage or mass effect. Mild chronic small vessel ischemic change. CT CHEST PULMONARY EMBOLISM W CONTRAST   Final Result      Limited assessment for pulmonary embolism, particularly in the lower lobe secondary to respiratory motion. No pulmonary emboli in the lower evaluated pulmonary arteries. Moderate peripheral and basilar predominant groundglass opacity and septal thickening, suspicious for atypical pneumonia. Hepatic steatosis. Trace residual peripancreatic fat stranding, likely sequelae of pancreatitis as noted on CT 2 weeks prior. INCIDENTAL FINDINGS: None. XR CHEST (2 VW)   Final Result      1. Minimal bilateral basilar airspace disease. ASSESSMENT AND PLAN:    Hospital-acquired pneumonia  As patient was recently seen a week ago in the hospital for pancreatitis and is now presenting with pneumonia.   He remains afebrile he does have a mild leukocytosis of 12.7. Chest x-ray showed minimal bilateral basilar airspace disease. CT PE showed no evidence of PE, moderate peripheral and basilar predominant groundglass opacity and septal thickening suspicious for atypical pneumonia.  - COVID and influenza test ordered  - Viral panel ordered  - Sputum culture  - Blood cultures  - Gentle IV fluids at 100 cc/hr for 10 hours  - Started on cefepime and doxycycline for atypical coverage    Type 2 diabetes mellitus  Patient's glucose on presentation was 321. Patient's last hemoglobin A1c on 8/9/2022 was 6.6.   Patient has diabetic neuropathy.  - Continue home Lyrica  - Given 10 units insulin  - Started on medium dose sliding scale insulin  - POCT    History of orthostatic syncope  Likely related autonomic dysfunction in setting of diabetic neuropathy.  - Continue home midodrine 5 mg twice daily       Will discuss with attending physician Dr. Stewart Monzon    Code Status:Full code  FEN: Diabetic diet  PPX: Lovenox  DISPO: [id number] Stephania Salazar DO  8/20/2022,  10:43 PM

## 2022-08-21 NOTE — PROGRESS NOTES
4 Eyes Admission Assessment     I agree as the admission nurse that 2 RN's have performed a thorough Head to Toe Skin Assessment on the patient. ALL assessment sites listed below have been assessed on admission. Areas assessed by both nurses: Meaghan Purdue  [x]   Head, Face, and Ears   [x]   Shoulders, Back, and Chest  [x]   Arms, Elbows, and Hands   [x]   Coccyx, Sacrum, and Ischium  [x]   Legs, Feet, and Heels        Does the Patient have Skin Breakdown?   No         César Prevention initiated:  No   Wound Care Orders initiated:  No      [facility] nurse consulted for Pressure Injury (Stage 3,4, Unstageable, DTI, NWPT, and Complex wounds) or César score 18 or lower:  No      Nurse 1 eSignature: Electronically signed by Coby Bynum RN on 8/21/22 at 3:03 AM EDT    **SHARE this note so that the co-signing nurse is able to place an eSignature**    Nurse 2 eSignature: Electronically signed by José Luis Mendosa RN on 8/21/22 at 8:02 AM EDT

## 2022-08-21 NOTE — PROGRESS NOTES
Pharmacy Note - Extended Infusion Beta-Lactam Adjustment    Cefepime 2000 mg every 12 hours ordered for treatment of HAP. Per [facility] Extended Infusion Beta-Lactam Policy, order will be changed to 2000 mg 8 hours     Estimated Creatinine Clearance: Estimated Creatinine Clearance: 107 mL/min (A) (based on SCr of 0.7 mg/dL (L)). Dialysis Status, TYLOR, CKD:   BMI: Body mass index is 21.25 kg/m². Rationale for Adjustment: Agent demonstrates time-dependent effect on bacterial eradication. Extended-infusion dosing strategy aims to enhance microbiologic and clinical efficacy. Pharmacy will continue to monitor cultures and sensitivities (where available) and adjust dose as necessary. Please call with any questions.   Sudheer Nelson, PharmD, BCPS

## 2022-08-22 LAB
ALBUMIN SERPL-MCNC: 2.9 G/DL (ref 3.4–5)
ALBUMIN SERPL-MCNC: 3 G/DL (ref 3.4–5)
ALBUMIN SERPL-MCNC: 3.1 G/DL (ref 3.4–5)
ALP BLD-CCNC: 91 U/L (ref 40–129)
ALP BLD-CCNC: 92 U/L (ref 40–129)
ALT SERPL-CCNC: 36 U/L (ref 10–40)
ALT SERPL-CCNC: 36 U/L (ref 10–40)
ANION GAP SERPL CALCULATED.3IONS-SCNC: 13 MMOL/L (ref 3–16)
AST SERPL-CCNC: 79 U/L (ref 15–37)
AST SERPL-CCNC: 81 U/L (ref 15–37)
BASOPHILS ABSOLUTE: 0.1 K/UL (ref 0–0.2)
BASOPHILS RELATIVE PERCENT: 0.6 %
BILIRUB SERPL-MCNC: 0.5 MG/DL (ref 0–1)
BILIRUB SERPL-MCNC: 0.6 MG/DL (ref 0–1)
BILIRUBIN DIRECT: 0.3 MG/DL (ref 0–0.3)
BILIRUBIN DIRECT: 0.4 MG/DL (ref 0–0.3)
BILIRUBIN, INDIRECT: 0.2 MG/DL (ref 0–1)
BILIRUBIN, INDIRECT: 0.2 MG/DL (ref 0–1)
BUN BLDV-MCNC: 11 MG/DL (ref 7–20)
CALCIUM SERPL-MCNC: 8.9 MG/DL (ref 8.3–10.6)
CHLORIDE BLD-SCNC: 101 MMOL/L (ref 99–110)
CO2: 21 MMOL/L (ref 21–32)
CREAT SERPL-MCNC: 0.7 MG/DL (ref 0.8–1.3)
EOSINOPHILS ABSOLUTE: 0.1 K/UL (ref 0–0.6)
EOSINOPHILS RELATIVE PERCENT: 1.7 %
GFR AFRICAN AMERICAN: >60
GFR NON-AFRICAN AMERICAN: >60
GLUCOSE BLD-MCNC: 226 MG/DL (ref 70–99)
GLUCOSE BLD-MCNC: 227 MG/DL (ref 70–99)
GLUCOSE BLD-MCNC: 273 MG/DL (ref 70–99)
GLUCOSE BLD-MCNC: 288 MG/DL (ref 70–99)
GLUCOSE BLD-MCNC: 320 MG/DL (ref 70–99)
HCT VFR BLD CALC: 36.2 % (ref 40.5–52.5)
HEMOGLOBIN: 12.4 G/DL (ref 13.5–17.5)
LIPASE: 42 U/L (ref 13–60)
LYMPHOCYTES ABSOLUTE: 0.9 K/UL (ref 1–5.1)
LYMPHOCYTES RELATIVE PERCENT: 11.2 %
MAGNESIUM: 1.6 MG/DL (ref 1.8–2.4)
MCH RBC QN AUTO: 34.3 PG (ref 26–34)
MCHC RBC AUTO-ENTMCNC: 34.2 G/DL (ref 31–36)
MCV RBC AUTO: 100.2 FL (ref 80–100)
MONOCYTES ABSOLUTE: 0.7 K/UL (ref 0–1.3)
MONOCYTES RELATIVE PERCENT: 8.4 %
NEUTROPHILS ABSOLUTE: 6.6 K/UL (ref 1.7–7.7)
NEUTROPHILS RELATIVE PERCENT: 78.1 %
PDW BLD-RTO: 13.3 % (ref 12.4–15.4)
PERFORMED ON: ABNORMAL
PHOSPHORUS: 2.9 MG/DL (ref 2.5–4.9)
PLATELET # BLD: 192 K/UL (ref 135–450)
PMV BLD AUTO: 7.6 FL (ref 5–10.5)
POTASSIUM SERPL-SCNC: 3.6 MMOL/L (ref 3.5–5.1)
RAPID INFLUENZA  B AGN: NEGATIVE
RAPID INFLUENZA A AGN: NEGATIVE
RBC # BLD: 3.61 M/UL (ref 4.2–5.9)
REPORT: NORMAL
SODIUM BLD-SCNC: 135 MMOL/L (ref 136–145)
TOTAL PROTEIN: 6 G/DL (ref 6.4–8.2)
TOTAL PROTEIN: 6.1 G/DL (ref 6.4–8.2)
WBC # BLD: 8.4 K/UL (ref 4–11)

## 2022-08-22 PROCEDURE — 83735 ASSAY OF MAGNESIUM: CPT

## 2022-08-22 PROCEDURE — 6360000002 HC RX W HCPCS

## 2022-08-22 PROCEDURE — 2500000003 HC RX 250 WO HCPCS: Performed by: STUDENT IN AN ORGANIZED HEALTH CARE EDUCATION/TRAINING PROGRAM

## 2022-08-22 PROCEDURE — 80069 RENAL FUNCTION PANEL: CPT

## 2022-08-22 PROCEDURE — 87633 RESP VIRUS 12-25 TARGETS: CPT

## 2022-08-22 PROCEDURE — 85025 COMPLETE CBC W/AUTO DIFF WBC: CPT

## 2022-08-22 PROCEDURE — 1200000000 HC SEMI PRIVATE

## 2022-08-22 PROCEDURE — 36415 COLL VENOUS BLD VENIPUNCTURE: CPT

## 2022-08-22 PROCEDURE — 2580000003 HC RX 258: Performed by: STUDENT IN AN ORGANIZED HEALTH CARE EDUCATION/TRAINING PROGRAM

## 2022-08-22 PROCEDURE — 6370000000 HC RX 637 (ALT 250 FOR IP)

## 2022-08-22 PROCEDURE — 2580000003 HC RX 258

## 2022-08-22 PROCEDURE — 87081 CULTURE SCREEN ONLY: CPT

## 2022-08-22 PROCEDURE — 80076 HEPATIC FUNCTION PANEL: CPT

## 2022-08-22 PROCEDURE — 83690 ASSAY OF LIPASE: CPT

## 2022-08-22 PROCEDURE — 87804 INFLUENZA ASSAY W/OPTIC: CPT

## 2022-08-22 PROCEDURE — 6370000000 HC RX 637 (ALT 250 FOR IP): Performed by: STUDENT IN AN ORGANIZED HEALTH CARE EDUCATION/TRAINING PROGRAM

## 2022-08-22 RX ORDER — MAGNESIUM SULFATE IN WATER 40 MG/ML
2000 INJECTION, SOLUTION INTRAVENOUS ONCE
Status: COMPLETED | OUTPATIENT
Start: 2022-08-22 | End: 2022-08-22

## 2022-08-22 RX ORDER — INSULIN LISPRO 100 [IU]/ML
0-16 INJECTION, SOLUTION INTRAVENOUS; SUBCUTANEOUS
Status: DISCONTINUED | OUTPATIENT
Start: 2022-08-22 | End: 2022-08-31 | Stop reason: HOSPADM

## 2022-08-22 RX ORDER — POTASSIUM CHLORIDE 20 MEQ/1
20 TABLET, EXTENDED RELEASE ORAL 2 TIMES DAILY WITH MEALS
Status: COMPLETED | OUTPATIENT
Start: 2022-08-22 | End: 2022-08-22

## 2022-08-22 RX ORDER — INSULIN LISPRO 100 [IU]/ML
0-4 INJECTION, SOLUTION INTRAVENOUS; SUBCUTANEOUS NIGHTLY
Status: DISCONTINUED | OUTPATIENT
Start: 2022-08-22 | End: 2022-08-31 | Stop reason: HOSPADM

## 2022-08-22 RX ADMIN — SODIUM CHLORIDE: 9 INJECTION, SOLUTION INTRAVENOUS at 16:52

## 2022-08-22 RX ADMIN — BACLOFEN 10 MG: 10 TABLET ORAL at 04:21

## 2022-08-22 RX ADMIN — POTASSIUM CHLORIDE 20 MEQ: 1500 TABLET, EXTENDED RELEASE ORAL at 10:09

## 2022-08-22 RX ADMIN — PREGABALIN 50 MG: 50 CAPSULE ORAL at 14:07

## 2022-08-22 RX ADMIN — INSULIN LISPRO 4 UNITS: 100 INJECTION, SOLUTION INTRAVENOUS; SUBCUTANEOUS at 16:55

## 2022-08-22 RX ADMIN — CEFEPIME 2000 MG: 2 INJECTION, POWDER, FOR SOLUTION INTRAVENOUS at 09:00

## 2022-08-22 RX ADMIN — CEFEPIME 2000 MG: 2 INJECTION, POWDER, FOR SOLUTION INTRAVENOUS at 16:52

## 2022-08-22 RX ADMIN — DOXYCYCLINE 100 MG: 100 INJECTION, POWDER, LYOPHILIZED, FOR SOLUTION INTRAVENOUS at 22:19

## 2022-08-22 RX ADMIN — SODIUM CHLORIDE, PRESERVATIVE FREE 10 ML: 5 INJECTION INTRAVENOUS at 08:45

## 2022-08-22 RX ADMIN — PREGABALIN 50 MG: 50 CAPSULE ORAL at 20:39

## 2022-08-22 RX ADMIN — POTASSIUM CHLORIDE 20 MEQ: 1500 TABLET, EXTENDED RELEASE ORAL at 16:40

## 2022-08-22 RX ADMIN — BACLOFEN 10 MG: 10 TABLET ORAL at 16:39

## 2022-08-22 RX ADMIN — SODIUM CHLORIDE, PRESERVATIVE FREE 10 ML: 5 INJECTION INTRAVENOUS at 22:18

## 2022-08-22 RX ADMIN — INSULIN LISPRO 4 UNITS: 100 INJECTION, SOLUTION INTRAVENOUS; SUBCUTANEOUS at 08:45

## 2022-08-22 RX ADMIN — SODIUM CHLORIDE: 9 INJECTION, SOLUTION INTRAVENOUS at 10:13

## 2022-08-22 RX ADMIN — DOXYCYCLINE 100 MG: 100 INJECTION, POWDER, LYOPHILIZED, FOR SOLUTION INTRAVENOUS at 10:15

## 2022-08-22 RX ADMIN — ENOXAPARIN SODIUM 40 MG: 100 INJECTION SUBCUTANEOUS at 08:44

## 2022-08-22 RX ADMIN — INSULIN LISPRO 6 UNITS: 100 INJECTION, SOLUTION INTRAVENOUS; SUBCUTANEOUS at 12:14

## 2022-08-22 RX ADMIN — CEFEPIME 2000 MG: 2 INJECTION, POWDER, FOR SOLUTION INTRAVENOUS at 00:49

## 2022-08-22 RX ADMIN — SODIUM CHLORIDE: 9 INJECTION, SOLUTION INTRAVENOUS at 08:58

## 2022-08-22 RX ADMIN — PREGABALIN 50 MG: 50 CAPSULE ORAL at 08:43

## 2022-08-22 RX ADMIN — Medication 100 MG: at 08:44

## 2022-08-22 RX ADMIN — MAGNESIUM SULFATE HEPTAHYDRATE 2000 MG: 40 INJECTION, SOLUTION INTRAVENOUS at 10:14

## 2022-08-22 ASSESSMENT — PAIN SCALES - GENERAL
PAINLEVEL_OUTOF10: 0
PAINLEVEL_OUTOF10: 4
PAINLEVEL_OUTOF10: 3

## 2022-08-22 ASSESSMENT — PAIN DESCRIPTION - ORIENTATION
ORIENTATION: MID;LOWER
ORIENTATION: RIGHT;LEFT

## 2022-08-22 ASSESSMENT — PAIN DESCRIPTION - FREQUENCY: FREQUENCY: CONTINUOUS

## 2022-08-22 ASSESSMENT — PAIN DESCRIPTION - DESCRIPTORS
DESCRIPTORS: SORE
DESCRIPTORS: SPASM

## 2022-08-22 ASSESSMENT — PAIN DESCRIPTION - LOCATION
LOCATION: OTHER (COMMENT)
LOCATION: ABDOMEN

## 2022-08-22 ASSESSMENT — PAIN DESCRIPTION - ONSET: ONSET: ON-GOING

## 2022-08-22 ASSESSMENT — PAIN - FUNCTIONAL ASSESSMENT
PAIN_FUNCTIONAL_ASSESSMENT: ACTIVITIES ARE NOT PREVENTED
PAIN_FUNCTIONAL_ASSESSMENT: ACTIVITIES ARE NOT PREVENTED

## 2022-08-22 ASSESSMENT — PAIN DESCRIPTION - PAIN TYPE: TYPE: ACUTE PAIN

## 2022-08-22 NOTE — CARE COORDINATION
Case Management Assessment           Initial Evaluation                Date / Time of Evaluation: 8/22/2022 12:14 PM                 Assessment Completed by: PRATIMA Light    Patient Name: Carolyn Andrews     YOB: 1960  Diagnosis: Hypoxia [R09.02]  PNA (pneumonia) [J18.9]  Pneumonia of left lower lobe due to infectious organism [J18.9]  Hospital-acquired pneumonia [J18.9, Y95]     Date / Time: 8/20/2022  3:34 PM    Patient Admission Status: Inpatient    If patient is discharged prior to next notation, then this note serves as note for discharge by case management.      Current PCP: Brittany Charles DO  Clinic Patient: No    Chart Reviewed: Yes  Patient/ Family Interviewed: Yes    Initial assessment completed at bedside with: patient    Hospitalization in the last 30 days: No    Emergency Contacts:  Extended Emergency Contact Information  Primary Emergency Contact: Benny Diego [place] Phone: [phone]  Relation: Child    Advance Directives:   Code Status: Full [address]: No  Agent:   Contact Number:     Copy present: No     In paper Chart: No    Scanned into EMR No    Financial  Payor: [address] / Plan: [address] / Product Type: *No Product type* /     Pre-cert required for SNF: Yes    Pharmacy    [facility] [place], [address] [phone] - F [phone]  [address]  Phone: [phone] Fax: [address] [facility], [address] [address]  Phone: [phone] Fax: [address]  Phone: [phone] Fax: [phone]      Potential assistance Purchasing Medications:    Does Patient want to participate in local refill/ meds to beds program?: Meds To Beds General Rules:  1. Can ONLY be done Monday- Friday between 8:30am-5pm  2. Prescription(s) must be in pharmacy by 3pm to be filled same day  3. Copy of patient's insurance/ prescription drug card and patient face sheet must be sent along with the prescription(s)  4. Cost of Rx cannot be added to hospital bill. If financial assistance is needed, please contact unit  or ;  or  CANNOT provide pharmacy voucher for patients co-pays  5.  Patients can then  the prescription on their way out of the hospital at discharge, or pharmacy can deliver to the bedside if staff is available. (payment due at time of pick-up or delivery - cash, check, or card accepted)     Able to afford home medications/ co-pay costs: Yes    ADLS  Support Systems:      PT AM-PAC:   /24  OT AM-PAC:   /24    [place]: home alone, no steps   Steps: none    Plans to RETURN to current housing: Yes  Barriers to RETURNING to current housing: none    [address]  Currently ACTIVE with [facility] [address]: No  Home Care Agency: Not Applicable    Currently ACTIVE with [place] on Aging: No  Passport/ Waiver: No  Passport/ Waiver Services: Not Applicable    :  Phone:       [address]  [facility] [place] [place] Provider: none  Equipment: none    Home Oxygen and [address] [place] prior to admission: No  [address]: Not Applicable  Other Respiratory Equipment: n/a    Informed of need to bring portable home O2 tank on day of DISCHARGE for nursing to connect prior to leaving: No  Verbalized agreement/Understanding: No  Person to bring portable tank at discharge: n/a    Dialysis  Active with HD/PD prior to admission: No  Nephrologist: [address]  Not Applicable    DISCHARGE PLAN:  Disposition: Home- No Services Needed    Transportation PLAN for discharge:  will need Lyft home      Factors facilitating achievement of predicted outcomes: Cooperative    Barriers to discharge: IV ABX, wean oxygen, IV hydration    Additional Case Management Notes: Patient reports he lives alone with no steps at home. Patient uses no DME or additional services at home pta. Patient is not on O2 at home and goal is to wean patient to room air. Patient reports he will need a lift to get home at discharge. CM verified that the address on file is patient's current home address. Patient reported no other CM needs at discharge at this time. The Plan for Transition of Care is related to the following treatment goals of Hypoxia [R09.02]  PNA (pneumonia) [J18.9]  Pneumonia of left lower lobe due to infectious organism [J18.9]  Hospital-acquired pneumonia [J18.9, Y95]    The Patient and/or patient representative Tete Kidd and his family were provided with a choice of provider and agrees with the discharge plan Yes    Freedom of choice list was provided with basic dialogue that supports the patient's individualized plan of care/goals and shares the quality data associated with the providers.  Yes    Care Transition patient: Yes    Kenneth Masters  Case Management Department  Ph: [phone]   Fax: [phone]

## 2022-08-22 NOTE — PLAN OF CARE
Problem: Pain  Goal: Verbalizes/displays adequate comfort level or baseline comfort level  Outcome: Not Progressing   Patient states that he is still having discomfort with hiccups, Patient received baclofen to help with discomfort   Problem: Safety - Adult  Goal: Free from fall injury  8/22/2022 1844 by Romain Cortez RN  Outcome: Progressing  Patient remained free of falls, non-skid socks on. Call light, belongings, and bedside table within reach.  Will continue to monitor

## 2022-08-22 NOTE — PROGRESS NOTES
Internal Medicine  PGY 1  Progress note      CC : Hiccups for 2 days and fatigue    History Obtained From:  patient    Inteval Hx:  No acute events overnight. Patient seen this morning at bedside laying comfortably in bed. Patient seems sleepy, but states that has no acute complaints. He says he is breathing well on the 4 L he is currently on and has no shortness of breath. He additionally denies any abdominal pain. He continues with hiccups although infrequently which are uncomfortable and elicit some mild pain in his chest.    He otherwise has no complaints of fevers, chills, or other infectious symptoms. Past Medical History:        Diagnosis Date    Chronic hepatitis C without hepatic coma ([facility] [address] - Diagnosed in 30s     Type 2 diabetes mellitus without complication, without long-term [facility] use [facility]       Past Surgical History:    History reviewed. No pertinent surgical history. Medications Priorto Admission:    Medications Prior to Admission: thiamine mononitrate (THIAMINE) 100 MG tablet, Take 1 tablet by mouth in the morning. metFORMIN (GLUCOPHAGE) 500 MG tablet, Take 1 tablet by mouth daily (with breakfast)  [] oxyCODONE (OXY-IR) 10 MG immediate release tablet, Take 1 tablet by mouth every 8 hours as needed for Pain (severe pain) for up to 5 days. pregabalin (LYRICA) 50 MG capsule, Take 1 capsule by mouth in the morning and 1 capsule at noon and 1 capsule before bedtime. Do all this for 30 days. L-methylfolate-B6-B12 (METANX) 2-71.110-7-86 MG CAPS capsule, Take 1 capsule by mouth daily    Allergies:  Patient has no known allergies. Social History:   TOBACCO:   reports that he has been smoking cigars and cigarettes. He has never used smokeless tobacco.  ETOH:   reports current alcohol use.   DRUGS : none  Patient currently lives alone    Family History:       Problem Relation Age of Onset    Other Mother         COPD    No Known Problems Sister     No Known Problems Brother     No Known Problems Brother     No Known Problems Brother            Physical exam:    Physical Exam  Vitals reviewed. Constitutional:  on 4L NC     Appearance: Normal appearance. He is normal weight. Comments: Positive for hiccups   HENT:      Head: Normocephalic and atraumatic. Eyes:      Extraocular Movements: Extraocular movements intact. Conjunctiva/sclera: Conjunctivae normal.      Pupils: Pupils are equal, round, and reactive to light. Cardiovascular:      Rate and Rhythm: Regular rhythm. Regular rate. Heart sounds: Normal heart sounds. Pulmonary:      Effort: Pulmonary effort is normal.      Breath sounds: Normal breath sounds. Abdominal:      General: Abdomen is flat. Bowel sounds are normal.      Palpations: Abdomen is soft. Musculoskeletal:         General: Normal range of motion. Mild tenderness to palpation of chest   Skin:     General: Skin is warm and dry. Neurological:      General: No focal deficit present. Mental Status: He is alert and oriented to person, place, and time. Mental status is at baseline. Psychiatric:         Mood and Affect: Mood normal.         Behavior: Behavior normal.      Vitals:    08/22/22 0803   BP: (!) 151/91   Pulse: 89   Resp: 16   Temp: 98.1 °F (36.7 °C)   SpO2: 96%       DATA:    Labs:  CBC:   Recent Labs     08/20/22  1546 08/21/22  0625 08/22/22  0705   WBC 12.7* 11.9* 8.4   HGB 12.6* 11.6* 12.4*   HCT 36.0* 33.9* 36.2*    198 192       BMP:   Recent Labs     08/20/22  1546 08/21/22  0625 08/22/22  0705   * 137 135*   K 3.8 3.2* 3.6   CL 96* 101 101   CO2 18* 23 21   BUN 13 12 11   CREATININE 0.7* 0.7* 0.7*   GLUCOSE 321* 225* 273*   PHOS  --   --  2.9     LFT's:   Recent Labs     08/21/22  0625 08/22/22  0705   AST 51* 79*   ALT 26 36   BILITOT 0.5 0.6   ALKPHOS 91 92     Troponin:   Recent Labs     08/20/22  1546   TROPONINI <0.01     BNP:No results for input(s): BNP in the last 72 hours.   ABGs: No results for input(s): PHART, JEE9GMM, PO2ART in the last 72 hours. INR: No results for input(s): INR in the last 72 hours. U/A:No results for input(s): NITRITE, COLORU, PHUR, LABCAST, WBCUA, RBCUA, MUCUS, TRICHOMONAS, YEAST, BACTERIA, CLARITYU, SPECGRAV, LEUKOCYTESUR, UROBILINOGEN, BILIRUBINUR, BLOODU, GLUCOSEU, AMORPHOUS in the last 72 hours. Invalid input(s): KETONESU    CT Head W/O Contrast   Final Result      No acute intracranial hemorrhage or mass effect. Mild chronic small vessel ischemic change. CT CHEST PULMONARY EMBOLISM W CONTRAST   Final Result      Limited assessment for pulmonary embolism, particularly in the lower lobe secondary to respiratory motion. No pulmonary emboli in the lower evaluated pulmonary arteries. Moderate peripheral and basilar predominant groundglass opacity and septal thickening, suspicious for atypical pneumonia. Hepatic steatosis. Trace residual peripancreatic fat stranding, likely sequelae of pancreatitis as noted on CT 2 weeks prior. INCIDENTAL FINDINGS: None. XR CHEST (2 VW)   Final Result      1. Minimal bilateral basilar airspace disease. ASSESSMENT AND PLAN:    Health Care Associated Pneumonia  Acute Hypoxic Respiratory Failure  - Viral panel ordered, COVID-negative  - Sputum culture, await results  - Blood cultures, Pos 1 out of 2 for Staph. Likely contaminant. Will follow up 2nd culture.   - procal normal  - WBC improving  - Cefepime and doxycycline for atypical coverage  - Will look to narrow and transition to PO pending culture results  - Sating well on 4L, wean today (baseline on RA)    Hiccups:  -likely 2/2 to PNA irritating the diaphragm however cannot rule out pancreas as the cause given recent hospitalization for pancreatitis.  -If pt finds hiccups distressing than may try one time chlorpromazine but with caution due to its QT prolonging side effects    Sepsis  Patient with 1 positive blood culture for coagulase-negative staph. This is likely contaminant, but will continue to follow-up with second blood culture vial.  -Patient with improving leukocytosis    Type 2 diabetes mellitus  Patient's glucose on presentation was 321. Patient's last hemoglobin A1c on 8/9/2022 was 6.6.   - up lantus, 8 units  - Started on medium dose sliding scale insulin  - POCT    History of orthostatic syncope  Likely related autonomic dysfunction in setting of diabetic neuropathy.  - Continue home midodrine 5 mg twice daily  -check ortho stats  - Patient no longer orthostatic today.        Will discuss with attending physician Dr. Edwardo Barragan Status:Full code  FEN: Diabetic diet  PPX: Lovenox  DISPO: Ima Rubinstein, MD PGY-1  8/22/2022,  9:16 AM

## 2022-08-22 NOTE — PLAN OF CARE
Problem: Safety - Adult  Goal: Free from fall injury  Outcome: Adequate for Discharge  Note: Pt has remained free from falls during shift. Pt's bed is locked in lowest position with alarm on, call light, bedside table, and personal belongings within reach.

## 2022-08-23 LAB
ALBUMIN SERPL-MCNC: 2.8 G/DL (ref 3.4–5)
ALBUMIN SERPL-MCNC: 2.9 G/DL (ref 3.4–5)
ALP BLD-CCNC: 206 U/L (ref 40–129)
ALT SERPL-CCNC: 127 U/L (ref 10–40)
ANION GAP SERPL CALCULATED.3IONS-SCNC: 12 MMOL/L (ref 3–16)
AST SERPL-CCNC: 307 U/L (ref 15–37)
BASOPHILS ABSOLUTE: 0 K/UL (ref 0–0.2)
BASOPHILS RELATIVE PERCENT: 0.2 %
BILIRUB SERPL-MCNC: 1.2 MG/DL (ref 0–1)
BILIRUBIN DIRECT: 0.8 MG/DL (ref 0–0.3)
BILIRUBIN, INDIRECT: 0.4 MG/DL (ref 0–1)
BLOOD CULTURE, ROUTINE: ABNORMAL
BLOOD CULTURE, ROUTINE: ABNORMAL
BUN BLDV-MCNC: 10 MG/DL (ref 7–20)
CALCIUM SERPL-MCNC: 8.6 MG/DL (ref 8.3–10.6)
CHLORIDE BLD-SCNC: 102 MMOL/L (ref 99–110)
CO2: 21 MMOL/L (ref 21–32)
CREAT SERPL-MCNC: 0.6 MG/DL (ref 0.8–1.3)
EOSINOPHILS ABSOLUTE: 0.1 K/UL (ref 0–0.6)
EOSINOPHILS RELATIVE PERCENT: 1 %
GFR AFRICAN AMERICAN: >60
GFR NON-AFRICAN AMERICAN: >60
GLUCOSE BLD-MCNC: 197 MG/DL (ref 70–99)
GLUCOSE BLD-MCNC: 255 MG/DL (ref 70–99)
GLUCOSE BLD-MCNC: 257 MG/DL (ref 70–99)
GLUCOSE BLD-MCNC: 261 MG/DL (ref 70–99)
GLUCOSE BLD-MCNC: 324 MG/DL (ref 70–99)
HCT VFR BLD CALC: 35.3 % (ref 40.5–52.5)
HEMOGLOBIN: 11.8 G/DL (ref 13.5–17.5)
LYMPHOCYTES ABSOLUTE: 0.7 K/UL (ref 1–5.1)
LYMPHOCYTES RELATIVE PERCENT: 9.3 %
MAGNESIUM: 1.7 MG/DL (ref 1.8–2.4)
MCH RBC QN AUTO: 33.7 PG (ref 26–34)
MCHC RBC AUTO-ENTMCNC: 33.5 G/DL (ref 31–36)
MCV RBC AUTO: 100.8 FL (ref 80–100)
MONOCYTES ABSOLUTE: 0.7 K/UL (ref 0–1.3)
MONOCYTES RELATIVE PERCENT: 9.4 %
NEUTROPHILS ABSOLUTE: 6 K/UL (ref 1.7–7.7)
NEUTROPHILS RELATIVE PERCENT: 80.1 %
ORGANISM: ABNORMAL
ORGANISM: ABNORMAL
PDW BLD-RTO: 13.1 % (ref 12.4–15.4)
PERFORMED ON: ABNORMAL
PHOSPHORUS: 2.7 MG/DL (ref 2.5–4.9)
PLATELET # BLD: 193 K/UL (ref 135–450)
PMV BLD AUTO: 7.6 FL (ref 5–10.5)
POTASSIUM SERPL-SCNC: 4 MMOL/L (ref 3.5–5.1)
RBC # BLD: 3.5 M/UL (ref 4.2–5.9)
SODIUM BLD-SCNC: 135 MMOL/L (ref 136–145)
TOTAL PROTEIN: 6 G/DL (ref 6.4–8.2)
WBC # BLD: 7.5 K/UL (ref 4–11)

## 2022-08-23 PROCEDURE — 6370000000 HC RX 637 (ALT 250 FOR IP)

## 2022-08-23 PROCEDURE — 97535 SELF CARE MNGMENT TRAINING: CPT

## 2022-08-23 PROCEDURE — 1200000000 HC SEMI PRIVATE

## 2022-08-23 PROCEDURE — 2580000003 HC RX 258: Performed by: STUDENT IN AN ORGANIZED HEALTH CARE EDUCATION/TRAINING PROGRAM

## 2022-08-23 PROCEDURE — 97530 THERAPEUTIC ACTIVITIES: CPT

## 2022-08-23 PROCEDURE — 97161 PT EVAL LOW COMPLEX 20 MIN: CPT

## 2022-08-23 PROCEDURE — 80076 HEPATIC FUNCTION PANEL: CPT

## 2022-08-23 PROCEDURE — 80069 RENAL FUNCTION PANEL: CPT

## 2022-08-23 PROCEDURE — 97116 GAIT TRAINING THERAPY: CPT

## 2022-08-23 PROCEDURE — 6360000002 HC RX W HCPCS

## 2022-08-23 PROCEDURE — 83735 ASSAY OF MAGNESIUM: CPT

## 2022-08-23 PROCEDURE — 6360000002 HC RX W HCPCS: Performed by: STUDENT IN AN ORGANIZED HEALTH CARE EDUCATION/TRAINING PROGRAM

## 2022-08-23 PROCEDURE — 87070 CULTURE OTHR SPECIMN AEROBIC: CPT

## 2022-08-23 PROCEDURE — 2500000003 HC RX 250 WO HCPCS: Performed by: STUDENT IN AN ORGANIZED HEALTH CARE EDUCATION/TRAINING PROGRAM

## 2022-08-23 PROCEDURE — 85025 COMPLETE CBC W/AUTO DIFF WBC: CPT

## 2022-08-23 PROCEDURE — 2580000003 HC RX 258

## 2022-08-23 PROCEDURE — 36415 COLL VENOUS BLD VENIPUNCTURE: CPT

## 2022-08-23 PROCEDURE — 87205 SMEAR GRAM STAIN: CPT

## 2022-08-23 PROCEDURE — 97165 OT EVAL LOW COMPLEX 30 MIN: CPT

## 2022-08-23 RX ORDER — METOCLOPRAMIDE 5 MG/1
5 TABLET ORAL
Status: COMPLETED | OUTPATIENT
Start: 2022-08-23 | End: 2022-08-23

## 2022-08-23 RX ORDER — MAGNESIUM SULFATE IN WATER 40 MG/ML
2000 INJECTION, SOLUTION INTRAVENOUS ONCE
Status: COMPLETED | OUTPATIENT
Start: 2022-08-23 | End: 2022-08-23

## 2022-08-23 RX ADMIN — PREGABALIN 50 MG: 50 CAPSULE ORAL at 09:09

## 2022-08-23 RX ADMIN — BACLOFEN 10 MG: 10 TABLET ORAL at 01:13

## 2022-08-23 RX ADMIN — PANTOPRAZOLE SODIUM 40 MG: 40 TABLET, DELAYED RELEASE ORAL at 06:19

## 2022-08-23 RX ADMIN — DOXYCYCLINE 100 MG: 100 INJECTION, POWDER, LYOPHILIZED, FOR SOLUTION INTRAVENOUS at 11:26

## 2022-08-23 RX ADMIN — Medication 100 MG: at 09:10

## 2022-08-23 RX ADMIN — BACLOFEN 10 MG: 10 TABLET ORAL at 14:30

## 2022-08-23 RX ADMIN — METOCLOPRAMIDE 5 MG: 5 TABLET ORAL at 11:20

## 2022-08-23 RX ADMIN — INSULIN LISPRO 8 UNITS: 100 INJECTION, SOLUTION INTRAVENOUS; SUBCUTANEOUS at 09:34

## 2022-08-23 RX ADMIN — CEFEPIME 2000 MG: 2 INJECTION, POWDER, FOR SOLUTION INTRAVENOUS at 09:30

## 2022-08-23 RX ADMIN — PREGABALIN 50 MG: 50 CAPSULE ORAL at 14:28

## 2022-08-23 RX ADMIN — SODIUM CHLORIDE, PRESERVATIVE FREE 10 ML: 5 INJECTION INTRAVENOUS at 22:48

## 2022-08-23 RX ADMIN — INSULIN LISPRO 12 UNITS: 100 INJECTION, SOLUTION INTRAVENOUS; SUBCUTANEOUS at 17:52

## 2022-08-23 RX ADMIN — SODIUM CHLORIDE: 9 INJECTION, SOLUTION INTRAVENOUS at 17:52

## 2022-08-23 RX ADMIN — SODIUM CHLORIDE: 9 INJECTION, SOLUTION INTRAVENOUS at 09:29

## 2022-08-23 RX ADMIN — SODIUM CHLORIDE, PRESERVATIVE FREE 10 ML: 5 INJECTION INTRAVENOUS at 09:23

## 2022-08-23 RX ADMIN — CEFEPIME 2000 MG: 2 INJECTION, POWDER, FOR SOLUTION INTRAVENOUS at 01:09

## 2022-08-23 RX ADMIN — PREGABALIN 50 MG: 50 CAPSULE ORAL at 22:47

## 2022-08-23 RX ADMIN — CEFEPIME 2000 MG: 2 INJECTION, POWDER, FOR SOLUTION INTRAVENOUS at 17:52

## 2022-08-23 RX ADMIN — MAGNESIUM SULFATE HEPTAHYDRATE 2000 MG: 40 INJECTION, SOLUTION INTRAVENOUS at 11:26

## 2022-08-23 RX ADMIN — DOXYCYCLINE 100 MG: 100 INJECTION, POWDER, LYOPHILIZED, FOR SOLUTION INTRAVENOUS at 22:54

## 2022-08-23 RX ADMIN — INSULIN LISPRO 8 UNITS: 100 INJECTION, SOLUTION INTRAVENOUS; SUBCUTANEOUS at 12:32

## 2022-08-23 ASSESSMENT — PAIN SCALES - GENERAL
PAINLEVEL_OUTOF10: 5
PAINLEVEL_OUTOF10: 8

## 2022-08-23 ASSESSMENT — PAIN - FUNCTIONAL ASSESSMENT: PAIN_FUNCTIONAL_ASSESSMENT: PREVENTS OR INTERFERES SOME ACTIVE ACTIVITIES AND ADLS

## 2022-08-23 ASSESSMENT — PAIN DESCRIPTION - LOCATION: LOCATION: OTHER (COMMENT)

## 2022-08-23 NOTE — PROGRESS NOTES
Occupational Therapy  Facility/Department: [address]  Occupational Therapy Initial Assessment    Name: Romel Jose  : 1960  MRN: [id number]  Date of Service: 2022    Discharge Recommendations: Romel Jose scored a 21/24 on the AM-PAC ADL Inpatient form. Current research shows that an AM-PAC score of 18 or greater is typically associated with a discharge to the patient's home setting. Based on the patient's AM-PAC score, and their current ADL deficits, it is recommended that the patient have 2-3 sessions per week of Occupational Therapy at d/c to increase the patient's independence. At this time, this patient demonstrates the endurance and safety to discharge home. Please see assessment section for further patient specific details. If patient discharges prior to next session this note will serve as a discharge summary. Please see below for the latest assessment towards goals. OT Equipment Recommendations  Equipment Needed: No       Patient Diagnosis(es): The primary encounter diagnosis was Pneumonia of left lower lobe due to infectious organism. A diagnosis of Hypoxia was also pertinent to this visit. Past Medical History:  has a past medical history of Chronic hepatitis C without hepatic coma ([facility] [address] - Diagnosed in 30s and Type 2 diabetes mellitus without complication, without long-term current use of insulin ([facility] Past Surgical History:  has no past surgical history on file. Treatment Diagnosis: decreased functional mobility, safety awareness, ADL status, endurance/strength. Assessment   Performance deficits / Impairments: Decreased functional mobility ; Decreased safe awareness;Decreased ADL status; Decreased endurance;Decreased strength  Assessment: Pt from home, admitted for hypoxia/SOB. Prior to admission, pt was independent with ADL's and functional mobility. Pt currently requiring SBA-CGA for ADL's and functional mobility. Pt currentl using 4LO2.  Vitals were taken: BP: 144/98, 88-91% O2 while pt was seated after walk to restroom. Pt sat in chair to clean UB/LB with SBA, UB/LB dressing wtih SBA, completed oral care standing at sink with CBA-CGA. Pt walked from chair into bathroom with SBA-CGA. OT recommending home with [employer]. Pt would continue to benefit from OT services to increase safety awareness and maximize independence. Treatment Diagnosis: decreased functional mobility, safety awareness, ADL status, endurance/strength. Decision Making: Low Complexity  Activity Tolerance  Activity Tolerance: Patient Tolerated treatment well;Patient limited by fatigue        Plan   Plan  Times per Week: 2-5  Times per Day: Daily     Restrictions  Position Activity Restriction  Other position/activity restrictions: up with assist    Subjective   General  Chart Reviewed: Yes  Patient assessed for rehabilitation services?: Yes  Additional Pertinent Hx: coronary artery disease, hypertension, diabetes mellitus, alcohol abuse  Family / Caregiver Present: No  Referring Practitioner: Edson Hazel MD  Diagnosis: Hypoxia  Subjective  Subjective: Pt supine in bed, sleeping. States he has no slept in 3-4 days.  Reports no pain     Social/Functional History  Social/Functional History  Lives With: Alone  Type of Home: Apartment (lives on first floor)  Home Layout: One level  Home Access: Level entry  Bathroom Shower/Tub: Tub/Shower unit  Bathroom Toilet: Standard  Home Equipment:  (none)  Has the patient had two or more falls in the past year or any fall with injury in the past year?: Yes (reports several falls in the last year 2/2 dizziness)  ADL Assistance: [address] [place] [address] Avenue: Independent  Homemaking Responsibilities: Yes  Ambulation Assistance: Independent  Transfer Assistance: Independent  Active : No  Patient's  Info: Pt reports his friend drives him  Occupation: Retired  Type of Occupation:   Additional Comments: Pt states he does not have someone to help out overnight if needed          Safety Devices  Type of Devices: Call light within reach; Chair alarm in place;Nurse notified; Left in chair;Gait belt;Patient at risk for falls  Bed Mobility Training  Bed Mobility Training: Yes  Overall Level of Assistance: Stand-by assistance;Contact-guard assistance  Supine to Sit: Stand-by assistance;Contact-guard assistance  Balance  Sitting: Intact  Standing: Intact  Transfer Training  Transfer Training: Yes  Overall Level of Assistance: Stand-by assistance;Contact-guard assistance  Stand to Sit: Stand-by assistance;Contact-guard assistance  Bed to Chair: Stand-by assistance;Contact-guard assistance  Toilet Transfer: Stand-by assistance;Contact-guard assistance  Gait  Overall Level of Assistance: Stand-by assistance;Contact-guard assistance (Pt walked to bathroom from bed SBA-CGA)     AROM: Generally decreased, functional  Strength: Generally decreased, functional                 Vision  Vision: Within Functional Limits  Hearing  Hearing: Within functional limits  Cognition  Overall Cognitive Status: WNL  Orientation  Orientation Level: Oriented to place;Oriented to time;Oriented to situation;Disoriented to time                  Education Given To: Patient  Education Provided: Plan of Care;Role of Therapy  Education Method: Demonstration  Barriers to Learning: None  Education Outcome: Verbalized understanding;Demonstrated understanding                                 AM-PAC Score        AM-[facility] Inpatient Daily Activity Raw Score: 21 (08/23/22 0936)  AM-PAC Inpatient ADL T-Scale Score : 44.27 (08/23/22 0936)  ADL Inpatient CMS 0-100% Score: 32.79 (08/23/22 0936)  ADL Inpatient CMS G-Code Modifier : CJ (08/23/22 7339)      Goals  Short Term Goals  Time Frame for Short term goals: d/c  Short Term Goal 1: pt will be Mod I for toileting  Short Term Goal 2: Pt will be Mod I for functional transfers  Short Term Goal 3: Pt will complete dynamic standing activity SPVN for 5 minutes in preparation for ADL's       Therapy Time   Individual Concurrent Group Co-treatment   Time In 0815         Time Out 0853         Minutes 38             Timed Code Treatment Minutes:   23    Total Treatment Minutes:   Inderjit 72, OTS      Therapist was present, directed the patients care, made skilled judgement and was responsible for assessment and treatment of the patient.      Jake Martinez, MOT, OTR/L

## 2022-08-23 NOTE — CARE COORDINATION
CM following: CM met with patient at bedside this PM. CM reviewed discharge plan of home with no needs other than Lyft to get home. Patient remains in agreement with discharge plan and reported no additional CM needs.   Electronically signed by PRATIMA Taylor on 8/23/2022 at 4:49 PM  [phone]

## 2022-08-23 NOTE — PROGRESS NOTES
none  Patient currently lives alone    Family History:       Problem Relation Age of Onset    Other Mother         COPD    No Known Problems Sister     No Known Problems Brother     No Known Problems Brother     No Known Problems Brother            Physical exam:    Physical Exam  Vitals reviewed. Constitutional:  on 4L NC, turned down to 2 L during exam.  Well-tolerated     Appearance: Normal appearance. He is normal weight. Comments: Positive for hiccups   HENT:      Head: Normocephalic and atraumatic. Eyes:      Extraocular Movements: Extraocular movements intact. Conjunctiva/sclera: Conjunctivae normal.      Pupils: Pupils are equal, round, and reactive to light. Cardiovascular:      Rate and Rhythm: Regular rhythm. Regular rate. Heart sounds: Normal heart sounds. Pulmonary:      Effort: Pulmonary effort is normal.      Breath sounds: Normal breath sounds. Abdominal:      General: Abdomen is flat. Bowel sounds are normal.      Palpations: Abdomen is soft. Patient notes pain on deep palpation under rib cage. Musculoskeletal:         General: Normal range of motion. Mild tenderness to palpation of chest   Skin:     General: Skin is warm and dry. Neurological:      General: No focal deficit present. Mental Status: He is alert and oriented to person, place, and time. Mental status is at baseline.    Psychiatric:         Mood and Affect: Mood normal.         Behavior: Behavior normal.      Vitals:    08/23/22 1200   BP: (!) 141/88   Pulse: 85   Resp: 18   Temp: 98 °F (36.7 °C)   SpO2: 96%       DATA:    Labs:  CBC:   Recent Labs     08/21/22  0625 08/22/22  0705 08/23/22  0612   WBC 11.9* 8.4 7.5   HGB 11.6* 12.4* 11.8*   HCT 33.9* 36.2* 35.3*    192 193       BMP:   Recent Labs     08/21/22  0625 08/22/22  0705 08/23/22  0612    135* 135*   K 3.2* 3.6 4.0    101 102   CO2 23 21 21   BUN 12 11 10   CREATININE 0.7* 0.7* 0.6*   GLUCOSE 225* 273* 257*   PHOS  --  2.9 2. 7     LFT's:   Recent Labs     08/21/22  0625 08/22/22  0702 08/22/22  0705   AST 51* 81* 79*   ALT 26 36 36   BILITOT 0.5 0.5 0.6   ALKPHOS 91 91 92     Troponin:   Recent Labs     08/20/22  1546   TROPONINI <0.01     BNP:No results for input(s): BNP in the last 72 hours. ABGs: No results for input(s): PHART, OBJ5MLF, PO2ART in the last 72 hours. INR: No results for input(s): INR in the last 72 hours. U/A:No results for input(s): NITRITE, COLORU, PHUR, LABCAST, WBCUA, RBCUA, MUCUS, TRICHOMONAS, YEAST, BACTERIA, CLARITYU, SPECGRAV, LEUKOCYTESUR, UROBILINOGEN, BILIRUBINUR, BLOODU, GLUCOSEU, AMORPHOUS in the last 72 hours. Invalid input(s): KETONESU    CT Head W/O Contrast   Final Result      No acute intracranial hemorrhage or mass effect. Mild chronic small vessel ischemic change. CT CHEST PULMONARY EMBOLISM W CONTRAST   Final Result      Limited assessment for pulmonary embolism, particularly in the lower lobe secondary to respiratory motion. No pulmonary emboli in the lower evaluated pulmonary arteries. Moderate peripheral and basilar predominant groundglass opacity and septal thickening, suspicious for atypical pneumonia. Hepatic steatosis. Trace residual peripancreatic fat stranding, likely sequelae of pancreatitis as noted on CT 2 weeks prior. INCIDENTAL FINDINGS: None. XR CHEST (2 VW)   Final Result      1. Minimal bilateral basilar airspace disease. ASSESSMENT AND PLAN:    Health Care Associated Pneumonia  Acute Hypoxic Respiratory Failure  - Viral panel ordered, COVID-negative  - Sputum culture, await results  - Blood cultures, Pos 1 out of 2 for Staph. Likely contaminant. Will follow up 2nd culture.   - procal normal  - WBC improving  - Cefepime and doxycycline for atypical coverage  - Will look to narrow and transition to PO pending culture results  - Sating well on 4L, wean today (baseline on RA)    Hiccups:  -likely 2/2 to PNA irritating the diaphragm however cannot rule out pancreas as the cause given recent hospitalization for pancreatitis.  -If pt finds hiccups distressing than may try one time chlorpromazine but with caution due to its QT prolonging side effects  - Patient on baclofen PRN and Reglan for hiccups as well. Sepsis  Patient with 1 positive blood culture for coagulase-negative staph. This is likely contaminant, but will continue to follow-up with second blood culture vial.  -Patient with improving leukocytosis    Type 2 diabetes mellitus  Patient's glucose on presentation was 321. Patient's last hemoglobin A1c on 8/9/2022 was 6.6.   - up lantus, 12 units  - Started on medium dose sliding scale insulin  - POCT    History of orthostatic syncope  Likely related autonomic dysfunction in setting of diabetic neuropathy.  - Continue home midodrine 5 mg twice daily  -check ortho stats  - Patient no longer orthostatic today.        Will discuss with attending physician Dr. Deisy Villafuerte Status:Full code  FEN: Diabetic diet  PPX: Lovenox  DISPO: Mamie Chris MD PGY-1  8/23/2022,  1:26 PM

## 2022-08-23 NOTE — PROGRESS NOTES
Physical Therapy  Facility/Department: [address]  Physical Therapy Initial Assessment and Treatment    Name: Hector Dumont  : 1960  MRN: [id number]  Date of Service: 2022    Discharge Recommendations:    Hector Dumont scored a 20/24 on the AM-PAC short mobility form. Current research shows that an AM-PAC score of 18 or greater is typically associated with a discharge to the patient's home setting. Based on the patient's AM-PAC score and their current functional mobility deficits, it is recommended that the patient have 2-3 sessions per week of Physical Therapy at d/c to increase the patient's independence. At this time, this patient demonstrates the endurance and safety to discharge home with home PT and a follow up treatment frequency of 2-3x/wk. Please see assessment section for further patient specific details. If patient discharges prior to next session this note will serve as a discharge summary. Please see below for the latest assessment towards goals. PT Equipment Recommendations  Equipment Needed:  (anticipate no needs)      Patient Diagnosis(es): The primary encounter diagnosis was Pneumonia of left lower lobe due to infectious organism. A diagnosis of Hypoxia was also pertinent to this visit. Past Medical History:  has a past medical history of Chronic hepatitis C without hepatic coma ([facility] [address] - Diagnosed in 30s and Type 2 diabetes mellitus without complication, without long-term current use of insulin ([facility] [address] Past Surgical History:  has no past surgical history on file. Assessment   Body Structures, Functions, Activity Limitations Requiring Skilled Therapeutic Intervention: Decreased functional mobility ; Decreased strength;Decreased endurance  Assessment: Pt with decreased independent mobility from baseline. Pt reports normally independent with mobility however admits to frequent falls. Currently with decreased activity tolerance. Needing CG/SBA for safety.   Desats with activity. Pt plans to return home at d/c however does not have anyone who can help him. Rec cont skilled PT to maximize mobilty and independence  Treatment Diagnosis: impaired functional mobility 2/2 decreased endurance  Decision Making: Low Complexity  Requires PT Follow-Up: Yes     Plan   Plan  Plan:  (2-5)  Current Treatment Recommendations: Functional mobility training, Endurance training, Strengthening, Gait training, Patient/Caregiver education & training, Safety education & training  Safety Devices  Type of Devices: Call light within reach, Chair alarm in place, Nurse notified, Left in chair     Restrictions  Position Activity Restriction  Other position/activity restrictions: up with assist     Subjective   General  Chart Reviewed: Yes  Patient assessed for rehabilitation services?: Yes  Additional Pertinent Hx: Pt is a 57 y.o. male adm 8/20 with hypoxia. Pt presented to ED with hypoxia. CXR:Minimal bilateral basilar airspace disease. CT chest:Moderate peripheral and basilar predominant groundglass opacity and septal thickening, suspicious for atypical pneumonia, neg PE. Head CT: neg. PMH:pancreatitis, type 2 diabetes, and chronic hepatitis C  Referring Practitioner: Gabby Louie MD  Referral Date : 08/22/22  Subjective  Subjective: Pt found supine. Reports has had trouble sleeping. Has had hiccups for several days and lower diaphragm is hurting. Was recently in hospital for pancreatitis.   Was only home a day and a half before needing to return to hospital.         Social/Functional History  Social/Functional History  Lives With: Alone  Type of Home: Apartment (lives on first floor)  Home Layout: One level  Home Access: Level entry  Bathroom Shower/Tub: Tub/Shower unit  Bathroom Toilet: Standard  Home Equipment:  (none)  Has the patient had two or more falls in the past year or any fall with injury in the past year?: Yes (reports several falls in the last year 2/2 dizziness)  ADL Assistance: Independent  Homemaking Assistance: Independent  Homemaking Responsibilities: Yes  Ambulation Assistance: Independent  Transfer Assistance: Independent  Active : No  Patient's  Info: Pt reports his friend drives him  Occupation: Retired  Type of Occupation:   Additional Comments: Pt states he does not have someone to help out overnight if needed  Vision/Hearing  Vision  Vision: Within Functional Limits  Hearing  Hearing: Within functional limits    Cognition   Orientation  Orientation Level: Oriented to place;Oriented to time;Oriented to situation;Disoriented to time  Cognition  Overall Cognitive Status: WNL     Objective                 AROM RLE (degrees)  RLE AROM: WFL  AROM LLE (degrees)  LLE AROM : WFL  Strength RLE  Strength RLE: WFL  Strength LLE  Strength LLE: WFL          Bed mobility  Supine to Sit: Modified independent  Transfers  Sit to Stand: Stand by assistance  Stand to sit: Stand by assistance  Ambulation  Device: No Device  Other Apparatus: O2 (4L)  Assistance: Contact guard assistance  Quality of Gait: decreased bilat step length/height, decreased maisha, no LOB, HERRERA  Distance: 12'x 2. Pt declined further gt at this time  Comments: Pulse ox 90% after ambulating to/from bathroom. Increased to 92% with rest and cues for pursed lip breathing.                  OutComes Score                                                  AM-PAC Score  AM-PAC Inpatient Mobility Raw Score : 20 (08/23/22 0840)  AM-PAC Inpatient T-Scale Score : 47.67 (08/23/22 0840)  Mobility Inpatient CMS 0-100% Score: 35.83 (08/23/22 0840)  Mobility Inpatient CMS G-Code Modifier : CJ (08/23/22 0840)          Tinneti Score       Goals  Short Term Goals  Time Frame for Short term goals: By discharge  Short term goal 1: Sit to stand independent  Short term goal 2: Pt will amb >100' with or without AD supervision       Education  Patient Education  Education Given To: Patient  Education Provided: Role of Therapy;Plan of Care  Education Method: Verbal  Education Outcome: Verbalized understanding;Continued education needed      Therapy Time   Individual Concurrent Group Co-treatment   Time In 1580         Time Out 6445         Minutes 38               Timed Code Treatment Minutes: 23      Total Treatment Minutes:  [address] [place], [place]

## 2022-08-23 NOTE — PLAN OF CARE
Problem: Discharge Planning  Goal: Discharge to home or other facility with appropriate resources  Outcome: Progressing     Problem: Chronic Conditions and Co-morbidities  Goal: Patient's chronic conditions and co-morbidity symptoms are monitored and maintained or improved  8/22/2022 2237 by Rhea Andrade RN  Outcome: Progressing     Problem: Pain  Goal: Verbalizes/displays adequate comfort level or baseline comfort level  8/22/2022 2239 by Rhea Andrade RN  Outcome: Progressing  Flowsheets (Taken 8/22/2022 1959)  Verbalizes/displays adequate comfort level or baseline comfort level: Encourage patient to monitor pain and request assistance  8/22/2022 1844 by Aster Parisi RN  Outcome: Not Progressing

## 2022-08-24 LAB
ALBUMIN SERPL-MCNC: 3 G/DL (ref 3.4–5)
ALBUMIN SERPL-MCNC: 3.1 G/DL (ref 3.4–5)
ALP BLD-CCNC: 207 U/L (ref 40–129)
ALT SERPL-CCNC: 87 U/L (ref 10–40)
ANION GAP SERPL CALCULATED.3IONS-SCNC: 9 MMOL/L (ref 3–16)
AST SERPL-CCNC: 86 U/L (ref 15–37)
BASOPHILS ABSOLUTE: 0 K/UL (ref 0–0.2)
BASOPHILS RELATIVE PERCENT: 0.3 %
BILIRUB SERPL-MCNC: 0.9 MG/DL (ref 0–1)
BILIRUBIN DIRECT: 0.4 MG/DL (ref 0–0.3)
BILIRUBIN, INDIRECT: 0.5 MG/DL (ref 0–1)
BUN BLDV-MCNC: 8 MG/DL (ref 7–20)
CALCIUM SERPL-MCNC: 8.9 MG/DL (ref 8.3–10.6)
CHLORIDE BLD-SCNC: 102 MMOL/L (ref 99–110)
CO2: 25 MMOL/L (ref 21–32)
CREAT SERPL-MCNC: 0.7 MG/DL (ref 0.8–1.3)
CULTURE, BLOOD 2: NORMAL
EOSINOPHILS ABSOLUTE: 0.2 K/UL (ref 0–0.6)
EOSINOPHILS RELATIVE PERCENT: 1.8 %
GFR AFRICAN AMERICAN: >60
GFR NON-AFRICAN AMERICAN: >60
GLUCOSE BLD-MCNC: 227 MG/DL (ref 70–99)
GLUCOSE BLD-MCNC: 229 MG/DL (ref 70–99)
GLUCOSE BLD-MCNC: 251 MG/DL (ref 70–99)
GLUCOSE BLD-MCNC: 256 MG/DL (ref 70–99)
GLUCOSE BLD-MCNC: 263 MG/DL (ref 70–99)
GLUCOSE BLD-MCNC: 279 MG/DL (ref 70–99)
HCT VFR BLD CALC: 37.5 % (ref 40.5–52.5)
HEMOGLOBIN: 12.8 G/DL (ref 13.5–17.5)
LYMPHOCYTES ABSOLUTE: 1.1 K/UL (ref 1–5.1)
LYMPHOCYTES RELATIVE PERCENT: 12.9 %
MAGNESIUM: 1.8 MG/DL (ref 1.8–2.4)
MCH RBC QN AUTO: 34.2 PG (ref 26–34)
MCHC RBC AUTO-ENTMCNC: 34.2 G/DL (ref 31–36)
MCV RBC AUTO: 100 FL (ref 80–100)
MONOCYTES ABSOLUTE: 0.7 K/UL (ref 0–1.3)
MONOCYTES RELATIVE PERCENT: 8 %
MRSA CULTURE ONLY: NORMAL
NEUTROPHILS ABSOLUTE: 6.9 K/UL (ref 1.7–7.7)
NEUTROPHILS RELATIVE PERCENT: 77 %
PDW BLD-RTO: 13.1 % (ref 12.4–15.4)
PERFORMED ON: ABNORMAL
PHOSPHORUS: 2.4 MG/DL (ref 2.5–4.9)
PLATELET # BLD: 182 K/UL (ref 135–450)
PMV BLD AUTO: 7.6 FL (ref 5–10.5)
POTASSIUM SERPL-SCNC: 4.2 MMOL/L (ref 3.5–5.1)
PROCALCITONIN: 0.16 NG/ML (ref 0–0.15)
RBC # BLD: 3.75 M/UL (ref 4.2–5.9)
SODIUM BLD-SCNC: 136 MMOL/L (ref 136–145)
TOTAL PROTEIN: 6.9 G/DL (ref 6.4–8.2)
WBC # BLD: 8.9 K/UL (ref 4–11)

## 2022-08-24 PROCEDURE — 36415 COLL VENOUS BLD VENIPUNCTURE: CPT

## 2022-08-24 PROCEDURE — 80069 RENAL FUNCTION PANEL: CPT

## 2022-08-24 PROCEDURE — 6360000002 HC RX W HCPCS

## 2022-08-24 PROCEDURE — 87641 MR-STAPH DNA AMP PROBE: CPT

## 2022-08-24 PROCEDURE — 1200000000 HC SEMI PRIVATE

## 2022-08-24 PROCEDURE — 6370000000 HC RX 637 (ALT 250 FOR IP)

## 2022-08-24 PROCEDURE — 2700000000 HC OXYGEN THERAPY PER DAY

## 2022-08-24 PROCEDURE — 94761 N-INVAS EAR/PLS OXIMETRY MLT: CPT

## 2022-08-24 PROCEDURE — 2580000003 HC RX 258

## 2022-08-24 PROCEDURE — 85025 COMPLETE CBC W/AUTO DIFF WBC: CPT

## 2022-08-24 PROCEDURE — 80076 HEPATIC FUNCTION PANEL: CPT

## 2022-08-24 PROCEDURE — 83735 ASSAY OF MAGNESIUM: CPT

## 2022-08-24 PROCEDURE — 6370000000 HC RX 637 (ALT 250 FOR IP): Performed by: STUDENT IN AN ORGANIZED HEALTH CARE EDUCATION/TRAINING PROGRAM

## 2022-08-24 PROCEDURE — 2580000003 HC RX 258: Performed by: STUDENT IN AN ORGANIZED HEALTH CARE EDUCATION/TRAINING PROGRAM

## 2022-08-24 PROCEDURE — 84145 PROCALCITONIN (PCT): CPT

## 2022-08-24 PROCEDURE — 2500000003 HC RX 250 WO HCPCS: Performed by: STUDENT IN AN ORGANIZED HEALTH CARE EDUCATION/TRAINING PROGRAM

## 2022-08-24 RX ORDER — CHLORPROMAZINE HYDROCHLORIDE 25 MG/1
25 TABLET, FILM COATED ORAL 3 TIMES DAILY
Status: COMPLETED | OUTPATIENT
Start: 2022-08-24 | End: 2022-08-25

## 2022-08-24 RX ADMIN — Medication 100 MG: at 09:13

## 2022-08-24 RX ADMIN — BACLOFEN 10 MG: 10 TABLET ORAL at 02:02

## 2022-08-24 RX ADMIN — PREGABALIN 50 MG: 50 CAPSULE ORAL at 14:01

## 2022-08-24 RX ADMIN — PANTOPRAZOLE SODIUM 40 MG: 40 TABLET, DELAYED RELEASE ORAL at 06:19

## 2022-08-24 RX ADMIN — CEFEPIME 2000 MG: 2 INJECTION, POWDER, FOR SOLUTION INTRAVENOUS at 01:58

## 2022-08-24 RX ADMIN — PREGABALIN 50 MG: 50 CAPSULE ORAL at 20:40

## 2022-08-24 RX ADMIN — BACLOFEN 10 MG: 10 TABLET ORAL at 11:18

## 2022-08-24 RX ADMIN — CHLORPROMAZINE HYDROCHLORIDE 25 MG: 25 TABLET, FILM COATED ORAL at 12:16

## 2022-08-24 RX ADMIN — CEFEPIME 2000 MG: 2 INJECTION, POWDER, FOR SOLUTION INTRAVENOUS at 16:28

## 2022-08-24 RX ADMIN — PREGABALIN 50 MG: 50 CAPSULE ORAL at 09:14

## 2022-08-24 RX ADMIN — INSULIN LISPRO 8 UNITS: 100 INJECTION, SOLUTION INTRAVENOUS; SUBCUTANEOUS at 12:34

## 2022-08-24 RX ADMIN — DOXYCYCLINE 100 MG: 100 INJECTION, POWDER, LYOPHILIZED, FOR SOLUTION INTRAVENOUS at 12:11

## 2022-08-24 RX ADMIN — CEFEPIME 2000 MG: 2 INJECTION, POWDER, FOR SOLUTION INTRAVENOUS at 09:40

## 2022-08-24 RX ADMIN — INSULIN LISPRO 8 UNITS: 100 INJECTION, SOLUTION INTRAVENOUS; SUBCUTANEOUS at 08:27

## 2022-08-24 RX ADMIN — INSULIN LISPRO 8 UNITS: 100 INJECTION, SOLUTION INTRAVENOUS; SUBCUTANEOUS at 17:07

## 2022-08-24 RX ADMIN — DOXYCYCLINE 100 MG: 100 INJECTION, POWDER, LYOPHILIZED, FOR SOLUTION INTRAVENOUS at 23:14

## 2022-08-24 RX ADMIN — SODIUM CHLORIDE, PRESERVATIVE FREE 10 ML: 5 INJECTION INTRAVENOUS at 09:20

## 2022-08-24 RX ADMIN — ENOXAPARIN SODIUM 40 MG: 100 INJECTION SUBCUTANEOUS at 09:14

## 2022-08-24 RX ADMIN — SODIUM CHLORIDE, PRESERVATIVE FREE 10 ML: 5 INJECTION INTRAVENOUS at 21:06

## 2022-08-24 ASSESSMENT — PAIN DESCRIPTION - LOCATION
LOCATION: RIB CAGE

## 2022-08-24 ASSESSMENT — PAIN SCALES - GENERAL
PAINLEVEL_OUTOF10: 8
PAINLEVEL_OUTOF10: 3
PAINLEVEL_OUTOF10: 8
PAINLEVEL_OUTOF10: 2
PAINLEVEL_OUTOF10: 2
PAINLEVEL_OUTOF10: 7
PAINLEVEL_OUTOF10: 7

## 2022-08-24 NOTE — PLAN OF CARE
General Internal Medicine Attending     Chart and data reviewed. Patient seen and examined, case discussed with medical resident. Physical exam repeated. Labs and imaging studies reviewed. Agree with documentation, assessment and plan as outlined          Sepsis: POA  Bilateral Pneumonia: POA  Acute hypoxic Respiratory: POA  Type 2 diabetes mellitus  History of orthostatic syncope  ETOH abuse        EMS called for multiple near syncopal episodes\", room air 81%. Pxt had leucocytosis and tachycardia on admission, with pneumonia    COVID negative     Blood cx: CoNS: likely contaminate     Sputum cx finally done: ID of orgs still pending (likely normal upper resp salazar)    MRSA screen: Cx was ordered: we will get prob    Follow micro to possibly de-escalate antibx by tomorrow    He is noted with elevated liver enzymes. We will repeat, tamara with his persistent hiccups, cholelithiasis, may suggest biliary disease. If still elevated, we will obtain repeat GB imaging to reassess the GB, CBD. Wean Oxygen      Monitor orthostatics       Disposition:  Possible DC home  tomorrow on oral antibx pending further results, and clinical progress.    PT/OT yinka Jimenes MD

## 2022-08-24 NOTE — PROGRESS NOTES
Internal Medicine  PGY 1  Progress note      CC : Hiccups for 2 days and fatigue    History Obtained From:  patient    Inteval Hx:  No acute events overnight. Patient states that he does not feel short of breath. He continues to be on 3 L and breathing normally. He states that his main concern continues to be the hiccups. We tried Reglan yesterday, but with no improvement. In the past he has received 1 dose of chlorpromazine, which he believes has helped. He otherwise has no major complaints. No cough, no sputum production, no feelings of fevers, no chills, no sweating, no belly pain. Past Medical History:        Diagnosis Date    Chronic hepatitis C without hepatic coma ([facility] [address] - Diagnosed in 30s     Type 2 diabetes mellitus without complication, without long-term current use of insulin ([facility] 2021       Past Surgical History:    History reviewed. No pertinent surgical history. Medications Priorto Admission:    Medications Prior to Admission: thiamine mononitrate (THIAMINE) 100 MG tablet, Take 1 tablet by mouth in the morning. metFORMIN (GLUCOPHAGE) 500 MG tablet, Take 1 tablet by mouth daily (with breakfast)  [] oxyCODONE (OXY-IR) 10 MG immediate release tablet, Take 1 tablet by mouth every 8 hours as needed for Pain (severe pain) for up to 5 days. pregabalin (LYRICA) 50 MG capsule, Take 1 capsule by mouth in the morning and 1 capsule at noon and 1 capsule before bedtime. Do all this for 30 days. L-methylfolate-B6-B12 (METANX) 9-81.316-3-45 MG CAPS capsule, Take 1 capsule by mouth daily    Allergies:  Patient has no known allergies. Social History:   TOBACCO:   reports that he has been smoking cigars and cigarettes. He has never used smokeless tobacco.  ETOH:   reports current alcohol use.   DRUGS : none  Patient currently lives alone    Family History:       Problem Relation Age of Onset    Other Mother         COPD    No Known Problems Sister     No Known Problems Brother No Known Problems Brother     No Known Problems Brother            Physical exam:    Physical Exam  Vitals reviewed. Constitutional:  on 3L NC  Well-tolerated     Appearance: Normal appearance. He is normal weight. Comments: Positive for hiccups   HENT:      Head: Normocephalic and atraumatic. Eyes:      Extraocular Movements: Extraocular movements intact. Conjunctiva/sclera: Conjunctivae normal.      Pupils: Pupils are equal, round, and reactive to light. Cardiovascular:      Rate and Rhythm: Regular rhythm. Regular rate. Heart sounds: Normal heart sounds. Pulmonary:      Effort: Pulmonary effort is normal.      Breath sounds: Normal breath sounds. Abdominal:      General: Abdomen is flat. Bowel sounds are normal.      Palpations: Abdomen is soft. Patient notes pain on deep palpation under rib cage. Musculoskeletal:         General: Normal range of motion. Mild tenderness to palpation of chest   Skin:     General: Skin is warm and dry. Neurological:      General: No focal deficit present. Mental Status: He is alert and oriented to person, place, and time. Mental status is at baseline. Psychiatric:         Mood and Affect: Mood normal.         Behavior: Behavior normal.      Vitals:    08/24/22 1150   BP:    Pulse: 98   Resp:    Temp:    SpO2:        DATA:    Labs:  CBC:   Recent Labs     08/22/22  0705 08/23/22  0612 08/24/22  0857   WBC 8.4 7.5 8.9   HGB 12.4* 11.8* 12.8*   HCT 36.2* 35.3* 37.5*    193 182       BMP:   Recent Labs     08/22/22  0705 08/23/22  0612 08/24/22  0857   * 135* 136   K 3.6 4.0 4.2    102 102   CO2 21 21 25   BUN 11 10 8   CREATININE 0.7* 0.6* 0.7*   GLUCOSE 273* 257* 263*   PHOS 2.9 2.7 2.4*     LFT's:   Recent Labs     08/22/22  0705 08/23/22  0602 08/24/22  0857   AST 79* 307* 86*   ALT 36 127* 87*   BILITOT 0.6 1.2* 0.9   ALKPHOS 92 206* 207*     Troponin:   No results for input(s): TROPONINI in the last 72 hours.     BNP:No results for input(s): BNP in the last 72 hours. ABGs: No results for input(s): PHART, SLF7TJX, PO2ART in the last 72 hours. INR: No results for input(s): INR in the last 72 hours. U/A:No results for input(s): NITRITE, COLORU, PHUR, LABCAST, WBCUA, RBCUA, MUCUS, TRICHOMONAS, YEAST, BACTERIA, CLARITYU, SPECGRAV, LEUKOCYTESUR, UROBILINOGEN, BILIRUBINUR, BLOODU, GLUCOSEU, AMORPHOUS in the last 72 hours. Invalid input(s): KETONESU    CT Head W/O Contrast   Final Result      No acute intracranial hemorrhage or mass effect. Mild chronic small vessel ischemic change. CT CHEST PULMONARY EMBOLISM W CONTRAST   Final Result      Limited assessment for pulmonary embolism, particularly in the lower lobe secondary to respiratory motion. No pulmonary emboli in the lower evaluated pulmonary arteries. Moderate peripheral and basilar predominant groundglass opacity and septal thickening, suspicious for atypical pneumonia. Hepatic steatosis. Trace residual peripancreatic fat stranding, likely sequelae of pancreatitis as noted on CT 2 weeks prior. INCIDENTAL FINDINGS: None. XR CHEST (2 VW)   Final Result      1. Minimal bilateral basilar airspace disease. ASSESSMENT AND PLAN:    Health Care Associated Pneumonia  Acute Hypoxic Respiratory Failure  - Viral panel ordered, COVID-negative  - Sputum culture done, awaiting species  - Blood cultures, Pos 1 out of 2 for Staph. Likely contaminant. Will follow up 2nd culture. - WBC improved  - Cefepime and doxycycline for atypical coverage  - Will look to narrow and transition to PO pending culture results  - Sating well on 3L, continue to wean (baseline on RA)    Hiccups  Transaminitis  -likely 2/2 to PNA irritating the diaphragm however cannot rule out pancreas as the cause given recent hospitalization for pancreatitis. - Patient started on chlorpromazine today, will continue to monitor  - Elevated AST, ALT, alk phos yesterday. Concern that there may be some diaphragm irritation from gall bladder/common bile duct obstruction. Patient with gallstones on prior recent imaging  - Recheck hepatic labs today, if elevated will work up with RUQ U/S      Sepsis, resolved  Patient with 1 positive blood culture for coagulase-negative staph. This is likely contaminant, but will continue to follow-up with second blood culture vial.  - Patient with improving leukocytosis  - No tachycardia    Type 2 diabetes mellitus  Patient's glucose on presentation was 321.   Patient's last hemoglobin A1c on 8/9/2022 was 6.6.   - up lantus, 15 units  - Started on medium dose sliding scale insulin  - POCT    History of orthostatic syncope  Likely related autonomic dysfunction in setting of diabetic neuropathy.  - Patient no longer orthostatic  - Mildly hypertensive but will continue to tolerate due to hx of orthostatic syncope       Will discuss with attending physician Dr. Mohan Shearer Status:Full code  FEN: Diabetic diet  PPX: Lovenox  DISPO: Micah Villagomez MD PGY-1  8/24/2022,  2:15 PM

## 2022-08-24 NOTE — CARE COORDINATION
CM following. Pt is from home, plans to return at DC. No current DME or HHC needs, may need Lyft for transport. CM will continue to follow for DC needs and recs.         Electronically signed by PRATIMA Alva, ALFREDOW on 8/24/2022 at 4:28 PM

## 2022-08-24 NOTE — PROGRESS NOTES
Patient continues to have chest tightness and SOB related to his \"hiccups\". Resident team aware and will assess patient again at bedside.

## 2022-08-24 NOTE — PROGRESS NOTES
Physical / Occupational Therapy  Refusal  Percy Ortiz     Chart reviewed. PT / OT attempted to see patient for follow up treatment - patient in bed with blankets over head - declining to participate at this time. Patient requesting to remain in bed despite encouragement and education on benefits of PT/OT intervention. Plan to continue to follow per plan of care.     Wendy Sender PT, DPT [id number]  Sandeep Walters, MOT, OTR/L

## 2022-08-25 LAB
ALBUMIN SERPL-MCNC: 2.7 G/DL (ref 3.4–5)
ALP BLD-CCNC: 180 U/L (ref 40–129)
ALT SERPL-CCNC: 55 U/L (ref 10–40)
ANION GAP SERPL CALCULATED.3IONS-SCNC: 9 MMOL/L (ref 3–16)
AST SERPL-CCNC: 36 U/L (ref 15–37)
BASOPHILS ABSOLUTE: 0.2 K/UL (ref 0–0.2)
BASOPHILS RELATIVE PERCENT: 2.3 %
BILIRUB SERPL-MCNC: 0.8 MG/DL (ref 0–1)
BILIRUBIN DIRECT: 0.4 MG/DL (ref 0–0.3)
BILIRUBIN, INDIRECT: 0.4 MG/DL (ref 0–1)
BUN BLDV-MCNC: 9 MG/DL (ref 7–20)
CALCIUM SERPL-MCNC: 8.3 MG/DL (ref 8.3–10.6)
CHLORIDE BLD-SCNC: 105 MMOL/L (ref 99–110)
CO2: 22 MMOL/L (ref 21–32)
CREAT SERPL-MCNC: 0.5 MG/DL (ref 0.8–1.3)
CULTURE, RESPIRATORY: NORMAL
EOSINOPHILS ABSOLUTE: 0.2 K/UL (ref 0–0.6)
EOSINOPHILS RELATIVE PERCENT: 2.4 %
GFR AFRICAN AMERICAN: >60
GFR NON-AFRICAN AMERICAN: >60
GLUCOSE BLD-MCNC: 208 MG/DL (ref 70–99)
GLUCOSE BLD-MCNC: 265 MG/DL (ref 70–99)
GLUCOSE BLD-MCNC: 270 MG/DL (ref 70–99)
GLUCOSE BLD-MCNC: 273 MG/DL (ref 70–99)
GLUCOSE BLD-MCNC: 283 MG/DL (ref 70–99)
GRAM STAIN RESULT: NORMAL
HCT VFR BLD CALC: 33.3 % (ref 40.5–52.5)
HEMOGLOBIN: 11.7 G/DL (ref 13.5–17.5)
LYMPHOCYTES ABSOLUTE: 1.2 K/UL (ref 1–5.1)
LYMPHOCYTES RELATIVE PERCENT: 12.7 %
MAGNESIUM: 1.6 MG/DL (ref 1.8–2.4)
MCH RBC QN AUTO: 34.3 PG (ref 26–34)
MCHC RBC AUTO-ENTMCNC: 35.1 G/DL (ref 31–36)
MCV RBC AUTO: 97.9 FL (ref 80–100)
MONOCYTES ABSOLUTE: 0.8 K/UL (ref 0–1.3)
MONOCYTES RELATIVE PERCENT: 8.7 %
MRSA SCREEN RT-PCR: NORMAL
NEUTROPHILS ABSOLUTE: 6.7 K/UL (ref 1.7–7.7)
NEUTROPHILS RELATIVE PERCENT: 73.9 %
PDW BLD-RTO: 13.1 % (ref 12.4–15.4)
PERFORMED ON: ABNORMAL
PHOSPHORUS: 2.6 MG/DL (ref 2.5–4.9)
PLATELET # BLD: 173 K/UL (ref 135–450)
PMV BLD AUTO: 8.1 FL (ref 5–10.5)
POTASSIUM SERPL-SCNC: 3.7 MMOL/L (ref 3.5–5.1)
RBC # BLD: 3.4 M/UL (ref 4.2–5.9)
SODIUM BLD-SCNC: 136 MMOL/L (ref 136–145)
TOTAL PROTEIN: 5.7 G/DL (ref 6.4–8.2)
WBC # BLD: 9.1 K/UL (ref 4–11)

## 2022-08-25 PROCEDURE — 1200000000 HC SEMI PRIVATE

## 2022-08-25 PROCEDURE — 6370000000 HC RX 637 (ALT 250 FOR IP): Performed by: INTERNAL MEDICINE

## 2022-08-25 PROCEDURE — 2580000003 HC RX 258

## 2022-08-25 PROCEDURE — 97530 THERAPEUTIC ACTIVITIES: CPT

## 2022-08-25 PROCEDURE — 6370000000 HC RX 637 (ALT 250 FOR IP)

## 2022-08-25 PROCEDURE — 6360000002 HC RX W HCPCS

## 2022-08-25 PROCEDURE — 83735 ASSAY OF MAGNESIUM: CPT

## 2022-08-25 PROCEDURE — 80076 HEPATIC FUNCTION PANEL: CPT

## 2022-08-25 PROCEDURE — 36415 COLL VENOUS BLD VENIPUNCTURE: CPT

## 2022-08-25 PROCEDURE — 85025 COMPLETE CBC W/AUTO DIFF WBC: CPT

## 2022-08-25 PROCEDURE — 6360000002 HC RX W HCPCS: Performed by: STUDENT IN AN ORGANIZED HEALTH CARE EDUCATION/TRAINING PROGRAM

## 2022-08-25 PROCEDURE — 2500000003 HC RX 250 WO HCPCS: Performed by: STUDENT IN AN ORGANIZED HEALTH CARE EDUCATION/TRAINING PROGRAM

## 2022-08-25 PROCEDURE — 80069 RENAL FUNCTION PANEL: CPT

## 2022-08-25 PROCEDURE — 6370000000 HC RX 637 (ALT 250 FOR IP): Performed by: STUDENT IN AN ORGANIZED HEALTH CARE EDUCATION/TRAINING PROGRAM

## 2022-08-25 PROCEDURE — 2580000003 HC RX 258: Performed by: STUDENT IN AN ORGANIZED HEALTH CARE EDUCATION/TRAINING PROGRAM

## 2022-08-25 PROCEDURE — 93005 ELECTROCARDIOGRAM TRACING: CPT

## 2022-08-25 RX ORDER — MAGNESIUM SULFATE IN WATER 40 MG/ML
2000 INJECTION, SOLUTION INTRAVENOUS ONCE
Status: COMPLETED | OUTPATIENT
Start: 2022-08-25 | End: 2022-08-25

## 2022-08-25 RX ORDER — CHLORPROMAZINE HYDROCHLORIDE 25 MG/1
25 TABLET, FILM COATED ORAL 4 TIMES DAILY
Status: DISCONTINUED | OUTPATIENT
Start: 2022-08-25 | End: 2022-08-29

## 2022-08-25 RX ORDER — CHLORPROMAZINE HYDROCHLORIDE 25 MG/1
25 TABLET, FILM COATED ORAL 3 TIMES DAILY
Status: DISCONTINUED | OUTPATIENT
Start: 2022-08-25 | End: 2022-08-25

## 2022-08-25 RX ORDER — POTASSIUM CHLORIDE 20 MEQ/1
20 TABLET, EXTENDED RELEASE ORAL ONCE
Status: COMPLETED | OUTPATIENT
Start: 2022-08-25 | End: 2022-08-25

## 2022-08-25 RX ADMIN — PREGABALIN 50 MG: 50 CAPSULE ORAL at 13:03

## 2022-08-25 RX ADMIN — INSULIN LISPRO 8 UNITS: 100 INJECTION, SOLUTION INTRAVENOUS; SUBCUTANEOUS at 17:23

## 2022-08-25 RX ADMIN — INSULIN LISPRO 8 UNITS: 100 INJECTION, SOLUTION INTRAVENOUS; SUBCUTANEOUS at 13:04

## 2022-08-25 RX ADMIN — MAGNESIUM SULFATE HEPTAHYDRATE 2000 MG: 40 INJECTION, SOLUTION INTRAVENOUS at 12:53

## 2022-08-25 RX ADMIN — SODIUM CHLORIDE, PRESERVATIVE FREE 10 ML: 5 INJECTION INTRAVENOUS at 23:54

## 2022-08-25 RX ADMIN — POTASSIUM CHLORIDE 20 MEQ: 1500 TABLET, EXTENDED RELEASE ORAL at 13:03

## 2022-08-25 RX ADMIN — CHLORPROMAZINE HYDROCHLORIDE 25 MG: 25 TABLET, FILM COATED ORAL at 00:19

## 2022-08-25 RX ADMIN — CEFEPIME 2000 MG: 2 INJECTION, POWDER, FOR SOLUTION INTRAVENOUS at 00:42

## 2022-08-25 RX ADMIN — Medication 100 MG: at 08:34

## 2022-08-25 RX ADMIN — CEFEPIME 2000 MG: 2 INJECTION, POWDER, FOR SOLUTION INTRAVENOUS at 08:39

## 2022-08-25 RX ADMIN — CHLORPROMAZINE HYDROCHLORIDE 25 MG: 25 TABLET, FILM COATED ORAL at 08:35

## 2022-08-25 RX ADMIN — INSULIN LISPRO 8 UNITS: 100 INJECTION, SOLUTION INTRAVENOUS; SUBCUTANEOUS at 08:40

## 2022-08-25 RX ADMIN — CHLORPROMAZINE HYDROCHLORIDE 25 MG: 25 TABLET, FILM COATED ORAL at 14:06

## 2022-08-25 RX ADMIN — ENOXAPARIN SODIUM 40 MG: 100 INJECTION SUBCUTANEOUS at 08:34

## 2022-08-25 RX ADMIN — DOXYCYCLINE 100 MG: 100 INJECTION, POWDER, LYOPHILIZED, FOR SOLUTION INTRAVENOUS at 12:52

## 2022-08-25 RX ADMIN — PANTOPRAZOLE SODIUM 40 MG: 40 TABLET, DELAYED RELEASE ORAL at 08:37

## 2022-08-25 RX ADMIN — SODIUM CHLORIDE, PRESERVATIVE FREE 10 ML: 5 INJECTION INTRAVENOUS at 08:35

## 2022-08-25 RX ADMIN — PREGABALIN 50 MG: 50 CAPSULE ORAL at 08:34

## 2022-08-25 RX ADMIN — CHLORPROMAZINE HYDROCHLORIDE 25 MG: 25 TABLET, FILM COATED ORAL at 22:59

## 2022-08-25 RX ADMIN — DOXYCYCLINE 100 MG: 100 INJECTION, POWDER, LYOPHILIZED, FOR SOLUTION INTRAVENOUS at 23:03

## 2022-08-25 RX ADMIN — PREGABALIN 50 MG: 50 CAPSULE ORAL at 22:59

## 2022-08-25 NOTE — CARE COORDINATION
CM following. Pt is from home, plans to return at DC. No current DME or HHC needs, may need Lyft for transport. CM will continue to follow for DC needs and recs.         Electronically signed by PRATIMA Jerez, TAVO on 8/25/2022 at 3:57 PM

## 2022-08-25 NOTE — PROGRESS NOTES
Spoke with resident at bedside. Patient has some numbness and tingling in both hands. Also patients HR is now between 100-115. Will continue to monitor.

## 2022-08-25 NOTE — PROGRESS NOTES
Physical Therapy  No treatment    Attempted to see pt this PM for PT. Pt in bed. Stating that he didn't sleep well last night. \"I've been trying to get some sleep today and I keep getting interrupted just as I'm about to fall asleep. Can we do this later? Will check back later today as schedule allows. Otherwise, continue per plan of care.      Andreas Fontaine #[id number]

## 2022-08-25 NOTE — PROGRESS NOTES
Occupational Therapy  Facility/Department: [address] [place] [address]  Occupational Therapy Treatment    Name: Ashlee Han  : 1960  MRN: [id number]  Date of Service: 2022    Discharge Recommendations: Ashlee Han scored a 21/24 on the AM-PAC ADL Inpatient form. Current research shows that an AM-PAC score of 18 or greater is typically associated with a discharge to the patient's home setting. Based on the patient's AM-PAC score, and their current ADL deficits, it is recommended that the patient have 2-3 sessions per week of Occupational Therapy at d/c to increase the patient's independence. At this time, this patient demonstrates the endurance and safety to discharge home. Please see assessment section for further patient specific details. If patient discharges prior to next session this note will serve as a discharge summary. Please see below for the latest assessment towards goals. OT Equipment Recommendations  Equipment Needed: No       Patient Diagnosis(es): The primary encounter diagnosis was Pneumonia of left lower lobe due to infectious organism. A diagnosis of Hypoxia was also pertinent to this visit. Past Medical History:  has a past medical history of Chronic hepatitis C without hepatic coma ([facility] [address] - Diagnosed in 30s and Type 2 diabetes mellitus without complication, without long-term current use of insulin ([facility] [address] Past Surgical History:  has no past surgical history on file. Treatment Diagnosis: decreased functional mobility, safety awareness, ADL status, endurance/strength. Assessment   Performance deficits / Impairments: Decreased functional mobility ; Decreased safe awareness;Decreased ADL status; Decreased endurance;Decreased strength  Assessment: Pt from home, admitted for hypoxia/SOB. Prior to admission, pt was independent with ADL's and functional mobility. Pt currently requiring SBA-CGA for ADL's and functional mobility. Pt currently using 2LO2.  Vitals were taken after 125 foot walk: heart rate was ~125, O2 remained above 91%. Pt sat in chair to wash face, pt walked 125 feet with CGA, few minor LOB able to self correct. OT recommending home with [employer]. Pt would continue to benefit from OT services to increase safety awareness and maximize independence. Treatment Diagnosis: decreased functional mobility, safety awareness, ADL status, endurance/strength. Activity Tolerance  Activity Tolerance: Patient Tolerated treatment well;Patient limited by fatigue  Activity Tolerance Comments: HR after walk was 125+, O2 remained 91% and above        Plan   Plan  Times per Week: 2-5  Times per Day: Daily     Restrictions  Position Activity Restriction  Other position/activity restrictions: up with assist    Subjective   General  Chart Reviewed: Yes  Patient assessed for rehabilitation services?: Yes  Additional Pertinent Hx: coronary artery disease, hypertension, diabetes mellitus, alcohol abuse  Family / Caregiver Present: No  Referring Practitioner: Damion Lyons MD  Diagnosis: Hypoxia  Subjective  Subjective: Pt supine in bed, sleeping. States he has no slept in 3-4 days.  Reports no pain     Social/Functional History  Social/Functional History  Lives With: Alone  Type of Home: Apartment (lives on first floor)  Home Layout: One level  Home Access: Level entry  Bathroom Shower/Tub: Tub/Shower unit  Bathroom Toilet: Standard  Home Equipment:  (none)  Has the patient had two or more falls in the past year or any fall with injury in the past year?: Yes (reports several falls in the last year 2/2 dizziness)  ADL Assistance: [address] [place] [address] Avenue: Independent  Homemaking Responsibilities: Yes  Ambulation Assistance: Independent  Transfer Assistance: Independent  Active : No  Patient's  Info: Pt reports his friend drives him  Occupation: Retired  Type of Occupation:   Additional Comments: Pt states he does not have someone to help out overnight if needed Safety Devices  Type of Devices: Call light within reach; Chair alarm in place; Left in chair;Gait belt;Patient at risk for falls; All fall risk precautions in place  Bed Mobility Training  Bed Mobility Training: Yes  Overall Level of Assistance: Stand-by assistance  Supine to Sit: Stand-by assistance  Sit to Supine: Stand-by assistance  Balance  Sitting: Intact  Standing: Intact  Transfer Training  Transfer Training: Yes  Overall Level of Assistance: Stand-by assistance;Contact-guard assistance  Sit to Stand: Stand-by assistance;Contact-guard assistance  Stand to Sit: Stand-by assistance;Contact-guard assistance  Gait  Overall Level of Assistance: Contact-guard assistance (Pt walked about 125 feet with CGA assistance 3 minor LOB on longer walk in hallway, able to self correct. Cont to fatigue quickly.  Pt was slow and effortful, HR was 120's after walk, O2 was 91% and above)  Distance (ft): 125 Feet        ADL  Feeding: Independent  Grooming: Setup;Supervision (Pt sat in chair and washed face)  Additional Comments: Pt declined additonal ADL's                                     AM-PAC Score        [facility] Inpatient Daily Activity Raw Score: 21 (08/25/22 1224)  AM-PAC Inpatient ADL T-Scale Score : 44.27 (08/25/22 1224)  ADL Inpatient CMS 0-100% Score: 32.79 (08/25/22 1224)  ADL Inpatient CMS G-Code Modifier : CJ (08/25/22 1224)        Goals  Short Term Goals  Time Frame for Short term goals: d/c  Short Term Goal 1: pt will be Mod I for toileting  Short Term Goal 2: Pt will be Mod I for functional transfers  Short Term Goal 3: Pt will complete dynamic standing activity SBA for 5 minutes in preperation for ADL's       Therapy Time   Individual Concurrent Group Co-treatment   Time In 7782         Time Out 1213         Minutes 25              Total Treatment Minutes:  Ailin Bright 36, OTS      Therapist was present, directed the patients care, made skilled judgement and was responsible for assessment and treatment of the patient.      LAVONNE Powers, OTR/L

## 2022-08-25 NOTE — PROGRESS NOTES
Physical exam:    Physical Exam  Vitals reviewed. Constitutional:  on 2L NC, Well-tolerated     Appearance: Normal appearance. He is normal weight. Comments: No hiccups this morning. HENT:      Head: Normocephalic and atraumatic. Eyes:      Extraocular Movements: Extraocular movements intact. Conjunctiva/sclera: Conjunctivae normal.      Pupils: Pupils are equal, round, and reactive to light. Cardiovascular:      Rate and Rhythm: Regular rhythm. Regular rate. Heart sounds: Normal heart sounds. Pulmonary:      Effort: Pulmonary effort is normal.      Breath sounds: Normal breath sounds. Abdominal:      General: Abdomen is flat. Bowel sounds are normal.      Palpations: Abdomen is soft. Patient notes soreness on deep palpation under rib cage. Musculoskeletal:         General: Normal range of motion. Mild tenderness to palpation of chest   Skin:     General: Skin is warm and dry. Neurological:      General: Mild numbness and tingling of bilateral pinkies. Mental Status: He is alert and oriented to person, place, and time. Mental status is at baseline.    Psychiatric:         Mood and Affect: Mood normal.         Behavior: Behavior normal.      Vitals:    08/25/22 1253   BP: (!) 145/92   Pulse: (!) 107   Resp: 16   Temp: 98 °F (36.7 °C)   SpO2: 93%       DATA:    Labs:  CBC:   Recent Labs     08/23/22  0612 08/24/22  0857 08/25/22  0614   WBC 7.5 8.9 9.1   HGB 11.8* 12.8* 11.7*   HCT 35.3* 37.5* 33.3*    182 173       BMP:   Recent Labs     08/23/22  0612 08/24/22  0857 08/25/22  0614   * 136 136   K 4.0 4.2 3.7    102 105   CO2 21 25 22   BUN 10 8 9   CREATININE 0.6* 0.7* 0.5*   GLUCOSE 257* 263* 270*   PHOS 2.7 2.4* 2.6     LFT's:   Recent Labs     08/23/22  0602 08/24/22  0857 08/25/22  0614   * 86* 36   * 87* 55*   BILITOT 1.2* 0.9 0.8   ALKPHOS 206* 207* 180*     Troponin:   No results for input(s): TROPONINI in the last 72 hours.    BNP:No results for input(s): BNP in the last 72 hours. ABGs: No results for input(s): PHART, WIP0DKM, PO2ART in the last 72 hours. INR: No results for input(s): INR in the last 72 hours. U/A:No results for input(s): NITRITE, COLORU, PHUR, LABCAST, WBCUA, RBCUA, MUCUS, TRICHOMONAS, YEAST, BACTERIA, CLARITYU, SPECGRAV, LEUKOCYTESUR, UROBILINOGEN, BILIRUBINUR, BLOODU, GLUCOSEU, AMORPHOUS in the last 72 hours. Invalid input(s): KETONESU    CT Head W/O Contrast   Final Result      No acute intracranial hemorrhage or mass effect. Mild chronic small vessel ischemic change. CT CHEST PULMONARY EMBOLISM W CONTRAST   Final Result      Limited assessment for pulmonary embolism, particularly in the lower lobe secondary to respiratory motion. No pulmonary emboli in the lower evaluated pulmonary arteries. Moderate peripheral and basilar predominant groundglass opacity and septal thickening, suspicious for atypical pneumonia. Hepatic steatosis. Trace residual peripancreatic fat stranding, likely sequelae of pancreatitis as noted on CT 2 weeks prior. INCIDENTAL FINDINGS: None. XR CHEST (2 VW)   Final Result      1. Minimal bilateral basilar airspace disease. ASSESSMENT AND PLAN:    Health Care Associated Pneumonia  Acute Hypoxic Respiratory Failure  - Viral panel ordered, COVID-negative  - Sputum culture done, await species  - Blood cultures, Pos 1 out of 2 for Staph. Likely contaminant. Will follow up 2nd culture. - WBC improving  - Narrow to doxycycline for atypical coverage today  - Sating well on 2L, wean today (baseline on RA)    Hiccups  Transaminitis, resolved  -likely 2/2 to PNA irritating the diaphragm however cannot rule out pancreas as the cause given recent hospitalization for pancreatitis. - Scheduled chlorpromazine yesterday with some improvement of hiccups symptoms.   Continue with scheduled meds today and monitor for improvement  -Patient with 1 day of elevated AST, ALT, alk phos. There was some concern that the diaphragm may be irritated from gallbladder/common bile duct obstruction. We repeated these labs today and were found to be normalizing. This is likely not the cause of his hiccups. Sepsis  Patient with 1 positive blood culture for coagulase-negative staph. This is likely contaminant, but will continue to follow-up with second blood culture vial.  -Patient with improving leukocytosis    Type 2 diabetes mellitus  Patient's glucose on presentation was 321. Patient's last hemoglobin A1c on 8/9/2022 was 6.6.   - up lantus, 23  - Started on medium dose sliding scale insulin  - POCT    History of orthostatic syncope  Likely related autonomic dysfunction in setting of diabetic neuropathy.  - Continue home midodrine 5 mg twice daily  -check ortho stats  - Patient no longer orthostatic today.        Will discuss with attending physician Dr. Iraida Ortiz Status:Full code  FEN: Diabetic diet  PPX: Lovenox  DISPO: Virginia Vora MD PGY-1  8/25/2022,  1:36 PM

## 2022-08-26 LAB
ALBUMIN SERPL-MCNC: 2.5 G/DL (ref 3.4–5)
ALP BLD-CCNC: 161 U/L (ref 40–129)
ALT SERPL-CCNC: 40 U/L (ref 10–40)
ANION GAP SERPL CALCULATED.3IONS-SCNC: 12 MMOL/L (ref 3–16)
AST SERPL-CCNC: 40 U/L (ref 15–37)
BASOPHILS ABSOLUTE: 0.1 K/UL (ref 0–0.2)
BASOPHILS RELATIVE PERCENT: 1.1 %
BILIRUB SERPL-MCNC: 0.7 MG/DL (ref 0–1)
BILIRUBIN DIRECT: 0.3 MG/DL (ref 0–0.3)
BILIRUBIN, INDIRECT: 0.4 MG/DL (ref 0–1)
BUN BLDV-MCNC: 8 MG/DL (ref 7–20)
CALCIUM SERPL-MCNC: 8.2 MG/DL (ref 8.3–10.6)
CHLORIDE BLD-SCNC: 104 MMOL/L (ref 99–110)
CO2: 19 MMOL/L (ref 21–32)
CREAT SERPL-MCNC: 0.5 MG/DL (ref 0.8–1.3)
EKG ATRIAL RATE: 101 BPM
EKG DIAGNOSIS: NORMAL
EKG P AXIS: 46 DEGREES
EKG P-R INTERVAL: 128 MS
EKG Q-T INTERVAL: 372 MS
EKG QRS DURATION: 78 MS
EKG QTC CALCULATION (BAZETT): 482 MS
EKG R AXIS: 99 DEGREES
EKG T AXIS: -10 DEGREES
EKG VENTRICULAR RATE: 101 BPM
EOSINOPHILS ABSOLUTE: 0.3 K/UL (ref 0–0.6)
EOSINOPHILS RELATIVE PERCENT: 3.2 %
GFR AFRICAN AMERICAN: >60
GFR NON-AFRICAN AMERICAN: >60
GLUCOSE BLD-MCNC: 187 MG/DL (ref 70–99)
GLUCOSE BLD-MCNC: 243 MG/DL (ref 70–99)
GLUCOSE BLD-MCNC: 245 MG/DL (ref 70–99)
GLUCOSE BLD-MCNC: 284 MG/DL (ref 70–99)
GLUCOSE BLD-MCNC: 348 MG/DL (ref 70–99)
HCT VFR BLD CALC: 36.4 % (ref 40.5–52.5)
HEMOGLOBIN: 12.4 G/DL (ref 13.5–17.5)
LYMPHOCYTES ABSOLUTE: 1.2 K/UL (ref 1–5.1)
LYMPHOCYTES RELATIVE PERCENT: 14.1 %
MAGNESIUM: 1.6 MG/DL (ref 1.8–2.4)
MCH RBC QN AUTO: 33.9 PG (ref 26–34)
MCHC RBC AUTO-ENTMCNC: 34.1 G/DL (ref 31–36)
MCV RBC AUTO: 99.6 FL (ref 80–100)
MONOCYTES ABSOLUTE: 0.9 K/UL (ref 0–1.3)
MONOCYTES RELATIVE PERCENT: 10.4 %
NEUTROPHILS ABSOLUTE: 6.3 K/UL (ref 1.7–7.7)
NEUTROPHILS RELATIVE PERCENT: 71.2 %
PDW BLD-RTO: 13.4 % (ref 12.4–15.4)
PERFORMED ON: ABNORMAL
PHOSPHORUS: 2.9 MG/DL (ref 2.5–4.9)
PLATELET # BLD: 176 K/UL (ref 135–450)
PMV BLD AUTO: 8.4 FL (ref 5–10.5)
POTASSIUM SERPL-SCNC: 4.4 MMOL/L (ref 3.5–5.1)
RBC # BLD: 3.66 M/UL (ref 4.2–5.9)
SODIUM BLD-SCNC: 135 MMOL/L (ref 136–145)
TOTAL PROTEIN: 6.1 G/DL (ref 6.4–8.2)
WBC # BLD: 8.8 K/UL (ref 4–11)

## 2022-08-26 PROCEDURE — 80069 RENAL FUNCTION PANEL: CPT

## 2022-08-26 PROCEDURE — 2500000003 HC RX 250 WO HCPCS: Performed by: STUDENT IN AN ORGANIZED HEALTH CARE EDUCATION/TRAINING PROGRAM

## 2022-08-26 PROCEDURE — 94150 VITAL CAPACITY TEST: CPT

## 2022-08-26 PROCEDURE — 6360000002 HC RX W HCPCS: Performed by: STUDENT IN AN ORGANIZED HEALTH CARE EDUCATION/TRAINING PROGRAM

## 2022-08-26 PROCEDURE — 97530 THERAPEUTIC ACTIVITIES: CPT

## 2022-08-26 PROCEDURE — 83735 ASSAY OF MAGNESIUM: CPT

## 2022-08-26 PROCEDURE — 2580000003 HC RX 258: Performed by: STUDENT IN AN ORGANIZED HEALTH CARE EDUCATION/TRAINING PROGRAM

## 2022-08-26 PROCEDURE — 36415 COLL VENOUS BLD VENIPUNCTURE: CPT

## 2022-08-26 PROCEDURE — 6370000000 HC RX 637 (ALT 250 FOR IP)

## 2022-08-26 PROCEDURE — 85025 COMPLETE CBC W/AUTO DIFF WBC: CPT

## 2022-08-26 PROCEDURE — 1200000000 HC SEMI PRIVATE

## 2022-08-26 PROCEDURE — 2580000003 HC RX 258

## 2022-08-26 PROCEDURE — 2700000000 HC OXYGEN THERAPY PER DAY

## 2022-08-26 PROCEDURE — 6360000002 HC RX W HCPCS

## 2022-08-26 PROCEDURE — 94799 UNLISTED PULMONARY SVC/PX: CPT

## 2022-08-26 PROCEDURE — 97116 GAIT TRAINING THERAPY: CPT

## 2022-08-26 PROCEDURE — 80076 HEPATIC FUNCTION PANEL: CPT

## 2022-08-26 PROCEDURE — 6370000000 HC RX 637 (ALT 250 FOR IP): Performed by: INTERNAL MEDICINE

## 2022-08-26 PROCEDURE — 94761 N-INVAS EAR/PLS OXIMETRY MLT: CPT

## 2022-08-26 PROCEDURE — 93010 ELECTROCARDIOGRAM REPORT: CPT | Performed by: INTERNAL MEDICINE

## 2022-08-26 RX ORDER — METFORMIN HYDROCHLORIDE 500 MG/1
1000 TABLET, EXTENDED RELEASE ORAL
Status: DISCONTINUED | OUTPATIENT
Start: 2022-08-26 | End: 2022-08-31 | Stop reason: HOSPADM

## 2022-08-26 RX ORDER — DOXYCYCLINE 50 MG/1
100 CAPSULE ORAL EVERY 12 HOURS SCHEDULED
Status: COMPLETED | OUTPATIENT
Start: 2022-08-26 | End: 2022-08-27

## 2022-08-26 RX ORDER — MAGNESIUM SULFATE IN WATER 40 MG/ML
2000 INJECTION, SOLUTION INTRAVENOUS ONCE
Status: COMPLETED | OUTPATIENT
Start: 2022-08-26 | End: 2022-08-26

## 2022-08-26 RX ORDER — HYDROXYZINE HYDROCHLORIDE 25 MG/1
25 TABLET, FILM COATED ORAL 4 TIMES DAILY
Status: DISCONTINUED | OUTPATIENT
Start: 2022-08-26 | End: 2022-08-31 | Stop reason: HOSPADM

## 2022-08-26 RX ADMIN — DOXYCYCLINE 100 MG: 100 INJECTION, POWDER, LYOPHILIZED, FOR SOLUTION INTRAVENOUS at 11:23

## 2022-08-26 RX ADMIN — CHLORPROMAZINE HYDROCHLORIDE 25 MG: 25 TABLET, FILM COATED ORAL at 17:03

## 2022-08-26 RX ADMIN — HYDROXYZINE HYDROCHLORIDE 25 MG: 25 TABLET ORAL at 17:02

## 2022-08-26 RX ADMIN — INSULIN LISPRO 8 UNITS: 100 INJECTION, SOLUTION INTRAVENOUS; SUBCUTANEOUS at 17:03

## 2022-08-26 RX ADMIN — SODIUM CHLORIDE, PRESERVATIVE FREE 10 ML: 5 INJECTION INTRAVENOUS at 08:29

## 2022-08-26 RX ADMIN — Medication 100 MG: at 08:28

## 2022-08-26 RX ADMIN — INSULIN LISPRO 4 UNITS: 100 INJECTION, SOLUTION INTRAVENOUS; SUBCUTANEOUS at 08:30

## 2022-08-26 RX ADMIN — CHLORPROMAZINE HYDROCHLORIDE 25 MG: 25 TABLET, FILM COATED ORAL at 20:31

## 2022-08-26 RX ADMIN — PANTOPRAZOLE SODIUM 40 MG: 40 TABLET, DELAYED RELEASE ORAL at 08:28

## 2022-08-26 RX ADMIN — INSULIN LISPRO 12 UNITS: 100 INJECTION, SOLUTION INTRAVENOUS; SUBCUTANEOUS at 11:31

## 2022-08-26 RX ADMIN — CHLORPROMAZINE HYDROCHLORIDE 25 MG: 25 TABLET, FILM COATED ORAL at 08:28

## 2022-08-26 RX ADMIN — PREGABALIN 50 MG: 50 CAPSULE ORAL at 08:28

## 2022-08-26 RX ADMIN — HYDROXYZINE HYDROCHLORIDE 25 MG: 25 TABLET ORAL at 20:30

## 2022-08-26 RX ADMIN — CHLORPROMAZINE HYDROCHLORIDE 25 MG: 25 TABLET, FILM COATED ORAL at 15:34

## 2022-08-26 RX ADMIN — DOXYCYCLINE 100 MG: 50 CAPSULE ORAL at 20:31

## 2022-08-26 RX ADMIN — HYDROXYZINE HYDROCHLORIDE 25 MG: 25 TABLET ORAL at 11:28

## 2022-08-26 RX ADMIN — ENOXAPARIN SODIUM 40 MG: 100 INJECTION SUBCUTANEOUS at 08:28

## 2022-08-26 RX ADMIN — MAGNESIUM SULFATE HEPTAHYDRATE 2000 MG: 40 INJECTION, SOLUTION INTRAVENOUS at 10:14

## 2022-08-26 RX ADMIN — PREGABALIN 50 MG: 50 CAPSULE ORAL at 15:34

## 2022-08-26 RX ADMIN — METFORMIN HYDROCHLORIDE 1000 MG: 500 TABLET, EXTENDED RELEASE ORAL at 11:24

## 2022-08-26 RX ADMIN — SODIUM CHLORIDE, PRESERVATIVE FREE 10 ML: 5 INJECTION INTRAVENOUS at 20:30

## 2022-08-26 RX ADMIN — PREGABALIN 50 MG: 50 CAPSULE ORAL at 20:31

## 2022-08-26 NOTE — PROGRESS NOTES
Internal Medicine  PGY 1  Progress note      CC : Hiccups for 2 days and fatigue    History Obtained From:  patient    Inteval Hx:  No acute events overnight. Patient seen this morning in bed he states that he continues to have hiccups, but upon interviewing for extended period of time patient without hiccups this morning. He agrees that it is odd that when he is speaking thus he does not have any hiccups but is soon as relief he begins to have some. He does have some history of anxiety and depression which incidentally led to his recent admission for pancreatitis following extensive alcohol intake. He does state that the Thorazine that we started 4 times daily yesterday has been helping. He additionally says that he is not having any trouble breathing and would like to get off of oxygen today. Past Medical History:        Diagnosis Date    Chronic hepatitis C without hepatic coma ([facility] - Diagnosed in 30s     Type 2 diabetes mellitus without complication, without long-term current use of insulin ([facility] 2021       Past Surgical History:    History reviewed. No pertinent surgical history. Medications Priorto Admission:    Medications Prior to Admission: thiamine mononitrate (THIAMINE) 100 MG tablet, Take 1 tablet by mouth in the morning. metFORMIN (GLUCOPHAGE) 500 MG tablet, Take 1 tablet by mouth daily (with breakfast)  [] oxyCODONE (OXY-IR) 10 MG immediate release tablet, Take 1 tablet by mouth every 8 hours as needed for Pain (severe pain) for up to 5 days. pregabalin (LYRICA) 50 MG capsule, Take 1 capsule by mouth in the morning and 1 capsule at noon and 1 capsule before bedtime. Do all this for 30 days. L-methylfolate-B6-B12 (METANX) 7-36.345-3-78 MG CAPS capsule, Take 1 capsule by mouth daily    Allergies:  Patient has no known allergies. Social History:   TOBACCO:   reports that he has been smoking cigars and cigarettes.  He has never used smokeless tobacco.  ETOH:   reports GLUCOSE 263* 270* 243*   PHOS 2.4* 2.6 2.9     LFT's:   Recent Labs     08/24/22  0857 08/25/22  0614 08/26/22  0640   AST 86* 36 40*   ALT 87* 55* 40   BILITOT 0.9 0.8 0.7   ALKPHOS 207* 180* 161*     Troponin:   No results for input(s): TROPONINI in the last 72 hours. BNP:No results for input(s): BNP in the last 72 hours. ABGs: No results for input(s): PHART, YVG1THV, PO2ART in the last 72 hours. INR: No results for input(s): INR in the last 72 hours. U/A:No results for input(s): NITRITE, COLORU, PHUR, LABCAST, WBCUA, RBCUA, MUCUS, TRICHOMONAS, YEAST, BACTERIA, CLARITYU, SPECGRAV, LEUKOCYTESUR, UROBILINOGEN, BILIRUBINUR, BLOODU, GLUCOSEU, AMORPHOUS in the last 72 hours. Invalid input(s): KETONESU    CT Head W/O Contrast   Final Result      No acute intracranial hemorrhage or mass effect. Mild chronic small vessel ischemic change. CT CHEST PULMONARY EMBOLISM W CONTRAST   Final Result      Limited assessment for pulmonary embolism, particularly in the lower lobe secondary to respiratory motion. No pulmonary emboli in the lower evaluated pulmonary arteries. Moderate peripheral and basilar predominant groundglass opacity and septal thickening, suspicious for atypical pneumonia. Hepatic steatosis. Trace residual peripancreatic fat stranding, likely sequelae of pancreatitis as noted on CT 2 weeks prior. INCIDENTAL FINDINGS: None. XR CHEST (2 VW)   Final Result      1. Minimal bilateral basilar airspace disease. ASSESSMENT AND PLAN:    Health Care Associated Pneumonia  Acute Hypoxic Respiratory Failure  - Viral panel ordered, COVID-negative  - Sputum culture done, await species  - Blood cultures, Pos 1 out of 2 for Staph. Likely contaminant. Will follow up 2nd culture.   - WBC improving  - Oral doxycycline today  -Monitor respiratory status on room air  -Incentive spirometry    Hiccups  -likely 2/2 to PNA irritating the diaphragm however cannot rule out pancreas as the cause given recent hospitalization for pancreatitis. - Scheduled chlorpromazine yesterday 4 times daily. Seems to be helping.  -Seemingly some psychiatric component associated with anxiety. Will trial hydroxyzine today    Sepsis  Patient with 1 positive blood culture for coagulase-negative staph. This is likely contaminant, but will continue to follow-up with second blood culture vial.  -Patient with improving leukocytosis    Type 2 diabetes mellitus  Patient's glucose on presentation was 321. Patient's last hemoglobin A1c on 8/9/2022 was 6.6.   - up lancrispinus, 28  -Started on metformin 1000  - Started on medium dose sliding scale insulin  - POCT    History of orthostatic syncope  Likely related autonomic dysfunction in setting of diabetic neuropathy.  - Continue home midodrine 5 mg twice daily  -check ortho stats  - Patient no longer orthostatic today.        Will discuss with attending physician Dr. Brina Zimmerman Status:Full code  FEN: Diabetic diet  PPX: Lovenox  DISPO: Vishnu Morales MD PGY-1  8/26/2022,  2:02 PM

## 2022-08-26 NOTE — PROGRESS NOTES
Physical Therapy  Facility/Department: [address] [place] [address]  Physical Therapy Treatment Note    Name: Romel Jose  : 1960  MRN: [id number]  Date of Service: 2022    Discharge Recommendations:    Romel Jose scored a 19/24 on the AM-PAC short mobility form. Current research shows that an AM-PAC score of 18 or greater is typically associated with a discharge to the patient's home setting. Based on the patient's AM-PAC score and their current functional mobility deficits, it is recommended that the patient have 2-3 sessions per week of Physical Therapy at d/c to increase the patient's independence. At this time, this patient demonstrates the endurance and safety to discharge home with home PT and a follow up treatment frequency of 2-3x/wk. Please see assessment section for further patient specific details. If patient discharges prior to next session this note will serve as a discharge summary. Please see below for the latest assessment towards goals. PT Equipment Recommendations  Equipment Needed:  (cont to assess - may need rolling walker for safety if going home)      Patient Diagnosis(es): The primary encounter diagnosis was Pneumonia of left lower lobe due to infectious organism. A diagnosis of Hypoxia was also pertinent to this visit. Past Medical History:  has a past medical history of Chronic hepatitis C without hepatic coma ([address] - Diagnosed in 30s and Type 2 diabetes mellitus without complication, without long-term current use of insulin ([address] Past Surgical History:  has no past surgical history on file. Assessment   Body Structures, Functions, Activity Limitations Requiring Skilled Therapeutic Intervention: Decreased functional mobility ; Decreased strength;Decreased endurance  Assessment: Needing increased assist for gt this date. Unsteady requiring min assist at times. Desats with activity and had trouble recovering without O2. Safety concerns for pt returning home alone. Rec cont skilled PT to maximize mobility and independence. Rec 24 hr assist initially  Treatment Diagnosis: impaired functional mobility 2/2 decreased endurance  Requires PT Follow-Up: Yes     Plan   Plan  Plan:  (2-5)  Current Treatment Recommendations: Functional mobility training, Endurance training, Strengthening, Gait training, Patient/Caregiver education & training, Safety education & training  Safety Devices  Type of Devices: Bed alarm in place, Left in bed, Call light within reach, Nurse notified     Restrictions  Position Activity Restriction  Other position/activity restrictions: up with assist     Subjective   General  Chart Reviewed: Yes  Additional Pertinent Hx: Pt is a 57 y.o. male adm 8/20 with hypoxia. Pt presented to ED with hypoxia. CXR:Minimal bilateral basilar airspace disease. CT chest:Moderate peripheral and basilar predominant groundglass opacity and septal thickening, suspicious for atypical pneumonia, neg PE. Head CT: neg. PMH:pancreatitis, type 2 diabetes, and chronic hepatitis C  Subjective  Subjective: Pt found supine. Agreeable to PT. Reports took shower earlier and feeling better.   Was taken off O2 earlier today         Social/Functional History  Social/Functional History  Lives With: Alone  Type of Home: Apartment (lives on first floor)  Home Layout: One level  Home Access: Level entry  Bathroom Shower/Tub: Tub/Shower unit  Bathroom Toilet: Standard  Home Equipment:  (none)  Has the patient had two or more falls in the past year or any fall with injury in the past year?: Yes (reports several falls in the last year 2/2 dizziness)  ADL Assistance: Independent  Homemaking Assistance: Independent  Homemaking Responsibilities: Yes  Ambulation Assistance: Independent  Transfer Assistance: Independent  Active : No  Patient's  Info: Pt reports his friend drives him  Occupation: Retired  Type of Occupation:   Additional Comments: Pt states he does not have someone to

## 2022-08-27 LAB
ALBUMIN SERPL-MCNC: 2.5 G/DL (ref 3.4–5)
ALP BLD-CCNC: 156 U/L (ref 40–129)
ALT SERPL-CCNC: 32 U/L (ref 10–40)
ANION GAP SERPL CALCULATED.3IONS-SCNC: 11 MMOL/L (ref 3–16)
AST SERPL-CCNC: 29 U/L (ref 15–37)
BASOPHILS ABSOLUTE: 0.1 K/UL (ref 0–0.2)
BASOPHILS RELATIVE PERCENT: 1.1 %
BILIRUB SERPL-MCNC: 0.8 MG/DL (ref 0–1)
BILIRUBIN DIRECT: 0.4 MG/DL (ref 0–0.3)
BILIRUBIN, INDIRECT: 0.4 MG/DL (ref 0–1)
BUN BLDV-MCNC: 7 MG/DL (ref 7–20)
CALCIUM SERPL-MCNC: 8.1 MG/DL (ref 8.3–10.6)
CHLORIDE BLD-SCNC: 106 MMOL/L (ref 99–110)
CO2: 22 MMOL/L (ref 21–32)
CREAT SERPL-MCNC: 0.6 MG/DL (ref 0.8–1.3)
EOSINOPHILS ABSOLUTE: 0.2 K/UL (ref 0–0.6)
EOSINOPHILS RELATIVE PERCENT: 2.5 %
GFR AFRICAN AMERICAN: >60
GFR NON-AFRICAN AMERICAN: >60
GLUCOSE BLD-MCNC: 133 MG/DL (ref 70–99)
GLUCOSE BLD-MCNC: 148 MG/DL (ref 70–99)
GLUCOSE BLD-MCNC: 149 MG/DL (ref 70–99)
GLUCOSE BLD-MCNC: 193 MG/DL (ref 70–99)
GLUCOSE BLD-MCNC: 259 MG/DL (ref 70–99)
HCT VFR BLD CALC: 34.8 % (ref 40.5–52.5)
HEMOGLOBIN: 12.1 G/DL (ref 13.5–17.5)
LYMPHOCYTES ABSOLUTE: 1.8 K/UL (ref 1–5.1)
LYMPHOCYTES RELATIVE PERCENT: 19.3 %
MAGNESIUM: 1.6 MG/DL (ref 1.8–2.4)
MCH RBC QN AUTO: 33.9 PG (ref 26–34)
MCHC RBC AUTO-ENTMCNC: 34.7 G/DL (ref 31–36)
MCV RBC AUTO: 97.7 FL (ref 80–100)
MONOCYTES ABSOLUTE: 0.9 K/UL (ref 0–1.3)
MONOCYTES RELATIVE PERCENT: 9.7 %
NEUTROPHILS ABSOLUTE: 6.3 K/UL (ref 1.7–7.7)
NEUTROPHILS RELATIVE PERCENT: 67.4 %
PDW BLD-RTO: 13.3 % (ref 12.4–15.4)
PERFORMED ON: ABNORMAL
PHOSPHORUS: 3 MG/DL (ref 2.5–4.9)
PLATELET # BLD: 175 K/UL (ref 135–450)
PMV BLD AUTO: 7.9 FL (ref 5–10.5)
POTASSIUM SERPL-SCNC: 3.4 MMOL/L (ref 3.5–5.1)
RBC # BLD: 3.56 M/UL (ref 4.2–5.9)
SODIUM BLD-SCNC: 139 MMOL/L (ref 136–145)
TOTAL PROTEIN: 5.5 G/DL (ref 6.4–8.2)
WBC # BLD: 9.4 K/UL (ref 4–11)

## 2022-08-27 PROCEDURE — 80069 RENAL FUNCTION PANEL: CPT

## 2022-08-27 PROCEDURE — 94761 N-INVAS EAR/PLS OXIMETRY MLT: CPT

## 2022-08-27 PROCEDURE — 6370000000 HC RX 637 (ALT 250 FOR IP)

## 2022-08-27 PROCEDURE — 83735 ASSAY OF MAGNESIUM: CPT

## 2022-08-27 PROCEDURE — 36415 COLL VENOUS BLD VENIPUNCTURE: CPT

## 2022-08-27 PROCEDURE — 1200000000 HC SEMI PRIVATE

## 2022-08-27 PROCEDURE — 6360000002 HC RX W HCPCS

## 2022-08-27 PROCEDURE — 94680 O2 UPTK RST&XERS DIR SIMPLE: CPT

## 2022-08-27 PROCEDURE — 85025 COMPLETE CBC W/AUTO DIFF WBC: CPT

## 2022-08-27 PROCEDURE — 2580000003 HC RX 258

## 2022-08-27 PROCEDURE — 80076 HEPATIC FUNCTION PANEL: CPT

## 2022-08-27 PROCEDURE — 2700000000 HC OXYGEN THERAPY PER DAY

## 2022-08-27 PROCEDURE — 6370000000 HC RX 637 (ALT 250 FOR IP): Performed by: INTERNAL MEDICINE

## 2022-08-27 RX ORDER — MAGNESIUM SULFATE IN WATER 40 MG/ML
2000 INJECTION, SOLUTION INTRAVENOUS ONCE
Status: COMPLETED | OUTPATIENT
Start: 2022-08-27 | End: 2022-08-27

## 2022-08-27 RX ORDER — METFORMIN HYDROCHLORIDE 500 MG/1
1000 TABLET, EXTENDED RELEASE ORAL
Qty: 30 TABLET | Refills: 3 | Status: SHIPPED | OUTPATIENT
Start: 2022-08-28

## 2022-08-27 RX ORDER — MIDODRINE HYDROCHLORIDE 5 MG/1
5 TABLET ORAL 2 TIMES DAILY
Qty: 60 TABLET | Refills: 3 | Status: SHIPPED | OUTPATIENT
Start: 2022-08-27

## 2022-08-27 RX ORDER — CHLORPROMAZINE HYDROCHLORIDE 25 MG/1
25 TABLET, FILM COATED ORAL 4 TIMES DAILY
Qty: 40 TABLET | Refills: 0 | Status: SHIPPED | OUTPATIENT
Start: 2022-08-27 | End: 2022-08-30 | Stop reason: HOSPADM

## 2022-08-27 RX ORDER — HYDROXYZINE HYDROCHLORIDE 25 MG/1
25 TABLET, FILM COATED ORAL 4 TIMES DAILY
Qty: 40 TABLET | Refills: 0 | Status: SHIPPED | OUTPATIENT
Start: 2022-08-27 | End: 2022-09-06

## 2022-08-27 RX ORDER — POTASSIUM CHLORIDE 20 MEQ/1
20 TABLET, EXTENDED RELEASE ORAL 2 TIMES DAILY WITH MEALS
Status: COMPLETED | OUTPATIENT
Start: 2022-08-27 | End: 2022-08-27

## 2022-08-27 RX ADMIN — PREGABALIN 50 MG: 50 CAPSULE ORAL at 20:47

## 2022-08-27 RX ADMIN — POTASSIUM CHLORIDE 20 MEQ: 1500 TABLET, EXTENDED RELEASE ORAL at 10:14

## 2022-08-27 RX ADMIN — SODIUM CHLORIDE: 9 INJECTION, SOLUTION INTRAVENOUS at 11:30

## 2022-08-27 RX ADMIN — CHLORPROMAZINE HYDROCHLORIDE 25 MG: 25 TABLET, FILM COATED ORAL at 11:09

## 2022-08-27 RX ADMIN — ENOXAPARIN SODIUM 40 MG: 100 INJECTION SUBCUTANEOUS at 10:13

## 2022-08-27 RX ADMIN — HYDROXYZINE HYDROCHLORIDE 25 MG: 25 TABLET ORAL at 10:14

## 2022-08-27 RX ADMIN — Medication 100 MG: at 10:14

## 2022-08-27 RX ADMIN — PREGABALIN 50 MG: 50 CAPSULE ORAL at 10:14

## 2022-08-27 RX ADMIN — PREGABALIN 50 MG: 50 CAPSULE ORAL at 13:43

## 2022-08-27 RX ADMIN — METFORMIN HYDROCHLORIDE 1000 MG: 500 TABLET, EXTENDED RELEASE ORAL at 10:24

## 2022-08-27 RX ADMIN — CHLORPROMAZINE HYDROCHLORIDE 25 MG: 25 TABLET, FILM COATED ORAL at 13:47

## 2022-08-27 RX ADMIN — INSULIN LISPRO 8 UNITS: 100 INJECTION, SOLUTION INTRAVENOUS; SUBCUTANEOUS at 12:54

## 2022-08-27 RX ADMIN — HYDROXYZINE HYDROCHLORIDE 25 MG: 25 TABLET ORAL at 13:43

## 2022-08-27 RX ADMIN — PANTOPRAZOLE SODIUM 40 MG: 40 TABLET, DELAYED RELEASE ORAL at 05:43

## 2022-08-27 RX ADMIN — SODIUM CHLORIDE, PRESERVATIVE FREE 10 ML: 5 INJECTION INTRAVENOUS at 20:47

## 2022-08-27 RX ADMIN — CHLORPROMAZINE HYDROCHLORIDE 25 MG: 25 TABLET, FILM COATED ORAL at 20:47

## 2022-08-27 RX ADMIN — DOXYCYCLINE 100 MG: 50 CAPSULE ORAL at 20:47

## 2022-08-27 RX ADMIN — HYDROXYZINE HYDROCHLORIDE 25 MG: 25 TABLET ORAL at 17:29

## 2022-08-27 RX ADMIN — MAGNESIUM SULFATE HEPTAHYDRATE 2000 MG: 40 INJECTION, SOLUTION INTRAVENOUS at 11:31

## 2022-08-27 RX ADMIN — CHLORPROMAZINE HYDROCHLORIDE 25 MG: 25 TABLET, FILM COATED ORAL at 17:30

## 2022-08-27 RX ADMIN — SODIUM CHLORIDE, PRESERVATIVE FREE 10 ML: 5 INJECTION INTRAVENOUS at 11:11

## 2022-08-27 RX ADMIN — DOXYCYCLINE 100 MG: 50 CAPSULE ORAL at 10:14

## 2022-08-27 RX ADMIN — HYDROXYZINE HYDROCHLORIDE 25 MG: 25 TABLET ORAL at 20:47

## 2022-08-27 RX ADMIN — POTASSIUM CHLORIDE 20 MEQ: 1500 TABLET, EXTENDED RELEASE ORAL at 17:30

## 2022-08-27 NOTE — DISCHARGE INSTRUCTIONS
Please Follow up with your PCP within 1 week of discharge. - Discuss midodrine with PCP, given sustained tachycardia in patient. 2. Please Follow up with Pulmonology within 2 weeks of discharge. - Discuss results of biopsies, cultures from bronchoscopy. If hiccups persist, take 25 mg hydroxyzine (atarax) 3 times daily, and 10 mg metoclopramide (reglan) 3 times daily. Your level of Magnesium was low on lab work. Please take magnesium oxide as prescribed. Please eat foods rich in magnesium. In general rich sources of magnesium are greens, nuts, seeds, dry beans, whole grains, wheat germ, wheat and oat bran.

## 2022-08-27 NOTE — PLAN OF CARE
Problem: Safety - Adult  Goal: Free from fall injury  Outcome: Progressing     Problem: Chronic Conditions and Co-morbidities  Goal: Patient's chronic conditions and co-morbidity symptoms are monitored and maintained or improved  Outcome: Progressing     Problem: Pain  Goal: Verbalizes/displays adequate comfort level or baseline comfort level  Outcome: Progressing     Problem: ABCDS Injury Assessment  Goal: Absence of physical injury  Outcome: Progressing     Medications per order, patient continues with hiccups - hydroxyzine and chlorpromazine per order helpful. Will continue to monitor.

## 2022-08-27 NOTE — PLAN OF CARE
Patient remains free from falls this shift. Patient denies pain, states discomfort from hiccups. Patient with no sign of physical injury noted.   Problem: Safety - Adult  Goal: Free from fall injury  Outcome: Progressing     Problem: Pain  Goal: Verbalizes/displays adequate comfort level or baseline comfort level  Outcome: Progressing     Problem: ABCDS Injury Assessment  Goal: Absence of physical injury  Outcome: Progressing

## 2022-08-27 NOTE — DISCHARGE SUMMARY
[address]  DISCHARGE SUMMARY    Patient ID: Dar More                                             Discharge Date: 8/27/2022   Patient's PCP: Mark Car DO                                          Discharge Physician: Akshat Manning MD   Admit Date: 8/20/2022   Admitting Physician: Marco Kilgore MD    DISCHARGE DIAGNOSES:  Present on Admission:   PNA (pneumonia)   Hospital-acquired pneumonia      Hospital Course:    HPI: Patient presents shortly after recent admission to [facility] for alcohol induced pancreatitis. He presented this admission with consistent hiccups, fatigue, shortness of breath. He states that for the past 2 days he has had constant hiccups that prevent him from sleeping, occasionally causes entire diaphragm to spasm preventing him from catching his breath. He denied any abdominal pain at that time. In the ED patient was worked up for recurrence of pancreatitis which was negative. He additionally received an chest x-ray which showed concern for pneumonia. CT PE was also obtained which did not show evidence of pulmonary embolus. It did however also raise suspicion for atypical pneumonia. Due to his recent hospitalization, he was started on antibiotics for suspicion of healthcare associated pneumonia. He continued on cefepime and doxycycline for treatment while infectious work-up resolved. Over the course of his admission he was weaned from initial oxygen requirement of 5 L back to his home oxygen requirement on room air. Additionally patient remained afebrile and had decreasing white counts once initiated on antibiotic therapy. In regards to patient's hiccups, initial suspicion was that it was secondary to irritation of the diaphragm from lower lobe pneumonia.   There is also consideration that the diaphragm was irritated from below from peers pancreatitis, but pancreatitis work-up was normal.  As patient's pneumonia symptoms improved, we did not see any improvement in his hiccups symptoms. He was started on baclofen at first which did not help and was transitioned to Thorazine. This did help some, but suspicion arose that there was some component of anxiety to his hiccups and hydroxyzine was started. This combination of Thorazine and hydroxyzine was effective at resolving his sacrum symptoms and on day of discharge patient was able to have 30-minute conversations with us without hiccups. He will continue his medications outpatient and follow-up with his PCP. Patient was also found to be hyperglycemic on admission. Patient's last hemoglobin A1c was 6.6. He additionally has diabetic neuropathy. He was continued on his home Lyrica. He was additionally controlled with insulin and eventually metformin 1000 XR with gradual control of his hyperglycemia. There is also suspicion of orthostatic syncope given patient's presentation. At first he was maintained on his home midodrine, but throughout the admission he became more stable and was no longer orthostatic even off of the midodrine. There were times where patient was tachycardic so midodrine was held and patient remained stable. He was discussed with PCP about continuing midodrine. On the day of discharge, patient's shortness of breath, new oxygen requirement, infectious symptoms, orthostatic hypotension have resolved. His hiccups are under better control and he will continue on current symptomatic treatment as outpatient. Patient denied fever, chills, shortness of breath, chest pain, palpitations, nausea, vomiting, abdominal pain, changes in urinary/bowel movements. Physical Exam:  /79   Pulse (!) 111   Temp 99 °F (37.2 °C) (Oral)   Resp 16   Ht 5' 11\" (1.803 m)   Wt 152 lb 5.4 oz (69.1 kg)   SpO2 95%   BMI 21.25 kg/m²       Constitutional: Comfortable on room air     Appearance: Normal appearance. He is normal weight.       Comments: No hiccups  HENT:      Head: Normocephalic and atraumatic. Eyes:      Extraocular Movements: Extraocular movements intact. Conjunctiva/sclera: Conjunctivae normal.      Pupils: Pupils are equal, round, and reactive to light. Cardiovascular:      Rate and Rhythm: Regular rhythm. Tachycardia     Heart sounds: Normal heart sounds. Pulmonary:      Effort: Pulmonary effort is normal.      Breath sounds: Normal breath sounds. Mildly diminished in lower lobes  Abdominal:      General: Abdomen is flat. Bowel sounds are normal.      Palpations: Abdomen is soft. Musculoskeletal:         General: Normal range of motion. No tenderness to palpation of chest   Skin:     General: Skin is warm and dry. Areas of vitiligo noted  Neurological:      General: Mild numbness and tingling of bilateral pinkies. Mental Status: He is alert and oriented to person, place, and time. Mental status is at baseline. Psychiatric:         Mood and Affect: Mildly anxious, but otherwise normal     Behavior: Behavior normal.     Consults: none  Significant Diagnostic Studies:    CT Head W/O Contrast   Final Result      No acute intracranial hemorrhage or mass effect. Mild chronic small vessel ischemic change. CT CHEST PULMONARY EMBOLISM W CONTRAST   Final Result      Limited assessment for pulmonary embolism, particularly in the lower lobe secondary to respiratory motion. No pulmonary emboli in the lower evaluated pulmonary arteries. Moderate peripheral and basilar predominant groundglass opacity and septal thickening, suspicious for atypical pneumonia. Hepatic steatosis. Trace residual peripancreatic fat stranding, likely sequelae of pancreatitis as noted on CT 2 weeks prior. INCIDENTAL FINDINGS: None. XR CHEST (2 VW)   Final Result      1. Minimal bilateral basilar airspace disease. Treatments: antibiotics: cefepime, doxycylcine.  Thorazine, Baclofen, Hydroxyzine   Disposition: home  Discharged Condition: Stable  Follow Up: Primary Care Physician in one week    DISCHARGE MEDICATION:     Medication List        START taking these medications      chlorproMAZINE 25 MG tablet  Commonly known as: THORAZINE  Take 1 tablet by mouth 4 times daily for 10 days     hydrOXYzine HCl 25 MG tablet  Commonly known as: ATARAX  Take 1 tablet by mouth in the morning, at noon, in the evening, and at bedtime for 10 days     metFORMIN 500 MG extended release tablet  Commonly known as: GLUCOPHAGE-XR  Take 2 tablets by mouth daily (with breakfast)  Start taking on: August 28, 2022  Replaces: metFORMIN 500 MG tablet            CONTINUE taking these medications      L-methylfolate-B6-B12 3-90.314-2-35 MG Caps capsule  Take 1 capsule by mouth daily     midodrine 5 MG tablet  Commonly known as: PROAMATINE  Take 1 tablet by mouth 2 times daily     pregabalin 50 MG capsule  Commonly known as: Lyrica  Take 1 capsule by mouth in the morning and 1 capsule at noon and 1 capsule before bedtime. Do all this for 30 days.             STOP taking these medications      metFORMIN 500 MG tablet  Commonly known as: GLUCOPHAGE  Replaced by: metFORMIN 500 MG extended release tablet     oxyCODONE HCl 10 MG immediate release tablet  Commonly known as: OXY-IR     vitamin B-1 100 MG tablet  Commonly known as: THIAMINE               Where to Get Your Medications        These medications were sent to  [facility], [place] - [address] - P [phone] - F [phone]  [address] [place] [address] [place], [facility] [address]      Phone: [phone]   chlorproMAZINE 25 MG tablet  hydrOXYzine HCl 25 MG tablet  metFORMIN 500 MG extended release tablet  midodrine 5 MG tablet       Activity: activity as tolerated  Diet: cardiac diet  Wound Care: none needed    Time Spent on discharge is more than 20 minutes    Signed:  Didier Delgado MD   Internal Medicine PGY-1  8/27/2022

## 2022-08-27 NOTE — PROGRESS NOTES
[employer] secure message sent to Dr. Humaira Gonsalez, as follows:  Please note, patient's heart rate has increased to 144 at 1800 as notified by our monitor watcher. When he ambulates, his oxygen saturation is 85% on room air and he reports getting dizzy. Respiratory therapy recommends 5 liters oxygen with activity. When he is in bed, his oxygen saturation is 92% on room air. Case management/social work is gone for today; therefore, he will not be able to discharge tonight. FYI.

## 2022-08-27 NOTE — PROGRESS NOTES
[facility]    Respiratory Therapy     Home Oxygen Evaluation        Name: Ritu Briceno  Medical Record Number: [id number]  Age: 57 y.o. Gender:  male   : 1960  Today's date: 2022  Room: 6328/6328-01      Assessment        BP (!) 129/90   Pulse (!) 118   Temp 99.8 °F (37.7 °C) (Oral)   Resp 22   Ht 5' 11\" (1.803 m)   Wt 152 lb 5.4 oz (69.1 kg)   SpO2 92%   BMI 21.25 kg/m²     Patient Active Problem List   Diagnosis    Orthostatic syncope    Pancytopenia (HCC)    Chronic hepatitis C without hepatic coma (HCC)    Vitiligo    Type 2 diabetes mellitus without complication, without long-term current use of insulin (HCC)    Diabetic polyneuropathy associated with type 2 diabetes mellitus (HCC)    Cigarette nicotine dependence without complication    Acute pancreatitis without infection or necrosis    PNA (pneumonia)    Hospital-acquired pneumonia       Social History:  Social History     Tobacco Use    Smoking status: Every Day     Packs/day: 0.00     Years: 40.00     Pack years: 0.00     Types: Cigars, Cigarettes    Smokeless tobacco: Never    Tobacco comments:     2 cigars daily    Vaping Use    Vaping Use: Never used   Substance Use Topics    Alcohol use: Yes     Comment: 48 oz beer/day    Drug use: Not Currently       Patient Room Air saturation at rest 92-95%   Patient Room Air saturation upon ambulation 84 %    Oxygen saturations of 88% or less on RA qualifies patient for Home Oxygen    Patient resting on 0  lmp  with an oxygen saturation of  92-95 %     Patient ambulated on 2 lpm with an oxygen saturation of 86%    Patient ambulated on 4 lpm with an oxygen saturation of 87%    Patient ambulated on 5 lpm with an oxygen saturation of 90%     Qualifying patient for home oxygen with ambulation and continuous flow  @ 5 lpm.      In your clinical assessment does the Patient Require Portable Oxygen Tanks?     Yes      Pt became dizzy during walk on RA and with O2 on         Patient/caregiver was educated on Home Oxygen process:  yes    Level of patient/caregiver understanding able to:   [x] Verbalize understanding   [] Demonstrate understanding       [] Teach back        [] Needs reinforcement        []  No available caregiver               []  Other:     Response to education:  Good     Time Spent with Home O2 Set Up:  35  minutes     Loulou Enriquez RCP on 8/27/2022 at 6:06 PM                 .

## 2022-08-28 ENCOUNTER — APPOINTMENT (OUTPATIENT)
Dept: GENERAL RADIOLOGY | Age: 62
DRG: 720 | End: 2022-08-28
Payer: MEDICAID

## 2022-08-28 ENCOUNTER — APPOINTMENT (OUTPATIENT)
Dept: CT IMAGING | Age: 62
DRG: 720 | End: 2022-08-28
Payer: MEDICAID

## 2022-08-28 LAB
ALBUMIN SERPL-MCNC: 2.5 G/DL (ref 3.4–5)
ALP BLD-CCNC: 152 U/L (ref 40–129)
ALT SERPL-CCNC: 25 U/L (ref 10–40)
ANION GAP SERPL CALCULATED.3IONS-SCNC: 12 MMOL/L (ref 3–16)
AST SERPL-CCNC: 27 U/L (ref 15–37)
BASOPHILS ABSOLUTE: 0 K/UL (ref 0–0.2)
BASOPHILS RELATIVE PERCENT: 0.4 %
BILIRUB SERPL-MCNC: 0.7 MG/DL (ref 0–1)
BILIRUBIN DIRECT: 0.4 MG/DL (ref 0–0.3)
BILIRUBIN, INDIRECT: 0.3 MG/DL (ref 0–1)
BUN BLDV-MCNC: 8 MG/DL (ref 7–20)
C-REACTIVE PROTEIN: 23.5 MG/L (ref 0–5.1)
CALCIUM SERPL-MCNC: 8 MG/DL (ref 8.3–10.6)
CHLORIDE BLD-SCNC: 101 MMOL/L (ref 99–110)
CO2: 21 MMOL/L (ref 21–32)
CREAT SERPL-MCNC: 0.6 MG/DL (ref 0.8–1.3)
EOSINOPHILS ABSOLUTE: 0.3 K/UL (ref 0–0.6)
EOSINOPHILS RELATIVE PERCENT: 3.2 %
GFR AFRICAN AMERICAN: >60
GFR NON-AFRICAN AMERICAN: >60
GLUCOSE BLD-MCNC: 158 MG/DL (ref 70–99)
GLUCOSE BLD-MCNC: 188 MG/DL (ref 70–99)
GLUCOSE BLD-MCNC: 200 MG/DL (ref 70–99)
GLUCOSE BLD-MCNC: 240 MG/DL (ref 70–99)
GLUCOSE BLD-MCNC: 300 MG/DL (ref 70–99)
HCT VFR BLD CALC: 34.8 % (ref 40.5–52.5)
HEMOGLOBIN: 11.8 G/DL (ref 13.5–17.5)
LYMPHOCYTES ABSOLUTE: 1.7 K/UL (ref 1–5.1)
LYMPHOCYTES RELATIVE PERCENT: 19.3 %
MAGNESIUM: 1.5 MG/DL (ref 1.8–2.4)
MCH RBC QN AUTO: 32.9 PG (ref 26–34)
MCHC RBC AUTO-ENTMCNC: 33.8 G/DL (ref 31–36)
MCV RBC AUTO: 97.3 FL (ref 80–100)
MONOCYTES ABSOLUTE: 1 K/UL (ref 0–1.3)
MONOCYTES RELATIVE PERCENT: 12.2 %
NEUTROPHILS ABSOLUTE: 5.6 K/UL (ref 1.7–7.7)
NEUTROPHILS RELATIVE PERCENT: 64.9 %
PDW BLD-RTO: 13.2 % (ref 12.4–15.4)
PERFORMED ON: ABNORMAL
PHOSPHORUS: 2.6 MG/DL (ref 2.5–4.9)
PLATELET # BLD: 178 K/UL (ref 135–450)
PMV BLD AUTO: 8.3 FL (ref 5–10.5)
POTASSIUM SERPL-SCNC: 4.1 MMOL/L (ref 3.5–5.1)
PROCALCITONIN: 0.12 NG/ML (ref 0–0.15)
RBC # BLD: 3.57 M/UL (ref 4.2–5.9)
SODIUM BLD-SCNC: 134 MMOL/L (ref 136–145)
TOTAL PROTEIN: 5.5 G/DL (ref 6.4–8.2)
WBC # BLD: 8.6 K/UL (ref 4–11)

## 2022-08-28 PROCEDURE — 6370000000 HC RX 637 (ALT 250 FOR IP)

## 2022-08-28 PROCEDURE — 99223 1ST HOSP IP/OBS HIGH 75: CPT | Performed by: INTERNAL MEDICINE

## 2022-08-28 PROCEDURE — 2580000003 HC RX 258

## 2022-08-28 PROCEDURE — 6370000000 HC RX 637 (ALT 250 FOR IP): Performed by: INTERNAL MEDICINE

## 2022-08-28 PROCEDURE — 71046 X-RAY EXAM CHEST 2 VIEWS: CPT

## 2022-08-28 PROCEDURE — 85025 COMPLETE CBC W/AUTO DIFF WBC: CPT

## 2022-08-28 PROCEDURE — 80069 RENAL FUNCTION PANEL: CPT

## 2022-08-28 PROCEDURE — 1200000000 HC SEMI PRIVATE

## 2022-08-28 PROCEDURE — 86140 C-REACTIVE PROTEIN: CPT

## 2022-08-28 PROCEDURE — 84145 PROCALCITONIN (PCT): CPT

## 2022-08-28 PROCEDURE — 6360000002 HC RX W HCPCS

## 2022-08-28 PROCEDURE — 6360000002 HC RX W HCPCS: Performed by: STUDENT IN AN ORGANIZED HEALTH CARE EDUCATION/TRAINING PROGRAM

## 2022-08-28 PROCEDURE — 83735 ASSAY OF MAGNESIUM: CPT

## 2022-08-28 PROCEDURE — 36415 COLL VENOUS BLD VENIPUNCTURE: CPT

## 2022-08-28 PROCEDURE — 80076 HEPATIC FUNCTION PANEL: CPT

## 2022-08-28 PROCEDURE — 71250 CT THORAX DX C-: CPT

## 2022-08-28 RX ORDER — MAGNESIUM SULFATE IN WATER 40 MG/ML
2000 INJECTION, SOLUTION INTRAVENOUS ONCE
Status: COMPLETED | OUTPATIENT
Start: 2022-08-28 | End: 2022-08-28

## 2022-08-28 RX ADMIN — HYDROXYZINE HYDROCHLORIDE 25 MG: 25 TABLET ORAL at 09:47

## 2022-08-28 RX ADMIN — SODIUM CHLORIDE, PRESERVATIVE FREE 10 ML: 5 INJECTION INTRAVENOUS at 09:50

## 2022-08-28 RX ADMIN — PREGABALIN 50 MG: 50 CAPSULE ORAL at 21:09

## 2022-08-28 RX ADMIN — PREGABALIN 50 MG: 50 CAPSULE ORAL at 14:51

## 2022-08-28 RX ADMIN — PREGABALIN 50 MG: 50 CAPSULE ORAL at 09:47

## 2022-08-28 RX ADMIN — SODIUM CHLORIDE, PRESERVATIVE FREE 10 ML: 5 INJECTION INTRAVENOUS at 21:10

## 2022-08-28 RX ADMIN — HYDROXYZINE HYDROCHLORIDE 25 MG: 25 TABLET ORAL at 21:09

## 2022-08-28 RX ADMIN — Medication 100 MG: at 09:47

## 2022-08-28 RX ADMIN — INSULIN LISPRO 12 UNITS: 100 INJECTION, SOLUTION INTRAVENOUS; SUBCUTANEOUS at 12:26

## 2022-08-28 RX ADMIN — HYDROXYZINE HYDROCHLORIDE 25 MG: 25 TABLET ORAL at 17:14

## 2022-08-28 RX ADMIN — ENOXAPARIN SODIUM 40 MG: 100 INJECTION SUBCUTANEOUS at 09:46

## 2022-08-28 RX ADMIN — ACETAMINOPHEN 650 MG: 325 TABLET, FILM COATED ORAL at 17:20

## 2022-08-28 RX ADMIN — BACLOFEN 10 MG: 10 TABLET ORAL at 05:38

## 2022-08-28 RX ADMIN — HYDROXYZINE HYDROCHLORIDE 25 MG: 25 TABLET ORAL at 12:45

## 2022-08-28 RX ADMIN — SODIUM CHLORIDE: 9 INJECTION, SOLUTION INTRAVENOUS at 10:01

## 2022-08-28 RX ADMIN — CHLORPROMAZINE HYDROCHLORIDE 25 MG: 25 TABLET, FILM COATED ORAL at 21:09

## 2022-08-28 RX ADMIN — CHLORPROMAZINE HYDROCHLORIDE 25 MG: 25 TABLET, FILM COATED ORAL at 11:37

## 2022-08-28 RX ADMIN — CHLORPROMAZINE HYDROCHLORIDE 25 MG: 25 TABLET, FILM COATED ORAL at 12:45

## 2022-08-28 RX ADMIN — CHLORPROMAZINE HYDROCHLORIDE 25 MG: 25 TABLET, FILM COATED ORAL at 17:14

## 2022-08-28 RX ADMIN — MAGNESIUM SULFATE HEPTAHYDRATE 2000 MG: 40 INJECTION, SOLUTION INTRAVENOUS at 10:02

## 2022-08-28 RX ADMIN — METFORMIN HYDROCHLORIDE 1000 MG: 500 TABLET, EXTENDED RELEASE ORAL at 09:55

## 2022-08-28 ASSESSMENT — PAIN SCALES - GENERAL
PAINLEVEL_OUTOF10: 0
PAINLEVEL_OUTOF10: 0

## 2022-08-28 NOTE — PROGRESS NOTES
Internal Medicine  PGY 3  Progress note      CC : Hiccups for 2 days and fatigue    History Obtained From:  patient    Inteval Hx:  No acute events overnight. Ambulated patient about 15 ft and SPO2 went down to 85% and HR went up to the 150's. . Home O2 eval shows a requirement of 5 L. Pulm consulted. Discharge order dced. Past Medical History:        Diagnosis Date    Chronic hepatitis C without hepatic coma ([facility] [address] - Diagnosed in 30s     Type 2 diabetes mellitus without complication, without long-term current use of insulin ([facility] 7/9/2021       Past Surgical History:    History reviewed. No pertinent surgical history. Medications Priorto Admission:    Medications Prior to Admission: [DISCONTINUED] thiamine mononitrate (THIAMINE) 100 MG tablet, Take 1 tablet by mouth in the morning. [DISCONTINUED] metFORMIN (GLUCOPHAGE) 500 MG tablet, Take 1 tablet by mouth daily (with breakfast)  [DISCONTINUED] oxyCODONE (OXY-IR) 10 MG immediate release tablet, Take 1 tablet by mouth every 8 hours as needed for Pain (severe pain) for up to 5 days. pregabalin (LYRICA) 50 MG capsule, Take 1 capsule by mouth in the morning and 1 capsule at noon and 1 capsule before bedtime. Do all this for 30 days. L-methylfolate-B6-B12 (METANX) 9-10.513-6-21 MG CAPS capsule, Take 1 capsule by mouth daily    Allergies:  Patient has no known allergies. Social History:   TOBACCO:   reports that he has been smoking cigars and cigarettes. He has never used smokeless tobacco.  ETOH:   reports current alcohol use. DRUGS : none  Patient currently lives alone    Family History:       Problem Relation Age of Onset    Other Mother         COPD    No Known Problems Sister     No Known Problems Brother     No Known Problems Brother     No Known Problems Brother            Physical exam:    Physical Exam  Vitals reviewed. Constitutional:  on 4L NC     Appearance: Normal appearance. He is normal weight.    HENT:      Head: Normocephalic and atraumatic. Eyes:      Extraocular Movements: Extraocular movements intact. Conjunctiva/sclera: Conjunctivae normal.      Pupils: Pupils are equal, round, and reactive to light. Cardiovascular:      Rate and Rhythm: Regular rhythm. Regular rate. Heart sounds: Normal heart sounds. Pulmonary:      Effort: Pulmonary effort is normal.      Breath sounds: Normal breath sounds. Abdominal:      General: Abdomen is flat. Bowel sounds are normal.      Palpations: Abdomen is soft. Patient notes soreness on deep palpation under rib cage. Musculoskeletal:         General: Normal range of motion. Mild tenderness to palpation of chest   Skin:     General: Skin is warm and dry. Neurological:      General: Mild numbness and tingling of bilateral pinkies. Mental Status: He is alert and oriented to person, place, and time. Mental status is at baseline. Psychiatric:         Mood and Affect: Mood normal.         Behavior: Behavior normal.      Vitals:    08/28/22 0336   BP: 108/71   Pulse: (!) 105   Resp: 18   Temp: 98.7 °F (37.1 °C)   SpO2: 94%       DATA:    Labs:  CBC:   Recent Labs     08/26/22  0640 08/27/22  0627 08/28/22  0546   WBC 8.8 9.4 8.6   HGB 12.4* 12.1* 11.8*   HCT 36.4* 34.8* 34.8*    175 178       BMP:   Recent Labs     08/26/22  0640 08/27/22  0627 08/28/22  0546   * 139 134*   K 4.4 3.4* 4.1    106 101   CO2 19* 22 21   BUN 8 7 8   CREATININE 0.5* 0.6* 0.6*   GLUCOSE 243* 133* 200*   PHOS 2.9 3.0 2.6     LFT's:   Recent Labs     08/26/22  0640 08/27/22  0627 08/28/22  0546   AST 40* 29 27   ALT 40 32 25   BILITOT 0.7 0.8 0.7   ALKPHOS 161* 156* 152*     Troponin:   No results for input(s): TROPONINI in the last 72 hours. BNP:No results for input(s): BNP in the last 72 hours. ABGs: No results for input(s): PHART, SGS8VSG, PO2ART in the last 72 hours. INR: No results for input(s): INR in the last 72 hours.     U/A:No results for input(s): NITRITE, COLORU, PHUR, LABCAST, WBCUA, RBCUA, MUCUS, TRICHOMONAS, YEAST, BACTERIA, CLARITYU, SPECGRAV, LEUKOCYTESUR, UROBILINOGEN, BILIRUBINUR, BLOODU, GLUCOSEU, AMORPHOUS in the last 72 hours. Invalid input(s): KETONESU    CT Head W/O Contrast   Final Result      No acute intracranial hemorrhage or mass effect. Mild chronic small vessel ischemic change. CT CHEST PULMONARY EMBOLISM W CONTRAST   Final Result      Limited assessment for pulmonary embolism, particularly in the lower lobe secondary to respiratory motion. No pulmonary emboli in the lower evaluated pulmonary arteries. Moderate peripheral and basilar predominant groundglass opacity and septal thickening, suspicious for atypical pneumonia. Hepatic steatosis. Trace residual peripancreatic fat stranding, likely sequelae of pancreatitis as noted on CT 2 weeks prior. INCIDENTAL FINDINGS: None. XR CHEST (2 VW)   Final Result      1. Minimal bilateral basilar airspace disease. XR CHEST (2 VW)    (Results Pending)           ASSESSMENT AND PLAN:    Health Care Associated Pneumonia  -resolved with doxycycline       Acute Hypoxic Respiratory Failure  - unclear etiology  -new requirement of 5 L  -pulm consult  -CT chest wo con  -procalc/ CRP     Hiccups  -likely 2/2 to PNA plus anxiety component  -he does not have hiccups when we talk to him.   -continue chlorpromazine and hydroxyzine    Transaminitis, resolved    Type 2 diabetes mellitus  Patient's glucose on presentation was 321.   Patient's last hemoglobin A1c on 8/9/2022 was 6.6.   - resumed home metformin after acute illness resolved in anticipation of discharge  -sliding scale PRN  - POCT x times dialy    History of orthostatic syncope  Likely related autonomic dysfunction in setting of diabetic neuropathy.  - Continue home midodrine if needed       Will discuss with attending physician Dr. Mariann Bryant Status:Full code  FEN: Diabetic diet  PPX: Lovenox  DISPO: Kip Inches Toby Graham MD PGY-3  8/28/2022,  9:07 AM

## 2022-08-28 NOTE — PLAN OF CARE
Patient anticipating to discharge soon. Patient remains free from falls and injury this shift. Patient with no complaints of pain at this time.   Problem: Discharge Planning  Goal: Discharge to home or other facility with appropriate resources  Outcome: Progressing     Problem: Safety - Adult  Goal: Free from fall injury  8/27/2022 2300 by Aure Sparks RN  Outcome: Progressing     Problem: Pain  Goal: Verbalizes/displays adequate comfort level or baseline comfort level  8/27/2022 2300 by Aure Sparks RN  Outcome: Progressing

## 2022-08-28 NOTE — CONSULTS
Pulmonary Consult Note      Reason for Consult: Worsening acute hypoxemic respiratory failure and airspace disease on imaging  Requesting Physician: Levi White  Subjective:     CHIEF COMPLAINT / HPI:                The patient is a 57 y.o. male with significant past medical history of hepatitis C, diabetes and alcohol abuse presented with complaints of hiccups and shortness of breath. Patient presented 8 days ago with hiccups and shortness of breath following an admission for alcohol induced pancreatitis. He was admitted early in the month of August and was readmitted a couple days after discharge with above symptoms. He says he quit drinking about a month ago, around the time of his last admission. He states that he used to drink 2-3 tall boys a day. He does not have any chronic lung disease and he offers no constitutional symptoms states that hiccups have been incessant for the past couple of weeks and make it difficult for him to sleep. In addition he has had a persistent oxygen requirement and changes on chest x-ray and CT scan that were not there when he was admitted for the pancreatitis. He had a CT scan on August 20 that showed bilateral patchy areas of groundglass opacity and he was treated with antibiotics. He did get down to room air at rest at 1 point but still needed 4 to 5 L with exertion. Now he needs 4 L of oxygen at rest and a CT scan today showed progression of airspace opacities. Patient's only complaints are of fatigue, hiccups and dyspnea. Past Medical History:      Diagnosis Date    Chronic hepatitis C without hepatic coma ([facility] - Diagnosed in 30s     Type 2 diabetes mellitus without complication, without long-term current use of insulin ([facility] [address] 7/9/2021      Past Surgical History:    History reviewed. No pertinent surgical history.   Current Medications:     [Held by provider] insulin glargine  28 Units SubCUTAneous Nightly    metFORMIN  1,000 mg Oral Daily with breakfast hydrOXYzine HCl  25 mg Oral 4x daily    chlorproMAZINE  25 mg Oral 4x Daily    insulin lispro  0-16 Units SubCUTAneous TID WC    insulin lispro  0-4 Units SubCUTAneous Nightly    pregabalin  50 mg Oral TID    vitamin B-1  100 mg Oral Daily    sodium chloride flush  5-40 mL IntraVENous 2 times per day    enoxaparin  40 mg SubCUTAneous Daily     Allergies:  No Known Allergies  Social History:    TOBACCO:   reports that he has been smoking cigars and cigarettes. He has never used smokeless tobacco.  ETOH:   reports current alcohol use. Family History:       Problem Relation Age of Onset    Other Mother         COPD    No Known Problems Sister     No Known Problems Brother     No Known Problems Brother     No Known Problems Brother        REVIEW OF SYSTEMS:    CONSTITUTIONAL:  negative for fevers, chills, diaphoresis, activity change, appetite change, fatigue, night sweats and unexpected weight change.    EYES:  negative for blurred vision, eye discharge, visual disturbance and icterus  HEENT:  negative for hearing loss, tinnitus, ear drainage, sinus pressure, nasal congestion, epistaxis and snoring  RESPIRATORY:  See HPI  CARDIOVASCULAR:  negative for chest pain, palpitations, exertional chest pressure/discomfort, edema, syncope  GASTROINTESTINAL:  negative for nausea, vomiting, diarrhea, constipation, blood in stool and abdominal pain  GENITOURINARY:  negative for frequency, dysuria, urinary incontinence, decreased urine volume, and hematuria  HEMATOLOGIC/LYMPHATIC:  negative for easy bruising, bleeding and lymphadenopathy  ALLERGIC/IMMUNOLOGIC:  negative for recurrent infections, angioedema, anaphylaxis and drug reactions  ENDOCRINE:  negative for weight changes and diabetic symptoms including polyuria, polydipsia and polyphagia  MUSCULOSKELETAL:  negative for  pain, joint swelling, decreased range of motion and muscle weakness  NEUROLOGICAL:  negative for headaches, slurred speech, unilateral weakness  PSYCHIATRIC/BEHAVIORAL: negative for hallucinations, behavioral problems, confusion and agitation. Objective:   PHYSICAL EXAM:      VITALS:  /77   Pulse (!) 117   Temp 99.2 °F (37.3 °C) (Axillary)   Resp 16   Ht 5' 11\" (1.803 m)   Wt 152 lb 5.4 oz (69.1 kg)   SpO2 97%   BMI 21.25 kg/m²   24HR INTAKE/OUTPUT:    Intake/Output Summary (Last 24 hours) at 2022 1807  Last data filed at 2022 1451  Gross per 24 hour   Intake 240 ml   Output 975 ml   Net -735 ml     CURRENT PULSE OXIMETRY:  SpO2: 97 %  24HR PULSE OXIMETRY RANGE:  SpO2  Av.6 %  Min: 89 %  Max: 97 %    CONSTITUTIONAL:  awake, alert, cooperative, mild distress, and appears stated age  NECK:  Supple, symmetrical, trachea midline, no adenopathy, thyroid symmetric, not enlarged and no tenderness, skin normal  LUNGS: Mild increased work of breathing bilateral inspiratory squeaks, no wheezes and no rhonchi or rales. Trace accessory muscle use  CARDIOVASCULAR:  normal S1 and S2, no edema and no JVD  ABDOMEN:  normal bowel sounds, non-distended and no masses palpated, and no tenderness to palpation. No hepatospleenomegaly  LYMPHADENOPATHY:  no axillary or supraclavicular adenopathy. No cervical adnenopathy  PSYCHIATRIC: Oriented to person place and time. No obvious depression or anxiety. MUSCULOSKELETAL: No obvious misalignment or effusion of the joints. No clubbing, cyanosis of the digits. SKIN:  normal skin color, texture, turgor and no redness, warmth, or swelling.  No palpable nodules    DATA:    Old records have been reviewed  CBC with Differential:    Lab Results   Component Value Date/Time    WBC 8.6 2022 05:46 AM    RBC 3.57 2022 05:46 AM    HGB 11.8 2022 05:46 AM    HCT 34.8 2022 05:46 AM     2022 05:46 AM    MCV 97.3 2022 05:46 AM    MCH 32.9 2022 05:46 AM    MCHC 33.8 2022 05:46 AM    RDW 13.2 2022 05:46 AM    LYMPHOPCT 19.3 2022 05:46 AM embolism, particularly in the lower lobe secondary to respiratory motion. No pulmonary emboli in the lower evaluated pulmonary arteries. Moderate peripheral and basilar predominant groundglass opacity and septal thickening, suspicious for atypical pneumonia. Hepatic steatosis. Trace residual peripancreatic fat stranding, likely sequelae of pancreatitis as noted on CT 2 weeks prior. INCIDENTAL FINDINGS: None. XR CHEST (2 VW)   Final Result      1. Minimal bilateral basilar airspace disease. Other diagnostic test:  Assessment/Plan   57 y.o. male with acute hypoxemic respiratory failure and progressive interstitial changes on CT. Patient seems too far out from his pancreatitis for him to have developed acute lung injury as a result of the pancreatitis. He had a CT of the abdomen on August 8 that did not show any airspace disease in the lower lobes of the lungs but that was dramatically different on August 20 and even more so on the CT scan performed today. He had an infectious work-up early on admission with a bio fire swab and COVID test which were negative. He has completed an empiric course of cefepime and doxycycline. Because of the worsening airspace disease, and increased oxygen requirement despite empiric antibiotic coverage I think patient would best served with a bronchoscopy with BAL and transbronchial biopsies to evaluate the etiology of his airspace disease. I have made him n.p.o. after midnight. My plan will be to do a BAL in the medial subsegment of the left upper lobe lingula and perform transbronchial biopsies from both the left upper and left lower lobes as those of the areas that have the greatest preponderance of disease.     Phyllis Rosen MD

## 2022-08-29 ENCOUNTER — ANESTHESIA EVENT (OUTPATIENT)
Dept: ENDOSCOPY | Age: 62
DRG: 720 | End: 2022-08-29
Payer: MEDICAID

## 2022-08-29 ENCOUNTER — ANESTHESIA (OUTPATIENT)
Dept: ENDOSCOPY | Age: 62
DRG: 720 | End: 2022-08-29
Payer: MEDICAID

## 2022-08-29 ENCOUNTER — APPOINTMENT (OUTPATIENT)
Dept: GENERAL RADIOLOGY | Age: 62
DRG: 720 | End: 2022-08-29
Payer: MEDICAID

## 2022-08-29 PROBLEM — J96.01 ACUTE HYPOXEMIC RESPIRATORY FAILURE (HCC): Status: ACTIVE | Noted: 2022-08-29

## 2022-08-29 PROBLEM — J84.9 INTERSTITIAL LUNG DISEASE (HCC): Status: ACTIVE | Noted: 2022-08-29

## 2022-08-29 LAB
ALBUMIN SERPL-MCNC: 2.6 G/DL (ref 3.4–5)
ALP BLD-CCNC: 144 U/L (ref 40–129)
ALT SERPL-CCNC: 20 U/L (ref 10–40)
ANION GAP SERPL CALCULATED.3IONS-SCNC: 9 MMOL/L (ref 3–16)
APPEARANCE BAL (LAVAGE): CLEAR
AST SERPL-CCNC: 25 U/L (ref 15–37)
BASOPHILS ABSOLUTE: 0 K/UL (ref 0–0.2)
BASOPHILS RELATIVE PERCENT: 0.3 %
BASOPHILS, BAL FLUID: 1 %
BILIRUB SERPL-MCNC: 0.8 MG/DL (ref 0–1)
BILIRUBIN DIRECT: 0.4 MG/DL (ref 0–0.3)
BILIRUBIN, INDIRECT: 0.4 MG/DL (ref 0–1)
BUN BLDV-MCNC: 8 MG/DL (ref 7–20)
CALCIUM SERPL-MCNC: 8.1 MG/DL (ref 8.3–10.6)
CHLORIDE BLD-SCNC: 103 MMOL/L (ref 99–110)
CLOT EVALUATION BAL: ABNORMAL
CO2: 24 MMOL/L (ref 21–32)
COLOR LAVAGE: COLORLESS
CREAT SERPL-MCNC: 0.6 MG/DL (ref 0.8–1.3)
EOSIN: 1 %
EOSINOPHILS ABSOLUTE: 0.3 K/UL (ref 0–0.6)
EOSINOPHILS RELATIVE PERCENT: 3.2 %
GFR AFRICAN AMERICAN: >60
GFR NON-AFRICAN AMERICAN: >60
GLUCOSE BLD-MCNC: 192 MG/DL (ref 70–99)
GLUCOSE BLD-MCNC: 196 MG/DL (ref 70–99)
GLUCOSE BLD-MCNC: 200 MG/DL (ref 70–99)
GLUCOSE BLD-MCNC: 208 MG/DL (ref 70–99)
GLUCOSE BLD-MCNC: 338 MG/DL (ref 70–99)
HCT VFR BLD CALC: 35.4 % (ref 40.5–52.5)
HEMOGLOBIN: 11.8 G/DL (ref 13.5–17.5)
LYMPHOCYTES ABSOLUTE: 1.4 K/UL (ref 1–5.1)
LYMPHOCYTES RELATIVE PERCENT: 15.1 %
LYMPHOCYTES, BAL: 26 % (ref 5–10)
MACROPHAGES, BAL: 42 % (ref 90–95)
MAGNESIUM: 1.6 MG/DL (ref 1.8–2.4)
MCH RBC QN AUTO: 32.6 PG (ref 26–34)
MCHC RBC AUTO-ENTMCNC: 33.3 G/DL (ref 31–36)
MCV RBC AUTO: 97.8 FL (ref 80–100)
MONOCYTES ABSOLUTE: 1 K/UL (ref 0–1.3)
MONOCYTES RELATIVE PERCENT: 11.2 %
MONOCYTES, BAL: 15 %
NEUTROPHILS ABSOLUTE: 6.5 K/UL (ref 1.7–7.7)
NEUTROPHILS RELATIVE PERCENT: 70.2 %
NUMBER OF CELLS COUNTED BAL (LAVAGE): 100
PATH REVIEW BAL (LAVAGE): YES
PDW BLD-RTO: 13.6 % (ref 12.4–15.4)
PERFORMED ON: ABNORMAL
PHOSPHORUS: 3.1 MG/DL (ref 2.5–4.9)
PLATELET # BLD: 178 K/UL (ref 135–450)
PMV BLD AUTO: 7.7 FL (ref 5–10.5)
POTASSIUM SERPL-SCNC: 4.2 MMOL/L (ref 3.5–5.1)
RBC # BLD: 3.63 M/UL (ref 4.2–5.9)
RBC, BAL: <1000 /CUMM
SEGMENTED NEUTROPHILS, BAL: 15 % (ref 5–10)
SODIUM BLD-SCNC: 136 MMOL/L (ref 136–145)
TOTAL PROTEIN: 5.5 G/DL (ref 6.4–8.2)
WBC # BLD: 9.2 K/UL (ref 4–11)
WBC/EPI CELLS BAL: 185 /CUMM

## 2022-08-29 PROCEDURE — 31628 BRONCHOSCOPY/LUNG BX EACH: CPT | Performed by: INTERNAL MEDICINE

## 2022-08-29 PROCEDURE — 31632 BRONCHOSCOPY/LUNG BX ADDL: CPT | Performed by: INTERNAL MEDICINE

## 2022-08-29 PROCEDURE — 2580000003 HC RX 258

## 2022-08-29 PROCEDURE — 6360000002 HC RX W HCPCS: Performed by: NURSE ANESTHETIST, CERTIFIED REGISTERED

## 2022-08-29 PROCEDURE — 3609011800 HC BRONCHOSCOPY/TRANSBRONCHIAL LUNG BIOPSY: Performed by: INTERNAL MEDICINE

## 2022-08-29 PROCEDURE — 88312 SPECIAL STAINS GROUP 1: CPT

## 2022-08-29 PROCEDURE — 6370000000 HC RX 637 (ALT 250 FOR IP): Performed by: STUDENT IN AN ORGANIZED HEALTH CARE EDUCATION/TRAINING PROGRAM

## 2022-08-29 PROCEDURE — 87305 ASPERGILLUS AG IA: CPT

## 2022-08-29 PROCEDURE — 2700000000 HC OXYGEN THERAPY PER DAY

## 2022-08-29 PROCEDURE — 97116 GAIT TRAINING THERAPY: CPT

## 2022-08-29 PROCEDURE — 80069 RENAL FUNCTION PANEL: CPT

## 2022-08-29 PROCEDURE — 83735 ASSAY OF MAGNESIUM: CPT

## 2022-08-29 PROCEDURE — 7100000010 HC PHASE II RECOVERY - FIRST 15 MIN: Performed by: INTERNAL MEDICINE

## 2022-08-29 PROCEDURE — 88305 TISSUE EXAM BY PATHOLOGIST: CPT

## 2022-08-29 PROCEDURE — 6370000000 HC RX 637 (ALT 250 FOR IP)

## 2022-08-29 PROCEDURE — 71045 X-RAY EXAM CHEST 1 VIEW: CPT

## 2022-08-29 PROCEDURE — 0B9G8ZX DRAINAGE OF LEFT UPPER LUNG LOBE, VIA NATURAL OR ARTIFICIAL OPENING ENDOSCOPIC, DIAGNOSTIC: ICD-10-PCS | Performed by: INTERNAL MEDICINE

## 2022-08-29 PROCEDURE — 87205 SMEAR GRAM STAIN: CPT

## 2022-08-29 PROCEDURE — 87102 FUNGUS ISOLATION CULTURE: CPT

## 2022-08-29 PROCEDURE — 2709999900 HC NON-CHARGEABLE SUPPLY: Performed by: INTERNAL MEDICINE

## 2022-08-29 PROCEDURE — 87015 SPECIMEN INFECT AGNT CONCNTJ: CPT

## 2022-08-29 PROCEDURE — 87070 CULTURE OTHR SPECIMN AEROBIC: CPT

## 2022-08-29 PROCEDURE — 31624 DX BRONCHOSCOPE/LAVAGE: CPT | Performed by: INTERNAL MEDICINE

## 2022-08-29 PROCEDURE — 80076 HEPATIC FUNCTION PANEL: CPT

## 2022-08-29 PROCEDURE — 88342 IMHCHEM/IMCYTCHM 1ST ANTB: CPT

## 2022-08-29 PROCEDURE — 88112 CYTOPATH CELL ENHANCE TECH: CPT

## 2022-08-29 PROCEDURE — 87206 SMEAR FLUORESCENT/ACID STAI: CPT

## 2022-08-29 PROCEDURE — 87252 VIRUS INOCULATION TISSUE: CPT

## 2022-08-29 PROCEDURE — 94761 N-INVAS EAR/PLS OXIMETRY MLT: CPT

## 2022-08-29 PROCEDURE — 89051 BODY FLUID CELL COUNT: CPT

## 2022-08-29 PROCEDURE — 3700000000 HC ANESTHESIA ATTENDED CARE: Performed by: INTERNAL MEDICINE

## 2022-08-29 PROCEDURE — 3700000001 HC ADD 15 MINUTES (ANESTHESIA): Performed by: INTERNAL MEDICINE

## 2022-08-29 PROCEDURE — 87254 VIRUS INOCULATION SHELL VIA: CPT

## 2022-08-29 PROCEDURE — 7100000011 HC PHASE II RECOVERY - ADDTL 15 MIN: Performed by: INTERNAL MEDICINE

## 2022-08-29 PROCEDURE — 87633 RESP VIRUS 12-25 TARGETS: CPT

## 2022-08-29 PROCEDURE — 85025 COMPLETE CBC W/AUTO DIFF WBC: CPT

## 2022-08-29 PROCEDURE — 1200000000 HC SEMI PRIVATE

## 2022-08-29 PROCEDURE — 99233 SBSQ HOSP IP/OBS HIGH 50: CPT | Performed by: INTERNAL MEDICINE

## 2022-08-29 PROCEDURE — 88341 IMHCHEM/IMCYTCHM EA ADD ANTB: CPT

## 2022-08-29 PROCEDURE — 87081 CULTURE SCREEN ONLY: CPT

## 2022-08-29 PROCEDURE — 36415 COLL VENOUS BLD VENIPUNCTURE: CPT

## 2022-08-29 PROCEDURE — 31645 BRNCHSC W/THER ASPIR 1ST: CPT | Performed by: INTERNAL MEDICINE

## 2022-08-29 PROCEDURE — 87253 VIRUS INOCULATE TISSUE ADDL: CPT

## 2022-08-29 PROCEDURE — 87116 MYCOBACTERIA CULTURE: CPT

## 2022-08-29 PROCEDURE — 6360000002 HC RX W HCPCS

## 2022-08-29 RX ORDER — LIDOCAINE HYDROCHLORIDE 20 MG/ML
INJECTION, SOLUTION INTRAVENOUS PRN
Status: DISCONTINUED | OUTPATIENT
Start: 2022-08-29 | End: 2022-08-29 | Stop reason: SDUPTHER

## 2022-08-29 RX ORDER — METOCLOPRAMIDE 10 MG/1
10 TABLET ORAL 3 TIMES DAILY
Status: DISPENSED | OUTPATIENT
Start: 2022-08-29 | End: 2022-08-30

## 2022-08-29 RX ORDER — FUROSEMIDE 10 MG/ML
40 INJECTION INTRAMUSCULAR; INTRAVENOUS ONCE
Status: COMPLETED | OUTPATIENT
Start: 2022-08-29 | End: 2022-08-29

## 2022-08-29 RX ORDER — PROPOFOL 10 MG/ML
INJECTION, EMULSION INTRAVENOUS CONTINUOUS PRN
Status: DISCONTINUED | OUTPATIENT
Start: 2022-08-29 | End: 2022-08-29 | Stop reason: SDUPTHER

## 2022-08-29 RX ADMIN — METOCLOPRAMIDE 10 MG: 10 TABLET ORAL at 20:34

## 2022-08-29 RX ADMIN — Medication 100 MG: at 10:33

## 2022-08-29 RX ADMIN — PROPOFOL 150 MCG/KG/MIN: 10 INJECTION, EMULSION INTRAVENOUS at 14:38

## 2022-08-29 RX ADMIN — LIDOCAINE HYDROCHLORIDE 50 MG: 20 INJECTION, SOLUTION INTRAVENOUS at 14:39

## 2022-08-29 RX ADMIN — HYDROXYZINE HYDROCHLORIDE 25 MG: 25 TABLET ORAL at 20:34

## 2022-08-29 RX ADMIN — SODIUM CHLORIDE, PRESERVATIVE FREE 10 ML: 5 INJECTION INTRAVENOUS at 18:54

## 2022-08-29 RX ADMIN — INSULIN LISPRO 4 UNITS: 100 INJECTION, SOLUTION INTRAVENOUS; SUBCUTANEOUS at 20:35

## 2022-08-29 RX ADMIN — SODIUM CHLORIDE, PRESERVATIVE FREE 5 ML: 5 INJECTION INTRAVENOUS at 20:42

## 2022-08-29 RX ADMIN — FUROSEMIDE 40 MG: 10 INJECTION, SOLUTION INTRAMUSCULAR; INTRAVENOUS at 18:52

## 2022-08-29 RX ADMIN — CHLORPROMAZINE HYDROCHLORIDE 25 MG: 25 TABLET, FILM COATED ORAL at 10:32

## 2022-08-29 RX ADMIN — PREGABALIN 50 MG: 50 CAPSULE ORAL at 20:34

## 2022-08-29 RX ADMIN — HYDROXYZINE HYDROCHLORIDE 25 MG: 25 TABLET ORAL at 10:33

## 2022-08-29 RX ADMIN — PREGABALIN 50 MG: 50 CAPSULE ORAL at 10:33

## 2022-08-29 RX ADMIN — LIDOCAINE HYDROCHLORIDE 50 MG: 20 INJECTION, SOLUTION INTRAVENOUS at 14:42

## 2022-08-29 ASSESSMENT — PAIN SCALES - GENERAL
PAINLEVEL_OUTOF10: 0
PAINLEVEL_OUTOF10: 0

## 2022-08-29 ASSESSMENT — PAIN - FUNCTIONAL ASSESSMENT: PAIN_FUNCTIONAL_ASSESSMENT: NONE - DENIES PAIN

## 2022-08-29 NOTE — PROGRESS NOTES
Occupational Therapy  Hold   Attempt to see for OT treatment. Pt currently off floor for procedure BRONCHOSCOPY/TRANSBRONCHIAL LUNG BIOPSY. Will follow up as treatment schedule allows.      Janine Motta, LAVONNE, OTR/L

## 2022-08-29 NOTE — PROGRESS NOTES
Internal Medicine  PGY 3  Progress note      CC : Hiccups for 2 days and fatigue    History Obtained From:  patient    Inteval Hx:  No acute events overnight. Patient feels the same this AM. 4L O2 req. Still has hiccups. Past Medical History:        Diagnosis Date    Chronic hepatitis C without hepatic coma ([facility] [address] - Diagnosed in 30s     Type 2 diabetes mellitus without complication, without long-term current use of insulin ([facility] 7/9/2021       Past Surgical History:    History reviewed. No pertinent surgical history. Medications Priorto Admission:    Medications Prior to Admission: [DISCONTINUED] thiamine mononitrate (THIAMINE) 100 MG tablet, Take 1 tablet by mouth in the morning. [DISCONTINUED] metFORMIN (GLUCOPHAGE) 500 MG tablet, Take 1 tablet by mouth daily (with breakfast)  [DISCONTINUED] oxyCODONE (OXY-IR) 10 MG immediate release tablet, Take 1 tablet by mouth every 8 hours as needed for Pain (severe pain) for up to 5 days. pregabalin (LYRICA) 50 MG capsule, Take 1 capsule by mouth in the morning and 1 capsule at noon and 1 capsule before bedtime. Do all this for 30 days. L-methylfolate-B6-B12 (METANX) 4-03.208-8-08 MG CAPS capsule, Take 1 capsule by mouth daily    Allergies:  Patient has no known allergies. Social History:   TOBACCO:   reports that he has been smoking cigars and cigarettes. He has never used smokeless tobacco.  ETOH:   reports current alcohol use. DRUGS : none  Patient currently lives alone    Family History:       Problem Relation Age of Onset    Other Mother         COPD    No Known Problems Sister     No Known Problems Brother     No Known Problems Brother     No Known Problems Brother            Physical exam:    Physical Exam  Vitals reviewed. Constitutional:  on 4L NC     Appearance: Normal appearance. He is normal weight. HENT:      Head: Normocephalic and atraumatic. Eyes:      Extraocular Movements: Extraocular movements intact.       Conjunctiva/sclera: Conjunctivae normal.      Pupils: Pupils are equal, round, and reactive to light. Cardiovascular:      Rate and Rhythm: Regular rhythm. Regular rate. Heart sounds: Normal heart sounds. Pulmonary:      Effort: Pulmonary effort is normal.      Breath sounds: Normal breath sounds. Abdominal:      General: Abdomen is flat. Bowel sounds are normal.      Palpations: Abdomen is soft. Patient notes soreness on deep palpation under rib cage. Musculoskeletal:         General: Normal range of motion. Mild tenderness to palpation of chest   Skin:     General: Skin is warm and dry. Neurological:      General: Mild numbness and tingling of bilateral pinkies. Mental Status: He is alert and oriented to person, place, and time. Mental status is at baseline. Psychiatric:         Mood and Affect: Mood normal.         Behavior: Behavior normal.      Vitals:    08/29/22 0820   BP: (!) 157/99   Pulse: (!) 112   Resp: 18   Temp: 98 °F (36.7 °C)   SpO2: 98%       DATA:    Labs:  CBC:   Recent Labs     08/27/22  0627 08/28/22  0546 08/29/22  0853   WBC 9.4 8.6 9.2   HGB 12.1* 11.8* 11.8*   HCT 34.8* 34.8* 35.4*    178 178         BMP:   Recent Labs     08/27/22  0627 08/28/22  0546    134*   K 3.4* 4.1    101   CO2 22 21   BUN 7 8   CREATININE 0.6* 0.6*   GLUCOSE 133* 200*   PHOS 3.0 2.6       LFT's:   Recent Labs     08/27/22  0627 08/28/22  0546   AST 29 27   ALT 32 25   BILITOT 0.8 0.7   ALKPHOS 156* 152*       Troponin:   No results for input(s): TROPONINI in the last 72 hours. BNP:No results for input(s): BNP in the last 72 hours. ABGs: No results for input(s): PHART, CXD3ALF, PO2ART in the last 72 hours. INR: No results for input(s): INR in the last 72 hours. U/A:No results for input(s): NITRITE, COLORU, PHUR, LABCAST, WBCUA, RBCUA, MUCUS, TRICHOMONAS, YEAST, BACTERIA, CLARITYU, SPECGRAV, LEUKOCYTESUR, UROBILINOGEN, BILIRUBINUR, BLOODU, GLUCOSEU, AMORPHOUS in the last 72 hours.     Invalid input(s): KETONESU    CT CHEST WO CONTRAST   Final Result   1. Diffuse pleural-parenchymal mosaic pattern, similar territories to prior examination but the most consolidated component both lower lungs appear slightly improved. This may be related to an atypical viral etiology. 2. Mild fusiform ectasia of the ascending thoracic aorta      XR CHEST (2 VW)   Final Result      Findings suggestive for possible early pneumonic infiltrate left mid and lower lung, right lower lung with atelectatic changes right lower lung as well. CT Head W/O Contrast   Final Result      No acute intracranial hemorrhage or mass effect. Mild chronic small vessel ischemic change. CT CHEST PULMONARY EMBOLISM W CONTRAST   Final Result      Limited assessment for pulmonary embolism, particularly in the lower lobe secondary to respiratory motion. No pulmonary emboli in the lower evaluated pulmonary arteries. Moderate peripheral and basilar predominant groundglass opacity and septal thickening, suspicious for atypical pneumonia. Hepatic steatosis. Trace residual peripancreatic fat stranding, likely sequelae of pancreatitis as noted on CT 2 weeks prior. INCIDENTAL FINDINGS: None. XR CHEST (2 VW)   Final Result      1. Minimal bilateral basilar airspace disease. ASSESSMENT AND PLAN:    Health Care Associated Pneumonia  -resolved with doxycycline     Acute Hypoxic Respiratory Failure  - unclear etiology  -new requirement of 4L  -pulm consult  --> bronch today BAL + biopsies  -CT chest wo con - diffuse pleural parenchymal mosaic pattern in both lower lungs  -procalc negative  -CRP elevated to 23.5    Hiccups  -likely 2/2 to PNA plus anxiety component  -continues to have hiccups  -started 10 mg metoclopramide TID  --> will try thorazine if ineffective    Transaminitis, resolved    Type 2 diabetes mellitus  Patient's glucose on presentation was 321. Patient's last hemoglobin A1c on 8/9/2022 was 6.6.

## 2022-08-29 NOTE — PROGRESS NOTES
Patient dropped to 84% on 8 liters . Respiratory at bedside. Patient placed on a non re breather.   Perfect serve sent to residents on call and Dr. Carlyn Bland

## 2022-08-29 NOTE — PROGRESS NOTES
Spo2%: 84% on RA post ambulation recovering to 97% on 4Ls. Bed Mobility Training  Bed Mobility Training: Yes  Supine to Sit: Modified independent  Balance  Sitting:  (good)  Standing:  (fair)  Transfer Training  Transfer Training: Yes  Sit to Stand: Stand-by assistance  Stand to Sit: Stand-by assistance  Gait Training  Gait Training: Yes  Gait  Overall Level of Assistance: Contact-guard assistance<> Min assist for LOB  Gait Abnormalities: Slow reciprocal pattern with path deviations due to a lateral LOB. Slow stepping strategy noted. Distance (ft): 125 Feet  Assistive Device: None    Safety Devices  Type of Devices: Call light within reach;Nurse notified; Chair alarm in place; Left in chair     Goals  Short Term Goals  Time Frame for Short term goals: By discharge  Short term goal 1: Sit to stand independent. Ongoing  Short term goal 2: Pt will amb >100' with or without AD supervision.   Ongoing    Education  Patient Education  Education Given To: Patient  Education Provided: Role of Therapy;Plan of Care  Education Method: Verbal  Education Outcome: Verbalized understanding;Continued education needed    Therapy Time   Individual Concurrent Group Co-treatment   Time In 0755         Time Out 0813         Minutes [address] [facility], [facility]

## 2022-08-29 NOTE — ANESTHESIA PRE PROCEDURE
Department of Anesthesiology  Preprocedure Note       Name:  Moncho Zuniga   Age:  57 y.o.  :  1960                                          MRN:  [id number]         Date:  2022      Surgeon: Miguel Agosto):  Candy Armas MD    Procedure: Procedure(s):  BRONCHOSCOPY/TRANSBRONCHIAL LUNG BIOPSY    Medications prior to admission:   Prior to Admission medications    Medication Sig Start Date End Date Taking? Authorizing Provider   hydrOXYzine HCl (ATARAX) 25 MG tablet Take 1 tablet by mouth in the morning, at noon, in the evening, and at bedtime for 10 days 22 Yes Rubina Escudero MD   metFORMIN (GLUCOPHAGE-XR) 500 MG extended release tablet Take 2 tablets by mouth daily (with breakfast) 22  Yes Rubina Escudero MD   chlorproMAZINE (THORAZINE) 25 MG tablet Take 1 tablet by mouth 4 times daily for 10 days 22 Yes Rubina Escudero MD   midodrine (PROAMATINE) 5 MG tablet Take 1 tablet by mouth 2 times daily 22  Yes Rubina Escudero MD   thiamine mononitrate (THIAMINE) 100 MG tablet Take 1 tablet by mouth in the morning. 22  Subha Aguero MD   pregabalin (LYRICA) 50 MG capsule Take 1 capsule by mouth in the morning and 1 capsule at noon and 1 capsule before bedtime. Do all this for 30 days.  22  Isaiah Howard, DO   L-methylfolate-B6-B12 [facility]) 7-95.896-2-08 MG CAPS capsule Take 1 capsule by mouth daily 22   Dc Sotelo MD       Current medications:    Current Facility-Administered Medications   Medication Dose Route Frequency Provider Last Rate Last Admin    metoclopramide (REGLAN) tablet 10 mg  10 mg Oral TID Dominique Avitia DO        [Held by provider] insulin glargine (LANTUS;BASAGLAR) injection pen 28 Units  28 Units SubCUTAneous Nightly Geraldine Giron MD   28 Units at 22    metFORMIN (GLUCOPHAGE-XR) extended release tablet 1,000 mg  1,000 mg Oral Daily with breakfast Geraldine Giron MD   1,000 mg at 08/28/22 0955    hydrOXYzine HCl (ATARAX) tablet 25 mg  25 mg Oral 4x daily Bibiana Lezama MD   25 mg at 08/29/22 1033    insulin lispro (1 Unit Dial) 0-16 Units  0-16 Units SubCUTAneous TID WC Arsen Marie MD   12 Units at 08/28/22 1226    insulin lispro (1 Unit Dial) 0-4 Units  0-4 Units SubCUTAneous Nightly Arsen Marie MD        glucose chewable tablet 16 g  4 tablet Oral PRN Arsen Marie MD        dextrose bolus 10% 125 mL  125 mL IntraVENous PRN Arsen Marie MD        Or    dextrose bolus 10% 250 mL  250 mL IntraVENous PRN Arsen Marie MD        glucagon (rDNA) injection 1 mg  1 mg SubCUTAneous PRN Arsen Marie MD        dextrose 10 % infusion   IntraVENous Continuous PRN Arsen Marie MD        pregabalin (LYRICA) capsule 50 mg  50 mg Oral TID Ilean Bryson, DO   50 mg at 08/29/22 1033    thiamine mononitrate tablet 100 mg  100 mg Oral Daily Ilean Bryson, DO   100 mg at 08/29/22 1033    sodium chloride flush 0.9 % injection 5-40 mL  5-40 mL IntraVENous 2 times per day Ilean Bryson, DO   10 mL at 08/28/22 2110    sodium chloride flush 0.9 % injection 5-40 mL  5-40 mL IntraVENous PRN Ilean Bryson, DO        0.9 % sodium chloride infusion   IntraVENous PRN Ilean Bryson, DO 5 mL/hr at 08/28/22 1001 New Bag at 08/28/22 1001    enoxaparin (LOVENOX) injection 40 mg  40 mg SubCUTAneous Daily Ilean Bryson, DO   40 mg at 08/28/22 0946    ondansetron (ZOFRAN-ODT) disintegrating tablet 4 mg  4 mg Oral Q8H PRN Ilean Bryson, DO        Or    ondansetron [facility]) injection 4 mg  4 mg IntraVENous Q6H PRN Ilean Bryson, DO        polyethylene glycol [facility]) packet 17 g  17 g Oral Daily PRN Ilean Bryson, DO        acetaminophen (TYLENOL) tablet 650 mg  650 mg Oral Q6H PRN Ilean Bryson, DO   650 mg at 08/28/22 1720    Or    acetaminophen (TYLENOL) suppository 650 mg  650 mg Rectal Q6H PRN Huma Oreilly DO           Allergies:  No Known Allergies    Problem List:    Patient Active Problem List   Diagnosis Code    Orthostatic syncope I95.1    Pancytopenia (HCC) D61.818    Chronic hepatitis C without hepatic coma (HCC) B18.2    Vitiligo L80    Type 2 diabetes mellitus without complication, without long-term current use of insulin ([facility] [address] E11.9    Diabetic polyneuropathy associated with type 2 diabetes mellitus (HCC) E11.42    Cigarette nicotine dependence without complication A92.732    Acute pancreatitis without infection or necrosis K85.90    PNA (pneumonia) J18.9    Hospital-acquired pneumonia J18.9, Y95       Past Medical History:        Diagnosis Date    Chronic hepatitis C without hepatic coma ([facility] [address] - Diagnosed in 35s     Type 2 diabetes mellitus without complication, without long-term current use of insulin ([facility] [address] 7/9/2021       Past Surgical History:  History reviewed. No pertinent surgical history.     Social History:    Social History     Tobacco Use    Smoking status: Every Day     Packs/day: 0.00     Years: 40.00     Pack years: 0.00     Types: Cigars, Cigarettes    Smokeless tobacco: Never    Tobacco comments:     2 cigars daily    Substance Use Topics    Alcohol use: Yes     Comment: 48 oz beer/day                                Ready to quit: Not Answered  Counseling given: Not Answered  Tobacco comments: 2 cigars daily       Vital Signs (Current):   Vitals:    08/29/22 0030 08/29/22 0322 08/29/22 0820 08/29/22 1127   BP: 132/70 138/73 (!) 157/99 (!) 154/97   Pulse: (!) 107 (!) 105 (!) 112 (!) 115   Resp: 18 18 18 18   Temp: 98.4 °F (36.9 °C) 98.9 °F (37.2 °C) 98 °F (36.7 °C) 99.2 °F (37.3 °C)   TempSrc: Oral Oral Oral Oral   SpO2: 94% 95% 98% 98%   Weight:       Height:                                                  BP Readings from Last 3 Encounters:   08/29/22 (!) 154/97   08/16/22 (!) 164/107   07/26/22 128/76       NPO Status: BMI:   Wt Readings from Last 3 Encounters:   08/20/22 152 lb 5.4 oz (69.1 kg)   08/16/22 175 lb 0.7 oz (79.4 kg)   07/26/22 168 lb (76.2 kg)     Body mass index is 21.25 kg/m².     CBC:   Lab Results   Component Value Date/Time    WBC 9.2 08/29/2022 08:53 AM    RBC 3.63 08/29/2022 08:53 AM    HGB 11.8 08/29/2022 08:53 AM    HCT 35.4 08/29/2022 08:53 AM    MCV 97.8 08/29/2022 08:53 AM    RDW 13.6 08/29/2022 08:53 AM     08/29/2022 08:53 AM       CMP:   Lab Results   Component Value Date/Time     08/29/2022 08:53 AM    K 4.2 08/29/2022 08:53 AM    K 3.2 08/21/2022 06:25 AM     08/29/2022 08:53 AM    CO2 24 08/29/2022 08:53 AM    BUN 8 08/29/2022 08:53 AM    CREATININE 0.6 08/29/2022 08:53 AM    GFRAA >60 08/29/2022 08:53 AM    AGRATIO 1.4 08/09/2022 11:29 AM    LABGLOM >60 08/29/2022 08:53 AM    GLUCOSE 208 08/29/2022 08:53 AM    PROT 5.5 08/29/2022 08:53 AM    CALCIUM 8.1 08/29/2022 08:53 AM    BILITOT 0.8 08/29/2022 08:53 AM    ALKPHOS 144 08/29/2022 08:53 AM    AST 25 08/29/2022 08:53 AM    ALT 20 08/29/2022 08:53 AM       POC Tests:   Recent Labs     08/29/22  1153   POCGLU 192*       Coags:   Lab Results   Component Value Date/Time    PROTIME 15.2 08/09/2022 08:00 PM    INR 1.21 08/09/2022 08:00 PM       HCG (If Applicable): No results found for: PREGTESTUR, PREGSERUM, HCG, HCGQUANT     ABGs: No results found for: PHART, PO2ART, WMV0ZPG, XFL4RLP, BEART, B1WTRUAT     Type & Screen (If Applicable):  No results found for: LABABO, LABRH    Drug/Infectious Status (If Applicable):  No results found for: HIV, HEPCAB    COVID-19 Screening (If Applicable):   Lab Results   Component Value Date/Time    COVID19 Not Detected 08/20/2022 10:09 PM    COVID19 Not Detected 06/18/2021 07:49 PM           Anesthesia Evaluation  Patient summary reviewed and Nursing notes reviewed history of anesthetic complications:   Airway: Mallampati: II  TM distance: >3 FB Neck ROM: full  Mouth opening: > = 3 FB   Dental:      Comment: Several missing, loose upper back molar per patient    Pulmonary:   (+) decreased breath sounds                           ROS comment: Acute respiratory failure on 5L nasal O2   Cardiovascular:  Exercise tolerance: good (>4 METS),           Rhythm: regular  Rate: normal                    Neuro/Psych:               GI/Hepatic/Renal:   (+) hepatitis: C,          ROS comment: H/o pancreatitis. Endo/Other:    (+) DiabetesType II DM, , .                 Abdominal:             Vascular: Other Findings:           Anesthesia Plan      MAC     ASA 3       Induction: intravenous. MIPS: Prophylactic antiemetics administered. Anesthetic plan and risks discussed with patient. Plan discussed with CRNA.                     Sae Palomino, DO   8/29/2022

## 2022-08-29 NOTE — PROCEDURES
PROCEDURE:  BRONCHOSCOPY WITH BRONCHOALVEOLAR LAVAGE    Diagnosis: acute interstitial pneumonitis    PRE-PROCEDURE EVALUATION:  Nursing History and Physical reviewed and agreed upon     Allergies and medications have been reviewed    HENT: Airway patent and reviewed  Cardiovascular: Normal rate, regular rhythm, normal heart sounds. Pulmonary/Chest: No wheezes. + rhonchi. No rales. Abdominal: Soft. Bowel sounds are normal. No distension. ASA CLASS      II. Mild systemic disease       Mallampati score:  2    Sedation plan:     Type of sedation used: MAC      Post Procedure Plan   Return to same level of care     The risks and benefits as well as alternatives to the procedure have been discussed with the patient and or family. The patient and or next of kin understands and agrees to proceed. DESCRIPTION OF PROCEDURE: A time out was taken. Sedation was provided by anesthesia    The scope was passed with ease via the mouth. A complete airway inspection was performed. No endobronchial lesions were identified. There were slightly more clear grey secretions than usual. Washings were obtained throughout the airways. A Bronchoalveolar lavage was obtained from the CARSON lobe lingula with good return. Following the BAL I performed fluoroscopy guided transbronchial forceps biopsies of the left lower lobe (x4) and the ELVIRA X2. Following the 2nd biopsy of the ELVIRA patient had significant bleeding. I had to push several aliquots of iced saline and pull multiple clots out, while under suction, using the regular scope. Clots were so thick and copious that one briefly occluded his upper airway and we had to perform aggressive oral suctioning to clear it and improve his oxygen saturations. I then switched to the therapeutic scope and removed the residual clots that had spilled into the right mainstem. I was then able to therapeutically aspirate the rest of the clot from the left mainstem.   I terminated the procedure after confirming we had hemostasis and patient was again stable. The patient tolerated the procedure well. Recovery will be per endoscopy protocol. Estimated blood loss:  35-50 mL    FOLLOW UP:  Cultures, diatherix, path and cytology are pending.       Joanna Velasquez MD

## 2022-08-29 NOTE — PLAN OF CARE
Patient remains free from fall and injury this shift. Patient does not verbalize pain.   Problem: Safety - Adult  Goal: Free from fall injury  8/29/2022 0149 by Donte Schafer RN  Outcome: Progressing     Problem: Pain  Goal: Verbalizes/displays adequate comfort level or baseline comfort level  Outcome: Progressing     Problem: ABCDS Injury Assessment  Goal: Absence of physical injury  Outcome: Progressing

## 2022-08-29 NOTE — CARE COORDINATION
CM following. Pt is from home, plans to return at DC. No current DME or HHC needs, may need Lyft for transport. Pt not on home O2, currently on 5L. Home O2 eval completed and new diagnosis added by pulmonology. CM updated Aertabatha who is following. CM will continue to follow for DC needs and recs.         Electronically signed by PRATIMA Rebollar, TAVO on 8/29/2022 at 4:09 PM

## 2022-08-29 NOTE — PROGRESS NOTES
Pulmonary Followup Note    CC: Worsening respiratory failure and airspace disease on imaging. Subjective:  Patient seen at bedside, no acute events overnight. Endorses feeling the same this am. Denies SOB more than previous days. Still has hiccups. No chest pain. ROS:  Denies headache, nausea/vomiting, abdominal pain. 24HR INTAKE/OUTPUT:    Intake/Output Summary (Last 24 hours) at 2022 1106  Last data filed at 2022 0539  Gross per 24 hour   Intake 360 ml   Output 1650 ml   Net -1290 ml        [Held by provider] insulin glargine  28 Units SubCUTAneous Nightly    metFORMIN  1,000 mg Oral Daily with breakfast    hydrOXYzine HCl  25 mg Oral 4x daily    chlorproMAZINE  25 mg Oral 4x Daily    insulin lispro  0-16 Units SubCUTAneous TID WC    insulin lispro  0-4 Units SubCUTAneous Nightly    pregabalin  50 mg Oral TID    vitamin B-1  100 mg Oral Daily    sodium chloride flush  5-40 mL IntraVENous 2 times per day    enoxaparin  40 mg SubCUTAneous Daily     PHYSICAL EXAMINATION:  BP (!) 157/99   Pulse (!) 112   Temp 98 °F (36.7 °C) (Oral)   Resp 18   Ht 5' 11\" (1.803 m)   Wt 152 lb 5.4 oz (69.1 kg)   SpO2 98%   BMI 21.25 kg/m²   CURRENT PULSE OXIMETRY:  SpO2: 98 %  24HR PULSE OXIMETRY RANGE:  SpO2  Av.5 %  Min: 94 %  Max: 98 % on 4L NC. Gen: No distress. Speaking in full sentences with no accessory muscle use. HEENT: PERRL, EOMI, OP nl.  Lung:   CV: RRR without M/R/R. Abd: +BS, soft, NT/ND. Ext: No edema.     DATA  CBC:   Recent Labs     22  0627 22  0546 22  0853   WBC 9.4 8.6 9.2   HGB 12.1* 11.8* 11.8*   HCT 34.8* 34.8* 35.4*   MCV 97.7 97.3 97.8    178 178     BMP:   Recent Labs     22  0627 22  0546 22  0853    134* 136   K 3.4* 4.1 4.2    101 103   CO2 22 21 24   PHOS 3.0 2.6 3.1   BUN 7 8 8   CREATININE 0.6* 0.6* 0.6*     No results for input(s): PHART, SJD1TIV, PO2ART in the last 72 Kathie Madera MD    Patient seen, examined and discussed with the resident and I agree with the assessment and plan as edited above. Pre-Procedure Assessment / Plan:  ASA CLASS      II. Mild systemic disease      Mallampati score:  2    No significant changes in past 24 hours. Continues to have worsened acute hypoxemic respiratory failure  Bronch with BAL and transbronchial biopsies on the left. Patient likely with an acute pneumonitis versus ILD.       Shameka Tavera MD

## 2022-08-29 NOTE — ANESTHESIA POSTPROCEDURE EVALUATION
Department of Anesthesiology  Postprocedure Note    Patient: Ritu Briceno  MRN: [id number]  YOB: 1960  Date of evaluation: 8/29/2022      Procedure Summary     Date: 08/29/22 Room / Location: [address]    Anesthesia Start: 8802 Anesthesia Stop: 5292    Procedure: BRONCHOSCOPY/TRANSBRONCHIAL LUNG BIOPSY Diagnosis:       Hypoxemia      (worsening hypoxemia, airspace disease)    Surgeons: Carina Palacios MD Responsible Provider: Benji Paul MD    Anesthesia Type: MAC ASA Status: 3          Anesthesia Type: No value filed.     Mylene Phase I: Mylene Score: 10    Mylene Phase II: Mylene Score: 9      Anesthesia Post Evaluation    Patient location during evaluation: PACU  Patient participation: complete - patient participated  Level of consciousness: awake and alert  Pain score: 0  Airway patency: patent  Nausea & Vomiting: no nausea and no vomiting  Complications: no  Cardiovascular status: hemodynamically stable  Respiratory status: acceptable  Hydration status: euvolemic

## 2022-08-29 NOTE — PROGRESS NOTES
Patient arrived to unit. Patient requiring 8 liters of oxygen now. Patient states he feels the same. Placed back on heart monitor. Will review orders and monitor closely.   Perfect serve sent to MD

## 2022-08-30 LAB
ALBUMIN SERPL-MCNC: 2.7 G/DL (ref 3.4–5)
ALP BLD-CCNC: 124 U/L (ref 40–129)
ALT SERPL-CCNC: 18 U/L (ref 10–40)
ANION GAP SERPL CALCULATED.3IONS-SCNC: 11 MMOL/L (ref 3–16)
AST SERPL-CCNC: 29 U/L (ref 15–37)
BASOPHILS ABSOLUTE: 0 K/UL (ref 0–0.2)
BASOPHILS RELATIVE PERCENT: 0.3 %
BILIRUB SERPL-MCNC: 0.8 MG/DL (ref 0–1)
BILIRUBIN DIRECT: 0.4 MG/DL (ref 0–0.3)
BILIRUBIN, INDIRECT: 0.4 MG/DL (ref 0–1)
BUN BLDV-MCNC: 8 MG/DL (ref 7–20)
CALCIUM SERPL-MCNC: 8.2 MG/DL (ref 8.3–10.6)
CHLORIDE BLD-SCNC: 97 MMOL/L (ref 99–110)
CO2: 26 MMOL/L (ref 21–32)
CORTISOL TOTAL: 16.4 UG/DL
CREAT SERPL-MCNC: 0.6 MG/DL (ref 0.8–1.3)
EOSINOPHILS ABSOLUTE: 0.2 K/UL (ref 0–0.6)
EOSINOPHILS RELATIVE PERCENT: 2.2 %
GFR AFRICAN AMERICAN: >60
GFR NON-AFRICAN AMERICAN: >60
GLUCOSE BLD-MCNC: 185 MG/DL (ref 70–99)
GLUCOSE BLD-MCNC: 203 MG/DL (ref 70–99)
GLUCOSE BLD-MCNC: 232 MG/DL (ref 70–99)
GLUCOSE BLD-MCNC: 268 MG/DL (ref 70–99)
GLUCOSE BLD-MCNC: 273 MG/DL (ref 70–99)
HCT VFR BLD CALC: 35.1 % (ref 40.5–52.5)
HEMOGLOBIN: 12 G/DL (ref 13.5–17.5)
LYMPHOCYTES ABSOLUTE: 1.3 K/UL (ref 1–5.1)
LYMPHOCYTES RELATIVE PERCENT: 12.2 %
MAGNESIUM: 1.4 MG/DL (ref 1.8–2.4)
MCH RBC QN AUTO: 33.1 PG (ref 26–34)
MCHC RBC AUTO-ENTMCNC: 34.1 G/DL (ref 31–36)
MCV RBC AUTO: 97.1 FL (ref 80–100)
MONOCYTES ABSOLUTE: 0.9 K/UL (ref 0–1.3)
MONOCYTES RELATIVE PERCENT: 8.5 %
NEUTROPHILS ABSOLUTE: 8 K/UL (ref 1.7–7.7)
NEUTROPHILS RELATIVE PERCENT: 76.8 %
PDW BLD-RTO: 13.6 % (ref 12.4–15.4)
PERFORMED ON: ABNORMAL
PHOSPHORUS: 2.9 MG/DL (ref 2.5–4.9)
PLATELET # BLD: 161 K/UL (ref 135–450)
PMV BLD AUTO: 8.1 FL (ref 5–10.5)
POTASSIUM SERPL-SCNC: 4 MMOL/L (ref 3.5–5.1)
RBC # BLD: 3.62 M/UL (ref 4.2–5.9)
SODIUM BLD-SCNC: 134 MMOL/L (ref 136–145)
TOTAL PROTEIN: 5.5 G/DL (ref 6.4–8.2)
WBC # BLD: 10.5 K/UL (ref 4–11)

## 2022-08-30 PROCEDURE — 85025 COMPLETE CBC W/AUTO DIFF WBC: CPT

## 2022-08-30 PROCEDURE — 1200000000 HC SEMI PRIVATE

## 2022-08-30 PROCEDURE — 6360000002 HC RX W HCPCS

## 2022-08-30 PROCEDURE — 36415 COLL VENOUS BLD VENIPUNCTURE: CPT

## 2022-08-30 PROCEDURE — 2700000000 HC OXYGEN THERAPY PER DAY

## 2022-08-30 PROCEDURE — 80076 HEPATIC FUNCTION PANEL: CPT

## 2022-08-30 PROCEDURE — 6370000000 HC RX 637 (ALT 250 FOR IP)

## 2022-08-30 PROCEDURE — 94761 N-INVAS EAR/PLS OXIMETRY MLT: CPT

## 2022-08-30 PROCEDURE — 97110 THERAPEUTIC EXERCISES: CPT

## 2022-08-30 PROCEDURE — 6370000000 HC RX 637 (ALT 250 FOR IP): Performed by: STUDENT IN AN ORGANIZED HEALTH CARE EDUCATION/TRAINING PROGRAM

## 2022-08-30 PROCEDURE — 97116 GAIT TRAINING THERAPY: CPT

## 2022-08-30 PROCEDURE — 6370000000 HC RX 637 (ALT 250 FOR IP): Performed by: INTERNAL MEDICINE

## 2022-08-30 PROCEDURE — 82533 TOTAL CORTISOL: CPT

## 2022-08-30 PROCEDURE — 99233 SBSQ HOSP IP/OBS HIGH 50: CPT | Performed by: INTERNAL MEDICINE

## 2022-08-30 PROCEDURE — 80069 RENAL FUNCTION PANEL: CPT

## 2022-08-30 PROCEDURE — 83735 ASSAY OF MAGNESIUM: CPT

## 2022-08-30 RX ORDER — LANOLIN ALCOHOL/MO/W.PET/CERES
400 CREAM (GRAM) TOPICAL DAILY
Status: COMPLETED | OUTPATIENT
Start: 2022-08-30 | End: 2022-08-30

## 2022-08-30 RX ORDER — LANOLIN ALCOHOL/MO/W.PET/CERES
400 CREAM (GRAM) TOPICAL DAILY
Status: DISCONTINUED | OUTPATIENT
Start: 2022-08-30 | End: 2022-08-30

## 2022-08-30 RX ORDER — LANOLIN ALCOHOL/MO/W.PET/CERES
400 CREAM (GRAM) TOPICAL DAILY
Status: DISCONTINUED | OUTPATIENT
Start: 2022-08-31 | End: 2022-08-31

## 2022-08-30 RX ORDER — METOCLOPRAMIDE 10 MG/1
10 TABLET ORAL 3 TIMES DAILY
Qty: 10 TABLET | Refills: 0 | Status: SHIPPED | OUTPATIENT
Start: 2022-08-30 | End: 2022-09-03

## 2022-08-30 RX ORDER — LANOLIN ALCOHOL/MO/W.PET/CERES
400 CREAM (GRAM) TOPICAL DAILY
Qty: 2 TABLET | Refills: 0 | Status: SHIPPED | OUTPATIENT
Start: 2022-08-31 | End: 2022-09-02

## 2022-08-30 RX ORDER — METOCLOPRAMIDE 10 MG/1
10 TABLET ORAL 3 TIMES DAILY
Status: COMPLETED | OUTPATIENT
Start: 2022-08-30 | End: 2022-08-31

## 2022-08-30 RX ADMIN — METOCLOPRAMIDE 10 MG: 10 TABLET ORAL at 21:41

## 2022-08-30 RX ADMIN — ENOXAPARIN SODIUM 40 MG: 100 INJECTION SUBCUTANEOUS at 09:51

## 2022-08-30 RX ADMIN — HYDROXYZINE HYDROCHLORIDE 25 MG: 25 TABLET ORAL at 09:49

## 2022-08-30 RX ADMIN — PREGABALIN 50 MG: 50 CAPSULE ORAL at 21:41

## 2022-08-30 RX ADMIN — PREGABALIN 50 MG: 50 CAPSULE ORAL at 15:13

## 2022-08-30 RX ADMIN — PREGABALIN 50 MG: 50 CAPSULE ORAL at 09:49

## 2022-08-30 RX ADMIN — INSULIN LISPRO 4 UNITS: 100 INJECTION, SOLUTION INTRAVENOUS; SUBCUTANEOUS at 17:44

## 2022-08-30 RX ADMIN — METFORMIN HYDROCHLORIDE 1000 MG: 500 TABLET, EXTENDED RELEASE ORAL at 12:45

## 2022-08-30 RX ADMIN — METOCLOPRAMIDE 10 MG: 10 TABLET ORAL at 09:49

## 2022-08-30 RX ADMIN — HYDROXYZINE HYDROCHLORIDE 25 MG: 25 TABLET ORAL at 15:13

## 2022-08-30 RX ADMIN — HYDROXYZINE HYDROCHLORIDE 25 MG: 25 TABLET ORAL at 16:51

## 2022-08-30 RX ADMIN — INSULIN LISPRO 8 UNITS: 100 INJECTION, SOLUTION INTRAVENOUS; SUBCUTANEOUS at 12:45

## 2022-08-30 RX ADMIN — Medication 400 MG: at 16:51

## 2022-08-30 RX ADMIN — METOCLOPRAMIDE 10 MG: 10 TABLET ORAL at 15:13

## 2022-08-30 RX ADMIN — HYDROXYZINE HYDROCHLORIDE 25 MG: 25 TABLET ORAL at 21:41

## 2022-08-30 RX ADMIN — Medication 100 MG: at 09:49

## 2022-08-30 ASSESSMENT — PAIN SCALES - GENERAL: PAINLEVEL_OUTOF10: 0

## 2022-08-30 NOTE — PROGRESS NOTES
Pulmonary Followup Note    CC: Worsening respiratory failure and airspace disease on imaging. Subjective:  Patient seen at bedside, no acute events overnight. Hiccups were a bit better this morning. Patient frustrated that he's not breathing better. ROS:  Denies headache, nausea/vomiting, abdominal pain. 24HR INTAKE/OUTPUT:    Intake/Output Summary (Last 24 hours) at 2022 1628  Last data filed at 2022 0941  Gross per 24 hour   Intake 120 ml   Output 2300 ml   Net -2180 ml          metoclopramide  10 mg Oral TID    magnesium oxide  400 mg Oral Daily    [START ON 2022] magnesium oxide  400 mg Oral Daily    [Held by provider] insulin glargine  28 Units SubCUTAneous Nightly    metFORMIN  1,000 mg Oral Daily with breakfast    hydrOXYzine HCl  25 mg Oral 4x daily    insulin lispro  0-16 Units SubCUTAneous TID WC    insulin lispro  0-4 Units SubCUTAneous Nightly    pregabalin  50 mg Oral TID    vitamin B-1  100 mg Oral Daily    sodium chloride flush  5-40 mL IntraVENous 2 times per day    enoxaparin  40 mg SubCUTAneous Daily     PHYSICAL EXAMINATION:  /82   Pulse (!) 11   Temp 98.1 °F (36.7 °C) (Oral)   Resp 18   Ht 5' 11\" (1.803 m)   Wt 165 lb (74.8 kg)   SpO2 95%   BMI 23.01 kg/m²   CURRENT PULSE OXIMETRY:  SpO2: 95 %  24HR PULSE OXIMETRY RANGE:  SpO2  Av.6 %  Min: 84 %  Max: 100 % on 4L NC. Gen: No distress. Speaking in full sentences with no accessory muscle use. HEENT: PERRL, EOMI, OP nl.  Lung: No wheezes, rales or ronchi. No egophony or fremitus. No accessory respiratory muscle use. CV: RRR without M/R/R. Abd: +BS, soft, NT/ND. Ext: No edema.     DATA  CBC:   Recent Labs     22  0546 22  0853 22  1114   WBC 8.6 9.2 10.5   HGB 11.8* 11.8* 12.0*   HCT 34.8* 35.4* 35.1*   MCV 97.3 97.8 97.1    178 161       BMP:   Recent Labs     22  0546 22  0853 22  1114   * 136 134*   K 4.1

## 2022-08-30 NOTE — PLAN OF CARE
Problem: Discharge Planning  Goal: Discharge to home or other facility with appropriate resources  Outcome: Progressing  Waiting for home oxygen to be arranged for discharge. Med to bed filled.

## 2022-08-30 NOTE — DISCHARGE INSTR - COC
Continuity of Care Form    Patient Name: Phyllis Juarez   :  1960  MRN:  [id number]    Admit date:  2022  Discharge date:  ***    Code Status Order: Full Code   Advance Directives:   Advance Care Flowsheet Documentation       Date/Time Healthcare Directive Type of Healthcare Directive Copy in [address] Agent's Name Healthcare Agent's Phone Number    22 1321 No, patient does not have an advance directive for healthcare treatment -- -- -- -- --            Admitting Physician:  Tarik Cosme MD  PCP: Sergo Rangel DO    Discharging Nurse: [facility] Unit/Room#: 3073/0484-40  Discharging Unit Phone Number: ***    Emergency Contact:   Extended Emergency Contact Information  Primary Emergency Contact: Benny Chandler April Phone: [phone]  Relation: Child    Past Surgical History:  Past Surgical History:   Procedure Laterality Date    BRONCHOSCOPY N/A 2022    BRONCHOSCOPY/TRANSBRONCHIAL LUNG BIOPSY performed by Kyle Coombs MD at [facility]       Immunization History: There is no immunization history on file for this patient.     Active Problems:  Patient Active Problem List   Diagnosis Code    Orthostatic syncope I95.1    Pancytopenia (HCC) D61.818    Chronic hepatitis C without hepatic coma (HCC) B18.2    Vitiligo L80    Type 2 diabetes mellitus without complication, without long-term current use of insulin ([facility] [address] E11.9    Diabetic polyneuropathy associated with type 2 diabetes mellitus (HCC) E11.42    Cigarette nicotine dependence without complication Y65.058    Acute pancreatitis without infection or necrosis K85.90    PNA (pneumonia) J18.9    Hospital-acquired pneumonia J18.9, Y95    Acute hypoxemic respiratory failure (HCC) J96.01    Interstitial lung disease (HCC) J84.9       Isolation/Infection:   Isolation            No Isolation          Patient Infection Status       Infection Onset Added Last Indicated Last Indicated By Review Planned Expiration Resolved Resolved By    None active    Resolved    C-diff Rule Out 22 Clostridium difficile toxin/antigen (Ordered)   22 Rule-Out Test Resulted    COVID-19 (Rule Out) 22 COVID-19 (Ordered)   22 Rule-Out Test Resulted            Nurse Assessment:  Last Vital Signs: /82   Pulse (!) 11   Temp 98.1 °F (36.7 °C) (Oral)   Resp 18   Ht 5' 11\" (1.803 m)   Wt 165 lb (74.8 kg)   SpO2 95%   BMI 23.01 kg/m²     Last documented pain score (0-10 scale): Pain Level: 0  Last Weight:   Wt Readings from Last 1 Encounters:   22 165 lb (74.8 kg)     Mental Status:  {IP PT MENTAL STATUS:}    IV Access:  { GILSON IV ACCESS:135770644}    Nursing Mobility/ADLs:  Walking   {CHP DME KBTR:610132799}  Transfer  {P DME WZOL:969731736}  Bathing  {P DME ICGY:436955955}  Dressing  {P DME TFWH:899120807}  Toileting  {CHP DME IYAT:689968302}  Feeding  {P DME EJNC:904284669}  Med Admin  {P DME CQZU:492731181}  Med Delivery   { GILSON MED Delivery:984496760}    Wound Care Documentation and Therapy:        Elimination:  Continence:    Bowel: {YES / DI:80305}  Bladder: {YES / E}  Urinary Catheter: {Urinary Catheter:913021469}   Colostomy/Ileostomy/Ileal Conduit: {YES / CP:36038}       Date of Last BM: ***    Intake/Output Summary (Last 24 hours) at 2022 1603  Last data filed at 2022 0941  Gross per 24 hour   Intake 120 ml   Output 2300 ml   Net -2180 ml     I/O last 3 completed shifts:  In: -   Out: 2700 [Urine:2700]    Safety Concerns:     [address] Safety Concerns:597693977}    Impairments/Disabilities:      [address] Impairments/Disabilities:736670419}    Nutrition Therapy:  Current Nutrition Therapy:   [address] RIGGINS Diet List:907283134}    Routes of Feeding: {CHP DME Other Feedings:786587211}  Liquids: {Slp liquid thickness:60603}  Daily Fluid Restriction: {CHP DME Yes amt example:576511384}  Last Modified Barium Swallow with Video (Video Swallowing Test): {Done Not Done XGFO:954272767}    Treatments at the Time of Hospital Discharge:   Respiratory Treatments: ***  Oxygen Therapy:  {Therapy; copd oxygen:22320}  Ventilator:    {MH CC Vent JSPZ:778256564}    Rehab Therapies: {THERAPEUTIC INTERVENTION:9455632512}  Weight Bearing Status/Restrictions: [address] NIX Weight Bearin}  Other Medical Equipment (for information only, NOT a DME order):  {EQUIPMENT:657810558}  Other Treatments: ***    Patient's personal belongings (please select all that are sent with patient):  {CHP DME Belongings:989270081}    RN SIGNATURE:  {Esignature:391204128}    CASE MANAGEMENT/SOCIAL WORK SECTION    Inpatient Status Date: 22    Readmission Risk Assessment Score:  Readmission Risk              Risk of Unplanned Readmission:  21           Discharging to: Home with [facility] 21        [phone]    Dialysis Facility (if applicable)   NA    / signature: Electronically signed by PRATIMA Bello LSW on 22 at 4:04 PM EDT    PHYSICIAN SECTION    Prognosis: {Prognosis:2313740637}    Condition at Discharge: [address] Patient Condition:745193691}    Rehab Potential (if transferring to Rehab): {Prognosis:6957306328}    Recommended Labs or Other Treatments After Discharge: ***    Physician Certification: I certify the above information and transfer of Mikki Ashby  is necessary for the continuing treatment of the diagnosis listed and that he requires {Admit to Appropriate Level of Care:01203} for {GREATER/LESS:367684927} 30 days.      Update Admission H&P: {CHP DME Changes in DNWVW:895064379}    PHYSICIAN SIGNATURE:  {Esignature:339124651}

## 2022-08-30 NOTE — PROGRESS NOTES
Internal Medicine  PGY 3  Progress note      CC : Hiccups for 2 days and fatigue    History Obtained From:  patient    Inteval Hx:  Underwent bronchoscopy yesterday. No acute events overnight. Patient feels the same this AM. 5L O2 req. Hiccups under better control with reglan. Still with occasional hiccups. Past Medical History:        Diagnosis Date    Chronic hepatitis C without hepatic coma ([facility] [facility] - Diagnosed in 30s     Type 2 diabetes mellitus without complication, without long-term current use of insulin ([facility] 7/9/2021       Past Surgical History:        Procedure Laterality Date    BRONCHOSCOPY N/A 8/29/2022    BRONCHOSCOPY/TRANSBRONCHIAL LUNG BIOPSY performed by Steven Johnston MD at [address] ENDOSCOPY       Medications Priorto Admission:    Medications Prior to Admission: [DISCONTINUED] thiamine mononitrate (THIAMINE) 100 MG tablet, Take 1 tablet by mouth in the morning. [DISCONTINUED] metFORMIN (GLUCOPHAGE) 500 MG tablet, Take 1 tablet by mouth daily (with breakfast)  [DISCONTINUED] oxyCODONE (OXY-IR) 10 MG immediate release tablet, Take 1 tablet by mouth every 8 hours as needed for Pain (severe pain) for up to 5 days. pregabalin (LYRICA) 50 MG capsule, Take 1 capsule by mouth in the morning and 1 capsule at noon and 1 capsule before bedtime. Do all this for 30 days. L-methylfolate-B6-B12 (METANX) 9-03.526-8-39 MG CAPS capsule, Take 1 capsule by mouth daily    Allergies:  Patient has no known allergies. Social History:   TOBACCO:   reports that he has been smoking cigars and cigarettes. He has never used smokeless tobacco.  ETOH:   reports current alcohol use. DRUGS : none  Patient currently lives alone    Family History:       Problem Relation Age of Onset    Other Mother         COPD    No Known Problems Sister     No Known Problems Brother     No Known Problems Brother     No Known Problems Brother      Physical exam:    Physical Exam  Vitals reviewed.    Constitutional:  on 5L NC     Appearance: Normal appearance. He is normal weight. HENT:      Head: Normocephalic and atraumatic. Eyes:      Extraocular Movements: Extraocular movements intact. Conjunctiva/sclera: Conjunctivae normal.      Pupils: Pupils are equal, round, and reactive to light. Cardiovascular:      Rate and Rhythm: Regular rhythm. Regular rate. Heart sounds: Normal heart sounds. Pulmonary:      Effort: Pulmonary effort is normal.      Breath sounds: Normal breath sounds. Abdominal:      General: Abdomen is flat. Bowel sounds are normal.      Palpations: Abdomen is soft. Musculoskeletal:         General: Normal range of motion. Mild tenderness to palpation of chest   Skin:     General: Skin is warm and dry. Neurological:      General: Mild numbness and tingling of bilateral pinkies. Mental Status: He is alert and oriented to person, place, and time. Mental status is at baseline. Psychiatric:         Mood and Affect: Mood normal.         Behavior: Behavior normal.      Vitals:    08/30/22 1334   BP:    Pulse:    Resp:    Temp:    SpO2: 95%       DATA:    Labs:  CBC:   Recent Labs     08/28/22  0546 08/29/22  0853 08/30/22  1114   WBC 8.6 9.2 10.5   HGB 11.8* 11.8* 12.0*   HCT 34.8* 35.4* 35.1*    178 161         BMP:   Recent Labs     08/28/22  0546 08/29/22  0853 08/30/22  1114   * 136 134*   K 4.1 4.2 4.0    103 97*   CO2 21 24 26   BUN 8 8 8   CREATININE 0.6* 0.6* 0.6*   GLUCOSE 200* 208* 268*   PHOS 2.6 3.1 2.9       LFT's:   Recent Labs     08/28/22  0546 08/29/22  0853 08/30/22  1114   AST 27 25 29   ALT 25 20 18   BILITOT 0.7 0.8 0.8   ALKPHOS 152* 144* 124       Troponin:   No results for input(s): TROPONINI in the last 72 hours. BNP:No results for input(s): BNP in the last 72 hours. ABGs: No results for input(s): PHART, XRG5YNG, PO2ART in the last 72 hours. INR: No results for input(s): INR in the last 72 hours.     U/A:No results for input(s): NITRITE, COLORU, PHUR, LABCAST, [address], RBCUA, MUCUS, TRICHOMONAS, YEAST, BACTERIA, CLARITYU, SPECGRAV, LEUKOCYTESUR, UROBILINOGEN, BILIRUBINUR, BLOODU, GLUCOSEU, AMORPHOUS in the last 72 hours. Invalid input(s): KETONESU    XR CHEST PORTABLE   Final Result      Progression of severe left and moderate right airspace disease. CT CHEST WO CONTRAST   Final Result   1. Diffuse pleural-parenchymal mosaic pattern, similar territories to prior examination but the most consolidated component both lower lungs appear slightly improved. This may be related to an atypical viral etiology. 2. Mild fusiform ectasia of the ascending thoracic aorta      XR CHEST (2 VW)   Final Result      Findings suggestive for possible early pneumonic infiltrate left mid and lower lung, right lower lung with atelectatic changes right lower lung as well. CT Head W/O Contrast   Final Result      No acute intracranial hemorrhage or mass effect. Mild chronic small vessel ischemic change. CT CHEST PULMONARY EMBOLISM W CONTRAST   Final Result      Limited assessment for pulmonary embolism, particularly in the lower lobe secondary to respiratory motion. No pulmonary emboli in the lower evaluated pulmonary arteries. Moderate peripheral and basilar predominant groundglass opacity and septal thickening, suspicious for atypical pneumonia. Hepatic steatosis. Trace residual peripancreatic fat stranding, likely sequelae of pancreatitis as noted on CT 2 weeks prior. INCIDENTAL FINDINGS: None. XR CHEST (2 VW)   Final Result      1. Minimal bilateral basilar airspace disease.                 ASSESSMENT AND PLAN:    Health Care Associated Pneumonia  -s/p doxycycline 7d doxycycline    Acute Hypoxic Respiratory Failure - ILD vs bronchiectasis  - unclear etiology  -new requirement of 4-5L  -pulm consult  --> bronch yesterday with BAL + biopsies  -CT chest wo con - diffuse pleural parenchymal mosaic pattern in both lower lungs  -procalc negative  -CRP elevated to 23.5  -Pending results of bronch, patient is medically stable for dc pending home O2 eval    Hiccups  -likely 2/2 to ILD vs bronchiectasis  -hiccups decreased in frequency w/ metoclopramide  -continue 10 mg metoclopramide TID    Transaminitis, resolved    Type 2 diabetes mellitus  Patient's glucose on presentation was 321.   Patient's last hemoglobin A1c on 8/9/2022 was 6.6.   - resumed home metformin after acute illness resolved in anticipation of discharge  -sliding scale PRN  - POCT x times dialy    History of orthostatic syncope  Likely related autonomic dysfunction in setting of diabetic neuropathy.  - Continue home midodrine if needed    Will discuss with attending physician Dr. Humble Nicole    Code Status:Full code  FEN: Diabetic diet  PPX: Lovenox  DISPO: Phyllis Lin MD PGY-1  8/30/2022,  1:51 PM

## 2022-08-30 NOTE — CARE COORDINATION
ADDENDUM  Pt's insurance only covers home O2 at [employer], not [employer]. CM will fax referral docs to Medical Services once qualifying dx (ILD) is added to Pulm MD note (CM perfectserved Pulm). CM updated pt, he voiced understanding. Case Management Assessment            Discharge Note                    Date / Time of Note: 8/30/2022 12:17 PM                  Discharge Note Completed by: PRATIMA Garcia, ALFREDOW    Patient Name: Dar Red   YOB: 1960  Diagnosis: Hypoxia [R09.02]  PNA (pneumonia) [J18.9]  Pneumonia of left lower lobe due to infectious organism [J18.9]  Hospital-acquired pneumonia [J18.9, Y95]   Date / Time: 8/20/2022  3:34 PM    Current PCP: Mark Car DO  Clinic patient: No    Hospitalization in the last 30 days: Yes    Advance Directives:  Code Status: Full Code  [place] DNR form completed and on chart: Yes    Financial:  Payor: Norma Cervantes / Plan: Norma Cervantes / Product Type: *No Product type* /      Pharmacy:    [address] [phone] - F [phone]  [address] [facility] [address]  Phone: [phone] Fax: [phone]    [facility] [address] [facility] [address], [address] [address]  Phone: [phone] Fax: [address]  Phone: [phone] Fax: [phone]      Assistance purchasing medications?:    Assistance provided by Case Management: None at this time    Does patient want to participate in local refill/ meds to beds program?: Yes    Meds To Beds General Rules:  1. Can ONLY be done Monday- Friday between 8:30am-5pm  2. Prescription(s) must be in pharmacy by 3pm to be filled same day  3. Copy of patient's insurance/ prescription drug card and patient face sheet must be sent along with the prescription(s)  4. Cost of Rx cannot be added to hospital bill. If financial assistance is needed, please contact unit  or ;  or  CANNOT provide pharmacy voucher for patients co-pays  5. Patients can then  the prescription on their way out of the hospital at discharge, or pharmacy can deliver to the bedside if staff is available. (payment due at time of pick-up or delivery - cash, check, or card accepted)     Able to afford home medications/ co-pay costs: Yes    ADLS:  Current PT AM-PAC Score: 20 /24  Current OT AM-PAC Score: 21 /24      DISCHARGE Disposition: Home- No Services Needed    LOC at discharge: Not Applicable  GILSON Completed: Not Indicated    Notification completed in HENS/PAS?:  Not Applicable    IMM Completed:   Not Indicated    Transportation:  Transportation PLAN for discharge:  Lyft    Mode of Transport: [address]:  [address] ordered at discharge: Yes  Chapis Milligan [address]  [phone]    [address] [place] [address]:  Equipment obtained during hospitalization: NA    Home Oxygen and Respiratory Equipment:  Oxygen needed at discharge?: Yes  [address] [facility] [place]   Phone: [phone]  Portable tank available for discharge?: Yes    Dialysis:  Dialysis patient: No    Referrals made at [facility] for outpatient continued care:  [facility]    Additional CM Notes: Pt will DC home today. No current DME or HHC needs. Pt will have new home O2, currently on 5L. CM submitted [facility] referral.  CM will coordinate Lyft with pt's RN. No other needs at this time.         The Plan for Transition of Care is related to the following treatment goals of Hypoxia [R09.02]  PNA (pneumonia) [J18.9]  Pneumonia of left lower lobe due to infectious organism [J18.9]  Hospital-acquired pneumonia [J18.9, Y95]    The Patient and/or patient representative Letty Somers and his family were provided with a choice of provider and agrees with the discharge plan Yes    Freedom of choice list was provided with basic dialogue that supports the patient's individualized plan of care/goals and shares the quality data associated with the providers.  Yes    Care Transitions patient: Yes    PRATIMA Osullivan, [facility]  Case Management Department  Ph: [phone]  Fax: [phone]

## 2022-08-30 NOTE — PLAN OF CARE
Problem: Discharge Planning  Goal: Discharge to home or other facility with appropriate resources  Outcome: Progressing     Problem: Safety - Adult  Goal: Free from fall injury  Outcome: Progressing     Problem: Chronic Conditions and Co-morbidities  Goal: Patient's chronic conditions and co-morbidity symptoms are monitored and maintained or improved  Outcome: Progressing  Flowsheets (Taken 8/29/2022 2035)  Care Plan - Patient's Chronic Conditions and Co-Morbidity Symptoms are Monitored and Maintained or Improved: Monitor and assess patient's chronic conditions and comorbid symptoms for stability, deterioration, or improvement     Problem: Pain  Goal: Verbalizes/displays adequate comfort level or baseline comfort level  Outcome: Progressing

## 2022-08-30 NOTE — CARE COORDINATION
CTN contacted Intake with [facility] [phone]. [facility] [place]. able to accept this patient.  Intake will pull referral from [facility] for [facility] [place] by 9/1  Electronically signed by Freya Bermudez LPN on 7/14/0245 at 0:79 PM

## 2022-08-30 NOTE — DISCHARGE SUMMARY
Discharge Summary    Date: 8/30/2022  Patient Name: Anita Abdi    YOB: 1960     Age: 57 y.o. Admit Date: 8/20/2022  Discharge Date:  Discharge Condition:    Admission Diagnosis  Hypoxia [R09.02];PNA (pneumonia) [J18.9]; Pneumonia of left lower lobe due to infectious organism [J18.9]; Hospital-acquired pneumonia [J18.9, Y95]      Discharge Diagnosis  Principal Problem:    PNA (pneumonia)  Active Problems:    Hospital-acquired pneumonia    Acute hypoxemic respiratory failure (HCC)    Interstitial lung disease ([address]  Resolved Problems:    * No resolved hospital problems. [facility] Stay  Narrative of Hospital Course:  HPI: Patient presents shortly after recent admission to [facility] for alcohol induced pancreatitis. He presented this admission with consistent hiccups, fatigue, shortness of breath. He states that for the past 2 days he has had constant hiccups that prevent him from sleeping, occasionally causes entire diaphragm to spasm preventing him from catching his breath. He denied any abdominal pain at that time. In the ED patient was worked up for recurrence of pancreatitis which was negative. He additionally received an chest x-ray which showed concern for pneumonia. CT PE was also obtained which did not show evidence of pulmonary embolus. It did however also raise suspicion for atypical pneumonia. Due to his recent hospitalization, he was started on antibiotics for suspicion of healthcare associated pneumonia. He continued on cefepime and doxycycline for treatment while infectious work-up resolved. Over the course of his admission he had a new O2 requirement ~5L and was evaluated for home O2 by resp. Therapy prior to dc. Additionally patient remained afebrile and had decreasing white counts once initiated on antibiotic therapy. In regards to patient's hiccups, initial suspicion was that it was secondary to irritation of the diaphragm from lower lobe pneumonia. There is also consideration that the diaphragm was irritated from below from peers pancreatitis, but pancreatitis work-up was normal.  As patient's pneumonia symptoms improved, we did not see any improvement in his hiccups symptoms. He was started on baclofen at first which did not help and was transitioned to Thorazine. This did help some, but suspicion arose that there was some component of anxiety to his hiccups and hydroxyzine was started. Patient continued to report hiccups, reglan was started which pt states helped reduce their frequency significantly. He will continue his medications outpatient and follow-up with his PCP. Patient was also found to be hyperglycemic on admission. Patient's last hemoglobin A1c was 6.6. He additionally has diabetic neuropathy. He was continued on his home Lyrica. He was additionally controlled with insulin and eventually metformin 1000 XR with gradual control of his hyperglycemia. There is also suspicion of orthostatic syncope given patient's presentation. At first he was maintained on his home midodrine, but throughout the admission he became more stable and was no longer orthostatic even off of the midodrine. There were times where patient was tachycardic so midodrine was held and patient remained stable. He was discussed with PCP about continuing midodrine. On the day of discharge, infectious symptoms, orthostatic hypotension have resolved. SOB and O2 requirement persisted, however, no longer required inpatient management. His hiccups are under better control and he will continue on current symptomatic treatment as outpatient. Patient denied fever, chills, shortness of breath, chest pain, palpitations, nausea, vomiting, abdominal pain, changes in urinary/bowel movements.     Consultants:  IP CONSULT TO HOSPITALIST  IP CONSULT TO PULMONOLOGY    Surgeries/procedures Performed:      Treatments:    IV Hydration, Insulin, Antibiotics and Respiratory Therapy    Other, O2    Discharge Plan/Disposition:  Home    Hospital/Incidental Findings Requiring Follow Up:    Patient Instructions:    Diet: Diabetic Diet    Activity:Activity as Tolerated  For number of days (if applicable): 30      Other Instructions:    Provider Follow-Up:   No follow-ups on file. Significant Diagnostic Studies:    Recent Labs:  Admission on 08/20/2022  No results displayed because visit has over 200 results. ------------    Radiology last 7 days:  XR CHEST (2 VW)    Result Date: 8/28/2022  Findings suggestive for possible early pneumonic infiltrate left mid and lower lung, right lower lung with atelectatic changes right lower lung as well. CT CHEST WO CONTRAST    Result Date: 8/28/2022  1. Diffuse pleural-parenchymal mosaic pattern, similar territories to prior examination but the most consolidated component both lower lungs appear slightly improved. This may be related to an atypical viral etiology. 2. Mild fusiform ectasia of the ascending thoracic aorta    XR CHEST PORTABLE    Result Date: 8/29/2022  Progression of severe left and moderate right airspace disease.        Pending Labs     Order Current Status    Legionella antigen, urine Collected (08/23/22 1513)    POCT glucose Collected (08/21/22 0254)    POCT glucose Collected (08/21/22 0254)    POCT glucose Collected (08/21/22 0816)    POCT glucose Collected (08/21/22 0816)    POCT glucose Collected (08/21/22 0944)    POCT glucose Collected (08/21/22 0944)    POCT glucose Collected (08/21/22 1129)    POCT glucose Collected (08/21/22 2148)    POCT glucose Collected (08/22/22 0702)    POCT glucose Collected (08/22/22 0702)    POCT glucose Collected (08/22/22 1005)    POCT glucose Collected (08/24/22 0847)    POCT glucose Collected (08/24/22 0847)    POCT glucose Collected (08/24/22 0847)    POCT glucose Collected (08/23/22 1040)    POCT glucose Collected (08/24/22 0847)    POCT glucose Collected (08/24/22 0847)    POCT glucose Collected (08/24/22 0739)    POCT glucose Collected (08/24/22 1013)    POCT glucose Collected (08/25/22 1018)    POCT glucose Collected (08/25/22 1018)    POCT glucose Collected (08/25/22 6365)    POCT glucose Collected (08/25/22 1018)    POCT glucose Collected (08/29/22 0721)    POCT glucose Collected (08/29/22 0721)    POCT glucose Collected (08/29/22 0721)    POCT glucose Collected (08/26/22 1153)    POCT glucose Collected (08/29/22 0721)    POCT glucose Collected (08/29/22 0721)    POCT glucose Collected (08/29/22 0721)    POCT glucose Collected (08/29/22 0721)    POCT glucose Collected (08/29/22 0721)    POCT glucose Collected (08/29/22 0721)    POCT glucose Collected (08/29/22 0721)    POCT glucose Collected (08/29/22 0721)    POCT glucose Collected (08/29/22 0721)    POCT glucose Collected (08/29/22 0721)    POCT glucose Collected (08/29/22 0721)    POCT glucose Collected (08/29/22 1613)    Strep Pneumoniae Antigen Collected (08/23/22 1513)    Surgical Pathology Collected (08/29/22 1500)    Aspergillus Galact AG By EIA-A In process    Culture with Smear, Acid Fast Bacillius In process    Culture, CMV In process    Culture, Fungus In process    Culture, HSV In process    Culture, Legionella In process    Culture, Virus, Respiratory In process    Cytology, Non-Gyn In process    Cytology, Non-Gyn In process    Respiratory Viral Panel - Diatherix In process    Respiratory Viral Panel - Diatherix In process    Surgical Pathology In process    Culture, Respiratory Preliminary result        Discharge Medications    Current Discharge Medication List    START taking these medications    metoclopramide (REGLAN) 10 MG tablet  Take 1 tablet by mouth in the morning, at noon, and at bedtime for 10 doses  Qty: 10 tablet Refills: 0    hydrOXYzine HCl (ATARAX) 25 MG tablet  Take 1 tablet by mouth in the morning, at noon, in the evening, and at bedtime for 10 days  Qty: 40 tablet Refills: 0    metFORMIN (GLUCOPHAGE-XR) 500 MG extended release tablet  Take 2 tablets by mouth daily (with breakfast)  Qty: 30 tablet Refills: 3          Current Discharge Medication List    CONTINUE these medications which have CHANGED    midodrine (PROAMATINE) 5 MG tablet  Take 1 tablet by mouth 2 times daily  Qty: 60 tablet Refills: 3  Associated Diagnoses:Diabetic autonomic neuropathy associated with other specified diabetes mellitus ([facility] Syncope, unspecified syncope type          Current Discharge Medication List    CONTINUE these medications which have NOT CHANGED    pregabalin (LYRICA) 50 MG capsule  Take 1 capsule by mouth in the morning and 1 capsule at noon and 1 capsule before bedtime. Do all this for 30 days. Qty: 90 capsule Refills: 1  Associated Diagnoses:Diabetic polyneuropathy associated with type 2 diabetes mellitus (HCC)    L-methylfolate-B6-B12 (METANX) 6-77.373-6-23 MG CAPS capsule  Take 1 capsule by mouth daily  Qty: 90 capsule Refills: 3  Associated Diagnoses:Diabetic polyneuropathy associated with type 2 diabetes mellitus ([facility]          Current Discharge Medication List    STOP taking these medications    thiamine mononitrate (THIAMINE) 100 MG tablet  Comments:  Reason for Stopping:    metFORMIN (GLUCOPHAGE) 500 MG tablet  Comments:  Reason for Stopping:    oxyCODONE (OXY-IR) 10 MG immediate release tablet  Comments:  Reason for Stopping:          Time Spent on Discharge:  30 minutes were spent in patient examination, evaluation, counseling as well as medication reconciliation, prescriptions for required medications, discharge plan, and follow up.     Electronically signed by Eloy Green MD on 8/30/22 at 3:12 PM EDT

## 2022-08-30 NOTE — PROGRESS NOTES
Physical Therapy  Facility/Department: [address] [place] [address]  Physical Therapy Treatment    Name: Lucita Shields  : 1960  MRN: [id number]  Date of Service: 2022    Discharge Recommendations: Lucita Shields scored a 20/24 on the AM-PAC short mobility form. Current research shows that an AM-PAC score of 18 or greater is typically associated with a discharge to the patient's home setting. Based on the patient's AM-PAC score and their current functional mobility deficits, it is recommended that the patient have 2-3 sessions per week of Physical Therapy at d/c to increase the patient's independence. At this time, this patient demonstrates the endurance and safety to discharge home with home PT and a follow up treatment frequency of 2-3x/wk. Please see assessment section for further patient specific details. If patient discharges prior to next session this note will serve as a discharge summary. Please see below for the latest assessment towards goals. Patient Diagnosis(es): The primary encounter diagnosis was Pneumonia of left lower lobe due to infectious organism. Diagnoses of Hypoxia, Diabetic autonomic neuropathy associated with other specified diabetes mellitus ([address] Syncope, unspecified syncope type, and Hypoxemia were also pertinent to this visit. Past Medical History:  has a past medical history of Chronic hepatitis C without hepatic coma ([address] - Diagnosed in 30s and Type 2 diabetes mellitus without complication, without long-term current use of insulin ([address] Past Surgical History:  has a past surgical history that includes bronchoscopy (N/A, 2022). Assessment   Body Structures, Functions, Activity Limitations Requiring Skilled Therapeutic Intervention: Decreased functional mobility ; Decreased strength;Decreased endurance  Assessment: Pt is a 57 y.o. man who wants to return home upon d/c.  Pt completed ambulation with CGA on 6L of O2 via n/c. Pt demonstrated intermittent unsteadiness while ambulating with no overt LOB. Post amb, O2 sats slightly decreased and HR elevated. RN aware. Pt will benefit from home with assist PRN and would benefit from increased [address] initially - discussed with CM. Rec home PT. Treatment Diagnosis: impaired functional mobility 2/2 decreased endurance  Therapy Prognosis: Good  Decision Making: Low Complexity  Requires PT Follow-Up: Yes  Activity Tolerance  Activity Tolerance: Patient tolerated treatment well  Activity Tolerance Comments: Pt's O2 at 95% and 113 bpm at rest on 5L via n/c prior to mobility. Post amb: O2 sats 89% on 6L,  bpm.  O2 returned to 92% < 1 minute sitting EOB at 6L. Pts heart rate returned to 120 at end of treatment. Pt left on 5L O2.  RN aware of above. Plan   Plan  Plan:  (2-5)  Current Treatment Recommendations: Functional mobility training, Endurance training, Strengthening, Gait training, Patient/Caregiver education & training, Safety education & training  Safety Devices  Type of Devices: Bed alarm in place, Nurse notified, Left in bed, Gait belt, Call light within reach     Restrictions  Restrictions/Precautions  Restrictions/Precautions: Bed Alarm  Position Activity Restriction  Other position/activity restrictions: up with assist     Subjective   General  Chart Reviewed: Yes  Patient assessed for rehabilitation services?: Yes  Additional Pertinent Hx: Pt is a 57 y.o. male adm 8/20 with hypoxia. Pt presented to ED with hypoxia. CXR:Minimal bilateral basilar airspace disease. CT chest:Moderate peripheral and basilar predominant groundglass opacity and septal thickening, suspicious for atypical pneumonia, neg PE. Head CT: neg. PMH:pancreatitis, type 2 diabetes, and chronic hepatitis C  Family / Caregiver Present: No  Referring Practitioner: Sean Grigsby MD  Referral Date : 08/22/22  Subjective  Subjective: Pt found supine. Agreeable to PT. Pt on 5L at rest via N/C.  Pt denies any pain at rest and throughout treatment         Social/Functional History  Social/Functional History  Lives With: Alone  Type of Home: Apartment (lives on first floor)  Home Layout: One level  Home Access: Level entry  Bathroom Shower/Tub: Tub/Shower unit  Bathroom Toilet: Standard  Home Equipment:  (none)  Has the patient had two or more falls in the past year or any fall with injury in the past year?: Yes (reports several falls in the last year 2/2 dizziness)  ADL Assistance: [address]: Independent  Homemaking Responsibilities: Yes  Ambulation Assistance: Independent  Transfer Assistance: Independent  Active : No  Patient's  Info: Pt reports his friend drives him  Occupation: Retired  Type of Occupation:   Additional Comments: Pt states he does not have someone to help out overnight if needed  Vision/Hearing  Vision - [facility]  Hearing - [facility]       Cognition   Orientation  Overall Orientation Status: Within Normal Limits  Orientation Level: Oriented X4  Cognition  Overall Cognitive Status: WNL     Objective   Ambulation  Surface: level tile  Device: No Device  Other Apparatus: O2 - 6L  Assistance: Contact guard assistance  Quality of Gait: unsteady at times, decreased bilat step length/height, decreased maisha,  min HERRERA  Distance: 125 ft     Balance  Sitting - Static: Good (Supervision, Pt uses UEs to prop himself up)  Standing - Static: Good (CGA)  Standing - Dynamic:  (Pt CGA, demonstrates minimal unsteadiness with no overt LOB)  Exercise Treatment: Bilateral: SLR 10x w/ 5 second hold, resisted hip ABD and ADD in hooklying 5x w/ 5 second hold, PF 10x w/ 5 second hold, and glute bridges 10x for 10 seconds      OutComes Score     AM-PAC Score  AM-PAC Inpatient Mobility Raw Score : 20 (08/30/22 1548)  AM-PAC Inpatient T-Scale Score : 47.67 (08/30/22 1548)  Mobility Inpatient CMS 0-100% Score: 35.83 (08/30/22 1548)  Mobility Inpatient CMS G-Code Modifier : Cherrie Florez (08/30/22 1548)     Goals  Short Term Goals  Time

## 2022-08-30 NOTE — PROGRESS NOTES
08/30/22 1424   Resting (Room Air)   SpO2 80      Resting (On O2)   SpO2 93      O2 Device Nasal cannula   O2 Flow Rate (l/min) 5 l/min   During Walk (Room Air)   SpO2 83      Rate of Dyspnea 2   During Walk (On O2)   SpO2 91      O2 Flow Rate (l/min) 5 l/min   Walk/Assistance Device Ambulation   After Walk   SpO2 94      O2 Flow Rate (l/min) 5 l/min   Does the Patient Qualify for Home O2 Yes   Liter Flow at Rest 5   Liter Flow on Exertion 5   Does the Patient Need Portable Oxygen Tanks Yes

## 2022-08-31 VITALS
OXYGEN SATURATION: 93 % | HEIGHT: 71 IN | RESPIRATION RATE: 18 BRPM | TEMPERATURE: 97.5 F | DIASTOLIC BLOOD PRESSURE: 85 MMHG | WEIGHT: 165 LBS | HEART RATE: 115 BPM | BODY MASS INDEX: 23.1 KG/M2 | SYSTOLIC BLOOD PRESSURE: 132 MMHG

## 2022-08-31 LAB
ALBUMIN SERPL-MCNC: 2.6 G/DL (ref 3.4–5)
ALP BLD-CCNC: 129 U/L (ref 40–129)
ALT SERPL-CCNC: 21 U/L (ref 10–40)
ANION GAP SERPL CALCULATED.3IONS-SCNC: 9 MMOL/L (ref 3–16)
AST SERPL-CCNC: 45 U/L (ref 15–37)
BASOPHILS ABSOLUTE: 0 K/UL (ref 0–0.2)
BASOPHILS RELATIVE PERCENT: 0.2 %
BILIRUB SERPL-MCNC: 0.7 MG/DL (ref 0–1)
BILIRUBIN DIRECT: 0.4 MG/DL (ref 0–0.3)
BILIRUBIN, INDIRECT: 0.3 MG/DL (ref 0–1)
BUN BLDV-MCNC: 8 MG/DL (ref 7–20)
CALCIUM SERPL-MCNC: 8.2 MG/DL (ref 8.3–10.6)
CHLORIDE BLD-SCNC: 98 MMOL/L (ref 99–110)
CO2: 26 MMOL/L (ref 21–32)
CREAT SERPL-MCNC: 0.6 MG/DL (ref 0.8–1.3)
CULTURE, RESPIRATORY: NORMAL
EOSINOPHILS ABSOLUTE: 0.3 K/UL (ref 0–0.6)
EOSINOPHILS RELATIVE PERCENT: 3.4 %
GFR AFRICAN AMERICAN: >60
GFR NON-AFRICAN AMERICAN: >60
GLUCOSE BLD-MCNC: 203 MG/DL (ref 70–99)
GLUCOSE BLD-MCNC: 222 MG/DL (ref 70–99)
GLUCOSE BLD-MCNC: 243 MG/DL (ref 70–99)
GLUCOSE BLD-MCNC: 248 MG/DL (ref 70–99)
GRAM STAIN RESULT: NORMAL
HCT VFR BLD CALC: 34.4 % (ref 40.5–52.5)
HEMOGLOBIN: 11.5 G/DL (ref 13.5–17.5)
LYMPHOCYTES ABSOLUTE: 1 K/UL (ref 1–5.1)
LYMPHOCYTES RELATIVE PERCENT: 11.1 %
MAGNESIUM: 1.5 MG/DL (ref 1.8–2.4)
MCH RBC QN AUTO: 32.4 PG (ref 26–34)
MCHC RBC AUTO-ENTMCNC: 33.4 G/DL (ref 31–36)
MCV RBC AUTO: 97.1 FL (ref 80–100)
MONOCYTES ABSOLUTE: 0.8 K/UL (ref 0–1.3)
MONOCYTES RELATIVE PERCENT: 9.1 %
NEUTROPHILS ABSOLUTE: 6.9 K/UL (ref 1.7–7.7)
NEUTROPHILS RELATIVE PERCENT: 76.2 %
PDW BLD-RTO: 13.9 % (ref 12.4–15.4)
PERFORMED ON: ABNORMAL
PHOSPHORUS: 3.2 MG/DL (ref 2.5–4.9)
PLATELET # BLD: 170 K/UL (ref 135–450)
PMV BLD AUTO: 8 FL (ref 5–10.5)
POTASSIUM SERPL-SCNC: 4.1 MMOL/L (ref 3.5–5.1)
RBC # BLD: 3.54 M/UL (ref 4.2–5.9)
SODIUM BLD-SCNC: 133 MMOL/L (ref 136–145)
TOTAL PROTEIN: 5.5 G/DL (ref 6.4–8.2)
WBC # BLD: 9 K/UL (ref 4–11)

## 2022-08-31 PROCEDURE — 94761 N-INVAS EAR/PLS OXIMETRY MLT: CPT

## 2022-08-31 PROCEDURE — 80069 RENAL FUNCTION PANEL: CPT

## 2022-08-31 PROCEDURE — 2700000000 HC OXYGEN THERAPY PER DAY

## 2022-08-31 PROCEDURE — 6370000000 HC RX 637 (ALT 250 FOR IP)

## 2022-08-31 PROCEDURE — 6370000000 HC RX 637 (ALT 250 FOR IP): Performed by: INTERNAL MEDICINE

## 2022-08-31 PROCEDURE — 36415 COLL VENOUS BLD VENIPUNCTURE: CPT

## 2022-08-31 PROCEDURE — 6370000000 HC RX 637 (ALT 250 FOR IP): Performed by: STUDENT IN AN ORGANIZED HEALTH CARE EDUCATION/TRAINING PROGRAM

## 2022-08-31 PROCEDURE — 85025 COMPLETE CBC W/AUTO DIFF WBC: CPT

## 2022-08-31 PROCEDURE — 6360000002 HC RX W HCPCS

## 2022-08-31 PROCEDURE — 80076 HEPATIC FUNCTION PANEL: CPT

## 2022-08-31 PROCEDURE — 97530 THERAPEUTIC ACTIVITIES: CPT

## 2022-08-31 PROCEDURE — 99232 SBSQ HOSP IP/OBS MODERATE 35: CPT | Performed by: INTERNAL MEDICINE

## 2022-08-31 PROCEDURE — 97535 SELF CARE MNGMENT TRAINING: CPT

## 2022-08-31 PROCEDURE — 2580000003 HC RX 258

## 2022-08-31 PROCEDURE — 83735 ASSAY OF MAGNESIUM: CPT

## 2022-08-31 RX ORDER — MAGNESIUM SULFATE IN WATER 40 MG/ML
4000 INJECTION, SOLUTION INTRAVENOUS ONCE
Status: COMPLETED | OUTPATIENT
Start: 2022-08-31 | End: 2022-08-31

## 2022-08-31 RX ORDER — CAPSAICIN 0.025 %
CREAM (GRAM) TOPICAL 2 TIMES DAILY PRN
Status: DISCONTINUED | OUTPATIENT
Start: 2022-08-31 | End: 2022-08-31 | Stop reason: HOSPADM

## 2022-08-31 RX ADMIN — ENOXAPARIN SODIUM 40 MG: 100 INJECTION SUBCUTANEOUS at 10:23

## 2022-08-31 RX ADMIN — INSULIN LISPRO 4 UNITS: 100 INJECTION, SOLUTION INTRAVENOUS; SUBCUTANEOUS at 09:25

## 2022-08-31 RX ADMIN — MAGNESIUM SULFATE HEPTAHYDRATE 4000 MG: 40 INJECTION, SOLUTION INTRAVENOUS at 10:33

## 2022-08-31 RX ADMIN — Medication 100 MG: at 10:23

## 2022-08-31 RX ADMIN — SODIUM CHLORIDE 25 ML: 9 INJECTION, SOLUTION INTRAVENOUS at 10:29

## 2022-08-31 RX ADMIN — SODIUM CHLORIDE, PRESERVATIVE FREE 10 ML: 5 INJECTION INTRAVENOUS at 10:23

## 2022-08-31 RX ADMIN — HYDROXYZINE HYDROCHLORIDE 25 MG: 25 TABLET ORAL at 15:21

## 2022-08-31 RX ADMIN — METFORMIN HYDROCHLORIDE 1000 MG: 500 TABLET, EXTENDED RELEASE ORAL at 10:23

## 2022-08-31 RX ADMIN — PREGABALIN 50 MG: 50 CAPSULE ORAL at 15:21

## 2022-08-31 RX ADMIN — PREGABALIN 50 MG: 50 CAPSULE ORAL at 10:23

## 2022-08-31 RX ADMIN — HYDROXYZINE HYDROCHLORIDE 25 MG: 25 TABLET ORAL at 10:23

## 2022-08-31 RX ADMIN — METOCLOPRAMIDE 10 MG: 10 TABLET ORAL at 10:23

## 2022-08-31 ASSESSMENT — PAIN SCALES - GENERAL: PAINLEVEL_OUTOF10: 0

## 2022-08-31 NOTE — PROGRESS NOTES
groundglass opacity and septal thickening, suspicious for atypical pneumonia, neg PE. Head CT: neg. PMH:pancreatitis, type 2 diabetes, and chronic hepatitis C      Diagnosis: Hypoxia  Treatment Diagnosis: decreased functional mobility, safety awareness, ADL status, endurance/strength. Subjective:   Pt met supine in bed initially declining therapy then able to participate. Pain:   No pain reported    Social/Functional History  Lives With: Alone  Type of Home: Apartment (lives on first floor)  Home Layout: One level  Home Access: Level entry  Bathroom Shower/Tub: Tub/Shower unit  Bathroom Toilet: Standard  Home Equipment:  (none)  Has the patient had two or more falls in the past year or any fall with injury in the past year?: Yes (reports several falls in the last year 2/2 dizziness)  ADL Assistance: [address] [place] [address]: Independent  Homemaking Responsibilities: Yes  Ambulation Assistance: Independent  Transfer Assistance: Independent  Active : No  Patient's  Info: Pt reports his friend drives him  Occupation: Retired  Type of Occupation:   Additional Comments: Pt states he does not have someone to help out overnight if needed  Prior Function  ADL Assistance: Independent  Homemaking Assistance: Independent  Ambulation Assistance: Independent  Transfer Assistance: Independent  Additional Comments: Pt states he does not have someone to help out overnight if needed    Objective:    Cognition/Orientation:  WFL    Bed mobility   Supine to sit:  SBA with very fast movement  Scooting: SBA    Pt with dizziness seated EOB requiring increased time prior to stance.  BP monitored at 122/78 and O2 at 92%    Functional Mobility   Sit to Stand: CGA   Stand to Sit:CGA   Bed to Chair Transfer:  CGA   Commode Transfer: CGA  Other: Functional mobility demonstrated with CGA to and from bathroom and around room with rest breaks    ADLs   Grooming: CGA in stance at sink for oral care and washing hands and face with one seated rest break and SOB. O2 monitored at 90%  Bathing: Ue washing completed in stance and seated at sink  UB dressing: CGA for ties on gown  LB dressing: SBA donning socks  Toileting: SBA       Activity Tolerance:  Pt very limited by decreased endurance. O2 monitored above 90 during session. Patient Education:   Safe home set up, Role of OT, activity promotion - pt verb understanding      Safety Devices in Place:  Pt left seated in recliner chair with alarm on and call light in reach. Therapy Time:   Individual Concurrent Group Co-treatment   Time In  820         Time Out  900         Minutes  40           Timed Code Treatment Minutes:  40    Total Treatment Minutes:   40    Timed Code Treatment Minutes: Total Treatment Time:     Goals: (as determined and assessed by primary OT)   Short Term Goals  Time Frame for Short term goals: d/c  Short Term Goal 1: pt will be Mod I for toileting - ongonig  Short Term Goal 2: Pt will be Mod I for functional transfers - ongoing  Short Term Goal 3: Pt will complete dynamic standing activity SBA for 5 minutes in preperation for ADL's - ongoing         Plan:      Times per Week: 2-5   Times per Day: Daily    If patient is discharged prior to next treatment, this note will serve as the discharge summary.       [address] [place] [address], Ne, AYALA/L

## 2022-08-31 NOTE — PROGRESS NOTES
Pulmonary Followup Note    CC: Worsening respiratory failure and airspace disease on imaging. Subjective:  Patient seen at bedside, no acute events overnight. Patient endorses feeling well this am, less hiccups. Endorses feeling ready to go home. Still requiring 5L. Home oxygen eval revealing he requires oxygen support on discharge. ROS:  Denies headache, nausea/vomiting, abdominal pain. 24HR INTAKE/OUTPUT:    Intake/Output Summary (Last 24 hours) at 2022 1043  Last data filed at 2022 2139  Gross per 24 hour   Intake 150 ml   Output 200 ml   Net -50 ml        magnesium sulfate  4,000 mg IntraVENous Once    [Held by provider] insulin glargine  28 Units SubCUTAneous Nightly    metFORMIN  1,000 mg Oral Daily with breakfast    hydrOXYzine HCl  25 mg Oral 4x daily    insulin lispro  0-16 Units SubCUTAneous TID WC    insulin lispro  0-4 Units SubCUTAneous Nightly    pregabalin  50 mg Oral TID    vitamin B-1  100 mg Oral Daily    sodium chloride flush  5-40 mL IntraVENous 2 times per day    enoxaparin  40 mg SubCUTAneous Daily     PHYSICAL EXAMINATION:  /86   Pulse (!) 111   Temp 97.5 °F (36.4 °C) (Oral)   Resp 20   Ht 5' 11\" (1.803 m)   Wt 165 lb (74.8 kg)   SpO2 93%   BMI 23.01 kg/m²   CURRENT PULSE OXIMETRY:  SpO2: 93 %  24HR PULSE OXIMETRY RANGE:  SpO2  Av.3 %  Min: 91 %  Max: 95 % on 4L NC. Gen: No distress. Speaking in full sentences with no accessory muscle use. HEENT: PERRL, EOMI, OP nl.  Lung: No wheezes, rales or ronchi. No egophony or fremitus. No accessory respiratory muscle use. 5L O2.  CV: RRR without M/R/R. Abd: +BS, soft, NT/ND. Ext: No edema.     DATA  CBC:   Recent Labs     22  0853 22  1114 22  0630   WBC 9.2 10.5 9.0   HGB 11.8* 12.0* 11.5*   HCT 35.4* 35.1* 34.4*   MCV 97.8 97.1 97.1    161 170     BMP:   Recent Labs     22  0853 22  1114 22  0629    134* 133*   K 4.2 4.0 4.1    97* 98*   CO2 24 26 26   PHOS 3.1 2.9 3.2   BUN 8 8 8   CREATININE 0.6* 0.6* 0.6*     No results for input(s): PHART, ANQ0ASO, PO2ART in the last 72 hours. LIVER PROFILE:   Recent Labs     08/29/22  0853 08/30/22  1114 08/31/22  0629   AST 25 29 45*   ALT 20 18 21   BILIDIR 0.4* 0.4* 0.4*   BILITOT 0.8 0.8 0.7   ALKPHOS 144* 124 129       CXR REVIEWED BY ME AND SHOWED:  XR CHEST PORTABLE   Final Result      Progression of severe left and moderate right airspace disease. CT CHEST WO CONTRAST   Final Result   1. Diffuse pleural-parenchymal mosaic pattern, similar territories to prior examination but the most consolidated component both lower lungs appear slightly improved. This may be related to an atypical viral etiology. 2. Mild fusiform ectasia of the ascending thoracic aorta      XR CHEST (2 VW)   Final Result      Findings suggestive for possible early pneumonic infiltrate left mid and lower lung, right lower lung with atelectatic changes right lower lung as well. CT Head W/O Contrast   Final Result      No acute intracranial hemorrhage or mass effect. Mild chronic small vessel ischemic change. CT CHEST PULMONARY EMBOLISM W CONTRAST   Final Result      Limited assessment for pulmonary embolism, particularly in the lower lobe secondary to respiratory motion. No pulmonary emboli in the lower evaluated pulmonary arteries. Moderate peripheral and basilar predominant groundglass opacity and septal thickening, suspicious for atypical pneumonia. Hepatic steatosis. Trace residual peripancreatic fat stranding, likely sequelae of pancreatitis as noted on CT 2 weeks prior. INCIDENTAL FINDINGS: None. XR CHEST (2 VW)   Final Result      1. Minimal bilateral basilar airspace disease.                 ASSESSMENT/PLAN:  The patient is a 57 y.o. male with significant past medical history of hepatitis C, diabetes and alcohol abuse presented with complaints of hiccups and shortness of breath. Changes on his CT chest are consistent with a diagnosis of ILD, for which he will need supplemental oxygen. BAL/biopsies pending, hopefully these will guide further management outpatient. Consider steroids based on these results. Cortisol came back wnl. Continues on 5L. Khurram Robin for discharge home. Recommend close Pulmonary follow-up outpatient.      Nayely Canela MD

## 2022-08-31 NOTE — PROGRESS NOTES
Pt discharged home to private residence. D/c instructions gone over with pt. New medications and side effects explained. Verbalized understanding. No further questions. Aware of follow up appointments. Left floor with portable oxygen on 5L nasal cannula. This RN called oxygen [employer] to drop off additional oxygen-- company confirmed address and will drop off in est 30 min. Left floor via wheelchair with RN and all personal belongings.

## 2022-08-31 NOTE — PLAN OF CARE
Problem: Safety - Adult  Goal: Free from fall injury  Outcome: Adequate for Discharge  Note: Pt has remained free from falls during shift. Pt ambulates independently after set-up. Pt's bed is locked in lowest position with alarm on, call light, bedside table, and personal belongings within reach. Problem: Pain  Goal: Verbalizes/displays adequate comfort level or baseline comfort level  Outcome: Adequate for Discharge  Note: Pt is A&Ox4, calls out to staff, verbalizes needs, and participates in care. Will continue to monitor. Problem: ABCDS Injury Assessment  Goal: Absence of physical injury  Outcome: Adequate for Discharge  Note: Pt has remained free from physical injury during shift.

## 2022-08-31 NOTE — PROGRESS NOTES
Internal Medicine  PGY 3  Progress note      CC : Hiccups for 2 days and fatigue    History Obtained From:  patient    Inteval Hx:  No overnight events. Ready for discharge pending approval of home O2. Past Medical History:        Diagnosis Date    Chronic hepatitis C without hepatic coma ([facility] - Diagnosed in 30s     Type 2 diabetes mellitus without complication, without long-term current use of insulin ([facility] 7/9/2021       Past Surgical History:        Procedure Laterality Date    BRONCHOSCOPY N/A 8/29/2022    BRONCHOSCOPY/TRANSBRONCHIAL LUNG BIOPSY performed by Griselda Jacobs MD at [facility] ENDOSCOPY       Medications Priorto Admission:    Medications Prior to Admission: [DISCONTINUED] thiamine mononitrate (THIAMINE) 100 MG tablet, Take 1 tablet by mouth in the morning. [DISCONTINUED] metFORMIN (GLUCOPHAGE) 500 MG tablet, Take 1 tablet by mouth daily (with breakfast)  [DISCONTINUED] oxyCODONE (OXY-IR) 10 MG immediate release tablet, Take 1 tablet by mouth every 8 hours as needed for Pain (severe pain) for up to 5 days. pregabalin (LYRICA) 50 MG capsule, Take 1 capsule by mouth in the morning and 1 capsule at noon and 1 capsule before bedtime. Do all this for 30 days. L-methylfolate-B6-B12 (METANX) 3-22.689-8-63 MG CAPS capsule, Take 1 capsule by mouth daily    Allergies:  Patient has no known allergies. Social History:   TOBACCO:   reports that he has been smoking cigars and cigarettes. He has never used smokeless tobacco.  ETOH:   reports current alcohol use. DRUGS : none  Patient currently lives alone    Family History:       Problem Relation Age of Onset    Other Mother         COPD    No Known Problems Sister     No Known Problems Brother     No Known Problems Brother     No Known Problems Brother      Physical exam:    Physical Exam  Vitals reviewed. Constitutional: on 5L NC     Appearance: Normal appearance. He is normal weight. HENT:      Head: Normocephalic and atraumatic.    Eyes: Extraocular Movements: Extraocular movements intact. Conjunctiva/sclera: Conjunctivae normal.      Pupils: Pupils are equal, round, and reactive to light. Cardiovascular:      Rate and Rhythm: Regular rhythm. Regular rate. Heart sounds: Normal heart sounds. Pulmonary:      Effort: Pulmonary effort is normal.      Breath sounds: Normal breath sounds. Abdominal:      General: Abdomen is flat. Bowel sounds are normal.      Palpations: Abdomen is soft. Musculoskeletal:         General: Normal range of motion. Mild tenderness to palpation of chest   Skin:     General: Skin is warm and dry. Neurological:      General: Mild numbness and tingling of bilateral pinkies. Mental Status: He is alert and oriented to person, place, and time. Mental status is at baseline. Psychiatric:         Mood and Affect: Mood normal.         Behavior: Behavior normal.      Vitals:    08/31/22 1339   BP:    Pulse: (!) 115   Resp: 18   Temp:    SpO2:      DATA:    Labs:  CBC:   Recent Labs     08/29/22  0853 08/30/22  1114 08/31/22  0630   WBC 9.2 10.5 9.0   HGB 11.8* 12.0* 11.5*   HCT 35.4* 35.1* 34.4*    161 170         BMP:   Recent Labs     08/29/22  0853 08/30/22  1114 08/31/22  0629    134* 133*   K 4.2 4.0 4.1    97* 98*   CO2 24 26 26   BUN 8 8 8   CREATININE 0.6* 0.6* 0.6*   GLUCOSE 208* 268* 248*   PHOS 3.1 2.9 3.2       LFT's:   Recent Labs     08/29/22  0853 08/30/22  1114 08/31/22  0629   AST 25 29 45*   ALT 20 18 21   BILITOT 0.8 0.8 0.7   ALKPHOS 144* 124 129       Troponin:   No results for input(s): TROPONINI in the last 72 hours. BNP:No results for input(s): BNP in the last 72 hours. ABGs: No results for input(s): PHART, LCY1NPX, PO2ART in the last 72 hours. INR: No results for input(s): INR in the last 72 hours.     U/A:No results for input(s): NITRITE, COLORU, PHUR, LABCAST, WBCUA, RBCUA, MUCUS, TRICHOMONAS, YEAST, BACTERIA, CLARITYU, SPECGRAV, LEUKOCYTESUR, UROBILINOGEN, BILIRUBINUR, BLOODU, GLUCOSEU, AMORPHOUS in the last 72 hours. Invalid input(s): KETONESU    XR CHEST PORTABLE   Final Result      Progression of severe left and moderate right airspace disease. CT CHEST WO CONTRAST   Final Result   1. Diffuse pleural-parenchymal mosaic pattern, similar territories to prior examination but the most consolidated component both lower lungs appear slightly improved. This may be related to an atypical viral etiology. 2. Mild fusiform ectasia of the ascending thoracic aorta      XR CHEST (2 VW)   Final Result      Findings suggestive for possible early pneumonic infiltrate left mid and lower lung, right lower lung with atelectatic changes right lower lung as well. CT Head W/O Contrast   Final Result      No acute intracranial hemorrhage or mass effect. Mild chronic small vessel ischemic change. CT CHEST PULMONARY EMBOLISM W CONTRAST   Final Result      Limited assessment for pulmonary embolism, particularly in the lower lobe secondary to respiratory motion. No pulmonary emboli in the lower evaluated pulmonary arteries. Moderate peripheral and basilar predominant groundglass opacity and septal thickening, suspicious for atypical pneumonia. Hepatic steatosis. Trace residual peripancreatic fat stranding, likely sequelae of pancreatitis as noted on CT 2 weeks prior. INCIDENTAL FINDINGS: None. XR CHEST (2 VW)   Final Result      1. Minimal bilateral basilar airspace disease.               ASSESSMENT AND PLAN:    Health Care Associated Pneumonia  -s/p doxycycline 7d doxycycline    Acute Hypoxic Respiratory Failure - ILD vs bronchiectasis  - unclear etiology  -new requirement of 4-5L  -pulm consult  --> bronch (8/29) with BAL + biopsies  -CT chest wo con - diffuse pleural parenchymal mosaic pattern in both lower lungs  -procalc negative  -CRP elevated to 23.5  -Pending results of bronch, patient is medically stable for dc pending home O2 approval    Hiccups  -likely 2/2 to ILD vs bronchiectasis  -hiccups decreased in frequency w/ metoclopramide/atarax  -continue 10 mg metoclopramide TID, 25 mg atarax QID    Transaminitis, resolved    Type 2 diabetes mellitus  Patient's glucose on presentation was 321.   Patient's last hemoglobin A1c on 8/9/2022 was 6.6.   - resumed home metformin after acute illness resolved in anticipation of discharge  -sliding scale PRN  - POCT x times dialy    History of orthostatic syncope  Likely related autonomic dysfunction in setting of diabetic neuropathy.  - Continue home midodrine if needed    Will discuss with attending physician Dr. Christiane Claude    Code Status:Full code  FEN: Diabetic diet  PPX: Lovenox  DISPO: Renaldo Campos MD PGY-1  8/31/2022,  2:06 PM

## 2022-08-31 NOTE — CARE COORDINATION
Case Management Assessment            Discharge Note                    Date / Time of Note: 8/31/2022 10:43 AM                  Discharge Note Completed by: PRATIMA Collier, ALFREDOW    Patient Name: Cata Godfrey   YOB: 1960  Diagnosis: Hypoxia [R09.02]  PNA (pneumonia) [J18.9]  Pneumonia of left lower lobe due to infectious organism [J18.9]  Hospital-acquired pneumonia [J18.9, Y95]   Date / Time: 8/20/2022  3:34 PM    Current PCP: Jasvir Harrison DO  Clinic patient: No    Hospitalization in the last 30 days: Yes    Advance Directives:  Code Status: Full Code  [place] DNR form completed and on chart: Yes    Financial:  Payor: Noelle Zapata / Plan: Noelle Zapata / Product Type: *No Product type* /      Pharmacy:    [address], [address] [phone] - F [phone]  [address]  Phone: [phone] Fax: [phone]    [facility] [facility], [address] [address]  Phone: [phone] Fax: [address]  Phone: [phone] Fax: [phone]      Assistance purchasing medications?:    Assistance provided by Case Management: None at this time    Does patient want to participate in local refill/ meds to beds program?: Yes    Meds To Beds General Rules:  1. Can ONLY be done Monday- Friday between 8:30am-5pm  2. Prescription(s) must be in pharmacy by 3pm to be filled same day  3. Copy of patient's insurance/ prescription drug card and patient face sheet must be sent along with the prescription(s)  4. Cost of Rx cannot be added to hospital bill. If financial assistance is needed, please contact unit  or ;   or  CANNOT provide pharmacy voucher for patients co-pays  5. Patients can then  the prescription on their way out of the hospital at discharge, or pharmacy can deliver to the bedside if staff is available. (payment due at time of pick-up or delivery - cash, check, or card accepted)     Able to afford home medications/ co-pay costs: Yes    ADLS:  Current PT AM-PAC Score: 20 /24  Current OT AM-PAC Score: 21 /24      DISCHARGE Disposition: Home- No Services Needed    LOC at discharge: Not Applicable  GILSON Completed: Not Indicated    Notification completed in HENS/PAS?:  Not Applicable    IMM Completed:   Not Indicated    Transportation:  Transportation PLAN for discharge:  Lyft    Mode of Transport: [address]:  [address] ordered at discharge: Yes  Chapis Milligan [address] [facility]  [phone]    [facility]:  Equipment obtained during hospitalization: NA    Home Oxygen and Respiratory Equipment:  Oxygen needed at discharge?: Yes  [address] [facility]   Phone: [phone]  Portable tank available for discharge?: Yes    Dialysis:  Dialysis patient: No    Referrals made at [facility] for outpatient continued care:  [place] [facility]    Additional CM Notes: Pt will DC home today. Pt will start [facility]idan  services with Daniel [facility]er. Medical Services delivered new home O2 tank to pt's hospital room last night (8pm), and this tank was used and is now empty. CM called Medical Services today to request another tank to be delivered to the pt's room, and then pt can DC after. CM will coordinate Lyft with pt's RN. CM also submitted a COA referral.  No other needs at this time.         The Plan for Transition of Care is related to the following treatment goals of Hypoxia [R09.02]  PNA (pneumonia) [J18.9]  Pneumonia of left lower lobe due to infectious organism [J18.9]  Hospital-acquired pneumonia [J18.9, Y95]    The Patient and/or patient representative Simon Pires and his family were provided with a choice of provider and agrees with the discharge plan Yes    Freedom of choice list was provided with basic dialogue that supports the patient's individualized plan of care/goals and shares the quality data associated with the providers.  Yes    Care Transitions patient: Yes    PRATIMA Jameson, [facility]  Case Management Department  Ph: [phone]  Fax: [phone]

## 2022-08-31 NOTE — PROGRESS NOTES
Physician Progress Note      Miguel Mackey  CSN #:                  [id number]  :                       1960  ADMIT DATE:       2022 3:34 PM  [facility] DATE:  RESPONDING  PROVIDER #:        Ivy Knight          QUERY TEXT:    Patient admitted with hospital-acquired pneumonia. Documentation reflects   sepsis in notes dated , , , , , , and 22. If possible, please document in the progress notes and discharge summary if   sepsis due to pneumonia was: The medical record reflects the following:  Risk Factors: 57year old male  Clinical Indicators: Per  H&P- Sepsis secondary to HAP.  WBC 12.7,   , RR 28.  procalcitonin 0.16. Treatment: Labs, imaging, IV Maxipime, Rocephin, Vibramycin, PO doxycycline  Options provided:  -- Sepsis due to pneumonia confirmed after study  -- Sepsis due to pneumonia treated and resolved  -- Sepsis due to pneumonia ruled out after study  -- Other - I will add my own diagnosis  -- Disagree - Not applicable / Not valid  -- Disagree - Clinically unable to determine / Unknown  -- Refer to Clinical Documentation Reviewer    PROVIDER RESPONSE TEXT:    Provider disagreed with this query. Query created by:  Richard Montez on 2022 1:52 PM      Electronically signed by:  Ivy Knight 2022 2:03 PM

## 2022-09-01 ENCOUNTER — CARE COORDINATION (OUTPATIENT)
Dept: CASE MANAGEMENT | Age: 62
End: 2022-09-01

## 2022-09-01 DIAGNOSIS — J18.9 HOSPITAL-ACQUIRED PNEUMONIA: ICD-10-CM

## 2022-09-01 DIAGNOSIS — J18.9 PNEUMONIA OF BOTH LUNGS DUE TO INFECTIOUS ORGANISM, UNSPECIFIED PART OF LUNG: Primary | ICD-10-CM
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
## 2022-09-01 LAB
ASPERGILLUS GALACTO AG: NEGATIVE
ASPERGILLUS GALACTO INDEX: 0.05
FINAL REPORT: NORMAL
PRELIMINARY: NORMAL

## 2022-09-01 NOTE — CARE COORDINATION
Richard 45 Transitions Initial Follow Up Call    Call within 2 business days of discharge: Yes    Patient: Precy Ortiz Patient : 1960   MRN: <[id number]>  Reason for Admission: Pneumonia  Discharge Date: 22 RARS: Readmission Risk Score: 18.6      Last Discharge [facility]       Date Complaint Diagnosis Description Type Department Provider    22 Shortness of Breath Pneumonia of left lower lobe due to infectious organism . .. ED to Hosp-Admission (Discharged) (ADMITTED) Alberta Partida MD; Elbert Mustafa I. .. Spoke with: Carlo Lee  Patient reports he is feeling tired, weak and fatigued. His hiccups are better but he still has them. He denies fever, chills, cough, nvd, cp, palpitations or abdominal pain. He has f/u with PCP. He doesn't have his medications. He plans on getting them today. No bladder or bowel elimination problems. He received a call from [facility] but declined their services. Medication reconciliation and 1111f completed. Patient agreeable to future calls. Transitions of Care Initial Call    Was this an external facility discharge? No Discharge Facility: [facility] to be reviewed by the provider   Additional needs identified to be addressed with provider: No  none             Method of communication with provider : none    Advance Care Planning:   Does patient have an Advance Directive: not on file. LPN Care Coordinator contacted the patient by telephone to perform post hospital discharge assessment. Verified name and  with patient as identifiers. Provided introduction to self, and explanation of the LPN CC role. LPN CC reviewed discharge instructions, medical action plan and red flags with patient who verbalized understanding. Patient given an opportunity to ask questions and does not have any further questions or concerns at this time. Were discharge instructions available to patient? Yes.  Reviewed appropriate site of care based on symptoms and resources available to patient including: PCP  Specialist  Urgent care clinics  When to call 911. The patient agrees to contact the PCP office for questions related to their healthcare. Medication reconciliation was performed with patient, who verbalizes understanding of administration of home medications. Advised obtaining a 90-day supply of all daily and as-needed medications. Was patient discharged with a pulse oximeter? no    LPN CC provided contact information. Plan for follow-up call in 5-7 days based on severity of symptoms and risk factors.   Plan for next call: symptom management-hiccups, fatigue      Non-face-to-face services provided:  Obtained and reviewed discharge summary and/or continuity of care documents    Care Transitions 24 Hour Call    Do you have all of your prescriptions and are they filled?: Yes  Do you have support at home?: Alone  Are you an active caregiver in your home?: No  Care Transitions Interventions         Follow Up  Future Appointments   Date Time Provider Janelle Aguilar   9/6/2022  1:30 PM DO Gianfranco Veliz [address]   11/17/2022  2:30 PM Noelle Santana. [address] [facility]       Sonia Bynum LPN

## 2022-09-02 LAB
FINAL REPORT: NORMAL
PRELIMINARY: NORMAL

## 2022-09-05 PROBLEM — J18.9 PNA (PNEUMONIA): Status: RESOLVED | Noted: 2022-08-20 | Resolved: 2022-09-05

## 2022-09-06 ENCOUNTER — TELEPHONE (OUTPATIENT)
Dept: PRIMARY CARE CLINIC | Age: 62
End: 2022-09-06

## 2022-09-07 ENCOUNTER — CARE COORDINATION (OUTPATIENT)
Dept: CASE MANAGEMENT | Age: 62
End: 2022-09-07

## 2022-09-07 LAB
FINAL REPORT: NORMAL
PRELIMINARY: NORMAL

## 2022-09-07 NOTE — CARE COORDINATION
[facility]    2022    Patient: Percy Ortiz  Patient : 1960   MRN: [id number]   Reason for Admission: COVID -19 Monitoring, ARF, Hypoxia  Discharge Date: 22 RARS: Readmission Risk Score: 18.6         Spoke with: Percy Ortiz     CTN spoke with patient this am for follow up CTN call. Patient states he is still really SOB and having SOBE. Patient states he is not able to take deep breaths, no cough and no fever, chills, nausea, vomiting, chest pain, dizziness or lightheadedness. Patient feels his breathing has declined since readmission and Bronchoscopy. CTN explained diagnosis of ARF and Hypoxia, patient instructed to contact medical records at hospital to inquire about getting copies of chart. Patient also has follow up with PCP rescheduled for 2022. Care Transitions Follow Up Call    Needs to be reviewed by the provider   Additional needs identified to be addressed with provider: No  none             Method of communication with provider : none      Care Transition Nurse contacted the patient by telephone to follow up after admission on 2022. Verified name and  with patient as identifiers. Addressed changes since last contact: none  Discussed follow-up appointments. If no appointment was previously scheduled, appointment scheduling offered: Yes. Is follow up appointment scheduled within 7 days of discharge? No.    Advance Care Planning:   Does patient have an Advance Directive: not on file. CTN reviewed discharge instructions, medical action plan and red flags with patient and discussed any barriers to care and/or understanding of plan of care after discharge. Discussed appropriate site of care based on symptoms and resources available to patient including: PCP  Specialist  Urgent care clinics  [facility]   When to call 911. The patient agrees to contact the PCP office for questions related to their healthcare.      Patients top risk factors for readmission: medical condition-Hyoxia, ARF, PNA, COVID -19 Monitoring   medication management  multiple health system providers  polypharmacy  Interventions to address risk factors: Education of patient/family/caregiver/guardian to support self-management-Patient instructed to continue to monitor for any of the above noted s/s, as well as monitoring for any worsening SOB/SOBE, try not to over exert and report any increased difficulty with breathing, to MD immediately. CTN provided contact information for future needs. Plan for follow-up call in 5-7 days based on severity of symptoms and risk factors. Plan for next call: symptom management-Any worsening SOB/SOBE. Care Transitions Subsequent and Final Call    Schedule Follow Up Appointment with PCP: Declined  Subsequent and Final Calls  Do you have any ongoing symptoms?: Yes  Onset of Patient-reported symptoms: Other  Patient-reported symptoms: Shortness of Breath  Have your medications changed?: No  Do you have any questions related to your medications?: No  Do you currently have any active services?: No  Do you have any needs or concerns that I can assist you with?: No  Identified Barriers: None  Care Transitions Interventions  No Identified Needs  Other Interventions:            Follow Up  Future Appointments   Date Time Provider Janelle Aguilar   9/13/2022  1:30 PM DO AIMEE Jayci - [place]   11/17/2022  2:30 PM ZAHIRA Gramajo - YANET Latif [facility]     Thank Cristobal Milner RN  Care Transition Coordinator  Contact QNIYZS:[phone]

## 2022-09-09 NOTE — PROGRESS NOTES
Physician Progress Note      Kaitlin Evans  CSN #:                  [id number]  :                       1960  ADMIT DATE:       2022 3:34 PM  [address] [place] DATE:        2022 5:49 PM  RESPONDING  PROVIDER #:        Norma Camejo MD          QUERY TEXT:    Patient admitted with hospital-acquired pneumonia. Documentation reflects   sepsis in notes dated , , , , , , and 22. If possible, please document in the progress notes and discharge summary if   sepsis due to pneumonia was: The medical record reflects the following:  Risk Factors: 57year old male  Clinical Indicators: Per  H&P- Sepsis secondary to HAP.  WBC 12.7,   , RR 28.  procalcitonin 0.16. Treatment: Labs, imaging, IV Maxipime, Rocephin, Vibramycin, PO doxycycline  Options provided:  -- Sepsis due to pneumonia confirmed after study  -- Sepsis due to pneumonia treated and resolved  -- Sepsis due to pneumonia ruled out after study  -- Other - I will add my own diagnosis  -- Disagree - Not applicable / Not valid  -- Disagree - Clinically unable to determine / Unknown  -- Refer to Clinical Documentation Reviewer    PROVIDER RESPONSE TEXT:    Sepsis due to pneumonia was confirmed after study. Query created by:  Sumanth Rangel on 2022 7:57 AM      Electronically signed by:  Norma Camejo MD 2022 10:38 AM

## 2022-09-12 LAB
FINAL REPORT: NORMAL
PRELIMINARY: NORMAL

## 2022-09-13 ENCOUNTER — NURSE TRIAGE (OUTPATIENT)
Dept: OTHER | Facility: CLINIC | Age: 62
End: 2022-09-13

## 2022-09-13 ENCOUNTER — HOSPITAL ENCOUNTER (OUTPATIENT)
Dept: GENERAL RADIOLOGY | Age: 62
Discharge: HOME | End: 2022-09-13
Payer: MEDICAID

## 2022-09-13 ENCOUNTER — CARE COORDINATION (OUTPATIENT)
Dept: CASE MANAGEMENT | Age: 62
End: 2022-09-13

## 2022-09-13 ENCOUNTER — HOSPITAL ENCOUNTER (OUTPATIENT)
Age: 62
Discharge: HOME | End: 2022-09-13
Payer: MEDICAID

## 2022-09-13 ENCOUNTER — OFFICE VISIT (OUTPATIENT)
Dept: PRIMARY CARE CLINIC | Age: 62
End: 2022-09-13
Payer: MEDICAID

## 2022-09-13 VITALS
OXYGEN SATURATION: 96 % | WEIGHT: 142 LBS | HEART RATE: 69 BPM | TEMPERATURE: 97.2 F | DIASTOLIC BLOOD PRESSURE: 65 MMHG | BODY MASS INDEX: 19.8 KG/M2 | SYSTOLIC BLOOD PRESSURE: 99 MMHG

## 2022-09-13 DIAGNOSIS — Z09 HOSPITAL DISCHARGE FOLLOW-UP: Primary | ICD-10-CM

## 2022-09-13 DIAGNOSIS — J43.2 CENTRILOBULAR EMPHYSEMA (HCC): ICD-10-CM
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
## 2022-09-13 DIAGNOSIS — F17.210 CIGARETTE NICOTINE DEPENDENCE WITHOUT COMPLICATION: ICD-10-CM

## 2022-09-13 DIAGNOSIS — J96.01 ACUTE HYPOXEMIC RESPIRATORY FAILURE (HCC): ICD-10-CM

## 2022-09-13 DIAGNOSIS — J18.9 HOSPITAL-ACQUIRED PNEUMONIA: ICD-10-CM

## 2022-09-13 DIAGNOSIS — J84.9 INTERSTITIAL LUNG DISEASE (HCC): ICD-10-CM

## 2022-09-13 PROBLEM — K85.90 ACUTE PANCREATITIS WITHOUT INFECTION OR NECROSIS: Status: RESOLVED | Noted: 2022-08-09 | Resolved: 2022-09-13

## 2022-09-13 PROBLEM — D61.818 PANCYTOPENIA (HCC): Status: RESOLVED | Noted: 2021-07-09 | Resolved: 2022-09-13

## 2022-09-13 PROCEDURE — 99215 OFFICE O/P EST HI 40 MIN: CPT | Performed by: STUDENT IN AN ORGANIZED HEALTH CARE EDUCATION/TRAINING PROGRAM

## 2022-09-13 PROCEDURE — 71046 X-RAY EXAM CHEST 2 VIEWS: CPT

## 2022-09-13 PROCEDURE — 1111F DSCHRG MED/CURRENT MED MERGE: CPT | Performed by: STUDENT IN AN ORGANIZED HEALTH CARE EDUCATION/TRAINING PROGRAM

## 2022-09-13 PROCEDURE — 96372 THER/PROPH/DIAG INJ SC/IM: CPT | Performed by: STUDENT IN AN ORGANIZED HEALTH CARE EDUCATION/TRAINING PROGRAM

## 2022-09-13 RX ORDER — PREDNISONE 20 MG/1
40 TABLET ORAL DAILY
Qty: 10 TABLET | Refills: 0 | Status: SHIPPED | OUTPATIENT
Start: 2022-09-13 | End: 2022-09-18

## 2022-09-13 RX ORDER — HYDROXYZINE HYDROCHLORIDE 25 MG/1
TABLET, FILM COATED ORAL
COMMUNITY
Start: 2022-09-10

## 2022-09-13 RX ORDER — METHYLPREDNISOLONE ACETATE 40 MG/ML
40 INJECTION, SUSPENSION INTRA-ARTICULAR; INTRALESIONAL; INTRAMUSCULAR; SOFT TISSUE ONCE
Status: COMPLETED | OUTPATIENT
Start: 2022-09-13 | End: 2022-09-13

## 2022-09-13 RX ORDER — CHLORPROMAZINE HYDROCHLORIDE 25 MG/1
TABLET, FILM COATED ORAL
COMMUNITY
Start: 2022-09-10

## 2022-09-13 RX ADMIN — METHYLPREDNISOLONE ACETATE 40 MG: 40 INJECTION, SUSPENSION INTRA-ARTICULAR; INTRALESIONAL; INTRAMUSCULAR; SOFT TISSUE at 14:58

## 2022-09-13 NOTE — CARE COORDINATION
Richard 45 Transitions Follow Up Call    2022    Patient: Yoselyn Farrar  Patient : 1960   MRN: [id number]   Reason for Admission: COVID -19 Monitoring, Hypoxia   Discharge Date: 22 RARS: Readmission Risk Score: 18.6         Spoke with: Yoselyn Farrar     CTN spoke with patient this am for follow up CTN call. Patient states he is doing about the same, still with lot's of SOBE, not able to walk far, or complete ADL's, before needing to take a rest in between. Patient states he will not be able to make PCP appointment today due to not feeling like he will be able to ambulate to and from care. Patient given Pulmonologist phone number again, to schedule follow up with his office as well, informed patient that he can get PCP office to allow him to borrow a walker, once he get's there. CTN will forward message to PCP office as well, regarding ongoing symptoms and patients request for call back. ADDENDUM:  Patient returned CTN call. Instructed to contact insurer, by calling 800 number on back of insurance card to see if he would be able to get transportation to MD appointments, since he now is on a heavy amount of Home O2. Patient states he will and plans to try and make PCP appointment today, has arranged with office for wheelchair to be brought to main entrance for him, and spoken with PCP already, regarding message sent by this CTN. Care Transitions Follow Up Call    Needs to be reviewed by the provider   Additional needs identified to be addressed with provider: Yes  Patient stating he is still having lot's of SOB/SOBE, unable to walk short distances, or perform any ADL's, without having to take several breaks. Doesn't feel he would be able to walk to car and then from car to building and up elevator without getting exerted. Patient wanting a call from your office to advise. Patient has not scheduled follow up with Pulmonologist either.                Method of communication with provider : none      Care Transition Nurse contacted the patient by telephone to follow up after admission on 2022. Verified name and  with patient as identifiers. Addressed changes since last contact: none  Discussed follow-up appointments. If no appointment was previously scheduled, appointment scheduling offered: Yes. Is follow up appointment scheduled within 7 days of discharge? No.    Advance Care Planning:   Does patient have an Advance Directive: not on file. CTN reviewed discharge instructions, medical action plan and red flags with patient and discussed any barriers to care and/or understanding of plan of care after discharge. Discussed appropriate site of care based on symptoms and resources available to patient including: PCP  Specialist  Urgent care clinics  [address]   When to call 911. The patient agrees to contact the PCP office for questions related to their healthcare. Patients top risk factors for readmission: medical condition-Hypoxia  medication management  multiple health system providers  polypharmacy  Interventions to address risk factors: Education of patient/family/caregiver/guardian to support self-management-Patient instructed to continue to monitor for any worsening SOB/SOBE, also instructed to rest and try not to over exert. Make sure to schedule follow up with PCP and Pulmonologist.  May be able to get transportation from insurer. CTN provided contact information for future needs. Plan for follow-up call in 3-5 days based on severity of symptoms and risk factors. Plan for next call: symptom management-Any worsening SOB/SOBE.     Care Transitions Subsequent and Final Call    Schedule Follow Up Appointment with PCP: Declined  Subsequent and Final Calls  Do you have any ongoing symptoms?: Yes  Onset of Patient-reported symptoms: Other  Patient-reported symptoms: Shortness of Breath  Interventions for patient-reported symptoms: Notified PCP/Physician  Have your medications changed?: No  Do you have any questions related to your medications?: No  Do you currently have any active services?: No  Do you have any needs or concerns that I can assist you with?: No  Identified Barriers: None  Care Transitions Interventions  No Identified Needs  Other Interventions:            Follow Up  Future Appointments   Date Time Provider Janelle Aguilar   9/13/2022  1:30 PM DO AIMEE [place] - [place]   11/17/2022  2:30 PM ZAHIRA Awad - CNP [facility]     Thank Krissy Golden RN  Care Transition Coordinator  Contact [facility][phone]

## 2022-09-13 NOTE — TELEPHONE ENCOUNTER
Received call from Carmen at [facility] with Red Flag Complaint. Subjective: Caller states \"I was in the hospital 2-3 weeks ago for pancreatitis. I as in for a week. I was feeling good. Then I got chronic hiccups. I called 911. I went to the hospital again. It was every other breath. I was in there a week. They did a bronchoscopy. After that I wasn't breathing so good. I went home and still had hiccups. I went home and I'm not breathing right. When I am walking around I get take a few steps and get short of breath. I am short of breath all the time but when I move it is real bad. \"     Current Symptoms: SOB with exertion is worse than baseline. No labored breathing during call, speaking in complete sentences. Sensation lungs not inflating all the way. Onset: 1.5 weeks ago; worsening    Associated Symptoms: denies cough. States had a fall last night \"phased out\"     Pain Severity: 0/10; ;     Temperature: denies     What has been tried: rest. O2 PRN 20-30 mins-States does not help. NyQuil to assist with insomnia but treated hiccups. LMP: NA Pregnant: NA    Recommended disposition: Go to [facility]. Pt states he did have an appt today at 1:30. Initially called to cancel it but was able to find a ride and wondering if he could get that time again. Requesting wheelchair assistance. Care advice provided, patient verbalizes understanding; denies any other questions or concerns; instructed to call back for any new or worsening symptoms. Patient/Caller agrees with recommended disposition; writer provided warm transfer to Daisy Hernandez at [facility] for appointment scheduling     Attention Provider: Thank you for allowing me to participate in the care of your patient. The patient was connected to triage in response to information provided to the ECC/PSC. Please do not respond through this encounter as the response is not directed to a shared pool.       Reason for Disposition   MILD difficulty breathing (e.g., minimal/no SOB at rest, SOB with walking, pulse < 100) of new-onset or worse than normal    Protocols used: Breathing Difficulty-ADULT-[place]

## 2022-09-13 NOTE — PROGRESS NOTES
symptoms of developing respiratory distress and when to call or present to the emergency department for evaluation.  -     Fior Corrales MD, Pulmonary, [place]  -     methylPREDNISolone acetate (DEPO-MEDROL) injection 40 mg; 40 mg, IntraMUSCular, ONCE, 1 dose, On Tue 9/13/22 at 1515    Cigarette nicotine dependence without complication: Has not smoking due to his shortness of breath. Needs permanent smoking cessation and I discussed this with him today. Medical Decision Making: high complexity  Return in 1 month (on 10/13/2022). On this date 9/13/2022 I have spent 50 minutes reviewing previous notes, test results and face to face with the patient discussing the diagnosis and importance of compliance with the treatment plan as well as documenting on the day of the visit. Subjective:   HPI:  Follow up of Hospital problems/diagnosis(es): Hospital-acquired pneumonia, interstitial lung disease, acute hypoxic respiratory failure    Inpatient course: Discharge summary reviewed- see chart. Interval history/Current status: Patient was admitted last month to the hospital and diagnosed with pancreatitis. Began having some shortness of breath and abdominal plain last week. Was concerned he may be having a recurrence of his pancreatitis. Present to the emergency department where work-up for pancreatitis was negative; however, he did have an x-ray which demonstrated findings suggestive for atypical pneumonia. Given his recent hospitalization, the scarring was he could have hospital-acquired pneumonia. He was started on the appropriate antibiotics, but his respiratory status worsened. He required 5 L of oxygen. During his previous admission he had been worked up and found to possibly have interstitial lung disease. He had had a bronchoscopy and biopsy, which he has not seen the results of. Today, he continues to complain of shortness of breath chest tightness and exertional intolerance. He was supposed to follow-up with pulmonology, but has not done so. He has not had fevers, chills, nausea, vomiting, diarrhea or sputum production. Patient Active Problem List   Diagnosis    Orthostatic syncope    Chronic hepatitis C without hepatic coma (HCC)    Vitiligo    Type 2 diabetes mellitus without complication, without long-term current use of insulin (HCC)    Diabetic polyneuropathy associated with type 2 diabetes mellitus ([facility]    Cigarette nicotine dependence without complication    Hospital-acquired pneumonia    Acute hypoxemic respiratory failure ([facility]    Interstitial lung disease ([facility]       Medications listed as ordered at the time of discharge from hospital     Medication List            Accurate as of September 13, 2022  4:15 PM. If you have any questions, ask your nurse or doctor. START taking these medications      predniSONE 20 MG tablet  Commonly known as: DELTASONE  Take 2 tablets by mouth daily for 5 days  Started by: Leann Smiley, DO            CHANGE how you take these medications      metFORMIN 500 MG extended release tablet  Commonly known as: GLUCOPHAGE-XR  Take 2 tablets by mouth daily (with breakfast)  What changed: how much to take            CONTINUE taking these medications      chlorproMAZINE 25 MG tablet  Commonly known as: THORAZINE     hydrOXYzine HCl 25 MG tablet  Commonly known as: ATARAX     L-methylfolate-B6-B12 3-90.314-2-35 MG Caps capsule  Take 1 capsule by mouth daily     magnesium oxide 400 (240 Mg) MG tablet  Commonly known as: MAG-OX  Take 1 tablet by mouth daily for 2 doses     metoclopramide 10 MG tablet  Commonly known as: REGLAN  Take 1 tablet by mouth in the morning, at noon, and at bedtime for 10 doses     midodrine 5 MG tablet  Commonly known as: PROAMATINE  Take 1 tablet by mouth 2 times daily     pregabalin 50 MG capsule  Commonly known as: Lyrica  Take 1 capsule by mouth in the morning and 1 capsule at noon and 1 capsule before bedtime.  Do all this for 30 days. Where to Get Your Medications        These medications were sent to [facility]/pharmacy #[id number]- [place], [address] [phone] - F [phone]  [address] [place] [address]      Phone: [phone]   predniSONE 20 MG tablet           Medications marked \"taking\" at this time  Outpatient Medications Marked as Taking for the 9/13/22 encounter (Office Visit) with Mane Valderrama, DO   Medication Sig Dispense Refill    hydrOXYzine HCl (ATARAX) 25 MG tablet       chlorproMAZINE (THORAZINE) 25 MG tablet       predniSONE (DELTASONE) 20 MG tablet Take 2 tablets by mouth daily for 5 days 10 tablet 0    metFORMIN (GLUCOPHAGE-XR) 500 MG extended release tablet Take 2 tablets by mouth daily (with breakfast) (Patient taking differently: Take 500 mg by mouth daily (with breakfast)) 30 tablet 3    midodrine (PROAMATINE) 5 MG tablet Take 1 tablet by mouth 2 times daily 60 tablet 3        Medications patient taking as of now reconciled against medications ordered at time of hospital discharge: Yes    A comprehensive review of systems was negative except for what was noted in the HPI. Objective:    BP 99/65   Pulse 69   Temp 97.2 °F (36.2 °C) (Temporal)   Wt 142 lb (64.4 kg)   SpO2 96%   BMI 19.80 kg/m²   Physical Exam  Constitutional:       General: He is not in acute distress. Appearance: Normal appearance. He is normal weight. He is not ill-appearing, toxic-appearing or diaphoretic. Comments: Does appear tired   HENT:      Head: Normocephalic and atraumatic. Nose: Nose normal.      Mouth/Throat:      Mouth: Mucous membranes are moist.      Pharynx: Oropharynx is clear. Cardiovascular:      Rate and Rhythm: Normal rate and regular rhythm. Pulses: Normal pulses. Heart sounds: Normal heart sounds. Pulmonary:      Effort: Pulmonary effort is normal.      Breath sounds: No wheezing.       Comments: Coarse breath sounds throughout and scattered crackles  Abdominal:      General: Abdomen is flat. Bowel sounds are normal.      Palpations: Abdomen is soft. Lymphadenopathy:      Cervical: No cervical adenopathy. Skin:     General: Skin is warm and dry. Capillary Refill: Capillary refill takes less than 2 seconds. Findings: No rash. Neurological:      Mental Status: He is alert. Mental status is at baseline. Psychiatric:         Mood and Affect: Mood normal.         Behavior: Behavior normal.         Thought Content: Thought content normal.         Judgment: Judgment normal.       An electronic signature was used to authenticate this note.   --Jasvir Loosen, DO

## 2022-09-13 NOTE — CARE COORDINATION
Date/Time:  9/13/2022 11:31 AM  Attempted to reach patient by telephone. Call within 2 business days of discharge: Yes  Left HIPPA compliant message requesting a return call. Will attempt to reach patient again.     Thank Armani Morrison RN  Care Transition Coordinator  Contact FGYIUF:[phone]

## 2022-09-14 ENCOUNTER — TELEPHONE (OUTPATIENT)
Dept: PULMONOLOGY | Age: 62
End: 2022-09-14

## 2022-09-14 NOTE — TELEPHONE ENCOUNTER
Patient was called after he missed an appt which had been made at the request of his PCP for urgent medical care. He states that he had not been able to arrange transportation in such a short time. He states that he is not doing very well. He asked if he can be seen in a couple of days as he needs two days, ideally, to schedule transportation. He states that he had not understood if Dr. Nadeen Haskins was to contact him with bronch results or if he was supposed to call office. Can he be called or does he need office visit? He can be reached at [phone].

## 2022-09-15 ENCOUNTER — CARE COORDINATION (OUTPATIENT)
Dept: CASE MANAGEMENT | Age: 62
End: 2022-09-15

## 2022-09-15 NOTE — TELEPHONE ENCOUNTER
Patient does need to follow up with me. My next available would be good.   The pathology from his bronchoscopy was non-specific so I will be starting him on steroids, but will need to see that he follows up first.

## 2022-09-15 NOTE — CARE COORDINATION
[facility] Transitions Follow Up Call    9/15/2022    Patient: Zay Mena  Patient : 1960   MRN: [id number]   Reason for Admission: COVID -19 Monitoring, Hypoxia  Discharge Date: 22 RARS: Readmission Risk Score: 18.6         Spoke with: Zay Mena     CTN spoke with patient this afternoon for follow up CTN call. Patient states he does feel a little better, states he is taking Prednisone as ordered, per PCP orders. Patient states SOB with exertion is a little better, is able to do a little more, getting around a little better. Patient also reports he is able to recover himself fairly quickly, once he sits down and rest.  Patient states he is wearing Home O2. Care Transitions Follow Up Call    Needs to be reviewed by the provider   Additional needs identified to be addressed with provider: No  none             Method of communication with provider : none      Care Transition Nurse contacted the patient by telephone to follow up after admission on 2022. Verified name and  with patient as identifiers. Addressed changes since last contact: none  Discussed follow-up appointments. If no appointment was previously scheduled, appointment scheduling offered: Yes. Is follow up appointment scheduled within 7 days of discharge? No.    Advance Care Planning:   Does patient have an Advance Directive: not on file. CTN reviewed discharge instructions, medical action plan and red flags with patient and discussed any barriers to care and/or understanding of plan of care after discharge. Discussed appropriate site of care based on symptoms and resources available to patient including: PCP  Specialist  Urgent care clinics  [address]   When to call 911. The patient agrees to contact the PCP office for questions related to their healthcare.      Patients top risk factors for readmission: medication management  multiple health system providers  polypharmacy  Interventions to address

## 2022-09-16 NOTE — TELEPHONE ENCOUNTER
Spoke with patient. Appt made for 10/27/2022 at 3 PM, which is first available. He will be seeing his PCP next week.

## 2022-09-19 PROBLEM — J96.01 ACUTE HYPOXEMIC RESPIRATORY FAILURE (HCC): Status: RESOLVED | Noted: 2022-08-29 | Resolved: 2022-09-19

## 2022-09-20 ENCOUNTER — TELEPHONE (OUTPATIENT)
Dept: PRIMARY CARE CLINIC | Age: 62
End: 2022-09-20

## 2022-09-20 ENCOUNTER — CARE COORDINATION (OUTPATIENT)
Dept: CASE MANAGEMENT | Age: 62
End: 2022-09-20

## 2022-09-20 NOTE — CARE COORDINATION
[facility] Transitions Follow Up Call    2022    Patient: Basia Gay  Patient : 1960   MRN: [id number]   Reason for Admission: SOB, Hypoxia, COVID -19 Monitoring   Discharge Date: 22 RARS: Readmission Risk Score: 18.6         Spoke with: Basia Gay     CTN spoke with patient this am for follow up CTN call. Patient states he is doing a bit better, states SOB/SOBE is still present, but feels it has improved. No reports of any fever, chills, nausea, vomiting, chest pain, SOB or cough. Patient has follow up with PCP scheduled for today, Pulmonologist isn't scheduled until 10/27/2022. Care Transitions Follow Up Call    Needs to be reviewed by the provider   Additional needs identified to be addressed with provider: Yes  Patient wanting to be placed on cancel list, if any other appointments open up sooner, would like sooner appointment. Method of communication with provider : chart routing      Care Transition Nurse contacted the patient by telephone to follow up after admission on 2022. Verified name and  with patient as identifiers. Addressed changes since last contact: none  Discussed follow-up appointments. If no appointment was previously scheduled, appointment scheduling offered: Yes. Is follow up appointment scheduled within 7 days of discharge? No.    Advance Care Planning:   Does patient have an Advance Directive: not on file. CTN reviewed discharge instructions, medical action plan and red flags with patient and discussed any barriers to care and/or understanding of plan of care after discharge. Discussed appropriate site of care based on symptoms and resources available to patient including: PCP  Specialist  Urgent care clinics  [facility]   When to call 911. The patient agrees to contact the PCP office for questions related to their healthcare.      Patients top risk factors for readmission: medical condition-SOB  medication management  multiple health system providers  polypharmacy  Interventions to address risk factors: Education of patient/family/caregiver/guardian to support self-management-Patient instructed to continue to monitor for any of the above noted s/s, as well as any worsening SOB/SOBE. CTN provided contact information for future needs. Plan for follow-up call in 5-7 days based on severity of symptoms and risk factors. Plan for next call: symptom management-Any worsening SOB/SOBE. Care Transitions Subsequent and Final Call    Schedule Follow Up Appointment with PCP: Declined  Subsequent and Final Calls  Do you have any ongoing symptoms?: Yes  Patient-reported symptoms: Shortness of Breath  Have your medications changed?: No  Do you have any questions related to your medications?: No  Do you currently have any active services?: No  Do you have any needs or concerns that I can assist you with?: No  Identified Barriers: None  Care Transitions Interventions  No Identified Needs  Other Interventions:            Follow Up  Future Appointments   Date Time Provider Janelle Aguilar   9/20/2022  1:30 PM DO Gianfranco [place] [address]   10/13/2022  1:00 PM DO AIM[facility] [place] [facility] [place] [facility]   10/27/2022  3:00 PM Griselda Jacobs MD [facility]   11/17/2022  2:30 PM ZAHIRA Awad - CNP [facility]     Thank Krissy Golden RN  Care Transition Coordinator  Contact YHXFGL:[phone]

## 2022-09-26 ENCOUNTER — OFFICE VISIT (OUTPATIENT)
Dept: PRIMARY CARE CLINIC | Age: 62
End: 2022-09-26
Payer: MEDICAID

## 2022-09-26 VITALS
HEIGHT: 70 IN | DIASTOLIC BLOOD PRESSURE: 86 MMHG | WEIGHT: 146.6 LBS | BODY MASS INDEX: 20.99 KG/M2 | SYSTOLIC BLOOD PRESSURE: 129 MMHG | HEART RATE: 107 BPM | TEMPERATURE: 98.7 F

## 2022-09-26 DIAGNOSIS — J43.2 CENTRILOBULAR EMPHYSEMA (HCC): Primary | ICD-10-CM

## 2022-09-26 DIAGNOSIS — Z23 NEED FOR PROPHYLACTIC VACCINATION AGAINST STREPTOCOCCUS PNEUMONIAE (PNEUMOCOCCUS): ICD-10-CM

## 2022-09-26 DIAGNOSIS — J84.9 INTERSTITIAL LUNG DISEASE (HCC): ICD-10-CM

## 2022-09-26 DIAGNOSIS — F17.210 CIGARETTE NICOTINE DEPENDENCE WITHOUT COMPLICATION: ICD-10-CM

## 2022-09-26 PROBLEM — J18.9 HOSPITAL-ACQUIRED PNEUMONIA: Status: RESOLVED | Noted: 2022-08-20 | Resolved: 2022-09-26
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
## 2022-09-26 PROCEDURE — G8420 CALC BMI NORM PARAMETERS: HCPCS | Performed by: STUDENT IN AN ORGANIZED HEALTH CARE EDUCATION/TRAINING PROGRAM

## 2022-09-26 PROCEDURE — 3023F SPIROM DOC REV: CPT | Performed by: STUDENT IN AN ORGANIZED HEALTH CARE EDUCATION/TRAINING PROGRAM

## 2022-09-26 PROCEDURE — 1111F DSCHRG MED/CURRENT MED MERGE: CPT | Performed by: STUDENT IN AN ORGANIZED HEALTH CARE EDUCATION/TRAINING PROGRAM

## 2022-09-26 PROCEDURE — 3017F COLORECTAL CA SCREEN DOC REV: CPT | Performed by: STUDENT IN AN ORGANIZED HEALTH CARE EDUCATION/TRAINING PROGRAM

## 2022-09-26 PROCEDURE — 99214 OFFICE O/P EST MOD 30 MIN: CPT | Performed by: STUDENT IN AN ORGANIZED HEALTH CARE EDUCATION/TRAINING PROGRAM

## 2022-09-26 PROCEDURE — 4004F PT TOBACCO SCREEN RCVD TLK: CPT | Performed by: STUDENT IN AN ORGANIZED HEALTH CARE EDUCATION/TRAINING PROGRAM

## 2022-09-26 PROCEDURE — G8427 DOCREV CUR MEDS BY ELIG CLIN: HCPCS | Performed by: STUDENT IN AN ORGANIZED HEALTH CARE EDUCATION/TRAINING PROGRAM

## 2022-09-26 RX ORDER — PREDNISONE 20 MG/1
40 TABLET ORAL DAILY
Qty: 10 TABLET | Refills: 0 | Status: SHIPPED | OUTPATIENT
Start: 2022-09-26 | End: 2022-10-01

## 2022-09-26 ASSESSMENT — ENCOUNTER SYMPTOMS
CHEST TIGHTNESS: 0
ABDOMINAL DISTENTION: 0
DIARRHEA: 0
ABDOMINAL PAIN: 0
SHORTNESS OF BREATH: 0
NAUSEA: 0

## 2022-09-26 NOTE — PATIENT INSTRUCTIONS
Patient Education        Chronic Obstructive Pulmonary Disease (COPD): Care Instructions  Overview     Chronic obstructive pulmonary disease (COPD) is a lung disease that makes it hard to breathe. With COPD, the airways that lead to the lungs are narrowed, and the tiny air sacs in the lungs are damaged and lose their stretch. People with COPD have decreased airflow in and out of the lungs, which makes it hard to breathe. The airways also can get clogged with thick mucus. Cigarette smoking is a major cause of COPD. Although there is no cure for COPD, you can slow its progress. Following your treatment plan and taking care of yourself can help you feel better and live longer. Follow-up care is a key part of your treatment and safety. Be sure to make and go to all appointments, and call your doctor if you are having problems. It's also a good idea to know your test results and keep a list of the medicines you take. How can you care for yourself at home? Staying healthy    Do not smoke. This is the most important step you can take to prevent more damage to your lungs. If you need help quitting, talk to your doctor about stop-smoking programs and medicines. These can increase your chances of quitting for good. Avoid colds and other infections. Get the pneumococcal and whooping cough (pertussis) vaccines. If you have had these vaccines before, ask your doctor if you need another dose. Get the flu vaccine every fall. If you must be around people with colds or the flu, wash your hands often. Avoid secondhand smoke and air pollution. Try to stay inside with your windows closed when air pollution is bad. Medicines and oxygen therapy    Take your medicines exactly as prescribed. Call your doctor if you think you are having a problem with your medicine. You may be taking medicines such as:  Bronchodilators. These help open your airways and make breathing easier.  They are either short-acting (work for 4 to 9 hours) or long-acting (work for 12 to 24 hours). You inhale most bronchodilators, so they start to act quickly. Always carry your quick-relief inhaler with you in case you need it. Corticosteroids (prednisone, budesonide). These reduce airway inflammation. They come in inhaled or pill form. Ask your doctor or pharmacist if you are using each type of inhaler correctly. With correct use, the medicine is more likely to get to your lungs. See if a spacer is right for you. A spacer may also help you get more inhaled medicine to your lungs. If you use one, ask how to use it properly. Do not take any vitamins, over-the-counter medicine, or herbal products without talking to your doctor first.     If your doctor prescribed antibiotics, take them as directed. Do not stop taking them just because you feel better. You need to take the full course of antibiotics. If you use oxygen therapy, use the flow rate your doctor has recommended. Don't change it without talking to your doctor first. Oxygen therapy boosts the amount of oxygen in your blood and helps you breathe easier. Activity    Get regular exercise. Walking is an easy way to get exercise. Start out slowly, and walk a little more each day. Pay attention to your breathing. You are exercising too hard if you can't talk while you exercise. Take short rest breaks when doing household chores and other activities. Learn breathing methods--such as breathing through pursed lips--to help you become less short of breath. If your doctor has not set you up with a pulmonary rehabilitation program, ask if rehab is right for you. Rehab includes exercise programs, education about your disease and how to manage it, help with diet and other changes, and emotional support. Diet    Eat regular, healthy meals. Use bronchodilators about 1 hour before you eat to make it easier to eat. Eat several smaller, frequent meals to prevent getting too full.  A full stomach can push on the muscle that helps you breathe (your diaphragm) and make it harder to breathe. Drink beverages at the end of the meal.     Avoid foods that are hard to chew. Eat foods that contain protein so you don't lose muscle mass. Talk with your doctor if you gain too much weight or if you lose weight without trying. Mental health    Talk to your family, friends, or a therapist about your feelings. Some people feel frightened, angry, hopeless, helpless, and even guilty. Talking openly about feelings may help you cope. If these feelings last, talk to your doctor. When should you call for help? Call 911 anytime you think you may need emergency care. For example, call if:    You have severe trouble breathing. You have severe chest pain. Call your doctor now or seek immediate medical care if:    You have new or worse trouble breathing. You have new or worse chest pain. You cough up blood. You have a fever. Watch closely for changes in your health, and be sure to contact your doctor if:    You cough more deeply or more often, especially if you notice more mucus or a change in the color of your mucus. You have new or worse swelling in your legs or belly. You have feelings of anxiety or depression. You need to use your antibiotic or steroid pills. You are not getting better as expected. Where can you learn more? Go to https://Dok[employer]okcampos.[employer]. org and sign in to your [employer] account. Enter S946 in the ApptheGame box to learn more about \"Chronic Obstructive Pulmonary Disease (COPD): Care Instructions. \"     If you do not have an account, please click on the \"Sign Up Now\" link. Current as of: March 9, 2022               Content Version: 13.4  © 4319-3099 [employer]. Care instructions adapted under license by [employer].  If you have questions about a medical condition or this instruction, always ask your healthcare professional. [address] any warranty or liability for your use of this information.

## 2022-09-26 NOTE — PROGRESS NOTES
2022     Trent Reagan (:  1960) is a 57 y.o. male, here for evaluation of the following medical concerns:    HPI  Shortness of breath  Nicky Kearns presents today for evaluation of cough and shortness of breath. He was seen about 2 weeks ago for hospital discharge follow-up. At that time he appeared quite ill, he was coughing and short of breath. He had recently been discharged from the hospital with a new diagnosis of pulmonary fibrosis. At discharge he had actually been breathing okay, but it worsened a few days since. When he was seen here in clinic it was felt there was a component of COPD as well as the fibrosis. He does have a significant smoking history. He was given a Medrol shot and started on 5 days of prednisone. He noted significant improvement in his symptoms about 3 days after. He has been back to baseline since. Today he denies fevers, chills, nausea, vomiting or diarrhea. He breathes quite comfortably at rest, but with exertion he does appreciate shortness of breath. Review of Systems   Constitutional:  Positive for fatigue. Negative for activity change, appetite change and unexpected weight change. Eyes:  Negative for visual disturbance. Respiratory:  Negative for chest tightness and shortness of breath. Gastrointestinal:  Negative for abdominal distention, abdominal pain, diarrhea and nausea. Endocrine: Negative for polydipsia, polyphagia and polyuria. Genitourinary:  Negative for decreased urine volume, dysuria and frequency. Musculoskeletal:  Negative for gait problem and myalgias. Skin:  Negative for rash and wound. Neurological:  Positive for numbness. Negative for dizziness, weakness and light-headedness. Hematological:  Does not bruise/bleed easily. Prior to Visit Medications    Medication Sig Taking?  Authorizing Provider   umeclidinium-vilanterol (ANORO ELLIPTA) 62.5-25 MCG/INH AEPB inhaler Inhale 1 puff into the lungs daily Yes Shiloh Escamilla DO   predniSONE (DELTASONE) 20 MG tablet Take 2 tablets by mouth daily for 5 days Yes Jasvir Harrison, DO   hydrOXYzine HCl (ATARAX) 25 MG tablet  Yes Historical Provider, MD   chlorproMAZINE (THORAZINE) 25 MG tablet  Yes Historical Provider, MD   metFORMIN (GLUCOPHAGE-XR) 500 MG extended release tablet Take 2 tablets by mouth daily (with breakfast)  Patient taking differently: Take 500 mg by mouth daily (with breakfast) Yes Shonda Killian MD   midodrine (PROAMATINE) 5 MG tablet Take 1 tablet by mouth 2 times daily Yes Shonda Killian MD   metoclopramide (REGLAN) 10 MG tablet Take 1 tablet by mouth in the morning, at noon, and at bedtime for 10 doses  Vasquez Watt MD   magnesium oxide (MAG-OX) 400 (240 Mg) MG tablet Take 1 tablet by mouth daily for 2 doses  Levraquel Shade,    thiamine mononitrate (THIAMINE) 100 MG tablet Take 1 tablet by mouth in the morning. Sobeida Bustillo MD   pregabalin (LYRICA) 50 MG capsule Take 1 capsule by mouth in the morning and 1 capsule at noon and 1 capsule before bedtime. Do all this for 30 days. Jasvir Harrison, DO   L-methylfolate-B6-B12 [facility]) 2-51.190-6-23 MG CAPS capsule Take 1 capsule by mouth daily  Patient not taking: No sig reported  Jenni Christensen MD        Social History     Tobacco Use    Smoking status: Every Day     Packs/day: 0.00     Years: 40.00     Pack years: 0.00     Types: Cigars, Cigarettes    Smokeless tobacco: Never    Tobacco comments:     2 cigars daily    Substance Use Topics    Alcohol use: Yes     Comment: 48 oz beer/day        Vitals:    09/26/22 1355   BP: 129/86   Pulse: (!) 107   Temp: 98.7 °F (37.1 °C)   TempSrc: Temporal   Weight: 146 lb 9.6 oz (66.5 kg)   Height: 5' 10\" (1.778 m)     Estimated body mass index is 21.03 kg/m² as calculated from the following:    Height as of this encounter: 5' 10\" (1.778 m). Weight as of this encounter: 146 lb 9.6 oz (66.5 kg).     Physical Exam  Constitutional:       Appearance: Normal appearance. He is normal weight. Comments: Does appear tired   HENT:      Head: Normocephalic and atraumatic. Nose: Nose normal.      Mouth/Throat:      Mouth: Mucous membranes are moist.      Pharynx: Oropharynx is clear. Cardiovascular:      Rate and Rhythm: Normal rate and regular rhythm. Pulses: Normal pulses. Heart sounds: Normal heart sounds. Pulmonary:      Effort: Pulmonary effort is normal.      Comments: Diminished breath sounds throughout  Abdominal:      General: Abdomen is flat. Bowel sounds are normal.      Palpations: Abdomen is soft. Musculoskeletal:      Cervical back: Normal range of motion. Lymphadenopathy:      Cervical: No cervical adenopathy. Skin:     General: Skin is warm and dry. Capillary Refill: Capillary refill takes less than 2 seconds. Findings: No rash. Neurological:      Mental Status: He is alert. Mental status is at baseline. Psychiatric:         Mood and Affect: Mood normal.         Behavior: Behavior normal.         Thought Content: Thought content normal.         Judgment: Judgment normal.       ASSESSMENT/PLAN:  1. Centrilobular emphysema ([address]): Definitely feel there is a strong component of COPD; especially since he noted significant improvement in his respiratory status with the steroids. Starting Anoro as below. Has an appoint with pulmonology in October. We will follow-up in 2 weeks. Would prefer to get patient on a triple inhaler. - umeclidinium-vilanterol (ANORO ELLIPTA) 62.5-25 MCG/INH AEPB inhaler; Inhale 1 puff into the lungs daily  Dispense: 60 each; Refill: 2    2. Interstitial lung disease ([address]): Unsure if this could be related to his autoimmune disease vitiligo. Emphasized importance of keeping an appointment with pulmonology in late October.     3. Cigarette nicotine dependence without complication: Had been unable to smoke due to his respiratory distress his previous visit, but now that his breathing is better he has since resumed. I discussed with the patient how this is extremely detrimental to his lung health especially in the setting of COPD and comorbid interstitial lung disease. He is not interested in assistance with quitting today. 4. Need for prophylactic vaccination against Streptococcus pneumoniae (pneumococcus): Explained to the patient that pneumonia could prove fatal if he contracted it given his lung disease, but he still declines a pneumonia vaccine today. Return in about 2 weeks (around 10/10/2022) for COPD. An electronic signature was used to authenticate this note.     --Timothy Seymour, DO on 9/26/2022 at 2:19 PM

## 2022-09-28 ENCOUNTER — CARE COORDINATION (OUTPATIENT)
Dept: CASE MANAGEMENT | Age: 62
End: 2022-09-28

## 2022-09-28 NOTE — CARE COORDINATION
Date/Time:  9/28/2022 10:00 AM  Attempted to reach patient by telephone. Call within 2 business days of discharge: Yes Left HIPPA compliant message requesting a return call. CTN will close out CTN/COVID -19 Monitoring episode at this time.     Thank Stefany Foreman RN  Care Transition Coordinator  Contact EAIJKB:[phone]

## 2022-10-03 LAB
FUNGUS (MYCOLOGY) CULTURE: NORMAL
FUNGUS STAIN: NORMAL

## 2022-10-12 NOTE — PATIENT INSTRUCTIONS
Patient Education        Chronic Obstructive Pulmonary Disease (COPD): Care Instructions  Overview     Chronic obstructive pulmonary disease (COPD) is a lung disease that makes it hard to breathe. With COPD, the airways that lead to the lungs are narrowed, and the tiny air sacs in the lungs are damaged and lose their stretch. People with COPD have decreased airflow in and out of the lungs, which makes it hard to breathe. The airways also can get clogged with thick mucus. Cigarette smoking is a major cause of COPD. Although there is no cure for COPD, you can slow its progress. Following your treatment plan and taking care of yourself can help you feel better and live longer. Follow-up care is a key part of your treatment and safety. Be sure to make and go to all appointments, and call your doctor if you are having problems. It's also a good idea to know your test results and keep a list of the medicines you take. How can you care for yourself at home? Staying healthy    Do not smoke. This is the most important step you can take to prevent more damage to your lungs. If you need help quitting, talk to your doctor about stop-smoking programs and medicines. These can increase your chances of quitting for good. Avoid colds and other infections. Get the pneumococcal and whooping cough (pertussis) vaccines. If you have had these vaccines before, ask your doctor if you need another dose. Get the flu vaccine every fall. If you must be around people with colds or the flu, wash your hands often. Avoid secondhand smoke and air pollution. Try to stay inside with your windows closed when air pollution is bad. Medicines and oxygen therapy    Take your medicines exactly as prescribed. Call your doctor if you think you are having a problem with your medicine. You may be taking medicines such as:  Bronchodilators. These help open your airways and make breathing easier.  They are either short-acting (work for 4 to 9 hours) or long-acting (work for 12 to 24 hours). You inhale most bronchodilators, so they start to act quickly. Always carry your quick-relief inhaler with you in case you need it. Corticosteroids (prednisone, budesonide). These reduce airway inflammation. They come in inhaled or pill form. Ask your doctor or pharmacist if you are using each type of inhaler correctly. With correct use, the medicine is more likely to get to your lungs. See if a spacer is right for you. A spacer may also help you get more inhaled medicine to your lungs. If you use one, ask how to use it properly. Do not take any vitamins, over-the-counter medicine, or herbal products without talking to your doctor first.     If your doctor prescribed antibiotics, take them as directed. Do not stop taking them just because you feel better. You need to take the full course of antibiotics. If you use oxygen therapy, use the flow rate your doctor has recommended. Don't change it without talking to your doctor first. Oxygen therapy boosts the amount of oxygen in your blood and helps you breathe easier. Activity    Get regular exercise. Walking is an easy way to get exercise. Start out slowly, and walk a little more each day. Pay attention to your breathing. You are exercising too hard if you can't talk while you exercise. Take short rest breaks when doing household chores and other activities. Learn breathing methods--such as breathing through pursed lips--to help you become less short of breath. If your doctor has not set you up with a pulmonary rehabilitation program, ask if rehab is right for you. Rehab includes exercise programs, education about your disease and how to manage it, help with diet and other changes, and emotional support. Diet    Eat regular, healthy meals. Use bronchodilators about 1 hour before you eat to make it easier to eat. Eat several smaller, frequent meals to prevent getting too full.  A full stomach can push on the muscle that helps you breathe (your diaphragm) and make it harder to breathe. Drink beverages at the end of the meal.     Avoid foods that are hard to chew. Eat foods that contain protein so you don't lose muscle mass. Talk with your doctor if you gain too much weight or if you lose weight without trying. Mental health    Talk to your family, friends, or a therapist about your feelings. Some people feel frightened, angry, hopeless, helpless, and even guilty. Talking openly about feelings may help you cope. If these feelings last, talk to your doctor. When should you call for help? Call 911 anytime you think you may need emergency care. For example, call if:    You have severe trouble breathing. You have severe chest pain. Call your doctor now or seek immediate medical care if:    You have new or worse trouble breathing. You have new or worse chest pain. You cough up blood. You have a fever. Watch closely for changes in your health, and be sure to contact your doctor if:    You cough more deeply or more often, especially if you notice more mucus or a change in the color of your mucus. You have new or worse swelling in your legs or belly. You have feelings of anxiety or depression. You need to use your antibiotic or steroid pills. You are not getting better as expected. Where can you learn more? Go to https://eThor.comcampos.[employer]. org and sign in to your Blinkbuggy account. Enter R441 in the [employer] box to learn more about \"Chronic Obstructive Pulmonary Disease (COPD): Care Instructions. \"     If you do not have an account, please click on the \"Sign Up Now\" link. Current as of: March 9, 2022               Content Version: 13.4  © 9591-2236 Healthwise, Incorporated. Care instructions adapted under license by [facility] ([facility]).  If you have questions about a medical condition or this instruction, always ask your healthcare professional. [address] any warranty or liability for your use of this information.

## 2022-10-13 ENCOUNTER — OFFICE VISIT (OUTPATIENT)
Dept: PRIMARY CARE CLINIC | Age: 62
End: 2022-10-13
Payer: MEDICAID

## 2022-10-13 VITALS
BODY MASS INDEX: 22.05 KG/M2 | TEMPERATURE: 97.8 F | SYSTOLIC BLOOD PRESSURE: 133 MMHG | DIASTOLIC BLOOD PRESSURE: 82 MMHG | WEIGHT: 154 LBS | HEART RATE: 94 BPM | HEIGHT: 70 IN

## 2022-10-13 DIAGNOSIS — J43.2 CENTRILOBULAR EMPHYSEMA (HCC): Primary | ICD-10-CM

## 2022-10-13 DIAGNOSIS — J84.9 INTERSTITIAL LUNG DISEASE (HCC): ICD-10-CM

## 2022-10-13 PROCEDURE — G8427 DOCREV CUR MEDS BY ELIG CLIN: HCPCS | Performed by: STUDENT IN AN ORGANIZED HEALTH CARE EDUCATION/TRAINING PROGRAM

## 2022-10-13 PROCEDURE — 99214 OFFICE O/P EST MOD 30 MIN: CPT | Performed by: STUDENT IN AN ORGANIZED HEALTH CARE EDUCATION/TRAINING PROGRAM

## 2022-10-13 PROCEDURE — 3023F SPIROM DOC REV: CPT | Performed by: STUDENT IN AN ORGANIZED HEALTH CARE EDUCATION/TRAINING PROGRAM

## 2022-10-13 PROCEDURE — G8420 CALC BMI NORM PARAMETERS: HCPCS | Performed by: STUDENT IN AN ORGANIZED HEALTH CARE EDUCATION/TRAINING PROGRAM

## 2022-10-13 PROCEDURE — 4004F PT TOBACCO SCREEN RCVD TLK: CPT | Performed by: STUDENT IN AN ORGANIZED HEALTH CARE EDUCATION/TRAINING PROGRAM

## 2022-10-13 PROCEDURE — 3017F COLORECTAL CA SCREEN DOC REV: CPT | Performed by: STUDENT IN AN ORGANIZED HEALTH CARE EDUCATION/TRAINING PROGRAM

## 2022-10-13 PROCEDURE — G8484 FLU IMMUNIZE NO ADMIN: HCPCS | Performed by: STUDENT IN AN ORGANIZED HEALTH CARE EDUCATION/TRAINING PROGRAM

## 2022-10-13 ASSESSMENT — ENCOUNTER SYMPTOMS
DIARRHEA: 0
NAUSEA: 0
ABDOMINAL DISTENTION: 0
ABDOMINAL PAIN: 0
SHORTNESS OF BREATH: 0
CHEST TIGHTNESS: 0

## 2022-10-13 NOTE — PROGRESS NOTES
10/13/2022     Florentino Casey (:  1960) is a 57 y.o. male, here for evaluation of the following medical concerns:    HPI  COPD  Nay Colvin presents today for evaluation of COPD and interstitial lung disease. At his most recent appointment he was started on a LAMA-ICS inhaler. Unfortunately, he was unable to fill this due to issues at his pharmacy. He was given a prescription for prednisone with precautions for use in the attempt to prevent him from getting another severe exacerbation Per admitted to the hospital.  Because he was unable to fill his inhaler, he started experiencing shortness of breath and cough about 4 days after her next appointment, so ended up using this as needed prednisone. Today he looks well and is breathing much better, but still does not have his inhaler. He has an appointment coming with pulmonology on . Review of Systems   Constitutional:  Negative for activity change, appetite change, fatigue and unexpected weight change. Eyes:  Negative for visual disturbance. Respiratory:  Negative for chest tightness and shortness of breath. Gastrointestinal:  Negative for abdominal distention, abdominal pain, diarrhea and nausea. Endocrine: Negative for polydipsia, polyphagia and polyuria. Genitourinary:  Negative for decreased urine volume, dysuria and frequency. Musculoskeletal:  Negative for gait problem and myalgias. Skin:  Negative for rash and wound. Neurological:  Positive for numbness. Negative for dizziness, weakness and light-headedness. Hematological:  Does not bruise/bleed easily. Prior to Visit Medications    Medication Sig Taking?  Authorizing Provider   umeclidinium-vilanterol (ANORO ELLIPTA) 62.5-25 MCG/INH AEPB inhaler Inhale 1 puff into the lungs daily Yes Toño Harding DO   hydrOXYzine HCl (ATARAX) 25 MG tablet   Historical Provider, MD   chlorproMAZINE (THORAZINE) 25 MG tablet   Historical Provider, MD   metoclopramide (REGLAN) 10 MG tablet Take 1 tablet by mouth in the morning, at noon, and at bedtime for 10 doses  Teetee Boss MD   magnesium oxide (MAG-OX) 400 (240 Mg) MG tablet Take 1 tablet by mouth daily for 2 doses  Stefan Martinez DO   metFORMIN (GLUCOPHAGE-XR) 500 MG extended release tablet Take 2 tablets by mouth daily (with breakfast)  Patient not taking: Reported on 10/13/2022  Aquiles Jiménez MD   midodrine (PROAMATINE) 5 MG tablet Take 1 tablet by mouth 2 times daily  Patient not taking: Reported on 10/13/2022  Aquiles Jiménez MD   thiamine mononitrate (THIAMINE) 100 MG tablet Take 1 tablet by mouth in the morning. Jaimehoney Coleman MD   pregabalin (LYRICA) 50 MG capsule Take 1 capsule by mouth in the morning and 1 capsule at noon and 1 capsule before bedtime. Do all this for 30 days. Melanie Raymond DO   L-methylfolate-B6-B12 [facility]) 7-41.950-7-84 MG CAPS capsule Take 1 capsule by mouth daily  Patient not taking: No sig reported  Madelyn Alves MD        Social History     Tobacco Use    Smoking status: Every Day     Packs/day: 0.00     Years: 40.00     Pack years: 0.00     Types: Cigars, Cigarettes    Smokeless tobacco: Never    Tobacco comments:     2 cigars daily    Substance Use Topics    Alcohol use: Yes     Comment: 48 oz beer/day        Vitals:    10/13/22 1256 10/13/22 1309   BP: (!) 155/102 133/82   Pulse: 94    Temp: 97.8 °F (36.6 °C)    TempSrc: Temporal    Weight: 154 lb (69.9 kg)    Height: 5' 10\" (1.778 m)      Estimated body mass index is 22.1 kg/m² as calculated from the following:    Height as of this encounter: 5' 10\" (1.778 m). Weight as of this encounter: 154 lb (69.9 kg). Physical Exam  Constitutional:       Appearance: Normal appearance. He is normal weight. HENT:      Head: Normocephalic and atraumatic. Nose: Nose normal.      Mouth/Throat:      Mouth: Mucous membranes are moist.      Pharynx: Oropharynx is clear.    Cardiovascular:      Rate and Rhythm: Normal rate and regular rhythm. Pulses: Normal pulses. Heart sounds: Normal heart sounds. Pulmonary:      Effort: Pulmonary effort is normal.      Comments: Diminished breath sounds throughout  Abdominal:      General: Abdomen is flat. Bowel sounds are normal.      Palpations: Abdomen is soft. Musculoskeletal:      Cervical back: Normal range of motion. Lymphadenopathy:      Cervical: No cervical adenopathy. Skin:     General: Skin is warm and dry. Capillary Refill: Capillary refill takes less than 2 seconds. Findings: No rash. Neurological:      Mental Status: He is alert. Mental status is at baseline. Psychiatric:         Mood and Affect: Mood normal.         Behavior: Behavior normal.         Thought Content: Thought content normal.         Judgment: Judgment normal.       ASSESSMENT/PLAN:  1. Centrilobular emphysema ([address]): Looks much better today. Did explain to him though that he cannot continue to take oral steroids and emphasized the importance of filling and starting his inhaler. Emphasized the importance of keeping his appointment with pulmonology on October 27. We will follow-up after this appointment. - umeclidinium-vilanterol (ANORO ELLIPTA) 62.5-25 MCG/INH AEPB inhaler; Inhale 1 puff into the lungs daily  Dispense: 60 each; Refill: 2    > 30 min    Return in about 4 weeks (around 11/10/2022) for COPD. An electronic signature was used to authenticate this note.     --Alberto Tellez DO on 10/13/2022 at 1:27 PM

## 2022-10-18 LAB
AFB CULTURE (MYCOBACTERIA): NORMAL
AFB SMEAR: NORMAL

## 2022-11-09 NOTE — PATIENT INSTRUCTIONS
Patient Education        Chronic Obstructive Pulmonary Disease (COPD): Care Instructions  Overview     Chronic obstructive pulmonary disease (COPD) is a lung disease that makes it hard to breathe. With COPD, the airways that lead to the lungs are narrowed, and the tiny air sacs in the lungs are damaged and lose their stretch. People with COPD have decreased airflow in and out of the lungs, which makes it hard to breathe. The airways also can get clogged with thick mucus. Cigarette smoking is a major cause of COPD. Although there is no cure for COPD, you can slow its progress. Following your treatment plan and taking care of yourself can help you feel better and live longer. Follow-up care is a key part of your treatment and safety. Be sure to make and go to all appointments, and call your doctor if you are having problems. It's also a good idea to know your test results and keep a list of the medicines you take. How can you care for yourself at home? Staying healthy    Do not smoke. This is the most important step you can take to prevent more damage to your lungs. If you need help quitting, talk to your doctor about stop-smoking programs and medicines. These can increase your chances of quitting for good. Avoid colds and other infections. Get the pneumococcal and whooping cough (pertussis) vaccines. If you have had these vaccines before, ask your doctor if you need another dose. Get the flu vaccine every fall. If you must be around people with colds or the flu, wash your hands often. Avoid secondhand smoke and air pollution. Try to stay inside with your windows closed when air pollution is bad. Medicines and oxygen therapy    Take your medicines exactly as prescribed. Call your doctor if you think you are having a problem with your medicine. You may be taking medicines such as:  Bronchodilators. These help open your airways and make breathing easier.  They are either short-acting (work for 4 to 9 hours) or long-acting (work for 12 to 24 hours). You inhale most bronchodilators, so they start to act quickly. Always carry your quick-relief inhaler with you in case you need it. Corticosteroids (prednisone, budesonide). These reduce airway inflammation. They come in inhaled or pill form. Ask your doctor or pharmacist if you are using each type of inhaler correctly. With correct use, the medicine is more likely to get to your lungs. See if a spacer is right for you. A spacer may also help you get more inhaled medicine to your lungs. If you use one, ask how to use it properly. Do not take any vitamins, over-the-counter medicine, or herbal products without talking to your doctor first.     If your doctor prescribed antibiotics, take them as directed. Do not stop taking them just because you feel better. You need to take the full course of antibiotics. If you use oxygen therapy, use the flow rate your doctor has recommended. Don't change it without talking to your doctor first. Oxygen therapy boosts the amount of oxygen in your blood and helps you breathe easier. Activity    Get regular exercise. Walking is an easy way to get exercise. Start out slowly, and walk a little more each day. Pay attention to your breathing. You are exercising too hard if you can't talk while you exercise. Take short rest breaks when doing household chores and other activities. Learn breathing methods--such as breathing through pursed lips--to help you become less short of breath. If your doctor has not set you up with a pulmonary rehabilitation program, ask if rehab is right for you. Rehab includes exercise programs, education about your disease and how to manage it, help with diet and other changes, and emotional support. Diet    Eat regular, healthy meals. Use bronchodilators about 1 hour before you eat to make it easier to eat. Eat several smaller, frequent meals to prevent getting too full.  A full stomach can push on the muscle that helps you breathe (your diaphragm) and make it harder to breathe. Drink beverages at the end of the meal.     Avoid foods that are hard to chew. Eat foods that contain protein so you don't lose muscle mass. Talk with your doctor if you gain too much weight or if you lose weight without trying. Mental health    Talk to your family, friends, or a therapist about your feelings. Some people feel frightened, angry, hopeless, helpless, and even guilty. Talking openly about feelings may help you cope. If these feelings last, talk to your doctor. When should you call for help? Call 911 anytime you think you may need emergency care. For example, call if:    You have severe trouble breathing. You have severe chest pain. Call your doctor now or seek immediate medical care if:    You have new or worse trouble breathing. You have new or worse chest pain. You cough up blood. You have a fever. Watch closely for changes in your health, and be sure to contact your doctor if:    You cough more deeply or more often, especially if you notice more mucus or a change in the color of your mucus. You have new or worse swelling in your legs or belly. You have feelings of anxiety or depression. You need to use your antibiotic or steroid pills. You are not getting better as expected. Where can you learn more? Go to https://[employer]-Dimension Solutionscampos.[employer]. org and sign in to your Epic Playground account. Enter L286 in the [facility] box to learn more about \"Chronic Obstructive Pulmonary Disease (COPD): Care Instructions. \"     If you do not have an account, please click on the \"Sign Up Now\" link. Current as of: March 9, 2022               Content Version: 13.4  © 6081-0113 Healthwise, Incorporated. Care instructions adapted under license by [facility] ([facility]).  If you have questions about a medical condition or this instruction, always ask your healthcare professional. [address] any warranty or liability for your use of this information.

## 2022-11-10 ENCOUNTER — OFFICE VISIT (OUTPATIENT)
Dept: PRIMARY CARE CLINIC | Age: 62
End: 2022-11-10
Payer: MEDICAID

## 2022-11-10 VITALS
SYSTOLIC BLOOD PRESSURE: 104 MMHG | HEIGHT: 70 IN | WEIGHT: 148 LBS | HEART RATE: 118 BPM | DIASTOLIC BLOOD PRESSURE: 78 MMHG | BODY MASS INDEX: 21.19 KG/M2 | TEMPERATURE: 97.9 F

## 2022-11-10 DIAGNOSIS — E11.9 TYPE 2 DIABETES MELLITUS WITHOUT COMPLICATION, WITHOUT LONG-TERM CURRENT USE OF INSULIN (HCC): Primary | ICD-10-CM

## 2022-11-10 DIAGNOSIS — J84.9 INTERSTITIAL LUNG DISEASE (HCC): ICD-10-CM

## 2022-11-10 DIAGNOSIS — F17.210 CIGARETTE NICOTINE DEPENDENCE WITHOUT COMPLICATION: ICD-10-CM

## 2022-11-10 DIAGNOSIS — E13.43 DIABETIC AUTONOMIC NEUROPATHY ASSOCIATED WITH OTHER SPECIFIED DIABETES MELLITUS (HCC): ICD-10-CM

## 2022-11-10 DIAGNOSIS — J43.2 CENTRILOBULAR EMPHYSEMA (HCC): ICD-10-CM

## 2022-11-10 PROBLEM — E11.40 DIABETIC NEUROPATHY (HCC): Status: ACTIVE | Noted: 2022-11-10

## 2022-11-10 PROCEDURE — 99214 OFFICE O/P EST MOD 30 MIN: CPT | Performed by: STUDENT IN AN ORGANIZED HEALTH CARE EDUCATION/TRAINING PROGRAM

## 2022-11-10 PROCEDURE — 2022F DILAT RTA XM EVC RTNOPTHY: CPT | Performed by: STUDENT IN AN ORGANIZED HEALTH CARE EDUCATION/TRAINING PROGRAM

## 2022-11-10 PROCEDURE — 3051F HG A1C>EQUAL 7.0%<8.0%: CPT | Performed by: STUDENT IN AN ORGANIZED HEALTH CARE EDUCATION/TRAINING PROGRAM

## 2022-11-10 PROCEDURE — 4004F PT TOBACCO SCREEN RCVD TLK: CPT | Performed by: STUDENT IN AN ORGANIZED HEALTH CARE EDUCATION/TRAINING PROGRAM

## 2022-11-10 PROCEDURE — 3017F COLORECTAL CA SCREEN DOC REV: CPT | Performed by: STUDENT IN AN ORGANIZED HEALTH CARE EDUCATION/TRAINING PROGRAM

## 2022-11-10 PROCEDURE — G8427 DOCREV CUR MEDS BY ELIG CLIN: HCPCS | Performed by: STUDENT IN AN ORGANIZED HEALTH CARE EDUCATION/TRAINING PROGRAM

## 2022-11-10 PROCEDURE — 3023F SPIROM DOC REV: CPT | Performed by: STUDENT IN AN ORGANIZED HEALTH CARE EDUCATION/TRAINING PROGRAM

## 2022-11-10 PROCEDURE — G8420 CALC BMI NORM PARAMETERS: HCPCS | Performed by: STUDENT IN AN ORGANIZED HEALTH CARE EDUCATION/TRAINING PROGRAM

## 2022-11-10 PROCEDURE — G8484 FLU IMMUNIZE NO ADMIN: HCPCS | Performed by: STUDENT IN AN ORGANIZED HEALTH CARE EDUCATION/TRAINING PROGRAM

## 2022-11-10 RX ORDER — MIDODRINE HYDROCHLORIDE 5 MG/1
5 TABLET ORAL 2 TIMES DAILY
Qty: 180 TABLET | Refills: 3 | Status: SHIPPED | OUTPATIENT
Start: 2022-11-10

## 2022-11-10 RX ORDER — METFORMIN HYDROCHLORIDE 500 MG/1
1000 TABLET, EXTENDED RELEASE ORAL
Qty: 90 TABLET | Refills: 3 | Status: SHIPPED | OUTPATIENT
Start: 2022-11-10

## 2022-11-10 ASSESSMENT — ENCOUNTER SYMPTOMS
ABDOMINAL PAIN: 0
ABDOMINAL DISTENTION: 0
SHORTNESS OF BREATH: 0
CHEST TIGHTNESS: 0
NAUSEA: 0
DIARRHEA: 0

## 2023-01-19 ENCOUNTER — TELEPHONE (OUTPATIENT)
Dept: PRIMARY CARE CLINIC | Age: 63
End: 2023-01-19

## 2023-01-19 NOTE — TELEPHONE ENCOUNTER
Pt called in via nurse triage stating he has been having some very bad feet pain for about 1 year now. He has an appointment for 1/23.  I let pt know if pain is too much he needs to go to the er

## 2023-01-22 NOTE — PATIENT INSTRUCTIONS
Learning About Diabetes and Exercise  Can you exercise if you have diabetes? When you have diabetes, it's important to get regular exercise. It can help you manage your blood sugar level. You can still play sports, run, ride a bike, swim, and do other activities when you have diabetes. How does exercise help when you have diabetes? Getting regular exercise can help control your blood sugar. Your body turns the food you eat into glucose, a type of sugar. You need this sugar for energy. When you have diabetes, the sugar builds up in your blood. But when you exercise, your body uses sugar. This helps keep it from building up in your blood and results in lower blood sugar and better control of diabetes. Exercise may help you in other ways too. It can help you reach and stay at a healthy weight. It also helps improve blood pressure and cholesterol, which can reduce the risk of heart disease. Exercise can make you feel stronger and happier. It can help you relax and sleep better. And it can give you confidence in other things you do. Exercising safely when you have diabetes  Before you start a new exercise program, talk to your doctor about how and when to exercise. Some types of exercise can be harmful if your diabetes is causing other problems, such as problems with your feet. Your doctor can tell you what types of exercise are good choices for you. Here are some general safety tips. Take steps to avoid blood sugar problems. Check your blood sugar before and after you exercise. Ask your doctor what blood sugar range is safe for you when you exercise. If you take medicine or insulin that lowers blood sugar, check your blood sugar before you exercise. If your blood sugar is less than 90 mg/dL, you may need to eat a carbohydrate snack first.  Be careful when you exercise if your blood sugar is too high. Make sure to drink plenty of water. Try to exercise at about the same time each day.    This may help keep your blood sugar steady. If you want to exercise more, slowly increase how hard or long you exercise. Have someone with you when you exercise. Or exercise at a gym. You may need help if your blood sugar drops too low. Keep some quick-sugar food with you. You may get symptoms of low blood sugar during exercise or up to 24 hours later. Use proper footwear and the right equipment. Pay attention to your body. If you are used to exercising and notice that you cannot do as much as usual, talk to your doctor. Follow-up care is a key part of your treatment and safety. Be sure to make and go to all appointments, and call your doctor if you are having problems. It's also a good idea to know your test results and keep a list of the medicines you take. Where can you learn more? Go to https://[employer]ib[employer]itzcampos."[employer]. org and sign in to your [employer] account. Enter F847 in the [employer] box to learn more about \"Learning About Diabetes and Exercise. \"     If you do not have an account, please click on the \"Sign Up Now\" link. Current as of: April 13, 2022               Content Version: 13.4  © 7566-0452 Healthwise, [employer]. Care instructions adapted under license by [facility]. If you have questions about a medical condition or this instruction, always ask your healthcare professional. [address] any warranty or liability for your use of this information.

## 2023-01-22 NOTE — PROGRESS NOTES
2023     Clelia Brunner (:  1960) is a 60 y.o. male, here for evaluation of the following medical concerns:    HPI  Diabetes Mellitus Type 2: Current symptoms/problems include neuropathy. Medication compliance:  noncompliant: infrequewntly takes his meformin  Diabetic diet compliance:  compliant most of the time,  Weight trend: stable  Current exercise: no regular exercise  Barriers: none    Home blood sugar records: patient does not test  Any episodes of hypoglycemia? no  Eye exam current (within one year): no   reports that he has been smoking cigars and cigarettes. He has never used smokeless tobacco.   Daily Aspirin? No    Lab Results   Component Value Date    LABA1C 7.2 2022    LABA1C 6.6 2022    LABA1C 6.2 2022     Lab Results   Component Value Date    LABMICR YES 2022    CREATININE 0.6 (L) 2022     Lab Results   Component Value Date    ALT 21 2022    AST 45 (H) 2022     Lab Results   Component Value Date    CHOL 149 2022    TRIG 256 (H) 2022    HDL 37 (L) 2022    LDLCALC 61 2022        COPD/IPF:  Current treatment includes combined beta agonist/antichoinergic inhaler. Residual symptoms: none. He denies any other symptoms. He requires his rescue inhaler 0 time(s) per month. Review of Systems   Constitutional:  Negative for activity change, appetite change, fatigue and unexpected weight change. Eyes:  Negative for visual disturbance. Respiratory:  Negative for chest tightness and shortness of breath. Gastrointestinal:  Negative for abdominal distention, abdominal pain, diarrhea and nausea. Endocrine: Negative for polydipsia, polyphagia and polyuria. Genitourinary:  Negative for decreased urine volume, dysuria and frequency. Musculoskeletal:  Negative for gait problem and myalgias. Skin:  Negative for rash and wound. Neurological:  Positive for numbness.  Negative for dizziness, weakness and light-headedness. Hematological:  Does not bruise/bleed easily. Prior to Visit Medications    Medication Sig Taking? Authorizing Provider   traMADol (ULTRAM) 50 MG tablet Take 1 tablet by mouth every 4 hours as needed for Pain for up to 3 days. Intended supply: 3 days. Take lowest dose possible to manage pain Max Daily Amount: 300 mg Yes Timothy Fulling, DO   metFORMIN (GLUCOPHAGE-XR) 500 MG extended release tablet Take 2 tablets by mouth daily (with breakfast) Yes Timothy Fulling, DO   midodrine (PROAMATINE) 5 MG tablet Take 1 tablet by mouth 2 times daily  Patient not taking: Reported on 1/23/2023  Timothy Fulling, DO   umeclidinium-vilanterol (ANORO ELLIPTA) 62.5-25 MCG/INH AEPB inhaler Inhale 1 puff into the lungs daily  Patient not taking: Reported on 1/23/2023  Timothy Fulling, DO   thiamine mononitrate (THIAMINE) 100 MG tablet Take 1 tablet by mouth in the morning. Екатерина Beebe MD        Social History     Tobacco Use    Smoking status: Every Day     Packs/day: 0.00     Years: 40.00     Pack years: 0.00     Types: Cigars, Cigarettes    Smokeless tobacco: Never    Tobacco comments:     2 cigars daily    Substance Use Topics    Alcohol use: Yes     Comment: 48 oz beer/day        Vitals:    01/23/23 1335   BP: 120/82   Pulse: (!) 117   Temp: 96.8 °F (36 °C)   TempSrc: Temporal   Weight: 153 lb 12.8 oz (69.8 kg)   Height: 5' 10\" (1.778 m)     Estimated body mass index is 22.07 kg/m² as calculated from the following:    Height as of this encounter: 5' 10\" (1.778 m). Weight as of this encounter: 153 lb 12.8 oz (69.8 kg). Physical Exam  Constitutional:       Appearance: Normal appearance. He is normal weight. HENT:      Head: Normocephalic and atraumatic. Nose: Nose normal.      Mouth/Throat:      Mouth: Mucous membranes are moist.      Pharynx: Oropharynx is clear. Cardiovascular:      Rate and Rhythm: Regular rhythm. Tachycardia present. Pulses: Normal pulses. Heart sounds: Normal heart sounds. Pulmonary:      Effort: Pulmonary effort is normal.      Comments: Diminished breath sounds throughout  Abdominal:      General: Abdomen is flat. Bowel sounds are normal.      Palpations: Abdomen is soft. Musculoskeletal:      Cervical back: Normal range of motion. Lymphadenopathy:      Cervical: No cervical adenopathy. Skin:     General: Skin is warm and dry. Capillary Refill: Capillary refill takes less than 2 seconds. Findings: No rash. Neurological:      Mental Status: He is alert. Mental status is at baseline. Psychiatric:         Mood and Affect: Mood normal.         Behavior: Behavior normal.         Thought Content: Thought content normal.         Judgment: Judgment normal.       ASSESSMENT/PLAN:  1. Type 2 diabetes mellitus without complication, without long-term current use of insulin ([facility]): A1c minimally elevated today. Has not been taking his metformin and does not want to. As long as A1c trends downward not upward okay with holding off. Reiterated appropriate lifestyle modifications with patient. Follow-up 3 months. Hemoglobin A1C   Date Value Ref Range Status   08/20/2022 7.2 See comment % Final     - metFORMIN (GLUCOPHAGE-XR) 500 MG extended release tablet; Take 2 tablets by mouth daily (with breakfast)  Dispense: 90 tablet; Refill: 3    2. Diabetic autonomic neuropathy associated with other specified diabetes mellitus ([facility]): Compliant with his midodrine. Has had no additional syncopal episodes since last visit. 3. Diabetic polyneuropathy associated with type 2 diabetes mellitus ([facility]): Longstanding history of difficult to control diabetic neuropathy. Has been on gabapentin and Lyrica with no positive impact. Had discussed referral to pain management the past, but he did not follow through. Requesting opioid pain management today. Explained that neuropathic pain is not an indication for opioid treatment.   Okay with giving him a couple tramadol nightly, which may have some serotonin bleed over to help with neuropathic pain. Referring him to pain management. Understands he will not get a refill of this medication.  - Maki - Ellyn Pollock NP, Pain Management, [facility]  - traMADol (ULTRAM) 50 MG tablet; Take 1 tablet by mouth every 4 hours as needed for Pain for up to 3 days. Intended supply: 3 days. Take lowest dose possible to manage pain Max Daily Amount: 300 mg  Dispense: 18 tablet; Refill: 0    4. Centrilobular emphysema ([address]): Breathing has been much improved since his exacerbation at the end of November. 5. Interstitial lung disease ([address]): No showed his follow-ups to pulmonology and has been dismissed from their practice. Breathing is okay now, but did explain to patient that this leaves us in a difficult situation because ILD is a chronically progressive disease and out of my scope of practice. We are going to need another pulmonologist at some point and it is important that he follows up with the scheduled appointments. Return in about 3 months (around 4/23/2023) for neuropathy. An electronic signature was used to authenticate this note.     --Mark aCr, DO on 1/23/2023 at 2:14 PM

## 2023-01-23 ENCOUNTER — OFFICE VISIT (OUTPATIENT)
Dept: PRIMARY CARE CLINIC | Age: 63
End: 2023-01-23

## 2023-01-23 VITALS
HEIGHT: 70 IN | TEMPERATURE: 96.8 F | SYSTOLIC BLOOD PRESSURE: 120 MMHG | WEIGHT: 153.8 LBS | HEART RATE: 117 BPM | DIASTOLIC BLOOD PRESSURE: 82 MMHG | BODY MASS INDEX: 22.02 KG/M2

## 2023-01-23 DIAGNOSIS — E11.42 DIABETIC POLYNEUROPATHY ASSOCIATED WITH TYPE 2 DIABETES MELLITUS (HCC): ICD-10-CM

## 2023-01-23 DIAGNOSIS — E11.9 TYPE 2 DIABETES MELLITUS WITHOUT COMPLICATION, WITHOUT LONG-TERM CURRENT USE OF INSULIN (HCC): Primary | ICD-10-CM

## 2023-01-23 DIAGNOSIS — J84.9 INTERSTITIAL LUNG DISEASE (HCC): ICD-10-CM

## 2023-01-23 DIAGNOSIS — E13.43 DIABETIC AUTONOMIC NEUROPATHY ASSOCIATED WITH OTHER SPECIFIED DIABETES MELLITUS (HCC): ICD-10-CM

## 2023-01-23 DIAGNOSIS — J43.2 CENTRILOBULAR EMPHYSEMA (HCC): ICD-10-CM

## 2023-01-23 RX ORDER — TRAMADOL HYDROCHLORIDE 50 MG/1
50 TABLET ORAL EVERY 4 HOURS PRN
Qty: 18 TABLET | Refills: 0 | Status: SHIPPED | OUTPATIENT
Start: 2023-01-23 | End: 2023-01-26

## 2023-01-23 RX ORDER — METFORMIN HYDROCHLORIDE 500 MG/1
1000 TABLET, EXTENDED RELEASE ORAL
Qty: 90 TABLET | Refills: 3 | Status: SHIPPED | OUTPATIENT
Start: 2023-01-23

## 2023-01-23 ASSESSMENT — PATIENT HEALTH QUESTIONNAIRE - PHQ9
SUM OF ALL RESPONSES TO PHQ QUESTIONS 1-9: 0
SUM OF ALL RESPONSES TO PHQ9 QUESTIONS 1 & 2: 0
SUM OF ALL RESPONSES TO PHQ QUESTIONS 1-9: 0
2. FEELING DOWN, DEPRESSED OR HOPELESS: 0
SUM OF ALL RESPONSES TO PHQ QUESTIONS 1-9: 0
1. LITTLE INTEREST OR PLEASURE IN DOING THINGS: 0
SUM OF ALL RESPONSES TO PHQ QUESTIONS 1-9: 0

## 2023-01-23 ASSESSMENT — ENCOUNTER SYMPTOMS
ABDOMINAL DISTENTION: 0
SHORTNESS OF BREATH: 0
NAUSEA: 0
DIARRHEA: 0
CHEST TIGHTNESS: 0
ABDOMINAL PAIN: 0

## 2023-01-23 NOTE — LETTER
[facility]  [address] [place] [id number]  Phone: [phone]  Fax: [phone], DO        January 23, 2023     Patient: Ulises Padgett   YOB: 1960   Date of Visit: 1/23/2023       To Whom It May Concern: It is my medical opinion that Ulises Padgett Would have a significant difficulty working because of the below medical problems:     Patient Active Problem List   Diagnosis    Orthostatic syncope    Vitiligo    Type 2 diabetes mellitus without complication, without long-term current use of insulin ([address]    Diabetic polyneuropathy associated with type 2 diabetes mellitus ([address]    Cigarette nicotine dependence without complication    Interstitial lung disease ([address]    Centrilobular emphysema ([address]    Diabetic neuropathy ([address]       If you have any questions or concerns, please don't hesitate to call.     Sincerely,        Geovanna Kathleen, DO

## 2023-01-26 ENCOUNTER — TELEPHONE (OUTPATIENT)
Dept: PRIMARY CARE CLINIC | Age: 63
End: 2023-01-26

## 2023-01-26 DIAGNOSIS — Z12.11 SCREEN FOR COLON CANCER: Primary | ICD-10-CM

## 2023-02-06 ENCOUNTER — TELEPHONE (OUTPATIENT)
Dept: PRIMARY CARE CLINIC | Age: 63
End: 2023-02-06

## 2023-02-06 NOTE — TELEPHONE ENCOUNTER
Pt call in and states that his last visit here that his pain in his foot was a 10 and said that the traMADol (ULTRAM) 50 MG tablet work and said that he could use more or would like to know what could he do for long term.

## 2023-02-06 NOTE — TELEPHONE ENCOUNTER
Pt was referred to pain management when he let us know of this pain being so bad.  Please direct him to give them a call     Ailin Peralta [address] [place] [facility],  Barbara Harris   Tel: [phone]

## 2023-02-07 ENCOUNTER — TELEPHONE (OUTPATIENT)
Dept: PRIMARY CARE CLINIC | Age: 63
End: 2023-02-07

## 2023-02-07 NOTE — TELEPHONE ENCOUNTER
----- Message from Jerri Becker sent at 2/7/2023 11:11 AM EST -----  Subject: Message to Provider    QUESTIONS  Information for Provider? Pt called to let office know he was referred to   go to pain management but they have not yet received all documentation   that was requested. They are needing past 3 office notes, any imaging, the   referral and diagnosis code. If pt does not have any imaging to send pt   requesting to place an order for imaging. Please advise. Pt would like to   be seen asap with the specialist for pain management.   ---------------------------------------------------------------------------  --------------  Omelia Counter INFO  [phone]; OK to leave message on voicemail  ---------------------------------------------------------------------------  --------------  SCRIPT ANSWERS  Relationship to Patient?  Self

## 2023-02-13 ENCOUNTER — TELEPHONE (OUTPATIENT)
Dept: PRIMARY CARE CLINIC | Age: 63
End: 2023-02-13

## 2023-02-13 DIAGNOSIS — E11.42 DIABETIC POLYNEUROPATHY ASSOCIATED WITH TYPE 2 DIABETES MELLITUS (HCC): Primary | ICD-10-CM

## 2023-02-13 DIAGNOSIS — E13.43 DIABETIC AUTONOMIC NEUROPATHY ASSOCIATED WITH OTHER SPECIFIED DIABETES MELLITUS (HCC): ICD-10-CM

## 2023-02-13 NOTE — TELEPHONE ENCOUNTER
Pt is in need of a referral to Physical Therapy.  He is in need of a Physical Functional Capacity Assessment For social security

## 2023-02-14 ENCOUNTER — TELEPHONE (OUTPATIENT)
Dept: PRIMARY CARE CLINIC | Age: 63
End: 2023-02-14

## 2023-02-14 DIAGNOSIS — E13.43 DIABETIC AUTONOMIC NEUROPATHY ASSOCIATED WITH OTHER SPECIFIED DIABETES MELLITUS (HCC): ICD-10-CM

## 2023-02-14 DIAGNOSIS — E11.42 DIABETIC POLYNEUROPATHY ASSOCIATED WITH TYPE 2 DIABETES MELLITUS (HCC): Primary | ICD-10-CM

## 2023-02-14 NOTE — TELEPHONE ENCOUNTER
Called and spoke with physical therapy and they stated to Please Place in the note that the evaluation is for Social Security Disability.  Also There Therapist  who does this testing is and OT for them to  referral you have to Change it from Pt to OT

## 2023-02-14 NOTE — TELEPHONE ENCOUNTER
Called and spoke with pt yesterday 02/13/23 at 3:32 pm and let him know we received some paper work from the [facility] office of Michael Borja that  cannot complete. I explained to pt this paper work has to be completed by pt. I let him know Agustin Todd will place a referral for this and once placed I will give him all call back with the information to make an appointment. Referral needs placed to pt for Physical Functional Assessment.

## 2023-02-20 ENCOUNTER — OFFICE VISIT (OUTPATIENT)
Dept: PAIN MANAGEMENT | Age: 63
End: 2023-02-20
Payer: MEDICAID

## 2023-02-20 VITALS
OXYGEN SATURATION: 99 % | SYSTOLIC BLOOD PRESSURE: 154 MMHG | DIASTOLIC BLOOD PRESSURE: 104 MMHG | HEIGHT: 71 IN | HEART RATE: 96 BPM | BODY MASS INDEX: 21.87 KG/M2 | WEIGHT: 156.2 LBS

## 2023-02-20 DIAGNOSIS — E13.43 DIABETIC AUTONOMIC NEUROPATHY ASSOCIATED WITH OTHER SPECIFIED DIABETES MELLITUS (HCC): ICD-10-CM

## 2023-02-20 DIAGNOSIS — E11.9 TYPE 2 DIABETES MELLITUS WITHOUT COMPLICATION, WITHOUT LONG-TERM CURRENT USE OF INSULIN (HCC): ICD-10-CM

## 2023-02-20 DIAGNOSIS — Z51.81 ENCOUNTER FOR THERAPEUTIC DRUG MONITORING: ICD-10-CM

## 2023-02-20 DIAGNOSIS — G89.4 CHRONIC PAIN SYNDROME: Primary | ICD-10-CM

## 2023-02-20 PROCEDURE — G8484 FLU IMMUNIZE NO ADMIN: HCPCS | Performed by: NURSE PRACTITIONER

## 2023-02-20 PROCEDURE — G8420 CALC BMI NORM PARAMETERS: HCPCS | Performed by: NURSE PRACTITIONER

## 2023-02-20 PROCEDURE — G8427 DOCREV CUR MEDS BY ELIG CLIN: HCPCS | Performed by: NURSE PRACTITIONER

## 2023-02-20 PROCEDURE — 2022F DILAT RTA XM EVC RTNOPTHY: CPT | Performed by: NURSE PRACTITIONER

## 2023-02-20 PROCEDURE — 99204 OFFICE O/P NEW MOD 45 MIN: CPT | Performed by: NURSE PRACTITIONER

## 2023-02-20 RX ORDER — CYCLOBENZAPRINE HCL 100 %
2 POWDER (GRAM) MISCELLANEOUS 2 TIMES DAILY PRN
Qty: 60 G | Refills: 0 | Status: SHIPPED | OUTPATIENT
Start: 2023-02-20 | End: 2023-03-22

## 2023-02-20 RX ORDER — HYDROCODONE BITARTRATE AND ACETAMINOPHEN 5; 325 MG/1; MG/1
1 TABLET ORAL EVERY 8 HOURS PRN
Qty: 84 TABLET | Refills: 0 | Status: SHIPPED | OUTPATIENT
Start: 2023-02-20 | End: 2023-02-21 | Stop reason: SDUPTHER

## 2023-02-20 ASSESSMENT — ENCOUNTER SYMPTOMS
CHEST TIGHTNESS: 0
SHORTNESS OF BREATH: 0

## 2023-02-20 NOTE — PROGRESS NOTES
HISTORY OF PRESENT ILLNESS:  Mr. Brie Terrell is a 60 y.o. male presents for consultation at the request of Dr. Sandeep Decker. His presenting problems are lowell feet and ankle pain. He hasalso been evaluated by PCP. Onset of pain began about 1 year ago. He rates the pain 8/10 and describes it as sharp, burning, shooting. Pain is greaterin his lowell feet and ankles than any other body region. Pain is made worse by: nothing. Activities that have been limited by pain that he otherwise tolerated well are working, walking, ADLs. Alternative therapies he has previously attempted are nothing. Current treatment regimen has helped relieve about 0% of the pain. Relieving factors of pain include nothing. Hedenies side effects from the current pain regimen. Patient reports mood is well and sleep patterns are Poor. Patient deniesneurological bowel or bladder. Patient denies misusing/abusing his narcotic pain medications or using any illegal drugs. he denies morning stiffness, fatigue, and headaches. ROS:  Review of Systems   Constitutional:  Negative for fatigue and unexpected weight change. HENT:  Negative for congestion and dental problem. Respiratory:  Negative for chest tightness and shortness of breath. Cardiovascular:  Negative for chest pain, palpitations and leg swelling. Gastrointestinal:  Negative for constipation. Endocrine: Negative for polydipsia, polyphagia and polyuria. Musculoskeletal:  Positive for gait problem. Negative for arthralgias, back pain, neck pain and neck stiffness. Bilateral feet and ankle pain   Skin:  Negative for rash. Neurological:  Positive for dizziness. Negative for speech difficulty and weakness. Psychiatric/Behavioral:  Negative for agitation, sleep disturbance and suicidal ideas. Physical Exam  Constitutional:       Appearance: Normal appearance. HENT:      Head: Normocephalic and atraumatic.       Right Ear: External ear normal.      Left Ear: External ear normal. Nose: Nose normal.      Mouth/Throat:      Mouth: Mucous membranes are moist.      Pharynx: Oropharynx is clear. Eyes:      General: No scleral icterus. Extraocular Movements: Extraocular movements intact. Conjunctiva/sclera: Conjunctivae normal.   Cardiovascular:      Rate and Rhythm: Normal rate and regular rhythm. Pulses: Normal pulses. Heart sounds: Normal heart sounds. No murmur heard. No gallop. Pulmonary:      Effort: Pulmonary effort is normal. No respiratory distress. Breath sounds: Normal breath sounds. No wheezing. Abdominal:      General: Abdomen is flat. Palpations: Abdomen is soft. Musculoskeletal:         General: No swelling, tenderness, deformity or signs of injury. Normal range of motion. Cervical back: Normal range of motion and neck supple. Skin:     General: Skin is warm and dry. Capillary Refill: Capillary refill takes 2 to 3 seconds. Neurological:      General: No focal deficit present. Mental Status: He is alert and oriented to person, place, and time. Mental status is at baseline. Sensory: Sensory deficit (decreased sensation to pin prick and light tough bilateral feet to the ankles) present. Motor: No weakness. Gait: Gait normal.      Deep Tendon Reflexes: Reflexes normal.   Psychiatric:         Mood and Affect: Mood normal.         Behavior: Behavior normal.         Thought Content: Thought content normal.         Judgment: Judgment normal.      BP (!) 154/104   Pulse 96   Ht 5' 11\" (1.803 m)   Wt 156 lb 3.2 oz (70.9 kg)   SpO2 99%   BMI 21.79 kg/m²      ASSESSMENT:    1. Chronic pain syndrome    2. Encounter for therapeutic drug monitoring    3. Diabetic autonomic neuropathy associated with other specified diabetes mellitus ([facility]    4.  Type 2 diabetes mellitus without complication, without long-term current use of insulin ([facility])         Lab Results   Component Value Date    WBC 9.0 08/31/2022    HGB 11.5 (L) 08/31/2022    HCT 34.4 (L) 08/31/2022    MCV 97.1 08/31/2022     08/31/2022     Lab Results   Component Value Date/Time     08/31/2022 06:29 AM    K 4.1 08/31/2022 06:29 AM    K 3.2 08/21/2022 06:25 AM    CL 98 08/31/2022 06:29 AM    CO2 26 08/31/2022 06:29 AM    BUN 8 08/31/2022 06:29 AM    CREATININE 0.6 08/31/2022 06:29 AM    GLUCOSE 248 08/31/2022 06:29 AM    CALCIUM 8.2 08/31/2022 06:29 AM      No results found for: TSHFT4, TSH    IMAGING:  No updated EMG on file    PLAN:   -Chronic opiate treatment protocol was discussed withthe patient, informed consent was obtained. -Treatment guidelines were discussed and established  -Risks and benefits of narcotics were addressedwith the patient  - Obtainable long term and short term goals of opioid therapy were reviewed, including pain relief, sleep, psychosocial and physical functioning   -CBT techniques- relaxation therapies such as biofeedback, mindfulness based stress reduction, imagery, cognitive restructuring, problem solving discussed with patient   -Obtain urine Toxicology today  -SOAPP score:6  -ORT:0  -PHQ-9:13  -EMG with Dr. Dhiraj French  -pain cream through wellness  -Norco 5mg TID PRN pain   -f/u 4 weeks for reassessment and UDS review. It should be noted that patient urinated in the hallway into a box of empty lab cups that belonged to [place] [address] and were ruined. This inappropriate behavior will be discussed at next office visit. Provider has seriously considered discharge for inappropriate urination in the hallway of the office. Controlled Substances Monitoring:  [place] record was obtained and reviewed  for the last one year and no indicators of drug misuse  were found. Any other controlled substance prescriptions  seen on the record have been accounted for, I am aware of the patient receiving these medications. Sharon Bartonpaola [place] record will be rechecked as part of office protocol. Thank you for your consult, Dr. Vitaliy Concepcion.   Please see my notes and plan of care.     Genoveva Benavidez NP-C

## 2023-02-21 ENCOUNTER — TELEPHONE (OUTPATIENT)
Dept: PAIN MANAGEMENT | Age: 63
End: 2023-02-21

## 2023-02-21 DIAGNOSIS — E11.9 TYPE 2 DIABETES MELLITUS WITHOUT COMPLICATION, WITHOUT LONG-TERM CURRENT USE OF INSULIN (HCC): ICD-10-CM

## 2023-02-21 DIAGNOSIS — E13.43 DIABETIC AUTONOMIC NEUROPATHY ASSOCIATED WITH OTHER SPECIFIED DIABETES MELLITUS (HCC): ICD-10-CM

## 2023-02-21 DIAGNOSIS — Z51.81 ENCOUNTER FOR THERAPEUTIC DRUG MONITORING: ICD-10-CM

## 2023-02-21 RX ORDER — HYDROCODONE BITARTRATE AND ACETAMINOPHEN 5; 325 MG/1; MG/1
1 TABLET ORAL EVERY 8 HOURS PRN
Qty: 84 TABLET | Refills: 0 | Status: SHIPPED | OUTPATIENT
Start: 2023-02-21 | End: 2023-03-21

## 2023-02-21 NOTE — TELEPHONE ENCOUNTER
Sent to jeffry on michael, can we just make sure it is canceled at [facility]. Thank you

## 2023-02-21 NOTE — TELEPHONE ENCOUNTER
Patient said none of the [facility] in [place] have [address] right now,    But the [facility] does      [address] [facility], [address] [place] [address]

## 2023-02-21 NOTE — TELEPHONE ENCOUNTER
I cancelled the [address] [facility] [facility] script that was sent to [facility].    I called patient, notified him that new script was sent to [facility] Galindo / Kelsey Alberto - He voiced understanding.

## 2023-02-27 PROBLEM — Z51.81 ENCOUNTER FOR THERAPEUTIC DRUG MONITORING: Status: ACTIVE | Noted: 2023-02-27

## 2023-02-27 PROBLEM — G89.4 CHRONIC PAIN SYNDROME: Status: ACTIVE | Noted: 2023-02-27

## 2023-02-27 ASSESSMENT — ENCOUNTER SYMPTOMS
CONSTIPATION: 0
BACK PAIN: 0

## 2023-03-20 ENCOUNTER — OFFICE VISIT (OUTPATIENT)
Dept: PAIN MANAGEMENT | Age: 63
End: 2023-03-20
Payer: MEDICAID

## 2023-03-20 VITALS
HEART RATE: 97 BPM | DIASTOLIC BLOOD PRESSURE: 89 MMHG | WEIGHT: 156.8 LBS | SYSTOLIC BLOOD PRESSURE: 137 MMHG | OXYGEN SATURATION: 98 % | BODY MASS INDEX: 21.87 KG/M2

## 2023-03-20 DIAGNOSIS — E13.43 DIABETIC AUTONOMIC NEUROPATHY ASSOCIATED WITH OTHER SPECIFIED DIABETES MELLITUS (HCC): ICD-10-CM

## 2023-03-20 DIAGNOSIS — E11.9 TYPE 2 DIABETES MELLITUS WITHOUT COMPLICATION, WITHOUT LONG-TERM CURRENT USE OF INSULIN (HCC): ICD-10-CM

## 2023-03-20 DIAGNOSIS — Z51.81 ENCOUNTER FOR THERAPEUTIC DRUG MONITORING: ICD-10-CM

## 2023-03-20 DIAGNOSIS — G89.4 CHRONIC PAIN SYNDROME: ICD-10-CM

## 2023-03-20 PROCEDURE — 99214 OFFICE O/P EST MOD 30 MIN: CPT | Performed by: NURSE PRACTITIONER

## 2023-03-20 PROCEDURE — 3046F HEMOGLOBIN A1C LEVEL >9.0%: CPT | Performed by: NURSE PRACTITIONER

## 2023-03-20 PROCEDURE — G8420 CALC BMI NORM PARAMETERS: HCPCS | Performed by: NURSE PRACTITIONER

## 2023-03-20 PROCEDURE — G8484 FLU IMMUNIZE NO ADMIN: HCPCS | Performed by: NURSE PRACTITIONER

## 2023-03-20 PROCEDURE — 4004F PT TOBACCO SCREEN RCVD TLK: CPT | Performed by: NURSE PRACTITIONER

## 2023-03-20 PROCEDURE — 2022F DILAT RTA XM EVC RTNOPTHY: CPT | Performed by: NURSE PRACTITIONER

## 2023-03-20 PROCEDURE — G8427 DOCREV CUR MEDS BY ELIG CLIN: HCPCS | Performed by: NURSE PRACTITIONER

## 2023-03-20 PROCEDURE — 3017F COLORECTAL CA SCREEN DOC REV: CPT | Performed by: NURSE PRACTITIONER

## 2023-03-20 RX ORDER — OXYCODONE HYDROCHLORIDE AND ACETAMINOPHEN 5; 325 MG/1; MG/1
1 TABLET ORAL EVERY 8 HOURS PRN
Qty: 84 TABLET | Refills: 0 | Status: SHIPPED | OUTPATIENT
Start: 2023-03-20 | End: 2023-04-17

## 2023-03-20 NOTE — PROGRESS NOTES
Breannejaquangalen Han  1960  [id number]      HISTORY OF PRESENT ILLNESS: Mr. Yulia Garcia is a 60 y.o. male returns for a follow up visit for pain management  He has a diagnosis of   1. Encounter for therapeutic drug monitoring    2. Diabetic autonomic neuropathy associated with other specified diabetes mellitus ([facility]    3. Type 2 diabetes mellitus without complication, without long-term current use of insulin ([facility]    4. Chronic pain syndrome    . As per Information Obtained from the PADT (Patient Assessment and Documentation Tool)    He complains of pain in the bilateral feet. He rates the pain 9/10 and describes it as sharp, aching, burning, numbness, pins and needles. Current treatment regimen has helped relieve about 0% of the pain. He denies any side effects from the current pain regimen. Patient reports that since the last follow up visit the physical functioning is worse, family/social relationships are unchanged, mood is worse sleep patterns are worse, and that the overall functioning is worse. Patient denies misusing/abusing his narcotic pain medications or using any illegal drugs. Upon obtaining medical history from Mr. Aayush Mosquera: Patients list of allergies were reviewed     MEDICATIONS: Mr. Yulia Garcia list of medications were reviewed. His current medications are   Outpatient Medications Prior to Visit   Medication Sig Dispense Refill    Cyanocobalamin (VITAMIN B12 PO) Take 1 tablet by mouth daily      Cyclobenzaprine POWD Apply 2 g topically 2 times daily as needed (pain) Cyclobenzaprine 2%, diclofenac 3%, gabapentin 6%, lidocaine 2%, dimethyl sulfoxide DMSO 5% 60 g 0    HYDROcodone-acetaminophen (NORCO) 5-325 MG per tablet Take 1 tablet by mouth every 8 hours as needed for Pain for up to 28 days. Max Daily Amount: 3 tablets 84 tablet 0     No facility-administered medications prior to visit.        SOCIAL/FAMILY/PAST MEDICAL HISTORY: Mr. Adriel Peraza, family and past medical history was

## 2023-03-27 RX ORDER — NALOXONE HYDROCHLORIDE 4 MG/.1ML
1 SPRAY NASAL PRN
Qty: 1 EACH | Refills: 0 | Status: SHIPPED | OUTPATIENT
Start: 2023-03-27 | End: 2023-03-28

## 2023-04-24 ENCOUNTER — OFFICE VISIT (OUTPATIENT)
Dept: PAIN MANAGEMENT | Age: 63
End: 2023-04-24
Payer: MEDICAID

## 2023-04-24 VITALS
SYSTOLIC BLOOD PRESSURE: 153 MMHG | BODY MASS INDEX: 21.9 KG/M2 | HEART RATE: 108 BPM | DIASTOLIC BLOOD PRESSURE: 91 MMHG | OXYGEN SATURATION: 97 % | WEIGHT: 157 LBS

## 2023-04-24 DIAGNOSIS — Z51.81 ENCOUNTER FOR THERAPEUTIC DRUG MONITORING: ICD-10-CM

## 2023-04-24 DIAGNOSIS — E13.43 DIABETIC AUTONOMIC NEUROPATHY ASSOCIATED WITH OTHER SPECIFIED DIABETES MELLITUS (HCC): ICD-10-CM

## 2023-04-24 DIAGNOSIS — G89.4 CHRONIC PAIN SYNDROME: Primary | ICD-10-CM

## 2023-04-24 DIAGNOSIS — E11.9 TYPE 2 DIABETES MELLITUS WITHOUT COMPLICATION, WITHOUT LONG-TERM CURRENT USE OF INSULIN (HCC): ICD-10-CM

## 2023-04-24 PROCEDURE — 2022F DILAT RTA XM EVC RTNOPTHY: CPT | Performed by: NURSE PRACTITIONER

## 2023-04-24 PROCEDURE — 99213 OFFICE O/P EST LOW 20 MIN: CPT | Performed by: NURSE PRACTITIONER

## 2023-04-24 PROCEDURE — 3017F COLORECTAL CA SCREEN DOC REV: CPT | Performed by: NURSE PRACTITIONER

## 2023-04-24 PROCEDURE — 3046F HEMOGLOBIN A1C LEVEL >9.0%: CPT | Performed by: NURSE PRACTITIONER

## 2023-04-24 PROCEDURE — G8427 DOCREV CUR MEDS BY ELIG CLIN: HCPCS | Performed by: NURSE PRACTITIONER

## 2023-04-24 PROCEDURE — G8420 CALC BMI NORM PARAMETERS: HCPCS | Performed by: NURSE PRACTITIONER

## 2023-04-24 PROCEDURE — 4004F PT TOBACCO SCREEN RCVD TLK: CPT | Performed by: NURSE PRACTITIONER

## 2023-04-24 RX ORDER — MORPHINE SULFATE 15 MG/1
15 TABLET ORAL 2 TIMES DAILY PRN
Qty: 56 TABLET | Refills: 0 | Status: SHIPPED | OUTPATIENT
Start: 2023-04-24 | End: 2023-05-22

## 2023-04-24 NOTE — PROGRESS NOTES
and vitals reviewed. BP (!) 153/91   Pulse (!) 108   Wt 157 lb (71.2 kg)   SpO2 97%   BMI 21.90 kg/m²   Constitutional: He appears well-developed and well-nourished. No acute distress. Skin: Skin is warm and dry, good turgor. No rash noted. He is not diaphoretic. Cardiovascular: Normal rate, regular rhythm, normal heart sounds, and does not have murmur. Pulmonary/Chest: Effort normal. No respiratory distress. He does not have wheezes in the lung fields. He has no rales. Neurological/Psychiatric:He is alert and oriented to person, place, and time. Coordination is  normal.  His mood isAppropriate and affect is Neutral/Euthymic(normal) . Prescription pain medication monitoring:                  MEDD current = 30              ORT Score = 0              Other Risk factors - (mood) stable              Date of Last Medication Agreement: 02/20/2023              Date Naloxone prescribed: 03/27/2023              UDT:                          Date of last UDT: 02/20/2023                          Adverse report: YES, THC 94              OARRS:                          Checked today: Yes                          Adverse report: No    IMPRESSION:   1. Chronic pain syndrome    2. Diabetic autonomic neuropathy associated with other specified diabetes mellitus ([facility]    3. Encounter for therapeutic drug monitoring    4. Type 2 diabetes mellitus without complication, without long-term current use of insulin ([facility])        PLAN:  Informed verbal consent was obtained:  -he reports percocet has not helped much. If he takes 2 he gets relief. States his feet pain is intractable. He has requested dilaudid today.  D/w patient no dilaudid.   -try morphine 15mg IR BID for one month  -No continued opiate if next UDS still has cannabis, d/w patient today   -consider Miguel Sanes  -f/u 4 weeks for reassessment       Analgesic Plan:              Continue present regimen: no              Adjust dose of present analgesic:no

## 2023-05-19 ENCOUNTER — OFFICE VISIT (OUTPATIENT)
Dept: PAIN MANAGEMENT | Age: 63
End: 2023-05-19

## 2023-05-19 VITALS
OXYGEN SATURATION: 99 % | SYSTOLIC BLOOD PRESSURE: 141 MMHG | HEART RATE: 68 BPM | WEIGHT: 154 LBS | DIASTOLIC BLOOD PRESSURE: 96 MMHG | BODY MASS INDEX: 21.48 KG/M2

## 2023-05-19 DIAGNOSIS — G89.4 CHRONIC PAIN SYNDROME: ICD-10-CM

## 2023-05-19 DIAGNOSIS — E13.43 DIABETIC AUTONOMIC NEUROPATHY ASSOCIATED WITH OTHER SPECIFIED DIABETES MELLITUS (HCC): ICD-10-CM

## 2023-05-19 DIAGNOSIS — Z51.81 ENCOUNTER FOR THERAPEUTIC DRUG MONITORING: ICD-10-CM

## 2023-05-19 DIAGNOSIS — E11.9 TYPE 2 DIABETES MELLITUS WITHOUT COMPLICATION, WITHOUT LONG-TERM CURRENT USE OF INSULIN (HCC): ICD-10-CM

## 2023-05-19 RX ORDER — BUPRENORPHINE 10 UG/H
1 PATCH TRANSDERMAL WEEKLY
Qty: 4 PATCH | Refills: 0 | Status: SHIPPED | OUTPATIENT
Start: 2023-05-19 | End: 2023-06-16

## 2023-05-19 NOTE — PROGRESS NOTES
Switch analgesics:no              Add/Adjust concomitant therapy:no      Current Outpatient Medications   Medication Sig Dispense Refill    morphine (MSIR) 15 MG tablet Take 1 tablet by mouth 2 times daily as needed for Pain for up to 28 days. Max Daily Amount: 30 mg 56 tablet 0    Cyanocobalamin (VITAMIN B12 PO) Take 1 tablet by mouth daily       No current facility-administered medications for this visit. I will continue his current medication regimen  which is part of the above treatment schedule. It has been helping with Mr. Robel Paredes chronic  medical problems which for this visit include:   Diagnoses of Chronic pain syndrome, Diabetic autonomic neuropathy associated with other specified diabetes mellitus ([facility] Encounter for therapeutic drug monitoring, and Type 2 diabetes mellitus without complication, without long-term current use of insulin ([facility] were pertinent to this visit. Risks and benefits of the medications and other alternative treatments  including no treatment were discussed with the patient. The common side effects of these medications were also explained to the patient. Informed verbal consent was obtained. Goals of current treatment regimen include improvement in pain, restoration of functioning- with focus on improvement in physical performance, general activity, work or disability,emotional distress, health care utilization and  decreased medication consumption. Will continue to monitor progress towards achieving/maintaining therapeutic goals with special emphasis on  1. Improvement in perceived interfernce  of pain with ADL's. Ability to do home exercises independently. Ability to do household chores indoor and/or outdoor work and social and leisure activities. Improve psychosocial and physical functioning. - he is not showing any significant progress/or showing regression  towards this goal and reassessment and adjustment of goals/treatment have been made.      He was advised

## 2023-05-22 ENCOUNTER — TELEPHONE (OUTPATIENT)
Dept: PAIN MANAGEMENT | Age: 63
End: 2023-05-22

## 2023-05-23 NOTE — TELEPHONE ENCOUNTER
Submitted PA for BUPRENORPHINE PATCH  [facility] Key: [id number] STATUS: PENDING. Follow up done daily; if no response in three days we will refax for status check. If another three days goes by with no response we will call the insurance for status.

## 2023-05-24 NOTE — TELEPHONE ENCOUNTER
The medication was DENIED; DENIAL letter uploaded to [employer]. EXPEDITED APPEAL FAXED IN TODAY; letter created.

## 2023-06-23 ENCOUNTER — OFFICE VISIT (OUTPATIENT)
Dept: PAIN MANAGEMENT | Age: 63
End: 2023-06-23

## 2023-06-23 VITALS
DIASTOLIC BLOOD PRESSURE: 83 MMHG | HEART RATE: 103 BPM | SYSTOLIC BLOOD PRESSURE: 130 MMHG | BODY MASS INDEX: 21.53 KG/M2 | OXYGEN SATURATION: 98 % | WEIGHT: 154.4 LBS

## 2023-06-23 DIAGNOSIS — E13.43 DIABETIC AUTONOMIC NEUROPATHY ASSOCIATED WITH OTHER SPECIFIED DIABETES MELLITUS (HCC): ICD-10-CM

## 2023-06-23 DIAGNOSIS — Z51.81 ENCOUNTER FOR THERAPEUTIC DRUG MONITORING: ICD-10-CM

## 2023-06-23 DIAGNOSIS — E11.9 TYPE 2 DIABETES MELLITUS WITHOUT COMPLICATION, WITHOUT LONG-TERM CURRENT USE OF INSULIN (HCC): ICD-10-CM

## 2023-06-23 DIAGNOSIS — G89.4 CHRONIC PAIN SYNDROME: ICD-10-CM

## 2023-06-23 RX ORDER — BUPRENORPHINE 15 UG/H
1 PATCH TRANSDERMAL WEEKLY
Qty: 4 PATCH | Refills: 0 | Status: SHIPPED | OUTPATIENT
Start: 2023-06-23 | End: 2023-07-21

## 2023-06-23 NOTE — PROGRESS NOTES
social and leisure activities. Improve psychosocial and physical functioning.- he is showing progression towards this treatment goal with the current regimen. He was advised against drinking alcohol with the narcotic pain medicines, advised against driving or handling machinery while adjusting the dose of medicines or if having cognitive  issues related to the current medications. Risk of overdose and death, if medicines not taken as prescribed, were also discussed. If the patient develops new symptoms or if the symptoms worsen, the patient should call the office. While transcribing every attempt was made to maintain the accuracy of the note in terms of it's contents,there may have been some errors made inadvertently. Thank you for allowing me to participate in the care of this patient.     Marci Dandy, GUZMAN-C    Cc: Jeanne George,

## 2023-06-30 ENCOUNTER — TELEPHONE (OUTPATIENT)
Dept: PAIN MANAGEMENT | Age: 63
End: 2023-06-30

## 2023-07-31 NOTE — PROGRESS NOTES
11/10/2022     Savanna Dorantes (:  1960) is a 57 y.o. male, here for evaluation of the following medical concerns:    HPI  Diabetes Mellitus Type 2: Current symptoms/problems include neuropathy. Medication compliance:  noncompliant: stopped his metformin  Diabetic diet compliance:  compliant most of the time,  Weight trend: stable  Current exercise: no regular exercise  Barriers: lack of motivation    Home blood sugar records: patient does not test  Any episodes of hypoglycemia? no  Eye exam current (within one year): no   reports that he has been smoking cigars and cigarettes. He has never used smokeless tobacco.   Daily Aspirin? No    COPD/IPF: Had a hospitalization earlier this year with COPD exacerbation. Fibrosis of his lung was found during that visit. He saw me for hospital follow-up and was referred to pulmonology. Missed that appointment. Was started on Anoro here in clinic. He was given steroids when seen for hospital follow-up and is breathing has been better since then. Left his most recent appointment and decided because he was breathing better he was again to fill the Anoro. Denies respiratory symptoms today. Syncope: Patient has a history of persistent orthostatic syncope or presyncopal symptoms. Had a long work-up, which concluded with the diagnosis of autonomic neuropathy from his diabetes. He stopped his midodrine and has had several presyncopal episodes since.     Lab Results   Component Value Date    LABA1C 7.2 2022    LABA1C 6.6 2022    LABA1C 6.2 2022     Lab Results   Component Value Date    LABMICR YES 2022    CREATININE 0.6 (L) 2022     Lab Results   Component Value Date    ALT 21 2022    AST 45 (H) 2022     Lab Results   Component Value Date    CHOL 149 2022    TRIG 256 (H) 2022    HDL 37 (L) 2022    LDLCALC 61 2022          Review of Systems   Constitutional:  Negative for activity change, appetite RX PROGRESS NOTE: Vancomycin Therapeutic Drug Monitoring      Indication for therapy: Sepsis and Non-necrotizing SSTI    ALLERGIES:   Allergen Reactions   • Unasyn [Ampicillin-Sulbactam Sodium] Other (See Comments)     Acute interstitial nephritis /// Tolerates cephalosporins   • Morphine PRURITUS       Most recent height and weight information:  Weight: 66.2 kg (07/30/23 2322)  Height: 5' 7\" (170.2 cm) (07/30/23 2322)    The Following are the calculated  Current Weights for Justo Robert     Adjusted Ideal    66.1 kg 66.1 kg             Labs:  Serum Creatinine and Creatinine Clearance:  Serum creatinine: 3.12 mg/dL (H) 07/30/23 1920  Estimated creatinine clearance: 15 mL/min (A)    Maximum Temperature (last 24 hours)     Value Max    Temp 97.7 °F (36.5 °C)        WBC (K/mcL)   Date/Time Value   07/30/2023 1920 6.8     Microbiology Results     None            Assessment/Plan:  Briefly, this is a 88 year old male started on vancomycin for Sepsis and Non-necrotizing SSTI, with a target serum trough concentration of 10-15 mcg/mL.  Patient received a dose of vancomycin 1250 mg at 2101.  Initial dosing regimen will be 250 mg loading dose once, followed by a maintenance dose of 1000 mg every 48 hours..    Pharmacy will monitor levels as appropriate.    Pharmacy will continue to monitor patient (renal function, microbiology data, risk factors for adverse events, appropriate duration of therapy), will order/monitor serum levels as appropriate, and will adjust dose if/when necessary.      Thank you,    Aden Thomas [facility]  7/30/2023 11:25 PM     change, fatigue and unexpected weight change. Eyes:  Negative for visual disturbance. Respiratory:  Negative for chest tightness and shortness of breath. Gastrointestinal:  Negative for abdominal distention, abdominal pain, diarrhea and nausea. Endocrine: Negative for polydipsia, polyphagia and polyuria. Genitourinary:  Negative for decreased urine volume, dysuria and frequency. Musculoskeletal:  Negative for gait problem and myalgias. Skin:  Negative for rash and wound. Neurological:  Positive for light-headedness and numbness. Negative for dizziness, syncope and weakness. Hematological:  Does not bruise/bleed easily. Prior to Visit Medications    Medication Sig Taking? Authorizing Provider   metFORMIN (GLUCOPHAGE-XR) 500 MG extended release tablet Take 2 tablets by mouth daily (with breakfast) Yes Lala Casper DO   midodrine (PROAMATINE) 5 MG tablet Take 1 tablet by mouth 2 times daily Yes Lala Casper DO   umeclidinium-vilanterol (ANORO ELLIPTA) 62.5-25 MCG/INH AEPB inhaler Inhale 1 puff into the lungs daily Yes Lala Casper DO   thiamine mononitrate (THIAMINE) 100 MG tablet Take 1 tablet by mouth in the morning. Denis Pruitt MD        Social History     Tobacco Use    Smoking status: Every Day     Packs/day: 0.00     Years: 40.00     Pack years: 0.00     Types: Cigars, Cigarettes    Smokeless tobacco: Never    Tobacco comments:     2 cigars daily    Substance Use Topics    Alcohol use: Yes     Comment: 48 oz beer/day        Vitals:    11/10/22 1339   BP: 104/78   Pulse: (!) 118   Temp: 97.9 °F (36.6 °C)   TempSrc: Temporal   Weight: 148 lb (67.1 kg)   Height: 5' 10\" (1.778 m)     Estimated body mass index is 21.24 kg/m² as calculated from the following:    Height as of this encounter: 5' 10\" (1.778 m). Weight as of this encounter: 148 lb (67.1 kg). Physical Exam  Constitutional:       Appearance: Normal appearance. He is normal weight. HENT:      Head: Normocephalic and atraumatic. Nose: Nose normal.      Mouth/Throat:      Mouth: Mucous membranes are moist.      Pharynx: Oropharynx is clear. Cardiovascular:      Rate and Rhythm: Regular rhythm. Tachycardia present. Pulses: Normal pulses. Heart sounds: Normal heart sounds. Pulmonary:      Effort: Pulmonary effort is normal.      Comments: Diminished breath sounds throughout  Abdominal:      General: Abdomen is flat. Bowel sounds are normal.      Palpations: Abdomen is soft. Musculoskeletal:      Cervical back: Normal range of motion. Lymphadenopathy:      Cervical: No cervical adenopathy. Skin:     General: Skin is warm and dry. Capillary Refill: Capillary refill takes less than 2 seconds. Findings: No rash. Neurological:      Mental Status: He is alert. Mental status is at baseline. Psychiatric:         Mood and Affect: Mood normal.         Behavior: Behavior normal.         Thought Content: Thought content normal.         Judgment: Judgment normal.       ASSESSMENT/PLAN:  1. Type 2 diabetes mellitus without complication, without long-term current use of insulin ([facility]): His A1c has been creeping up. Explained the benefits of metformin the patient with type 2 diabetes and the importance of compliance. Also explained that, I suspect given his COPD and interstitial lung disease there may be a strong chance he could need additional steroids in the future. Because of this, will be important that we have tight glycemic control.  - metFORMIN (GLUCOPHAGE-XR) 500 MG extended release tablet; Take 2 tablets by mouth daily (with breakfast)  Dispense: 90 tablet; Refill: 3    2. Diabetic autonomic neuropathy associated with other specified diabetes mellitus [facility]): Has syncopal episode secondary to autonomic neuropathy. Has stopped his midodrine. Lites important he takes this in order to present full syncopal episodes; especially if he is going to drive. Restarting it today.   - midodrine (PROAMATINE) 5 MG tablet; Take 1 tablet by mouth 2 times daily  Dispense: 180 tablet; Refill: 3    3. Centrilobular emphysema ([facility] [address]): His breathing has been better, so he never filled the Anoro. Explained the chronic progressive nature of COPD as well as the benefit of inhalers with preventing exacerbations. He says he will fill today. - umeclidinium-vilanterol (ANORO ELLIPTA) 62.5-25 MCG/INH AEPB inhaler; Inhale 1 puff into the lungs daily  Dispense: 60 each; Refill: 5    4. Interstitial lung disease ([facility]): Was referred to pulmonology earlier this year, but no showed the appointment. We have him scheduled for January 16, 2023 as the pulmonologist was courteously willing to see him again. Return in about 6 weeks (around 12/22/2022). An electronic signature was used to authenticate this note.     --Griselda Kern,  on 11/10/2022 at 2:15 PM

## 2023-08-11 ENCOUNTER — OFFICE VISIT (OUTPATIENT)
Dept: PAIN MANAGEMENT | Age: 63
End: 2023-08-11
Payer: MEDICAID

## 2023-08-11 VITALS
WEIGHT: 154 LBS | SYSTOLIC BLOOD PRESSURE: 165 MMHG | DIASTOLIC BLOOD PRESSURE: 110 MMHG | OXYGEN SATURATION: 99 % | HEART RATE: 86 BPM | BODY MASS INDEX: 21.48 KG/M2

## 2023-08-11 DIAGNOSIS — E11.9 TYPE 2 DIABETES MELLITUS WITHOUT COMPLICATION, WITHOUT LONG-TERM CURRENT USE OF INSULIN (HCC): ICD-10-CM

## 2023-08-11 DIAGNOSIS — G89.4 CHRONIC PAIN SYNDROME: ICD-10-CM

## 2023-08-11 DIAGNOSIS — Z51.81 ENCOUNTER FOR THERAPEUTIC DRUG MONITORING: ICD-10-CM

## 2023-08-11 DIAGNOSIS — E13.43 DIABETIC AUTONOMIC NEUROPATHY ASSOCIATED WITH OTHER SPECIFIED DIABETES MELLITUS (HCC): ICD-10-CM

## 2023-08-11 PROCEDURE — G8427 DOCREV CUR MEDS BY ELIG CLIN: HCPCS | Performed by: NURSE PRACTITIONER

## 2023-08-11 PROCEDURE — 99213 OFFICE O/P EST LOW 20 MIN: CPT | Performed by: NURSE PRACTITIONER

## 2023-08-11 PROCEDURE — 3017F COLORECTAL CA SCREEN DOC REV: CPT | Performed by: NURSE PRACTITIONER

## 2023-08-11 PROCEDURE — 2022F DILAT RTA XM EVC RTNOPTHY: CPT | Performed by: NURSE PRACTITIONER

## 2023-08-11 PROCEDURE — 4004F PT TOBACCO SCREEN RCVD TLK: CPT | Performed by: NURSE PRACTITIONER

## 2023-08-11 PROCEDURE — 3046F HEMOGLOBIN A1C LEVEL >9.0%: CPT | Performed by: NURSE PRACTITIONER

## 2023-08-11 PROCEDURE — G8420 CALC BMI NORM PARAMETERS: HCPCS | Performed by: NURSE PRACTITIONER

## 2023-08-11 RX ORDER — BUPRENORPHINE 15 UG/H
1 PATCH TRANSDERMAL WEEKLY
Qty: 4 PATCH | Refills: 0 | Status: SHIPPED | OUTPATIENT
Start: 2023-08-11 | End: 2023-09-08

## 2023-08-11 NOTE — PROGRESS NOTES
advised against driving or handling machinery while adjusting the dose of medicines or if having cognitive  issues related to the current medications. Risk of overdose and death, if medicines not taken as prescribed, were also discussed. If the patient develops new symptoms or if the symptoms worsen, the patient should call the office. While transcribing every attempt was made to maintain the accuracy of the note in terms of it's contents,there may have been some errors made inadvertently. Thank you for allowing me to participate in the care of this patient.     Luz Maria Graves, NP-C    Cc: Karolyn Nair, DO

## 2023-08-18 ENCOUNTER — TELEPHONE (OUTPATIENT)
Dept: PRIMARY CARE CLINIC | Age: 63
End: 2023-08-18

## 2023-08-18 NOTE — TELEPHONE ENCOUNTER
----- Message from Sierra De Los Santos sent at 8/18/2023  2:38 PM EDT -----  Subject: Message to Provider    QUESTIONS  Information for Provider? Pt would like to know if PCP could prescribe pt   with some medication for bp. Bp levels were around 160-180 when seeing   pain management provider on 08/11. Preferred pharamacy:[id number]   Danie Toribio, [address] [facility] [phone] - f [phone]  ---------------------------------------------------------------------------  --------------  Isaías BOSTON  [phone]; OK to leave message on voicemail  ---------------------------------------------------------------------------  --------------  SCRIPT ANSWERS  Relationship to Patient?  Self

## 2023-08-31 ENCOUNTER — OFFICE VISIT (OUTPATIENT)
Dept: PRIMARY CARE CLINIC | Age: 63
End: 2023-08-31
Payer: MEDICAID

## 2023-08-31 VITALS
DIASTOLIC BLOOD PRESSURE: 96 MMHG | TEMPERATURE: 97.3 F | BODY MASS INDEX: 21.87 KG/M2 | HEART RATE: 77 BPM | SYSTOLIC BLOOD PRESSURE: 150 MMHG | WEIGHT: 156.8 LBS

## 2023-08-31 DIAGNOSIS — Z13.89 SCREENING FOR NEPHROPATHY: ICD-10-CM

## 2023-08-31 DIAGNOSIS — Z13.220 SCREENING FOR HYPERLIPIDEMIA: ICD-10-CM

## 2023-08-31 DIAGNOSIS — Z12.2 SCREENING FOR LUNG CANCER: ICD-10-CM

## 2023-08-31 DIAGNOSIS — E11.9 TYPE 2 DIABETES MELLITUS WITHOUT COMPLICATION, WITHOUT LONG-TERM CURRENT USE OF INSULIN (HCC): Primary | ICD-10-CM

## 2023-08-31 DIAGNOSIS — J84.9 INTERSTITIAL LUNG DISEASE (HCC): ICD-10-CM

## 2023-08-31 DIAGNOSIS — E11.59 HYPERTENSION ASSOCIATED WITH DIABETES (HCC): ICD-10-CM

## 2023-08-31 DIAGNOSIS — J43.2 CENTRILOBULAR EMPHYSEMA (HCC): ICD-10-CM

## 2023-08-31 DIAGNOSIS — I15.2 HYPERTENSION ASSOCIATED WITH DIABETES (HCC): ICD-10-CM

## 2023-08-31 DIAGNOSIS — F17.210 CIGARETTE NICOTINE DEPENDENCE WITHOUT COMPLICATION: ICD-10-CM

## 2023-08-31 DIAGNOSIS — E13.42 DIABETIC POLYNEUROPATHY ASSOCIATED WITH OTHER SPECIFIED DIABETES MELLITUS (HCC): ICD-10-CM

## 2023-08-31 DIAGNOSIS — Z12.11 SCREEN FOR COLON CANCER: ICD-10-CM

## 2023-08-31 PROBLEM — I95.1 ORTHOSTATIC SYNCOPE: Status: RESOLVED | Noted: 2021-04-17 | Resolved: 2023-08-31

## 2023-08-31 LAB — HBA1C MFR BLD: 6.2 %

## 2023-08-31 PROCEDURE — 3017F COLORECTAL CA SCREEN DOC REV: CPT | Performed by: STUDENT IN AN ORGANIZED HEALTH CARE EDUCATION/TRAINING PROGRAM

## 2023-08-31 PROCEDURE — 3044F HG A1C LEVEL LT 7.0%: CPT | Performed by: STUDENT IN AN ORGANIZED HEALTH CARE EDUCATION/TRAINING PROGRAM

## 2023-08-31 PROCEDURE — G8420 CALC BMI NORM PARAMETERS: HCPCS | Performed by: STUDENT IN AN ORGANIZED HEALTH CARE EDUCATION/TRAINING PROGRAM

## 2023-08-31 PROCEDURE — 99214 OFFICE O/P EST MOD 30 MIN: CPT | Performed by: STUDENT IN AN ORGANIZED HEALTH CARE EDUCATION/TRAINING PROGRAM

## 2023-08-31 PROCEDURE — 3080F DIAST BP >= 90 MM HG: CPT | Performed by: STUDENT IN AN ORGANIZED HEALTH CARE EDUCATION/TRAINING PROGRAM

## 2023-08-31 PROCEDURE — 3077F SYST BP >= 140 MM HG: CPT | Performed by: STUDENT IN AN ORGANIZED HEALTH CARE EDUCATION/TRAINING PROGRAM

## 2023-08-31 PROCEDURE — 4004F PT TOBACCO SCREEN RCVD TLK: CPT | Performed by: STUDENT IN AN ORGANIZED HEALTH CARE EDUCATION/TRAINING PROGRAM

## 2023-08-31 PROCEDURE — 3023F SPIROM DOC REV: CPT | Performed by: STUDENT IN AN ORGANIZED HEALTH CARE EDUCATION/TRAINING PROGRAM

## 2023-08-31 PROCEDURE — 83036 HEMOGLOBIN GLYCOSYLATED A1C: CPT | Performed by: STUDENT IN AN ORGANIZED HEALTH CARE EDUCATION/TRAINING PROGRAM

## 2023-08-31 PROCEDURE — 2022F DILAT RTA XM EVC RTNOPTHY: CPT | Performed by: STUDENT IN AN ORGANIZED HEALTH CARE EDUCATION/TRAINING PROGRAM

## 2023-08-31 PROCEDURE — G8427 DOCREV CUR MEDS BY ELIG CLIN: HCPCS | Performed by: STUDENT IN AN ORGANIZED HEALTH CARE EDUCATION/TRAINING PROGRAM

## 2023-08-31 RX ORDER — LOSARTAN POTASSIUM AND HYDROCHLOROTHIAZIDE 12.5; 5 MG/1; MG/1
1 TABLET ORAL DAILY
Qty: 90 TABLET | Refills: 1 | Status: SHIPPED | OUTPATIENT
Start: 2023-08-31

## 2023-08-31 ASSESSMENT — ENCOUNTER SYMPTOMS
ABDOMINAL PAIN: 0
ABDOMINAL DISTENTION: 0
NAUSEA: 0
CHEST TIGHTNESS: 1
SHORTNESS OF BREATH: 0
DIARRHEA: 0

## 2023-08-31 NOTE — PROGRESS NOTES
2023     Danette Bullard (:  1960) is a 60 y.o. male, here for evaluation of the following medical concerns:    HPI  COPD:  Current treatment includes nothing. Residual symptoms: chronic dyspnea. He denies any other symptoms. Currently untreated. Has declined further follow-up with pulmonology. Does not want an inhaler. Continues to smoke. .    Diabetes Mellitus Type 2: Current symptoms/problems include neuropathy. Medication compliance:   Not applicable  Diabetic diet compliance: Does not follow any particular diet,  Weight trend: stable  Current exercise: no regular exercise  Barriers: lack of motivation and lack of support    Home blood sugar records: patient does not test  Any episodes of hypoglycemia? no  Eye exam current (within one year): no   reports that he has been smoking cigars and cigarettes. He has a 40.00 pack-year smoking history. He has never used smokeless tobacco.   Daily Aspirin? Not applicable    Lab Results   Component Value Date    LABA1C 6.2 2023    LABA1C 7.2 2022    LABA1C 6.6 2022     Lab Results   Component Value Date    CREATININE 0.6 (L) 2022     Lab Results   Component Value Date    ALT 21 2022    AST 45 (H) 2022     Lab Results   Component Value Date    CHOL 149 2022    TRIG 256 (H) 2022    HDL 37 (L) 2022    LDLCALC 61 2022        Hyperlipidemia:  Patient is not following a low fat, low cholesterol diet. He is not exercising regularly. OTC Supplements: none. Lab Results   Component Value Date    CHOL 149 2022    TRIG 256 (H) 2022    HDL 37 (L) 2022    LDLCALC 61 2022     Lab Results   Component Value Date    ALT 21 2022    AST 45 (H) 2022        Hypertension:  Home blood pressure monitoring: Yes, has been greater than 150/90. He is not adherent to a low sodium diet. Patient denies chest pain, blurred vision, peripheral edema, palpitations, and dry cough.

## 2023-09-14 ENCOUNTER — OFFICE VISIT (OUTPATIENT)
Dept: PAIN MANAGEMENT | Age: 63
End: 2023-09-14

## 2023-09-14 VITALS
DIASTOLIC BLOOD PRESSURE: 98 MMHG | HEART RATE: 90 BPM | OXYGEN SATURATION: 99 % | BODY MASS INDEX: 21.59 KG/M2 | SYSTOLIC BLOOD PRESSURE: 151 MMHG | WEIGHT: 154.8 LBS

## 2023-09-14 DIAGNOSIS — E11.9 TYPE 2 DIABETES MELLITUS WITHOUT COMPLICATION, WITHOUT LONG-TERM CURRENT USE OF INSULIN (HCC): ICD-10-CM

## 2023-09-14 DIAGNOSIS — G89.4 CHRONIC PAIN SYNDROME: ICD-10-CM

## 2023-09-14 DIAGNOSIS — Z51.81 ENCOUNTER FOR THERAPEUTIC DRUG MONITORING: ICD-10-CM

## 2023-09-14 DIAGNOSIS — E13.43 DIABETIC AUTONOMIC NEUROPATHY ASSOCIATED WITH OTHER SPECIFIED DIABETES MELLITUS (HCC): ICD-10-CM

## 2023-09-14 RX ORDER — BUPRENORPHINE 15 UG/H
1 PATCH TRANSDERMAL WEEKLY
Qty: 4 PATCH | Refills: 0 | Status: SHIPPED | OUTPATIENT
Start: 2023-09-14 | End: 2023-10-12

## 2023-10-12 ENCOUNTER — OFFICE VISIT (OUTPATIENT)
Dept: PAIN MANAGEMENT | Age: 63
End: 2023-10-12
Payer: MEDICAID

## 2023-10-12 ENCOUNTER — TELEPHONE (OUTPATIENT)
Dept: PAIN MANAGEMENT | Age: 63
End: 2023-10-12

## 2023-10-12 VITALS
HEART RATE: 90 BPM | BODY MASS INDEX: 21.76 KG/M2 | OXYGEN SATURATION: 98 % | SYSTOLIC BLOOD PRESSURE: 139 MMHG | WEIGHT: 156 LBS | DIASTOLIC BLOOD PRESSURE: 87 MMHG

## 2023-10-12 DIAGNOSIS — E13.43 DIABETIC AUTONOMIC NEUROPATHY ASSOCIATED WITH OTHER SPECIFIED DIABETES MELLITUS (HCC): Primary | ICD-10-CM

## 2023-10-12 DIAGNOSIS — G89.4 CHRONIC PAIN SYNDROME: ICD-10-CM

## 2023-10-12 DIAGNOSIS — E11.9 TYPE 2 DIABETES MELLITUS WITHOUT COMPLICATION, WITHOUT LONG-TERM CURRENT USE OF INSULIN (HCC): ICD-10-CM

## 2023-10-12 DIAGNOSIS — Z51.81 ENCOUNTER FOR THERAPEUTIC DRUG MONITORING: ICD-10-CM

## 2023-10-12 PROCEDURE — G8484 FLU IMMUNIZE NO ADMIN: HCPCS | Performed by: NURSE PRACTITIONER

## 2023-10-12 PROCEDURE — 4004F PT TOBACCO SCREEN RCVD TLK: CPT | Performed by: NURSE PRACTITIONER

## 2023-10-12 PROCEDURE — 3017F COLORECTAL CA SCREEN DOC REV: CPT | Performed by: NURSE PRACTITIONER

## 2023-10-12 PROCEDURE — G8420 CALC BMI NORM PARAMETERS: HCPCS | Performed by: NURSE PRACTITIONER

## 2023-10-12 PROCEDURE — 2022F DILAT RTA XM EVC RTNOPTHY: CPT | Performed by: NURSE PRACTITIONER

## 2023-10-12 PROCEDURE — 99213 OFFICE O/P EST LOW 20 MIN: CPT | Performed by: NURSE PRACTITIONER

## 2023-10-12 PROCEDURE — 3044F HG A1C LEVEL LT 7.0%: CPT | Performed by: NURSE PRACTITIONER

## 2023-10-12 PROCEDURE — G8427 DOCREV CUR MEDS BY ELIG CLIN: HCPCS | Performed by: NURSE PRACTITIONER

## 2023-10-12 RX ORDER — BUPRENORPHINE 20 UG/H
1 PATCH TRANSDERMAL WEEKLY
Qty: 4 PATCH | Refills: 0 | Status: SHIPPED | OUTPATIENT
Start: 2023-10-12 | End: 2023-11-11

## 2023-10-12 NOTE — TELEPHONE ENCOUNTER
Pt will likely need new PA on the butrans r/t dose change.  Looks like they required that last time I changed the dose

## 2023-10-13 NOTE — TELEPHONE ENCOUNTER
Submitted PA for Butrans [facility] Key: [id number] STATUS: PENDING. Follow up done daily; if no response in three days we will refax for status check. If another three days goes by with no response we will call the insurance for status.

## 2023-11-13 ENCOUNTER — OFFICE VISIT (OUTPATIENT)
Dept: PAIN MANAGEMENT | Age: 63
End: 2023-11-13
Payer: MEDICAID

## 2023-11-13 VITALS
OXYGEN SATURATION: 97 % | WEIGHT: 156.4 LBS | HEART RATE: 94 BPM | BODY MASS INDEX: 21.81 KG/M2 | SYSTOLIC BLOOD PRESSURE: 132 MMHG | DIASTOLIC BLOOD PRESSURE: 89 MMHG

## 2023-11-13 DIAGNOSIS — E11.9 TYPE 2 DIABETES MELLITUS WITHOUT COMPLICATION, WITHOUT LONG-TERM CURRENT USE OF INSULIN (HCC): ICD-10-CM

## 2023-11-13 DIAGNOSIS — E13.43 DIABETIC AUTONOMIC NEUROPATHY ASSOCIATED WITH OTHER SPECIFIED DIABETES MELLITUS (HCC): Primary | ICD-10-CM

## 2023-11-13 DIAGNOSIS — G89.4 CHRONIC PAIN SYNDROME: ICD-10-CM

## 2023-11-13 DIAGNOSIS — Z51.81 ENCOUNTER FOR THERAPEUTIC DRUG MONITORING: ICD-10-CM

## 2023-11-13 PROCEDURE — 4004F PT TOBACCO SCREEN RCVD TLK: CPT | Performed by: NURSE PRACTITIONER

## 2023-11-13 PROCEDURE — 99213 OFFICE O/P EST LOW 20 MIN: CPT | Performed by: NURSE PRACTITIONER

## 2023-11-13 PROCEDURE — G8427 DOCREV CUR MEDS BY ELIG CLIN: HCPCS | Performed by: NURSE PRACTITIONER

## 2023-11-13 PROCEDURE — 2022F DILAT RTA XM EVC RTNOPTHY: CPT | Performed by: NURSE PRACTITIONER

## 2023-11-13 PROCEDURE — 3044F HG A1C LEVEL LT 7.0%: CPT | Performed by: NURSE PRACTITIONER

## 2023-11-13 PROCEDURE — 3017F COLORECTAL CA SCREEN DOC REV: CPT | Performed by: NURSE PRACTITIONER

## 2023-11-13 PROCEDURE — G8420 CALC BMI NORM PARAMETERS: HCPCS | Performed by: NURSE PRACTITIONER

## 2023-11-13 PROCEDURE — G8484 FLU IMMUNIZE NO ADMIN: HCPCS | Performed by: NURSE PRACTITIONER

## 2023-11-13 RX ORDER — BUPRENORPHINE 20 UG/H
1 PATCH TRANSDERMAL WEEKLY
Qty: 4 PATCH | Refills: 0 | Status: SHIPPED | OUTPATIENT
Start: 2023-11-13 | End: 2023-12-13

## 2023-11-13 NOTE — PROGRESS NOTES
he is showing progression towards this treatment goal with the current regimen. He was advised against drinking alcohol with the narcotic pain medicines, advised against driving or handling machinery while adjusting the dose of medicines or if having cognitive  issues related to the current medications. Risk of overdose and death, if medicines not taken as prescribed, were also discussed. If the patient develops new symptoms or if the symptoms worsen, the patient should call the office. While transcribing every attempt was made to maintain the accuracy of the note in terms of it's contents,there may have been some errors made inadvertently. Thank you for allowing me to participate in the care of this patient.     Raeann Walton NP-C    Cc: Reji Torres, DO

## 2024-03-19 ENCOUNTER — TELEPHONE (OUTPATIENT)
Dept: PRIMARY CARE CLINIC | Age: 64
End: 2024-03-19

## 2024-03-19 DIAGNOSIS — E11.9 TYPE 2 DIABETES MELLITUS WITHOUT COMPLICATION, WITHOUT LONG-TERM CURRENT USE OF INSULIN (HCC): Primary | ICD-10-CM

## 2024-03-19 DIAGNOSIS — J84.9 INTERSTITIAL LUNG DISEASE (HCC): ICD-10-CM

## 2024-03-19 NOTE — TELEPHONE ENCOUNTER
Forms came in form the Law office of Asim Guerra for Physical Functional Capacity assessment. A order needs to be placed for this to be done correct?

## (undated) DEVICE — Z DISCONTINUED USE 2749457 TUBING SAMP AD W12.5XH8.4IN D9.1IN NSL ORAL SMRT CAPNOLINE

## (undated) DEVICE — FORCEPS BX L100CM DIA1.8MM WRK CHN 2MM PULM S STL RAD JAW 4